# Patient Record
Sex: MALE | Race: WHITE | Employment: OTHER | URBAN - METROPOLITAN AREA
[De-identification: names, ages, dates, MRNs, and addresses within clinical notes are randomized per-mention and may not be internally consistent; named-entity substitution may affect disease eponyms.]

---

## 2017-04-17 PROBLEM — K21.9 GASTROESOPHAGEAL REFLUX DISEASE WITHOUT ESOPHAGITIS: Status: ACTIVE | Noted: 2017-04-17

## 2017-04-17 PROBLEM — R10.13 EPIGASTRIC PAIN: Status: ACTIVE | Noted: 2017-04-17

## 2017-07-05 PROBLEM — G47.33 OSA (OBSTRUCTIVE SLEEP APNEA): Status: ACTIVE | Noted: 2017-07-05

## 2017-12-29 PROBLEM — J01.00 ACUTE NON-RECURRENT MAXILLARY SINUSITIS: Status: ACTIVE | Noted: 2017-12-29

## 2017-12-29 PROBLEM — S33.5XXA LUMBAR BACK SPRAIN: Status: ACTIVE | Noted: 2017-12-29

## 2018-01-09 PROBLEM — E79.0 HYPERURICEMIA: Status: ACTIVE | Noted: 2018-01-09

## 2018-01-24 PROBLEM — J45.20 MILD INTERMITTENT ASTHMA WITHOUT COMPLICATION: Status: ACTIVE | Noted: 2018-01-24

## 2018-01-24 PROBLEM — G47.34 NOCTURNAL HYPOXEMIA: Status: ACTIVE | Noted: 2018-01-24

## 2018-05-22 ENCOUNTER — OFFICE VISIT (OUTPATIENT)
Dept: FAMILY MEDICINE CLINIC | Age: 73
End: 2018-05-22
Payer: MEDICARE

## 2018-05-22 ENCOUNTER — HOSPITAL ENCOUNTER (OUTPATIENT)
Age: 73
Discharge: HOME | End: 2018-05-24
Payer: MEDICARE

## 2018-05-22 ENCOUNTER — HOSPITAL ENCOUNTER (OUTPATIENT)
Dept: GENERAL RADIOLOGY | Age: 73
Discharge: HOME | End: 2018-05-24
Payer: MEDICARE

## 2018-05-22 VITALS
TEMPERATURE: 98 F | OXYGEN SATURATION: 96 % | HEIGHT: 69 IN | SYSTOLIC BLOOD PRESSURE: 130 MMHG | BODY MASS INDEX: 31.1 KG/M2 | RESPIRATION RATE: 20 BRPM | HEART RATE: 68 BPM | WEIGHT: 210 LBS | DIASTOLIC BLOOD PRESSURE: 72 MMHG

## 2018-05-22 DIAGNOSIS — M54.50 ACUTE BILATERAL LOW BACK PAIN WITHOUT SCIATICA: ICD-10-CM

## 2018-05-22 DIAGNOSIS — M54.50 ACUTE BILATERAL LOW BACK PAIN WITHOUT SCIATICA: Primary | ICD-10-CM

## 2018-05-22 PROCEDURE — 96372 THER/PROPH/DIAG INJ SC/IM: CPT | Performed by: FAMILY MEDICINE

## 2018-05-22 PROCEDURE — 72110 X-RAY EXAM L-2 SPINE 4/>VWS: CPT

## 2018-05-22 PROCEDURE — 99213 OFFICE O/P EST LOW 20 MIN: CPT | Performed by: FAMILY MEDICINE

## 2018-05-22 RX ORDER — PREDNISONE 20 MG/1
TABLET ORAL
Qty: 11 TABLET | Refills: 0 | Status: SHIPPED | OUTPATIENT
Start: 2018-05-22 | End: 2018-05-30 | Stop reason: ALTCHOICE

## 2018-05-22 RX ORDER — METHYLPREDNISOLONE ACETATE 80 MG/ML
80 INJECTION, SUSPENSION INTRA-ARTICULAR; INTRALESIONAL; INTRAMUSCULAR; SOFT TISSUE ONCE
Status: COMPLETED | OUTPATIENT
Start: 2018-05-22 | End: 2018-05-22

## 2018-05-22 RX ORDER — TRAMADOL HYDROCHLORIDE 50 MG/1
50 TABLET ORAL 2 TIMES DAILY PRN
Qty: 14 TABLET | Refills: 0 | Status: SHIPPED | OUTPATIENT
Start: 2018-05-22 | End: 2018-05-29

## 2018-05-22 RX ADMIN — METHYLPREDNISOLONE ACETATE 80 MG: 80 INJECTION, SUSPENSION INTRA-ARTICULAR; INTRALESIONAL; INTRAMUSCULAR; SOFT TISSUE at 14:39

## 2018-05-22 ASSESSMENT — ENCOUNTER SYMPTOMS
PHOTOPHOBIA: 0
SINUS PRESSURE: 0
SHORTNESS OF BREATH: 0
BACK PAIN: 1
COLOR CHANGE: 0
APNEA: 0
CHEST TIGHTNESS: 0
NAUSEA: 0
VOMITING: 0
WHEEZING: 0
ABDOMINAL PAIN: 0
SORE THROAT: 0
BLOOD IN STOOL: 0
FACIAL SWELLING: 0
ALLERGIC/IMMUNOLOGIC NEGATIVE: 1
DIARRHEA: 0
COUGH: 0

## 2018-05-30 ENCOUNTER — HOSPITAL ENCOUNTER (OUTPATIENT)
Dept: GENERAL RADIOLOGY | Age: 73
Discharge: HOME | End: 2018-06-01
Payer: MEDICARE

## 2018-05-30 ENCOUNTER — HOSPITAL ENCOUNTER (OUTPATIENT)
Age: 73
Discharge: HOME | End: 2018-06-01
Payer: MEDICARE

## 2018-05-30 ENCOUNTER — OFFICE VISIT (OUTPATIENT)
Dept: FAMILY MEDICINE CLINIC | Age: 73
End: 2018-05-30
Payer: MEDICARE

## 2018-05-30 VITALS
DIASTOLIC BLOOD PRESSURE: 60 MMHG | SYSTOLIC BLOOD PRESSURE: 126 MMHG | WEIGHT: 208 LBS | RESPIRATION RATE: 22 BRPM | HEART RATE: 73 BPM | BODY MASS INDEX: 30.81 KG/M2 | OXYGEN SATURATION: 95 % | HEIGHT: 69 IN

## 2018-05-30 DIAGNOSIS — M48.02 CERVICAL SPINAL STENOSIS: Primary | ICD-10-CM

## 2018-05-30 DIAGNOSIS — M50.30 DDD (DEGENERATIVE DISC DISEASE), CERVICAL: ICD-10-CM

## 2018-05-30 DIAGNOSIS — E78.5 HYPERLIPIDEMIA, UNSPECIFIED HYPERLIPIDEMIA TYPE: ICD-10-CM

## 2018-05-30 DIAGNOSIS — M51.36 DDD (DEGENERATIVE DISC DISEASE), LUMBAR: ICD-10-CM

## 2018-05-30 DIAGNOSIS — I35.0 AORTIC VALVE STENOSIS, ETIOLOGY OF CARDIAC VALVE DISEASE UNSPECIFIED: ICD-10-CM

## 2018-05-30 DIAGNOSIS — M50.30 DDD (DEGENERATIVE DISC DISEASE), CERVICAL: Primary | ICD-10-CM

## 2018-05-30 PROBLEM — M51.369 DDD (DEGENERATIVE DISC DISEASE), LUMBAR: Status: ACTIVE | Noted: 2018-05-30

## 2018-05-30 LAB
ALBUMIN SERPL-MCNC: 4.4 G/DL (ref 3.5–5.2)
ALP BLD-CCNC: 46 U/L (ref 40–129)
ALT SERPL-CCNC: 27 U/L (ref 0–40)
ANION GAP SERPL CALCULATED.3IONS-SCNC: 17 MMOL/L (ref 7–16)
AST SERPL-CCNC: 27 U/L (ref 0–39)
BASOPHILS ABSOLUTE: 0.06 E9/L (ref 0–0.2)
BASOPHILS RELATIVE PERCENT: 0.7 % (ref 0–2)
BILIRUB SERPL-MCNC: 0.4 MG/DL (ref 0–1.2)
BUN BLDV-MCNC: 23 MG/DL (ref 8–23)
CALCIUM SERPL-MCNC: 9.7 MG/DL (ref 8.6–10.2)
CHLORIDE BLD-SCNC: 100 MMOL/L (ref 98–107)
CHOLESTEROL, TOTAL: 199 MG/DL (ref 0–199)
CO2: 24 MMOL/L (ref 22–29)
CREAT SERPL-MCNC: 0.7 MG/DL (ref 0.7–1.2)
EOSINOPHILS ABSOLUTE: 0.02 E9/L (ref 0.05–0.5)
EOSINOPHILS RELATIVE PERCENT: 0.2 % (ref 0–6)
GFR AFRICAN AMERICAN: >60
GFR NON-AFRICAN AMERICAN: >60 ML/MIN/1.73
GLUCOSE BLD-MCNC: 135 MG/DL (ref 74–109)
HCT VFR BLD CALC: 39.7 % (ref 37–54)
HDLC SERPL-MCNC: 107 MG/DL
HEMOGLOBIN: 12.7 G/DL (ref 12.5–16.5)
IMMATURE GRANULOCYTES #: 0.03 E9/L
IMMATURE GRANULOCYTES %: 0.4 % (ref 0–5)
LDL CHOLESTEROL CALCULATED: 74 MG/DL (ref 0–99)
LYMPHOCYTES ABSOLUTE: 1.39 E9/L (ref 1.5–4)
LYMPHOCYTES RELATIVE PERCENT: 17.3 % (ref 20–42)
MCH RBC QN AUTO: 31.2 PG (ref 26–35)
MCHC RBC AUTO-ENTMCNC: 32 % (ref 32–34.5)
MCV RBC AUTO: 97.5 FL (ref 80–99.9)
MONOCYTES ABSOLUTE: 0.46 E9/L (ref 0.1–0.95)
MONOCYTES RELATIVE PERCENT: 5.7 % (ref 2–12)
NEUTROPHILS ABSOLUTE: 6.09 E9/L (ref 1.8–7.3)
NEUTROPHILS RELATIVE PERCENT: 75.7 % (ref 43–80)
PDW BLD-RTO: 12.9 FL (ref 11.5–15)
PLATELET # BLD: 222 E9/L (ref 130–450)
PMV BLD AUTO: 10.5 FL (ref 7–12)
POTASSIUM SERPL-SCNC: 4.4 MMOL/L (ref 3.5–5)
RBC # BLD: 4.07 E12/L (ref 3.8–5.8)
SODIUM BLD-SCNC: 141 MMOL/L (ref 132–146)
TOTAL PROTEIN: 6.7 G/DL (ref 6.4–8.3)
TRIGL SERPL-MCNC: 90 MG/DL (ref 0–149)
VLDLC SERPL CALC-MCNC: 18 MG/DL
WBC # BLD: 8.1 E9/L (ref 4.5–11.5)

## 2018-05-30 PROCEDURE — 80061 LIPID PANEL: CPT

## 2018-05-30 PROCEDURE — 80053 COMPREHEN METABOLIC PANEL: CPT

## 2018-05-30 PROCEDURE — 72050 X-RAY EXAM NECK SPINE 4/5VWS: CPT

## 2018-05-30 PROCEDURE — 85025 COMPLETE CBC W/AUTO DIFF WBC: CPT

## 2018-05-30 PROCEDURE — 99214 OFFICE O/P EST MOD 30 MIN: CPT | Performed by: FAMILY MEDICINE

## 2018-05-30 RX ORDER — GABAPENTIN 100 MG/1
100 CAPSULE ORAL 3 TIMES DAILY
Qty: 90 CAPSULE | Refills: 3 | Status: SHIPPED | OUTPATIENT
Start: 2018-05-30 | End: 2018-07-02 | Stop reason: DRUGHIGH

## 2018-05-30 ASSESSMENT — ENCOUNTER SYMPTOMS
DIARRHEA: 0
VOMITING: 0
COUGH: 0
APNEA: 0
PHOTOPHOBIA: 0
SORE THROAT: 0
NAUSEA: 0
COLOR CHANGE: 0
ALLERGIC/IMMUNOLOGIC NEGATIVE: 1
BACK PAIN: 1
ABDOMINAL PAIN: 0
BLOOD IN STOOL: 0
SINUS PRESSURE: 0
WHEEZING: 0
SHORTNESS OF BREATH: 0
FACIAL SWELLING: 0
CHEST TIGHTNESS: 0

## 2018-06-07 ENCOUNTER — HOSPITAL ENCOUNTER (OUTPATIENT)
Dept: MRI IMAGING | Age: 73
Discharge: HOME | End: 2018-06-09
Payer: MEDICARE

## 2018-06-07 DIAGNOSIS — M51.36 DDD (DEGENERATIVE DISC DISEASE), LUMBAR: ICD-10-CM

## 2018-06-07 DIAGNOSIS — M48.02 CERVICAL SPINAL STENOSIS: ICD-10-CM

## 2018-06-07 PROCEDURE — 72141 MRI NECK SPINE W/O DYE: CPT

## 2018-06-07 PROCEDURE — 72148 MRI LUMBAR SPINE W/O DYE: CPT

## 2018-07-02 ENCOUNTER — OFFICE VISIT (OUTPATIENT)
Dept: FAMILY MEDICINE CLINIC | Age: 73
End: 2018-07-02
Payer: MEDICARE

## 2018-07-02 ENCOUNTER — HOSPITAL ENCOUNTER (OUTPATIENT)
Dept: GENERAL RADIOLOGY | Age: 73
Discharge: HOME | End: 2018-07-04
Payer: MEDICARE

## 2018-07-02 ENCOUNTER — HOSPITAL ENCOUNTER (OUTPATIENT)
Age: 73
Discharge: HOME | End: 2018-07-04
Payer: MEDICARE

## 2018-07-02 VITALS
HEART RATE: 88 BPM | TEMPERATURE: 98.1 F | OXYGEN SATURATION: 96 % | RESPIRATION RATE: 18 BRPM | BODY MASS INDEX: 29.2 KG/M2 | WEIGHT: 204 LBS | SYSTOLIC BLOOD PRESSURE: 140 MMHG | HEIGHT: 70 IN | DIASTOLIC BLOOD PRESSURE: 80 MMHG

## 2018-07-02 DIAGNOSIS — J20.9 ACUTE BRONCHITIS, UNSPECIFIED ORGANISM: ICD-10-CM

## 2018-07-02 DIAGNOSIS — G03.9 ARACHNOIDITIS: ICD-10-CM

## 2018-07-02 DIAGNOSIS — M51.36 DDD (DEGENERATIVE DISC DISEASE), LUMBAR: Primary | ICD-10-CM

## 2018-07-02 DIAGNOSIS — M50.30 DDD (DEGENERATIVE DISC DISEASE), CERVICAL: ICD-10-CM

## 2018-07-02 PROCEDURE — 71046 X-RAY EXAM CHEST 2 VIEWS: CPT

## 2018-07-02 PROCEDURE — 99214 OFFICE O/P EST MOD 30 MIN: CPT | Performed by: FAMILY MEDICINE

## 2018-07-02 RX ORDER — PREDNISONE 20 MG/1
20 TABLET ORAL 2 TIMES DAILY
Qty: 10 TABLET | Refills: 0 | Status: SHIPPED | OUTPATIENT
Start: 2018-07-02 | End: 2018-07-07

## 2018-07-02 RX ORDER — DOXYCYCLINE HYCLATE 100 MG
100 TABLET ORAL 2 TIMES DAILY
Qty: 20 TABLET | Refills: 0 | Status: SHIPPED | OUTPATIENT
Start: 2018-07-02 | End: 2018-07-12

## 2018-07-02 RX ORDER — ALBUTEROL SULFATE 90 UG/1
2 AEROSOL, METERED RESPIRATORY (INHALATION) EVERY 6 HOURS PRN
Qty: 1 INHALER | Refills: 3 | Status: SHIPPED | OUTPATIENT
Start: 2018-07-02 | End: 2019-01-23

## 2018-07-02 RX ORDER — GABAPENTIN 300 MG/1
CAPSULE ORAL
Refills: 1 | COMMUNITY
Start: 2018-06-15 | End: 2018-10-12

## 2018-07-02 ASSESSMENT — ENCOUNTER SYMPTOMS
SINUS PRESSURE: 0
WHEEZING: 1
FACIAL SWELLING: 0
COUGH: 1
ALLERGIC/IMMUNOLOGIC NEGATIVE: 1
APNEA: 0
DIARRHEA: 0
PHOTOPHOBIA: 0
COLOR CHANGE: 0
BACK PAIN: 0
BLOOD IN STOOL: 0
CHEST TIGHTNESS: 0
SHORTNESS OF BREATH: 1

## 2018-07-02 NOTE — PROGRESS NOTES
right and left shoulders       Current Outpatient Prescriptions   Medication Sig Dispense Refill    gabapentin (NEURONTIN) 300 MG capsule TAKE 1 CAPSULE BY MOUTH AT BEDTIME FOR 3 DAYS  THEN TAKE TWICE DAILY FOR 3 DAYS. THEN TAKE THREE TIMES A DAY  1    predniSONE (DELTASONE) 20 MG tablet Take 1 tablet by mouth 2 times daily for 5 days 10 tablet 0    doxycycline hyclate (VIBRA-TABS) 100 MG tablet Take 1 tablet by mouth 2 times daily for 10 days 20 tablet 0    albuterol sulfate HFA (VENTOLIN HFA) 108 (90 Base) MCG/ACT inhaler Inhale 2 puffs into the lungs every 6 hours as needed for Wheezing 1 Inhaler 3    allopurinol (ZYLOPRIM) 300 MG tablet Take 1 tablet by mouth daily 90 tablet 3    pitavastatin (LIVALO) 2 MG TABS tablet Take 1 tablet by mouth nightly 90 tablet 3    valsartan-hydrochlorothiazide (DIOVAN-HCT) 80-12.5 MG per tablet Take 1 tablet by mouth daily 90 tablet 2    omeprazole (PRILOSEC) 20 MG delayed release capsule Take 1 capsule by mouth daily 90 capsule 3    Coenzyme Q10 (CO Q 10) 100 MG CAPS Take 2 tablets by mouth daily      Cholecalciferol (VITAMIN D3) 2000 UNITS TABS Take 1 tablet by mouth daily      Multiple Vitamins-Minerals (THERAPEUTIC MULTIVITAMIN-MINERALS) tablet Take 1 tablet by mouth daily.  aspirin 81 MG EC tablet Take 81 mg by mouth daily        No current facility-administered medications for this visit.         Allergies   Allergen Reactions    Iodine Swelling and Other (See Comments)     Reddened face and swelling of tongue       Social History     Social History    Marital status:      Spouse name: N/A    Number of children: N/A    Years of education: N/A     Occupational History    retired- assembly line/ [employer]      Social History Main Topics    Smoking status: Former Smoker     Packs/day: 1.50     Years: 55.00     Types: Cigarettes     Quit date: 4/29/2013    Smokeless tobacco: Never Used    Alcohol use 3.6 oz/week     6 Cans of beer per week Musculoskeletal: Normal range of motion. Lymphadenopathy:     He has no cervical adenopathy. Neurological: He is alert and oriented to person, place, and time. He has normal reflexes. No cranial nerve deficit. He exhibits normal muscle tone. Coordination normal.   Skin: Skin is warm and dry. No rash noted. No erythema. Psychiatric: He has a normal mood and affect. His behavior is normal. Judgment normal.   Nursing note and vitals reviewed. ASSESSMENT/PLAN:    Blair Anderson was seen today for cough, nasal congestion and generalized body aches. Diagnoses and all orders for this visit:    DDD (degenerative disc disease), lumbar  -     [facility]- Tarik Santos MD    DDD (degenerative disc disease), cervical  -     [facility]- Tarik Santos MD    Arachnoiditis  -     [facility]- Tarik Santos MD    Acute bronchitis, unspecified organism  -     predniSONE (DELTASONE) 20 MG tablet; Take 1 tablet by mouth 2 times daily for 5 days  -     doxycycline hyclate (VIBRA-TABS) 100 MG tablet; Take 1 tablet by mouth 2 times daily for 10 days  -     albuterol sulfate HFA (VENTOLIN HFA) 108 (90 Base) MCG/ACT inhaler; Inhale 2 puffs into the lungs every 6 hours as needed for Wheezing  -     XR CHEST STANDARD (2 VW);  Future            Lobo Hung DO    7/2/2018  11:07 AM

## 2018-07-06 ENCOUNTER — TELEPHONE (OUTPATIENT)
Dept: FAMILY MEDICINE CLINIC | Age: 73
End: 2018-07-06

## 2018-07-06 DIAGNOSIS — R05.9 COUGH: Primary | ICD-10-CM

## 2018-07-09 RX ORDER — GUAIFENESIN AND CODEINE PHOSPHATE 100; 10 MG/5ML; MG/5ML
5 SOLUTION ORAL 2 TIMES DAILY PRN
Qty: 70 ML | Refills: 0 | Status: SHIPPED | OUTPATIENT
Start: 2018-07-09 | End: 2018-07-16

## 2018-07-10 PROBLEM — R09.02 NOCTURNAL HYPOXEMIA DUE TO ASTHMA: Status: ACTIVE | Noted: 2018-07-10

## 2018-07-10 PROBLEM — J45.909 NOCTURNAL HYPOXEMIA DUE TO ASTHMA: Status: ACTIVE | Noted: 2018-07-10

## 2018-07-10 PROBLEM — J45.20 MILD INTERMITTENT ASTHMA WITHOUT COMPLICATION: Status: ACTIVE | Noted: 2018-07-10

## 2018-08-13 RX ORDER — OLMESARTAN MEDOXOMIL AND HYDROCHLOROTHIAZIDE 20/12.5 20; 12.5 MG/1; MG/1
1 TABLET ORAL DAILY
Qty: 30 TABLET | Refills: 3 | Status: SHIPPED | OUTPATIENT
Start: 2018-08-13 | End: 2018-12-03 | Stop reason: SINTOL

## 2018-08-13 RX ORDER — VALSARTAN AND HYDROCHLOROTHIAZIDE 80; 12.5 MG/1; MG/1
1 TABLET, FILM COATED ORAL DAILY
Qty: 90 TABLET | Refills: 1 | OUTPATIENT
Start: 2018-08-13

## 2018-08-24 ENCOUNTER — OFFICE VISIT (OUTPATIENT)
Dept: NEUROSURGERY | Age: 73
End: 2018-08-24
Payer: MEDICARE

## 2018-08-24 VITALS
WEIGHT: 211 LBS | HEIGHT: 69 IN | HEART RATE: 76 BPM | BODY MASS INDEX: 31.25 KG/M2 | SYSTOLIC BLOOD PRESSURE: 139 MMHG | OXYGEN SATURATION: 95 % | DIASTOLIC BLOOD PRESSURE: 69 MMHG

## 2018-08-24 DIAGNOSIS — M54.41 ACUTE MIDLINE LOW BACK PAIN WITH RIGHT-SIDED SCIATICA: Primary | ICD-10-CM

## 2018-08-24 PROCEDURE — 99204 OFFICE O/P NEW MOD 45 MIN: CPT | Performed by: NEUROLOGICAL SURGERY

## 2018-08-24 RX ORDER — IBUPROFEN 200 MG
200 TABLET ORAL EVERY 6 HOURS PRN
COMMUNITY
End: 2019-06-04

## 2018-08-24 ASSESSMENT — ENCOUNTER SYMPTOMS
BACK PAIN: 1
SHORTNESS OF BREATH: 1
EYES NEGATIVE: 1
ALLERGIC/IMMUNOLOGIC NEGATIVE: 1
GASTROINTESTINAL NEGATIVE: 1
BOWEL INCONTINENCE: 0
ABDOMINAL PAIN: 0

## 2018-08-24 NOTE — PATIENT INSTRUCTIONS
Patient Education        Back Pain: Care Instructions  Your Care Instructions    Back pain has many possible causes. It is often related to problems with muscles and ligaments of the back. It may also be related to problems with the nerves, discs, or bones of the back. Moving, lifting, standing, sitting, or sleeping in an awkward way can strain the back. Sometimes you don't notice the injury until later. Arthritis is another common cause of back pain. Although it may hurt a lot, back pain usually improves on its own within several weeks. Most people recover in 12 weeks or less. Using good home treatment and being careful not to stress your back can help you feel better sooner. Follow-up care is a key part of your treatment and safety. Be sure to make and go to all appointments, and call your doctor if you are having problems. It's also a good idea to know your test results and keep a list of the medicines you take. How can you care for yourself at home? · Sit or lie in positions that are most comfortable and reduce your pain. Try one of these positions when you lie down:  ¨ Lie on your back with your knees bent and supported by large pillows. ¨ Lie on the floor with your legs on the seat of a sofa or chair. [place] on your side with your knees and hips bent and a pillow between your legs. ¨ Lie on your stomach if it does not make pain worse. · Do not sit up in bed, and avoid soft couches and twisted positions. Bed rest can help relieve pain at first, but it delays healing. Avoid bed rest after the first day of back pain. · Change positions every 30 minutes. If you must sit for long periods of time, take breaks from sitting. Get up and walk around, or lie in a comfortable position. · Try using a heating pad on a low or medium setting for 15 to 20 minutes every 2 or 3 hours. Try a warm shower in place of one session with the heating pad.   · You can also try an ice pack for 10 to 15 minutes every 2 to 3

## 2018-08-24 NOTE — PROGRESS NOTES
epidurals. Plan:      I have discussed surgery with hi at this time and he is not interested in surgery. Continue with conservative therapy.         nAa Duran MD

## 2018-09-07 ENCOUNTER — HOSPITAL ENCOUNTER (OUTPATIENT)
Age: 73
Discharge: HOME | End: 2018-09-09
Payer: MEDICARE

## 2018-09-07 ENCOUNTER — NURSE ONLY (OUTPATIENT)
Dept: FAMILY MEDICINE CLINIC | Age: 73
End: 2018-09-07
Payer: MEDICARE

## 2018-09-07 DIAGNOSIS — E78.5 HYPERLIPIDEMIA, UNSPECIFIED HYPERLIPIDEMIA TYPE: ICD-10-CM

## 2018-09-07 DIAGNOSIS — I10 ESSENTIAL HYPERTENSION: ICD-10-CM

## 2018-09-07 DIAGNOSIS — E78.5 HYPERLIPIDEMIA, UNSPECIFIED HYPERLIPIDEMIA TYPE: Primary | ICD-10-CM

## 2018-09-07 LAB
ALBUMIN SERPL-MCNC: 4.2 G/DL (ref 3.5–5.2)
ALP BLD-CCNC: 45 U/L (ref 40–129)
ALT SERPL-CCNC: 24 U/L (ref 0–40)
ANION GAP SERPL CALCULATED.3IONS-SCNC: 18 MMOL/L (ref 7–16)
AST SERPL-CCNC: 28 U/L (ref 0–39)
BILIRUB SERPL-MCNC: 0.4 MG/DL (ref 0–1.2)
BUN BLDV-MCNC: 21 MG/DL (ref 8–23)
CALCIUM SERPL-MCNC: 9.4 MG/DL (ref 8.6–10.2)
CHLORIDE BLD-SCNC: 101 MMOL/L (ref 98–107)
CHOLESTEROL, TOTAL: 216 MG/DL (ref 0–199)
CO2: 24 MMOL/L (ref 22–29)
CREAT SERPL-MCNC: 0.8 MG/DL (ref 0.7–1.2)
GFR AFRICAN AMERICAN: >60
GFR NON-AFRICAN AMERICAN: >60 ML/MIN/1.73
GLUCOSE BLD-MCNC: 101 MG/DL (ref 74–109)
HCT VFR BLD CALC: 38.7 % (ref 37–54)
HDLC SERPL-MCNC: 76 MG/DL
HEMOGLOBIN: 12.6 G/DL (ref 12.5–16.5)
LDL CHOLESTEROL CALCULATED: 112 MG/DL (ref 0–99)
MCH RBC QN AUTO: 31.7 PG (ref 26–35)
MCHC RBC AUTO-ENTMCNC: 32.6 % (ref 32–34.5)
MCV RBC AUTO: 97.5 FL (ref 80–99.9)
PDW BLD-RTO: 12.9 FL (ref 11.5–15)
PLATELET # BLD: 214 E9/L (ref 130–450)
PMV BLD AUTO: 10.8 FL (ref 7–12)
POTASSIUM SERPL-SCNC: 4.5 MMOL/L (ref 3.5–5)
RBC # BLD: 3.97 E12/L (ref 3.8–5.8)
SODIUM BLD-SCNC: 143 MMOL/L (ref 132–146)
TOTAL PROTEIN: 6.5 G/DL (ref 6.4–8.3)
TRIGL SERPL-MCNC: 139 MG/DL (ref 0–149)
TSH SERPL DL<=0.05 MIU/L-ACNC: 1.8 UIU/ML (ref 0.27–4.2)
VLDLC SERPL CALC-MCNC: 28 MG/DL
WBC # BLD: 5.1 E9/L (ref 4.5–11.5)

## 2018-09-07 PROCEDURE — 80053 COMPREHEN METABOLIC PANEL: CPT

## 2018-09-07 PROCEDURE — 36415 COLL VENOUS BLD VENIPUNCTURE: CPT | Performed by: FAMILY MEDICINE

## 2018-09-07 PROCEDURE — 84443 ASSAY THYROID STIM HORMONE: CPT

## 2018-09-07 PROCEDURE — 80061 LIPID PANEL: CPT

## 2018-09-07 PROCEDURE — 85027 COMPLETE CBC AUTOMATED: CPT

## 2018-09-10 ENCOUNTER — OFFICE VISIT (OUTPATIENT)
Dept: FAMILY MEDICINE CLINIC | Age: 73
End: 2018-09-10
Payer: MEDICARE

## 2018-09-10 VITALS
HEART RATE: 64 BPM | RESPIRATION RATE: 18 BRPM | SYSTOLIC BLOOD PRESSURE: 138 MMHG | DIASTOLIC BLOOD PRESSURE: 70 MMHG | OXYGEN SATURATION: 96 % | WEIGHT: 217 LBS | HEIGHT: 70 IN | TEMPERATURE: 98.2 F | BODY MASS INDEX: 31.07 KG/M2

## 2018-09-10 DIAGNOSIS — I35.0 AORTIC VALVE STENOSIS, ETIOLOGY OF CARDIAC VALVE DISEASE UNSPECIFIED: ICD-10-CM

## 2018-09-10 DIAGNOSIS — M51.36 DDD (DEGENERATIVE DISC DISEASE), LUMBAR: ICD-10-CM

## 2018-09-10 DIAGNOSIS — R59.0 CERVICAL LYMPHADENOPATHY: ICD-10-CM

## 2018-09-10 DIAGNOSIS — J02.9 ACUTE PHARYNGITIS, UNSPECIFIED ETIOLOGY: ICD-10-CM

## 2018-09-10 DIAGNOSIS — I25.10 ATHEROSCLEROSIS OF NATIVE CORONARY ARTERY OF NATIVE HEART WITHOUT ANGINA PECTORIS: Primary | ICD-10-CM

## 2018-09-10 DIAGNOSIS — Z23 NEED FOR PROPHYLACTIC VACCINATION AGAINST STREPTOCOCCUS PNEUMONIAE (PNEUMOCOCCUS) AND INFLUENZA: ICD-10-CM

## 2018-09-10 DIAGNOSIS — R13.10 DYSPHAGIA, UNSPECIFIED TYPE: ICD-10-CM

## 2018-09-10 PROCEDURE — G8510 SCR DEP NEG, NO PLAN REQD: HCPCS | Performed by: FAMILY MEDICINE

## 2018-09-10 PROCEDURE — 90662 IIV NO PRSV INCREASED AG IM: CPT | Performed by: FAMILY MEDICINE

## 2018-09-10 PROCEDURE — 99214 OFFICE O/P EST MOD 30 MIN: CPT | Performed by: FAMILY MEDICINE

## 2018-09-10 PROCEDURE — G0009 ADMIN PNEUMOCOCCAL VACCINE: HCPCS | Performed by: FAMILY MEDICINE

## 2018-09-10 PROCEDURE — 90670 PCV13 VACCINE IM: CPT | Performed by: FAMILY MEDICINE

## 2018-09-10 PROCEDURE — G0008 ADMIN INFLUENZA VIRUS VAC: HCPCS | Performed by: FAMILY MEDICINE

## 2018-09-10 PROCEDURE — 3288F FALL RISK ASSESSMENT DOCD: CPT | Performed by: FAMILY MEDICINE

## 2018-09-10 ASSESSMENT — PATIENT HEALTH QUESTIONNAIRE - PHQ9
1. LITTLE INTEREST OR PLEASURE IN DOING THINGS: 0
2. FEELING DOWN, DEPRESSED OR HOPELESS: 0
SUM OF ALL RESPONSES TO PHQ QUESTIONS 1-9: 0
SUM OF ALL RESPONSES TO PHQ QUESTIONS 1-9: 0
SUM OF ALL RESPONSES TO PHQ9 QUESTIONS 1 & 2: 0

## 2018-09-10 ASSESSMENT — ENCOUNTER SYMPTOMS
SINUS PRESSURE: 0
BACK PAIN: 0
ABDOMINAL PAIN: 0
TROUBLE SWALLOWING: 1
CHEST TIGHTNESS: 0
SWOLLEN GLANDS: 1
SORE THROAT: 1
ALLERGIC/IMMUNOLOGIC NEGATIVE: 1
NAUSEA: 0
FACIAL SWELLING: 0
APNEA: 0
COLOR CHANGE: 0
WHEEZING: 0
DIARRHEA: 0
PHOTOPHOBIA: 0
BLOOD IN STOOL: 0
VOMITING: 0
SHORTNESS OF BREATH: 0
COUGH: 0

## 2018-09-10 NOTE — PROGRESS NOTES
Chief Complaint:   Chief Complaint   Patient presents with    Hypertension     check up and go over bw    Hyperlipidemia    Discuss Medications     wants to know if he should still be on allopurinol    Neck Swelling     feels like neck is swelling       Hypertension   This is a chronic problem. The current episode started more than 1 year ago. The problem has been resolved since onset. The problem is controlled. Pertinent negatives include no chest pain, headaches, palpitations or shortness of breath. Past treatments include angiotensin blockers and diuretics. The current treatment provides significant improvement. Hyperlipidemia   This is a chronic problem. The current episode started more than 1 year ago. The problem is controlled. Recent lipid tests were reviewed and are normal. Pertinent negatives include no chest pain, myalgias or shortness of breath. Current antihyperlipidemic treatment includes statins. The current treatment provides significant improvement of lipids. Pharyngitis   The current episode started 1 to 4 weeks ago. The problem occurs intermittently. The problem has been gradually worsening. Associated symptoms include a sore throat and swollen glands. Pertinent negatives include no abdominal pain, arthralgias, chest pain, congestion, coughing, headaches, joint swelling, myalgias, nausea, rash, vomiting or weakness.        Patient Active Problem List   Diagnosis    Coronary atherosclerosis of native coronary artery    Hyperlipidemia    Aortic stenosis    Hypertension    DEBORAH (obstructive sleep apnea)    DDD (degenerative disc disease), lumbar    DDD (degenerative disc disease), cervical    Mild intermittent asthma without complication    Nocturnal hypoxemia due to asthma       Past Medical History:   Diagnosis Date    Atrial fibrillation ([address]     day of surgery on 06/12/13- resolved at this time 6/23/2013    CAD (coronary artery disease)     heart cath with stent 2001    COPD Comments)     Reddened face and swelling of tongue       Social History     Social History    Marital status:      Spouse name: N/A    Number of children: N/A    Years of education: N/A     Occupational History    retired- assembly line/ [employer]      Social History Main Topics    Smoking status: Former Smoker     Packs/day: 1.50     Years: 55.00     Types: Cigarettes     Quit date: 4/29/2013    Smokeless tobacco: Never Used    Alcohol use Yes      Comment: drinks 6 cans of beer /day    Drug use: No    Sexual activity: Not Asked     Other Topics Concern    None     Social History Narrative    None       Family History   Problem Relation Age of Onset    Stroke Father     Cancer Sister     Heart Disease Brother          Review of Systems   Constitutional: Negative. HENT: Positive for sore throat and trouble swallowing. Negative for congestion, facial swelling, hearing loss, nosebleeds and sinus pressure. Eyes: Negative for photophobia and visual disturbance. Respiratory: Negative for apnea, cough, chest tightness, shortness of breath and wheezing. Cardiovascular: Negative for chest pain, palpitations and leg swelling. Gastrointestinal: Negative for abdominal pain, blood in stool, diarrhea, nausea and vomiting. Genitourinary: Negative for difficulty urinating, frequency and urgency. Musculoskeletal: Negative for arthralgias, back pain, joint swelling and myalgias. Skin: Negative for color change and rash. Allergic/Immunologic: Negative. Neurological: Negative for syncope, weakness, light-headedness and headaches. Hematological: Negative. Psychiatric/Behavioral: Negative for agitation, behavioral problems, confusion and self-injury. The patient is not nervous/anxious. All other systems reviewed and are negative. Physical Exam   Constitutional: He is oriented to person, place, and time. He appears well-developed and well-nourished. No distress.    HENT:   Head: Normocephalic and atraumatic. Nose: Nose normal.   Mouth/Throat: Oropharynx is clear and moist.   Eyes: Pupils are equal, round, and reactive to light. Conjunctivae and EOM are normal.   Neck: Normal range of motion. Neck supple. No JVD present. No thyromegaly present. Cardiovascular: Normal rate and regular rhythm. Exam reveals no gallop and no friction rub. Murmur heard. Systolic murmur is present with a grade of 1/6   Pulmonary/Chest: Effort normal and breath sounds normal. No respiratory distress. He has no wheezes. Abdominal: Soft. Bowel sounds are normal. He exhibits no distension. There is no tenderness. There is no rebound and no guarding. Musculoskeletal: Normal range of motion. Lymphadenopathy:     He has cervical adenopathy. Neurological: He is alert and oriented to person, place, and time. He has normal reflexes. No cranial nerve deficit. He exhibits normal muscle tone. Coordination normal.   Skin: Skin is warm and dry. No rash noted. No erythema. Psychiatric: He has a normal mood and affect. His behavior is normal. Judgment normal.   Nursing note and vitals reviewed. ASSESSMENT/PLAN:    Oren Landau was seen today for hypertension, hyperlipidemia, discuss medications and neck swelling.     Diagnoses and all orders for this visit:    Atherosclerosis of native coronary artery of native heart without angina pectoris    Aortic valve stenosis, etiology of cardiac valve disease unspecified    DDD (degenerative disc disease), lumbar    Dysphagia, unspecified type  -     CT Soft Tissue Neck WO Contrast; Future    Acute pharyngitis, unspecified etiology  -     CT Soft Tissue Neck WO Contrast; Future    Cervical lymphadenopathy  -     CT Soft Tissue Neck WO Contrast; Future    Need for prophylactic vaccination against Streptococcus pneumoniae (pneumococcus) and influenza    Other orders  -     INFLUENZA, HIGH DOSE, 65 YRS +, IM, PF, PREFILL SYR, 0.5ML (FLUZONE HD)  - Pneumococcal conjugate vaccine 13-valent      The current medical regimen is effective;  continue present plan and medications.         Rubina Copeland DO    9/10/2018  8:49 AM

## 2018-09-10 NOTE — PATIENT INSTRUCTIONS
ever had a life-threatening allergic reaction to a dose of this vaccine, to an earlier pneumococcal vaccine called PCV7, or to any vaccine containing diphtheria toxoid (for example, DTaP), should not get PCV13. Anyone with a severe allergy to any component of PCV13 should not get the vaccine. Tell your doctor if the person being vaccinated has any severe allergies. If the person scheduled for vaccination is not feeling well, your healthcare provider might decide to reschedule the shot on another day. Risks of a vaccine reaction  With any medicine, including vaccines, there is a chance of reactions. These are usually mild and go away on their own, but serious reactions are also possible. Problems reported following PCV13 varied by age and dose in the series. The most common problems reported among children were:  · About half became drowsy after the shot, had a temporary loss of appetite, or had redness or tenderness where the shot was given. · About 1 out of 3 had swelling where the shot was given. · About 1 out of 3 had a mild fever, and about 1 in 20 had a fever over 102.2°F.  · Up to about 8 out of 10 became fussy or irritable. Adults have reported pain, redness, and swelling where the shot was given; also mild fever, fatigue, headache, chills, or muscle pain. [facility] children who get PCV13 along with inactivated flu vaccine at the same time may be at increased risk for seizures caused by fever. Ask your doctor for more information. Problems that could happen after any vaccine:  · People sometimes faint after a medical procedure, including vaccination. Sitting or lying down for about 15 minutes can help prevent fainting and the injuries caused by a fall. Tell your doctor if you feel dizzy or have vision changes or ringing in the ears. · Some older children and adults get severe pain in the shoulder and have difficulty moving the arm where a shot was given. This happens very rarely.   · Any medication can Centers for Disease Control and Prevention (CDC):  ¨ Call 2-198.407.3695 (1-800-CDC-INFO) or  ¨ Visit CDC's website at www.cdc.gov/vaccines  Vaccine Information Statement  PCV13 Vaccine  11/5/2015  42 DIANE Lemus [id number]-[id number]  Department of Health and Human Services  Centers for Disease Control and Prevention  Many Vaccine Information Statements are available in [place] and other languages. See www.immunize.org/vis. Muchas hojas de información sobre vacunas están disponibles en español y en otros idiomas. Visite www.immunize.org/vis. Care instructions adapted under license by [facility] ([facility]). If you have questions about a medical condition or this instruction, always ask your healthcare professional. [address] any warranty or liability for your use of this information.

## 2018-09-14 ENCOUNTER — HOSPITAL ENCOUNTER (OUTPATIENT)
Dept: CT IMAGING | Age: 73
Discharge: HOME | End: 2018-09-16
Payer: MEDICARE

## 2018-09-14 DIAGNOSIS — J02.9 ACUTE PHARYNGITIS, UNSPECIFIED ETIOLOGY: ICD-10-CM

## 2018-09-14 DIAGNOSIS — R59.0 CERVICAL LYMPHADENOPATHY: ICD-10-CM

## 2018-09-14 DIAGNOSIS — R13.10 DYSPHAGIA, UNSPECIFIED TYPE: ICD-10-CM

## 2018-09-14 PROCEDURE — 70490 CT SOFT TISSUE NECK W/O DYE: CPT

## 2018-09-17 ENCOUNTER — TELEPHONE (OUTPATIENT)
Dept: FAMILY MEDICINE CLINIC | Age: 73
End: 2018-09-17

## 2018-09-17 DIAGNOSIS — M51.36 DDD (DEGENERATIVE DISC DISEASE), LUMBAR: Primary | ICD-10-CM

## 2018-09-17 DIAGNOSIS — M48.061 NEUROFORAMINAL STENOSIS OF LUMBAR SPINE: ICD-10-CM

## 2018-09-17 RX ORDER — OMEPRAZOLE 20 MG/1
20 CAPSULE, DELAYED RELEASE ORAL DAILY
Qty: 90 CAPSULE | Refills: 1 | Status: SHIPPED | OUTPATIENT
Start: 2018-09-17 | End: 2019-04-04 | Stop reason: SDUPTHER

## 2018-09-17 NOTE — TELEPHONE ENCOUNTER
Needs script for physical therapy  Back pain.  They didn't realize they needed a script  They are going to [address] [place] [address] orthop[facility] center for the therapy        Fax--[phone]

## 2018-09-17 NOTE — TELEPHONE ENCOUNTER
Patients wife called stating that he has some bilat swelling in lower legs & feet. Not sure if this is from the new BP meds or what. Please advise.

## 2018-09-21 ENCOUNTER — OFFICE VISIT (OUTPATIENT)
Dept: FAMILY MEDICINE CLINIC | Age: 73
End: 2018-09-21
Payer: MEDICARE

## 2018-09-21 VITALS
SYSTOLIC BLOOD PRESSURE: 152 MMHG | HEIGHT: 70 IN | TEMPERATURE: 98 F | BODY MASS INDEX: 30.64 KG/M2 | HEART RATE: 66 BPM | DIASTOLIC BLOOD PRESSURE: 68 MMHG | OXYGEN SATURATION: 95 % | RESPIRATION RATE: 20 BRPM | WEIGHT: 214 LBS

## 2018-09-21 DIAGNOSIS — I25.10 ATHEROSCLEROSIS OF NATIVE CORONARY ARTERY OF NATIVE HEART WITHOUT ANGINA PECTORIS: ICD-10-CM

## 2018-09-21 DIAGNOSIS — M79.89 LEG SWELLING: Primary | ICD-10-CM

## 2018-09-21 PROCEDURE — 99213 OFFICE O/P EST LOW 20 MIN: CPT | Performed by: FAMILY MEDICINE

## 2018-09-21 RX ORDER — FUROSEMIDE 20 MG/1
20 TABLET ORAL DAILY
Qty: 30 TABLET | Refills: 3 | Status: SHIPPED | OUTPATIENT
Start: 2018-09-21 | End: 2019-01-02 | Stop reason: SDUPTHER

## 2018-09-21 ASSESSMENT — ENCOUNTER SYMPTOMS
DIARRHEA: 0
SINUS PRESSURE: 0
ALLERGIC/IMMUNOLOGIC NEGATIVE: 1
NAUSEA: 0
ABDOMINAL PAIN: 0
APNEA: 0
WHEEZING: 0
BACK PAIN: 1
COLOR CHANGE: 0
COUGH: 0
SHORTNESS OF BREATH: 0
BLOOD IN STOOL: 0
FACIAL SWELLING: 0
CHEST TIGHTNESS: 0
PHOTOPHOBIA: 0
SORE THROAT: 0
VOMITING: 0

## 2018-09-21 NOTE — PROGRESS NOTES
Chief Complaint:   Chief Complaint   Patient presents with    Leg Swelling     x 1 week-decreased salt in diet, no change.  feels tight and swollen       HPIleg edema x 2 months   No sob  C/o back pain    Patient Active Problem List   Diagnosis    Coronary atherosclerosis of native coronary artery    Hyperlipidemia    Aortic stenosis    Hypertension    DEBORAH (obstructive sleep apnea)    DDD (degenerative disc disease), lumbar    DDD (degenerative disc disease), cervical    Mild intermittent asthma without complication    Nocturnal hypoxemia due to asthma    Neuroforaminal stenosis of lumbar spine       Past Medical History:   Diagnosis Date    Atrial fibrillation ([address]     day of surgery on 06/12/13- resolved at this time 6/23/2013    CAD (coronary artery disease)     heart cath with stent 2001    COPD (chronic obstructive pulmonary disease) ([address]     Hyperlipidemia     Hypertension     Kidney stones     Sleep apnea     Vitamin D deficiency        Past Surgical History:   Procedure Laterality Date    AORTIC VALVE REPLACEMENT  6/12/2013    BACK SURGERY  2006    cervicle fusion   [address] [place] [address] BACK SURGERY  1974, 1984    lumbar laminectomy    BACK SURGERY  [id number]    360 FUSION L4-L5    CARDIAC SURGERY      CARPAL TUNNEL RELEASE  [id number]    both hands    CORONARY ARTERY BYPASS GRAFT      DIAGNOSTIC CARDIAC CATH LAB PROCEDURE      KIDNEY STONE SURGERY      open    NECK SURGERY      SHOULDER ARTHROSCOPY      right and left shoulders       Current Outpatient Prescriptions   Medication Sig Dispense Refill    furosemide (LASIX) 20 MG tablet Take 1 tablet by mouth daily 30 tablet 3    omeprazole (PRILOSEC) 20 MG delayed release capsule Take 1 capsule by mouth daily 90 capsule 1    ibuprofen (ADVIL;MOTRIN) 200 MG tablet Take 200 mg by mouth every 6 hours as needed for Pain      olmesartan-hydrochlorothiazide (BENICAR HCT) 20-12.5 MG per tablet Take 1 tablet by mouth daily 30 tablet 3    gabapentin (NEURONTIN) 300 MG capsule TAKE 1 CAPSULE BY MOUTH AT BEDTIME FOR 3 DAYS  THEN TAKE TWICE DAILY FOR 3 DAYS. THEN TAKE THREE TIMES A DAY  1    albuterol sulfate HFA (VENTOLIN HFA) 108 (90 Base) MCG/ACT inhaler Inhale 2 puffs into the lungs every 6 hours as needed for Wheezing 1 Inhaler 3    allopurinol (ZYLOPRIM) 300 MG tablet Take 1 tablet by mouth daily 90 tablet 3    pitavastatin (LIVALO) 2 MG TABS tablet Take 1 tablet by mouth nightly 90 tablet 3    Coenzyme Q10 (CO Q 10) 100 MG CAPS Take 2 tablets by mouth daily      Cholecalciferol (VITAMIN D3) 2000 UNITS TABS Take 1 tablet by mouth daily      Multiple Vitamins-Minerals (THERAPEUTIC MULTIVITAMIN-MINERALS) tablet Take 1 tablet by mouth daily.  aspirin 81 MG EC tablet Take 81 mg by mouth daily        No current facility-administered medications for this visit. Allergies   Allergen Reactions    Iodine Swelling and Other (See Comments)     Reddened face and swelling of tongue       Social History     Social History    Marital status:      Spouse name: N/A    Number of children: N/A    Years of education: N/A     Occupational History    retired- assembly line/ [employer]      Social History Main Topics    Smoking status: Former Smoker     Packs/day: 1.50     Years: 55.00     Types: Cigarettes     Quit date: 4/29/2013    Smokeless tobacco: Never Used    Alcohol use Yes      Comment: drinks 6 cans of beer /day    Drug use: No    Sexual activity: Not Asked     Other Topics Concern    None     Social History Narrative    None       Family History   Problem Relation Age of Onset    Stroke Father     Cancer Sister     Heart Disease Brother          Review of Systems   Constitutional: Negative. HENT: Negative for congestion, facial swelling, hearing loss, nosebleeds, sinus pressure and sore throat. Eyes: Negative for photophobia and visual disturbance.    Respiratory: Negative for apnea, cough, chest tightness, shortness of breath and wheezing. Cardiovascular: Positive for leg swelling. Negative for chest pain and palpitations. Gastrointestinal: Negative for abdominal pain, blood in stool, diarrhea, nausea and vomiting. Genitourinary: Negative for difficulty urinating, frequency and urgency. Musculoskeletal: Positive for back pain. Negative for arthralgias, joint swelling and myalgias. Skin: Negative for color change and rash. Allergic/Immunologic: Negative. Neurological: Negative for syncope, weakness, light-headedness and headaches. Hematological: Negative. Psychiatric/Behavioral: Negative for agitation, behavioral problems, confusion and self-injury. The patient is not nervous/anxious. All other systems reviewed and are negative. Physical Exam   Constitutional: He is oriented to person, place, and time. He appears well-developed and well-nourished. No distress. HENT:   Head: Normocephalic and atraumatic. Nose: Nose normal.   Mouth/Throat: Oropharynx is clear and moist.   Eyes: Pupils are equal, round, and reactive to light. Conjunctivae and EOM are normal.   Neck: Normal range of motion. Neck supple. No JVD present. No thyromegaly present. Cardiovascular: Normal rate, regular rhythm and normal heart sounds. Exam reveals no gallop and no friction rub. No murmur heard. Pulmonary/Chest: Effort normal and breath sounds normal. No respiratory distress. He has no wheezes. Abdominal: Soft. Bowel sounds are normal. He exhibits no distension. There is no tenderness. There is no rebound and no guarding. Musculoskeletal:        Lumbar back: He exhibits decreased range of motion, tenderness and bony tenderness. Right lower leg: He exhibits swelling. Left lower leg: He exhibits swelling. Lymphadenopathy:     He has no cervical adenopathy. Neurological: He is alert and oriented to person, place, and time. He has normal reflexes. No cranial nerve deficit. He exhibits normal muscle tone.

## 2018-10-12 ENCOUNTER — HOSPITAL ENCOUNTER (OUTPATIENT)
Age: 73
Discharge: HOME | End: 2018-10-14
Payer: MEDICARE

## 2018-10-12 ENCOUNTER — OFFICE VISIT (OUTPATIENT)
Dept: FAMILY MEDICINE CLINIC | Age: 73
End: 2018-10-12
Payer: MEDICARE

## 2018-10-12 VITALS
DIASTOLIC BLOOD PRESSURE: 70 MMHG | OXYGEN SATURATION: 96 % | RESPIRATION RATE: 18 BRPM | WEIGHT: 213 LBS | HEART RATE: 71 BPM | HEIGHT: 70 IN | TEMPERATURE: 98.2 F | SYSTOLIC BLOOD PRESSURE: 124 MMHG | BODY MASS INDEX: 30.49 KG/M2

## 2018-10-12 DIAGNOSIS — G62.9 NEUROPATHY: ICD-10-CM

## 2018-10-12 DIAGNOSIS — E11.40 TYPE 2 DIABETES MELLITUS WITH DIABETIC NEUROPATHY, WITHOUT LONG-TERM CURRENT USE OF INSULIN (HCC): Primary | ICD-10-CM

## 2018-10-12 DIAGNOSIS — I73.9 PVD (PERIPHERAL VASCULAR DISEASE) (HCC): ICD-10-CM

## 2018-10-12 DIAGNOSIS — E11.40 TYPE 2 DIABETES MELLITUS WITH DIABETIC NEUROPATHY, WITHOUT LONG-TERM CURRENT USE OF INSULIN (HCC): ICD-10-CM

## 2018-10-12 LAB
CREATININE URINE: 28 MG/DL (ref 40–278)
HBA1C MFR BLD: 5.9 %
MICROALBUMIN UR-MCNC: <12 MG/L
MICROALBUMIN/CREAT UR-RTO: ABNORMAL (ref 0–30)

## 2018-10-12 PROCEDURE — 83036 HEMOGLOBIN GLYCOSYLATED A1C: CPT | Performed by: FAMILY MEDICINE

## 2018-10-12 PROCEDURE — 99214 OFFICE O/P EST MOD 30 MIN: CPT | Performed by: FAMILY MEDICINE

## 2018-10-12 PROCEDURE — 82044 UR ALBUMIN SEMIQUANTITATIVE: CPT

## 2018-10-12 PROCEDURE — 82570 ASSAY OF URINE CREATININE: CPT

## 2018-10-12 RX ORDER — GABAPENTIN 600 MG/1
600 TABLET ORAL 3 TIMES DAILY
Qty: 90 TABLET | Refills: 3 | Status: SHIPPED | OUTPATIENT
Start: 2018-10-12 | End: 2019-06-04

## 2018-10-12 ASSESSMENT — ENCOUNTER SYMPTOMS
COLOR CHANGE: 0
APNEA: 0
BACK PAIN: 0
ABDOMINAL PAIN: 0
COUGH: 0
SINUS PRESSURE: 0
ALLERGIC/IMMUNOLOGIC NEGATIVE: 1
BLOOD IN STOOL: 0
CHEST TIGHTNESS: 0
WHEEZING: 0
PHOTOPHOBIA: 0
DIARRHEA: 0
SHORTNESS OF BREATH: 0
NAUSEA: 0
FACIAL SWELLING: 0
VOMITING: 0
SORE THROAT: 0

## 2018-10-12 NOTE — PROGRESS NOTES
KIDNEY STONE SURGERY      open    NECK SURGERY      SHOULDER ARTHROSCOPY      right and left shoulders       Current Outpatient Prescriptions   Medication Sig Dispense Refill    gabapentin (NEURONTIN) 600 MG tablet Take 1 tablet by mouth 3 times daily for 30 days. . 90 tablet 3    furosemide (LASIX) 20 MG tablet Take 1 tablet by mouth daily 30 tablet 3    omeprazole (PRILOSEC) 20 MG delayed release capsule Take 1 capsule by mouth daily 90 capsule 1    ibuprofen (ADVIL;MOTRIN) 200 MG tablet Take 200 mg by mouth every 6 hours as needed for Pain      olmesartan-hydrochlorothiazide (BENICAR HCT) 20-12.5 MG per tablet Take 1 tablet by mouth daily 30 tablet 3    albuterol sulfate HFA (VENTOLIN HFA) 108 (90 Base) MCG/ACT inhaler Inhale 2 puffs into the lungs every 6 hours as needed for Wheezing 1 Inhaler 3    allopurinol (ZYLOPRIM) 300 MG tablet Take 1 tablet by mouth daily 90 tablet 3    pitavastatin (LIVALO) 2 MG TABS tablet Take 1 tablet by mouth nightly 90 tablet 3    Coenzyme Q10 (CO Q 10) 100 MG CAPS Take 2 tablets by mouth daily      Cholecalciferol (VITAMIN D3) 2000 UNITS TABS Take 1 tablet by mouth daily      Multiple Vitamins-Minerals (THERAPEUTIC MULTIVITAMIN-MINERALS) tablet Take 1 tablet by mouth daily.  aspirin 81 MG EC tablet Take 81 mg by mouth daily        No current facility-administered medications for this visit.         Allergies   Allergen Reactions    Iodine Swelling and Other (See Comments)     Reddened face and swelling of tongue       Social History     Social History    Marital status:      Spouse name: N/A    Number of children: N/A    Years of education: N/A     Occupational History    retired- assembly line/ forklift      Social History Main Topics    Smoking status: Former Smoker     Packs/day: 1.50     Years: 55.00     Types: Cigarettes     Quit date: 4/29/2013    Smokeless tobacco: Never Used    Alcohol use Yes      Comment: drinks 6 cans of beer /day    Drug normal and breath sounds normal. No respiratory distress. He has no wheezes. Abdominal: Soft. Bowel sounds are normal. He exhibits no distension. There is no tenderness. There is no rebound and no guarding. Musculoskeletal: Normal range of motion. Lymphadenopathy:     He has no cervical adenopathy. Neurological: He is alert and oriented to person, place, and time. He has normal reflexes. No cranial nerve deficit. He exhibits normal muscle tone. Coordination normal.   Skin: Skin is warm and dry. No rash noted. No erythema. Psychiatric: He has a normal mood and affect. His behavior is normal. Judgment normal.   Nursing note and vitals reviewed. decrease sensation ailyn lower ext with pin prick                         ASSESSMENT/PLAN:    David Couch was seen today for foot swelling and arthritis. Diagnoses and all orders for this visit:    Type 2 diabetes mellitus with diabetic neuropathy, without long-term current use of insulin ([facility])  -      DIABETES FOOT EXAM  -     Microalbumin / creatinine urine ratio; Future  -     POCT glycosylated hemoglobin (Hb A1C)    PVD (peripheral vascular disease) ([facility])  -     VL JELENA BILATERAL LIMITED 1-2 LEVELS; Future    Neuropathy  -     gabapentin (NEURONTIN) 600 MG tablet; Take 1 tablet by mouth 3 times daily for 30 days. .      Lose weight      Ami Seals DO    10/12/2018  11:09 AM

## 2018-10-17 ENCOUNTER — HOSPITAL ENCOUNTER (OUTPATIENT)
Dept: INTERVENTIONAL RADIOLOGY/VASCULAR | Age: 73
Discharge: HOME | End: 2018-10-19
Payer: MEDICARE

## 2018-10-17 DIAGNOSIS — I73.9 PVD (PERIPHERAL VASCULAR DISEASE) (HCC): ICD-10-CM

## 2018-10-17 PROCEDURE — 93922 UPR/L XTREMITY ART 2 LEVELS: CPT

## 2018-10-19 ENCOUNTER — TELEPHONE (OUTPATIENT)
Dept: FAMILY MEDICINE CLINIC | Age: 73
End: 2018-10-19

## 2018-10-19 DIAGNOSIS — R20.2 PARESTHESIA: ICD-10-CM

## 2018-10-19 DIAGNOSIS — G62.9 NEUROPATHY: Primary | ICD-10-CM

## 2018-10-19 NOTE — TELEPHONE ENCOUNTER
Purnima Jones calling states that patient is still having a lot of foot pain and swelling. Right side is worse. Would like to know if there is further testing for neuropathy?  Please advise, Purnima Jones can be reached at [phone]

## 2018-11-28 ENCOUNTER — HOSPITAL ENCOUNTER (OUTPATIENT)
Dept: NEUROLOGY | Age: 73
Discharge: HOME | End: 2018-11-28
Payer: MEDICARE

## 2018-11-28 VITALS — HEIGHT: 70 IN | WEIGHT: 210 LBS | BODY MASS INDEX: 30.06 KG/M2

## 2018-11-28 DIAGNOSIS — R20.2 PARESTHESIA: ICD-10-CM

## 2018-11-28 DIAGNOSIS — G62.9 NEUROPATHY: ICD-10-CM

## 2018-11-28 PROCEDURE — 95886 MUSC TEST DONE W/N TEST COMP: CPT | Performed by: PSYCHIATRY & NEUROLOGY

## 2018-11-28 PROCEDURE — 95911 NRV CNDJ TEST 9-10 STUDIES: CPT

## 2018-11-28 PROCEDURE — 95911 NRV CNDJ TEST 9-10 STUDIES: CPT | Performed by: PSYCHIATRY & NEUROLOGY

## 2018-11-28 PROCEDURE — 95886 MUSC TEST DONE W/N TEST COMP: CPT

## 2018-11-28 NOTE — PROCEDURES
interossei (pedis) N None None None None N N N Distant MUPs   R. Extensor digitorum brevis N None None None None N N 2+ Decr   R. Vastus lateralis N None None None None N N 1+ N   R. Biceps femoris (short head) N None None None +1Nascent N N 1+ N   R. Iliopsoas N None None None None N N N N   R. Sacral paraspinals N None None None None N N N N   R. Lumbar paraspinals (low) Incr 1+ None None None N N N N   R. Lumbar paraspinals (mid) Incr 1+ None None CRDs N N N N   R. Gluteus medius N None None None None N N 1+ N       Air conduction studies in both legs disclosed the following abnormalities---decreased compound motor action potentials in both peroneal nerves recording of the extensor digitorum brevis muscles. Both superficial peroneal and right sural sensory nerve potentials were not obtained. The left peroneal F wave response was not recorded; there was marked delay in the F-wave latencies of both tibial nerves. Both H reflex latencies were also prolonged. These findings were compared to the referential values in this laboratory, available upon request.    Monopolar needle examination of the right leg disclosed chronic axonal loss with reinnervation in all muscles examined except for the iliopsoas. Acute denervation changes were seen in the medial gastrocnemius muscle. Acute and chronic denervation changes were found in the lumbar paraspinals. Electrodiagnostic examination of both legs disclosed evidence of diffuse right-sided sacral motor radiculopathies or intracanalicular lesions---involving at least the L4 through S1 motor roots. These were also acute and chronic denervation changes in the paraspinals. However, the patient was status post 2 lumbosacral laminectomies. There were no other radiculopathies. There were no definitive peripheral neuropathies. Absent sensory nerve potentials in the feet are commonly seen in individuals in her 76s.  Sensory radiculopathies cannot be evaluated by

## 2018-12-03 ENCOUNTER — OFFICE VISIT (OUTPATIENT)
Dept: FAMILY MEDICINE CLINIC | Age: 73
End: 2018-12-03
Payer: MEDICARE

## 2018-12-03 ENCOUNTER — HOSPITAL ENCOUNTER (OUTPATIENT)
Age: 73
Discharge: HOME | End: 2018-12-05
Payer: MEDICARE

## 2018-12-03 VITALS
DIASTOLIC BLOOD PRESSURE: 50 MMHG | RESPIRATION RATE: 18 BRPM | HEIGHT: 70 IN | WEIGHT: 210 LBS | HEART RATE: 80 BPM | SYSTOLIC BLOOD PRESSURE: 110 MMHG | BODY MASS INDEX: 30.06 KG/M2 | TEMPERATURE: 98 F

## 2018-12-03 DIAGNOSIS — Z12.5 PROSTATE CANCER SCREENING: ICD-10-CM

## 2018-12-03 DIAGNOSIS — M50.30 DDD (DEGENERATIVE DISC DISEASE), CERVICAL: ICD-10-CM

## 2018-12-03 DIAGNOSIS — E78.5 HYPERLIPIDEMIA, UNSPECIFIED HYPERLIPIDEMIA TYPE: ICD-10-CM

## 2018-12-03 DIAGNOSIS — I25.10 ATHEROSCLEROSIS OF NATIVE CORONARY ARTERY OF NATIVE HEART WITHOUT ANGINA PECTORIS: ICD-10-CM

## 2018-12-03 DIAGNOSIS — M51.36 DDD (DEGENERATIVE DISC DISEASE), LUMBAR: ICD-10-CM

## 2018-12-03 DIAGNOSIS — I10 ESSENTIAL HYPERTENSION: Primary | ICD-10-CM

## 2018-12-03 LAB — PROSTATE SPECIFIC ANTIGEN: 0.91 NG/ML (ref 0–4)

## 2018-12-03 PROCEDURE — G0103 PSA SCREENING: HCPCS

## 2018-12-03 PROCEDURE — 84132 ASSAY OF SERUM POTASSIUM: CPT

## 2018-12-03 PROCEDURE — G8510 SCR DEP NEG, NO PLAN REQD: HCPCS | Performed by: FAMILY MEDICINE

## 2018-12-03 PROCEDURE — 99214 OFFICE O/P EST MOD 30 MIN: CPT | Performed by: FAMILY MEDICINE

## 2018-12-03 PROCEDURE — 36415 COLL VENOUS BLD VENIPUNCTURE: CPT | Performed by: FAMILY MEDICINE

## 2018-12-03 PROCEDURE — 3288F FALL RISK ASSESSMENT DOCD: CPT | Performed by: FAMILY MEDICINE

## 2018-12-03 RX ORDER — PREDNISONE 20 MG/1
TABLET ORAL
Qty: 11 TABLET | Refills: 0 | Status: SHIPPED | OUTPATIENT
Start: 2018-12-03 | End: 2019-01-23

## 2018-12-03 RX ORDER — OLMESARTAN MEDOXOMIL 5 MG/1
5 TABLET ORAL DAILY
Qty: 90 TABLET | Refills: 1 | Status: SHIPPED | OUTPATIENT
Start: 2018-12-03 | End: 2019-01-23 | Stop reason: DRUGHIGH

## 2018-12-03 ASSESSMENT — ENCOUNTER SYMPTOMS
WHEEZING: 0
CHEST TIGHTNESS: 0
SHORTNESS OF BREATH: 0
BACK PAIN: 1
FACIAL SWELLING: 0
DIARRHEA: 0
SINUS PRESSURE: 0
VOMITING: 0
NAUSEA: 0
COUGH: 0
SORE THROAT: 0
PHOTOPHOBIA: 0
ALLERGIC/IMMUNOLOGIC NEGATIVE: 1
ABDOMINAL PAIN: 0
COLOR CHANGE: 0
APNEA: 0
BLOOD IN STOOL: 0

## 2018-12-03 ASSESSMENT — PATIENT HEALTH QUESTIONNAIRE - PHQ9
SUM OF ALL RESPONSES TO PHQ QUESTIONS 1-9: 0
SUM OF ALL RESPONSES TO PHQ9 QUESTIONS 1 & 2: 0
SUM OF ALL RESPONSES TO PHQ QUESTIONS 1-9: 0
2. FEELING DOWN, DEPRESSED OR HOPELESS: 0
1. LITTLE INTEREST OR PLEASURE IN DOING THINGS: 0

## 2018-12-03 NOTE — PROGRESS NOTES
(coronary artery disease)     heart cath with stent 2001    COPD (chronic obstructive pulmonary disease) (HCC)     Hyperlipidemia     Hypertension     Kidney stones     Sleep apnea     Vitamin D deficiency        Past Surgical History:   Procedure Laterality Date    AORTIC VALVE REPLACEMENT  6/12/2013    BACK SURGERY  2006    cervicle fusion   [facility]GERY  1974, 1984    lumbar laminectomy    BACK SURGERY  [id number]    360 FUSION L4-L5    CARDIAC SURGERY      CARPAL TUNNEL RELEASE  1-20013    both hands    CORONARY ARTERY BYPASS GRAFT      DIAGNOSTIC CARDIAC CATH LAB PROCEDURE      KIDNEY STONE SURGERY      open    NECK SURGERY      SHOULDER ARTHROSCOPY      right and left shoulders       Current Outpatient Prescriptions   Medication Sig Dispense Refill    olmesartan (BENICAR) 5 MG tablet Take 1 tablet by mouth daily 90 tablet 1    predniSONE (DELTASONE) 20 MG tablet 2 tabs qd x 3 days  1 tab qd x 3 days 0.5 tab qd x 3 days 11 tablet 0    gabapentin (NEURONTIN) 600 MG tablet Take 1 tablet by mouth 3 times daily for 30 days. . 90 tablet 3    furosemide (LASIX) 20 MG tablet Take 1 tablet by mouth daily 30 tablet 3    omeprazole (PRILOSEC) 20 MG delayed release capsule Take 1 capsule by mouth daily 90 capsule 1    ibuprofen (ADVIL;MOTRIN) 200 MG tablet Take 200 mg by mouth every 6 hours as needed for Pain      albuterol sulfate HFA (VENTOLIN HFA) 108 (90 Base) MCG/ACT inhaler Inhale 2 puffs into the lungs every 6 hours as needed for Wheezing 1 Inhaler 3    allopurinol (ZYLOPRIM) 300 MG tablet Take 1 tablet by mouth daily 90 tablet 3    pitavastatin (LIVALO) 2 MG TABS tablet Take 1 tablet by mouth nightly 90 tablet 3    Coenzyme Q10 (CO Q 10) 100 MG CAPS Take 2 tablets by mouth daily      Cholecalciferol (VITAMIN D3) 2000 UNITS TABS Take 1 tablet by mouth daily      Multiple Vitamins-Minerals (THERAPEUTIC MULTIVITAMIN-MINERALS) tablet Take 1 tablet by mouth daily.       aspirin 81 MG EC Neurosurgery    Hyperlipidemia, unspecified hyperlipidemia type    Atherosclerosis of native coronary artery of native heart without angina pectoris    Prostate cancer screening  -     PSA SCREENING;  Future      Monitor bp      Britton Corrigan DO    12/3/2018  1:10 PM

## 2018-12-06 ENCOUNTER — TELEPHONE (OUTPATIENT)
Dept: FAMILY MEDICINE CLINIC | Age: 73
End: 2018-12-06

## 2018-12-07 LAB — POTASSIUM SERPL-SCNC: 4.9 MMOL/L (ref 3.5–5)

## 2018-12-14 ENCOUNTER — TELEPHONE (OUTPATIENT)
Dept: FAMILY MEDICINE CLINIC | Age: 73
End: 2018-12-14

## 2018-12-14 RX ORDER — METHYLPREDNISOLONE 4 MG/1
TABLET ORAL
Qty: 1 KIT | Refills: 0 | Status: SHIPPED | OUTPATIENT
Start: 2018-12-14 | End: 2019-01-23

## 2018-12-14 RX ORDER — DOXYCYCLINE HYCLATE 100 MG
100 TABLET ORAL 2 TIMES DAILY
Qty: 20 TABLET | Refills: 0 | Status: SHIPPED | OUTPATIENT
Start: 2018-12-14 | End: 2018-12-24

## 2018-12-17 ENCOUNTER — TELEPHONE (OUTPATIENT)
Dept: ADMINISTRATIVE | Age: 73
End: 2018-12-17

## 2018-12-17 NOTE — TELEPHONE ENCOUNTER
Pt has a yrly scheduled in February 2019, but wife states he is having some breathing problems and just dosen't feel right. Would like to see Dr. Connolly Bachmagnusor sooner.

## 2019-01-02 ENCOUNTER — OFFICE VISIT (OUTPATIENT)
Dept: CARDIOLOGY CLINIC | Age: 74
End: 2019-01-02
Payer: MEDICARE

## 2019-01-02 VITALS
HEIGHT: 70 IN | DIASTOLIC BLOOD PRESSURE: 82 MMHG | HEART RATE: 60 BPM | SYSTOLIC BLOOD PRESSURE: 150 MMHG | RESPIRATION RATE: 16 BRPM | BODY MASS INDEX: 31.5 KG/M2 | WEIGHT: 220 LBS

## 2019-01-02 DIAGNOSIS — I25.10 ATHEROSCLEROSIS OF NATIVE CORONARY ARTERY OF NATIVE HEART WITHOUT ANGINA PECTORIS: Primary | ICD-10-CM

## 2019-01-02 DIAGNOSIS — M79.89 LEG SWELLING: ICD-10-CM

## 2019-01-02 PROCEDURE — 99214 OFFICE O/P EST MOD 30 MIN: CPT | Performed by: INTERNAL MEDICINE

## 2019-01-02 PROCEDURE — 93000 ELECTROCARDIOGRAM COMPLETE: CPT | Performed by: INTERNAL MEDICINE

## 2019-01-02 RX ORDER — HYDROCODONE BITARTRATE AND ACETAMINOPHEN 5; 325 MG/1; MG/1
1 TABLET ORAL EVERY 6 HOURS PRN
COMMUNITY
End: 2019-01-23

## 2019-01-02 RX ORDER — VITAMIN B COMPLEX
1 CAPSULE ORAL DAILY
Status: ON HOLD | COMMUNITY
End: 2019-06-05 | Stop reason: HOSPADM

## 2019-01-02 RX ORDER — FUROSEMIDE 20 MG/1
20 TABLET ORAL DAILY
Qty: 30 TABLET | Refills: 3 | Status: CANCELLED | OUTPATIENT
Start: 2019-01-02

## 2019-01-02 RX ORDER — FUROSEMIDE 20 MG/1
20 TABLET ORAL DAILY PRN
Qty: 30 TABLET | Refills: 3 | Status: SHIPPED | OUTPATIENT
Start: 2019-01-02 | End: 2019-06-02 | Stop reason: SDUPTHER

## 2019-01-23 ENCOUNTER — HOSPITAL ENCOUNTER (OUTPATIENT)
Age: 74
Discharge: HOME | End: 2019-01-25
Payer: MEDICARE

## 2019-01-23 ENCOUNTER — OFFICE VISIT (OUTPATIENT)
Dept: FAMILY MEDICINE CLINIC | Age: 74
End: 2019-01-23
Payer: MEDICARE

## 2019-01-23 VITALS
RESPIRATION RATE: 16 BRPM | SYSTOLIC BLOOD PRESSURE: 148 MMHG | TEMPERATURE: 98 F | HEART RATE: 58 BPM | OXYGEN SATURATION: 99 % | HEIGHT: 70 IN | BODY MASS INDEX: 31.64 KG/M2 | DIASTOLIC BLOOD PRESSURE: 76 MMHG | WEIGHT: 221 LBS

## 2019-01-23 DIAGNOSIS — E79.0 HYPERURICEMIA: ICD-10-CM

## 2019-01-23 DIAGNOSIS — E78.5 HYPERLIPIDEMIA, UNSPECIFIED HYPERLIPIDEMIA TYPE: ICD-10-CM

## 2019-01-23 DIAGNOSIS — I38 VALVULAR HEART DISEASE: ICD-10-CM

## 2019-01-23 DIAGNOSIS — I10 ESSENTIAL HYPERTENSION: ICD-10-CM

## 2019-01-23 DIAGNOSIS — E11.9 TYPE 2 DIABETES MELLITUS WITHOUT COMPLICATION, WITHOUT LONG-TERM CURRENT USE OF INSULIN (HCC): ICD-10-CM

## 2019-01-23 DIAGNOSIS — I35.0 AORTIC VALVE STENOSIS, ETIOLOGY OF CARDIAC VALVE DISEASE UNSPECIFIED: ICD-10-CM

## 2019-01-23 DIAGNOSIS — I25.10 ATHEROSCLEROSIS OF NATIVE CORONARY ARTERY OF NATIVE HEART WITHOUT ANGINA PECTORIS: ICD-10-CM

## 2019-01-23 DIAGNOSIS — I25.10 ATHEROSCLEROSIS OF NATIVE CORONARY ARTERY OF NATIVE HEART WITHOUT ANGINA PECTORIS: Primary | ICD-10-CM

## 2019-01-23 DIAGNOSIS — R14.0 ABDOMINAL BLOATING: ICD-10-CM

## 2019-01-23 LAB
ALBUMIN SERPL-MCNC: 4.4 G/DL (ref 3.5–5.2)
ALP BLD-CCNC: 50 U/L (ref 40–129)
ALT SERPL-CCNC: 23 U/L (ref 0–40)
ANION GAP SERPL CALCULATED.3IONS-SCNC: 13 MMOL/L (ref 7–16)
AST SERPL-CCNC: 25 U/L (ref 0–39)
BASOPHILS ABSOLUTE: 0.07 E9/L (ref 0–0.2)
BASOPHILS RELATIVE PERCENT: 1.4 % (ref 0–2)
BILIRUB SERPL-MCNC: 0.4 MG/DL (ref 0–1.2)
BUN BLDV-MCNC: 24 MG/DL (ref 8–23)
CALCIUM SERPL-MCNC: 9.5 MG/DL (ref 8.6–10.2)
CHLORIDE BLD-SCNC: 102 MMOL/L (ref 98–107)
CHOLESTEROL, TOTAL: 173 MG/DL (ref 0–199)
CO2: 27 MMOL/L (ref 22–29)
CREAT SERPL-MCNC: 0.9 MG/DL (ref 0.7–1.2)
EOSINOPHILS ABSOLUTE: 0.18 E9/L (ref 0.05–0.5)
EOSINOPHILS RELATIVE PERCENT: 3.6 % (ref 0–6)
GFR AFRICAN AMERICAN: >60
GFR NON-AFRICAN AMERICAN: >60 ML/MIN/1.73
GLUCOSE BLD-MCNC: 125 MG/DL (ref 74–99)
HBA1C MFR BLD: 5.8 % (ref 4–5.6)
HCT VFR BLD CALC: 37.5 % (ref 37–54)
HDLC SERPL-MCNC: 57 MG/DL
HEMOGLOBIN: 12.3 G/DL (ref 12.5–16.5)
IMMATURE GRANULOCYTES #: 0.01 E9/L
IMMATURE GRANULOCYTES %: 0.2 % (ref 0–5)
LDL CHOLESTEROL CALCULATED: 88 MG/DL (ref 0–99)
LYMPHOCYTES ABSOLUTE: 1.66 E9/L (ref 1.5–4)
LYMPHOCYTES RELATIVE PERCENT: 33.3 % (ref 20–42)
MCH RBC QN AUTO: 31.5 PG (ref 26–35)
MCHC RBC AUTO-ENTMCNC: 32.8 % (ref 32–34.5)
MCV RBC AUTO: 95.9 FL (ref 80–99.9)
MONOCYTES ABSOLUTE: 0.58 E9/L (ref 0.1–0.95)
MONOCYTES RELATIVE PERCENT: 11.6 % (ref 2–12)
NEUTROPHILS ABSOLUTE: 2.49 E9/L (ref 1.8–7.3)
NEUTROPHILS RELATIVE PERCENT: 49.9 % (ref 43–80)
PDW BLD-RTO: 13.1 FL (ref 11.5–15)
PLATELET # BLD: 204 E9/L (ref 130–450)
PMV BLD AUTO: 11.3 FL (ref 7–12)
POTASSIUM SERPL-SCNC: 4.7 MMOL/L (ref 3.5–5)
RBC # BLD: 3.91 E12/L (ref 3.8–5.8)
SODIUM BLD-SCNC: 142 MMOL/L (ref 132–146)
TOTAL PROTEIN: 6.7 G/DL (ref 6.4–8.3)
TRIGL SERPL-MCNC: 140 MG/DL (ref 0–149)
TSH SERPL DL<=0.05 MIU/L-ACNC: 0.75 UIU/ML (ref 0.27–4.2)
URIC ACID, SERUM: 5.1 MG/DL (ref 3.4–7)
VLDLC SERPL CALC-MCNC: 28 MG/DL
WBC # BLD: 5 E9/L (ref 4.5–11.5)

## 2019-01-23 PROCEDURE — 80061 LIPID PANEL: CPT

## 2019-01-23 PROCEDURE — 85025 COMPLETE CBC W/AUTO DIFF WBC: CPT

## 2019-01-23 PROCEDURE — 84443 ASSAY THYROID STIM HORMONE: CPT

## 2019-01-23 PROCEDURE — 36415 COLL VENOUS BLD VENIPUNCTURE: CPT | Performed by: FAMILY MEDICINE

## 2019-01-23 PROCEDURE — 83036 HEMOGLOBIN GLYCOSYLATED A1C: CPT

## 2019-01-23 PROCEDURE — 99214 OFFICE O/P EST MOD 30 MIN: CPT | Performed by: FAMILY MEDICINE

## 2019-01-23 PROCEDURE — 80053 COMPREHEN METABOLIC PANEL: CPT

## 2019-01-23 PROCEDURE — G8510 SCR DEP NEG, NO PLAN REQD: HCPCS | Performed by: FAMILY MEDICINE

## 2019-01-23 PROCEDURE — 84550 ASSAY OF BLOOD/URIC ACID: CPT

## 2019-01-23 RX ORDER — OLMESARTAN MEDOXOMIL 5 MG/1
10 TABLET ORAL DAILY
Qty: 90 TABLET | Refills: 1 | Status: SHIPPED
Start: 2019-01-23 | End: 2019-02-04 | Stop reason: SDUPTHER

## 2019-01-23 ASSESSMENT — PATIENT HEALTH QUESTIONNAIRE - PHQ9
2. FEELING DOWN, DEPRESSED OR HOPELESS: 0
SUM OF ALL RESPONSES TO PHQ QUESTIONS 1-9: 0
SUM OF ALL RESPONSES TO PHQ9 QUESTIONS 1 & 2: 0
SUM OF ALL RESPONSES TO PHQ QUESTIONS 1-9: 0
1. LITTLE INTEREST OR PLEASURE IN DOING THINGS: 0

## 2019-01-23 ASSESSMENT — ENCOUNTER SYMPTOMS
APNEA: 0
WHEEZING: 0
ALLERGIC/IMMUNOLOGIC NEGATIVE: 1
PHOTOPHOBIA: 0
FACIAL SWELLING: 0
BLOOD IN STOOL: 0
BACK PAIN: 0
COLOR CHANGE: 0
SORE THROAT: 0
BLOATING: 1
SHORTNESS OF BREATH: 0
CHEST TIGHTNESS: 0
COUGH: 0
ABDOMINAL PAIN: 0
SINUS PRESSURE: 0

## 2019-01-24 ENCOUNTER — HOSPITAL ENCOUNTER (OUTPATIENT)
Dept: ULTRASOUND IMAGING | Age: 74
Discharge: HOME | End: 2019-01-26
Payer: MEDICARE

## 2019-01-24 DIAGNOSIS — R14.0 ABDOMINAL BLOATING: ICD-10-CM

## 2019-01-24 PROCEDURE — 76700 US EXAM ABDOM COMPLETE: CPT

## 2019-01-29 ENCOUNTER — TELEPHONE (OUTPATIENT)
Dept: CARDIOLOGY | Age: 74
End: 2019-01-29

## 2019-02-04 DIAGNOSIS — I10 ESSENTIAL HYPERTENSION: ICD-10-CM

## 2019-02-04 RX ORDER — OLMESARTAN MEDOXOMIL 5 MG/1
10 TABLET ORAL DAILY
Qty: 180 TABLET | Refills: 1 | Status: SHIPPED | OUTPATIENT
Start: 2019-02-04 | End: 2019-05-21

## 2019-02-05 ENCOUNTER — TELEPHONE (OUTPATIENT)
Dept: FAMILY MEDICINE CLINIC | Age: 74
End: 2019-02-05

## 2019-02-05 DIAGNOSIS — M48.061 NEUROFORAMINAL STENOSIS OF LUMBAR SPINE: Primary | ICD-10-CM

## 2019-02-05 RX ORDER — HYDROCODONE BITARTRATE AND ACETAMINOPHEN 5; 325 MG/1; MG/1
1 TABLET ORAL EVERY 6 HOURS PRN
Qty: 28 TABLET | Refills: 0 | Status: SHIPPED | OUTPATIENT
Start: 2019-02-05 | End: 2019-02-18 | Stop reason: SDUPTHER

## 2019-02-05 RX ORDER — PREDNISONE 20 MG/1
TABLET ORAL
Qty: 11 TABLET | Refills: 0 | Status: SHIPPED | OUTPATIENT
Start: 2019-02-05 | End: 2019-04-02

## 2019-02-18 DIAGNOSIS — M48.061 NEUROFORAMINAL STENOSIS OF LUMBAR SPINE: ICD-10-CM

## 2019-02-18 RX ORDER — HYDROCODONE BITARTRATE AND ACETAMINOPHEN 5; 325 MG/1; MG/1
1 TABLET ORAL EVERY 6 HOURS PRN
Qty: 28 TABLET | Refills: 0 | Status: SHIPPED | OUTPATIENT
Start: 2019-02-18 | End: 2019-02-25

## 2019-02-25 ENCOUNTER — TELEPHONE (OUTPATIENT)
Dept: FAMILY MEDICINE CLINIC | Age: 74
End: 2019-02-25

## 2019-04-02 ENCOUNTER — HOSPITAL ENCOUNTER (OUTPATIENT)
Age: 74
Discharge: HOME | End: 2019-04-04
Payer: MEDICARE

## 2019-04-02 ENCOUNTER — OFFICE VISIT (OUTPATIENT)
Dept: FAMILY MEDICINE CLINIC | Age: 74
End: 2019-04-02
Payer: MEDICARE

## 2019-04-02 VITALS
HEIGHT: 70 IN | DIASTOLIC BLOOD PRESSURE: 70 MMHG | HEART RATE: 73 BPM | RESPIRATION RATE: 16 BRPM | SYSTOLIC BLOOD PRESSURE: 138 MMHG | OXYGEN SATURATION: 93 % | TEMPERATURE: 98 F | WEIGHT: 214 LBS | BODY MASS INDEX: 30.64 KG/M2

## 2019-04-02 DIAGNOSIS — E79.0 HYPERURICEMIA: ICD-10-CM

## 2019-04-02 DIAGNOSIS — E55.9 VITAMIN D DEFICIENCY: ICD-10-CM

## 2019-04-02 DIAGNOSIS — Z00.00 ROUTINE GENERAL MEDICAL EXAMINATION AT A HEALTH CARE FACILITY: ICD-10-CM

## 2019-04-02 DIAGNOSIS — E11.9 TYPE 2 DIABETES MELLITUS WITHOUT COMPLICATION, WITHOUT LONG-TERM CURRENT USE OF INSULIN (HCC): ICD-10-CM

## 2019-04-02 DIAGNOSIS — M51.36 DDD (DEGENERATIVE DISC DISEASE), LUMBAR: ICD-10-CM

## 2019-04-02 DIAGNOSIS — I10 ESSENTIAL HYPERTENSION: Primary | ICD-10-CM

## 2019-04-02 DIAGNOSIS — E78.5 HYPERLIPIDEMIA, UNSPECIFIED HYPERLIPIDEMIA TYPE: ICD-10-CM

## 2019-04-02 LAB
ALBUMIN SERPL-MCNC: 4.5 G/DL (ref 3.5–5.2)
ALP BLD-CCNC: 74 U/L (ref 40–129)
ALT SERPL-CCNC: 20 U/L (ref 0–40)
ANION GAP SERPL CALCULATED.3IONS-SCNC: 15 MMOL/L (ref 7–16)
AST SERPL-CCNC: 20 U/L (ref 0–39)
BASOPHILS ABSOLUTE: 0.04 E9/L (ref 0–0.2)
BASOPHILS RELATIVE PERCENT: 0.6 % (ref 0–2)
BILIRUB SERPL-MCNC: 0.4 MG/DL (ref 0–1.2)
BUN BLDV-MCNC: 20 MG/DL (ref 8–23)
CALCIUM SERPL-MCNC: 9.5 MG/DL (ref 8.6–10.2)
CHLORIDE BLD-SCNC: 101 MMOL/L (ref 98–107)
CHOLESTEROL, TOTAL: 202 MG/DL (ref 0–199)
CO2: 27 MMOL/L (ref 22–29)
CREAT SERPL-MCNC: 0.8 MG/DL (ref 0.7–1.2)
EOSINOPHILS ABSOLUTE: 0.11 E9/L (ref 0.05–0.5)
EOSINOPHILS RELATIVE PERCENT: 1.7 % (ref 0–6)
GFR AFRICAN AMERICAN: >60
GFR NON-AFRICAN AMERICAN: >60 ML/MIN/1.73
GLUCOSE BLD-MCNC: 114 MG/DL (ref 74–99)
HBA1C MFR BLD: 5.5 % (ref 4–5.6)
HCT VFR BLD CALC: 36.2 % (ref 37–54)
HDLC SERPL-MCNC: 69 MG/DL
HEMOGLOBIN: 11.5 G/DL (ref 12.5–16.5)
IMMATURE GRANULOCYTES #: 0.02 E9/L
IMMATURE GRANULOCYTES %: 0.3 % (ref 0–5)
LDL CHOLESTEROL CALCULATED: 98 MG/DL (ref 0–99)
LYMPHOCYTES ABSOLUTE: 1.55 E9/L (ref 1.5–4)
LYMPHOCYTES RELATIVE PERCENT: 24.3 % (ref 20–42)
MCH RBC QN AUTO: 29.8 PG (ref 26–35)
MCHC RBC AUTO-ENTMCNC: 31.8 % (ref 32–34.5)
MCV RBC AUTO: 93.8 FL (ref 80–99.9)
MONOCYTES ABSOLUTE: 0.62 E9/L (ref 0.1–0.95)
MONOCYTES RELATIVE PERCENT: 9.7 % (ref 2–12)
NEUTROPHILS ABSOLUTE: 4.05 E9/L (ref 1.8–7.3)
NEUTROPHILS RELATIVE PERCENT: 63.4 % (ref 43–80)
PDW BLD-RTO: 13.3 FL (ref 11.5–15)
PLATELET # BLD: 283 E9/L (ref 130–450)
PMV BLD AUTO: 10.3 FL (ref 7–12)
POTASSIUM SERPL-SCNC: 4 MMOL/L (ref 3.5–5)
RBC # BLD: 3.86 E12/L (ref 3.8–5.8)
SODIUM BLD-SCNC: 143 MMOL/L (ref 132–146)
TOTAL PROTEIN: 7.1 G/DL (ref 6.4–8.3)
TRIGL SERPL-MCNC: 175 MG/DL (ref 0–149)
TSH SERPL DL<=0.05 MIU/L-ACNC: 1.28 UIU/ML (ref 0.27–4.2)
URIC ACID, SERUM: 6.4 MG/DL (ref 3.4–7)
VITAMIN D 25-HYDROXY: 26 NG/ML (ref 30–100)
VLDLC SERPL CALC-MCNC: 35 MG/DL
WBC # BLD: 6.4 E9/L (ref 4.5–11.5)

## 2019-04-02 PROCEDURE — 82306 VITAMIN D 25 HYDROXY: CPT

## 2019-04-02 PROCEDURE — G0439 PPPS, SUBSEQ VISIT: HCPCS | Performed by: FAMILY MEDICINE

## 2019-04-02 PROCEDURE — 83036 HEMOGLOBIN GLYCOSYLATED A1C: CPT

## 2019-04-02 PROCEDURE — 80061 LIPID PANEL: CPT

## 2019-04-02 PROCEDURE — 84443 ASSAY THYROID STIM HORMONE: CPT

## 2019-04-02 PROCEDURE — 84550 ASSAY OF BLOOD/URIC ACID: CPT

## 2019-04-02 PROCEDURE — 80053 COMPREHEN METABOLIC PANEL: CPT

## 2019-04-02 PROCEDURE — 85025 COMPLETE CBC W/AUTO DIFF WBC: CPT

## 2019-04-02 PROCEDURE — 36415 COLL VENOUS BLD VENIPUNCTURE: CPT | Performed by: FAMILY MEDICINE

## 2019-04-02 ASSESSMENT — LIFESTYLE VARIABLES
HOW OFTEN DURING THE LAST YEAR HAVE YOU NEEDED AN ALCOHOLIC DRINK FIRST THING IN THE MORNING TO GET YOURSELF GOING AFTER A NIGHT OF HEAVY DRINKING: 0
HAS A RELATIVE, FRIEND, DOCTOR, OR ANOTHER HEALTH PROFESSIONAL EXPRESSED CONCERN ABOUT YOUR DRINKING OR SUGGESTED YOU CUT DOWN: 0
AUDIT TOTAL SCORE: 3
HOW OFTEN DURING THE LAST YEAR HAVE YOU FAILED TO DO WHAT WAS NORMALLY EXPECTED FROM YOU BECAUSE OF DRINKING: 0
AUDIT-C TOTAL SCORE: 3
HOW MANY STANDARD DRINKS CONTAINING ALCOHOL DO YOU HAVE ON A TYPICAL DAY: 0
HOW OFTEN DURING THE LAST YEAR HAVE YOU FOUND THAT YOU WERE NOT ABLE TO STOP DRINKING ONCE YOU HAD STARTED: 0
HOW OFTEN DURING THE LAST YEAR HAVE YOU HAD A FEELING OF GUILT OR REMORSE AFTER DRINKING: 0
HOW OFTEN DO YOU HAVE A DRINK CONTAINING ALCOHOL: 3
HOW OFTEN DO YOU HAVE SIX OR MORE DRINKS ON ONE OCCASION: 0
HAVE YOU OR SOMEONE ELSE BEEN INJURED AS A RESULT OF YOUR DRINKING: 0
HOW OFTEN DURING THE LAST YEAR HAVE YOU BEEN UNABLE TO REMEMBER WHAT HAPPENED THE NIGHT BEFORE BECAUSE YOU HAD BEEN DRINKING: 0

## 2019-04-02 ASSESSMENT — PATIENT HEALTH QUESTIONNAIRE - PHQ9
SUM OF ALL RESPONSES TO PHQ QUESTIONS 1-9: 0
SUM OF ALL RESPONSES TO PHQ QUESTIONS 1-9: 0

## 2019-04-02 ASSESSMENT — ANXIETY QUESTIONNAIRES: GAD7 TOTAL SCORE: 0

## 2019-04-02 NOTE — PATIENT INSTRUCTIONS
Personalized Preventive Plan for Masha People - 4/2/2019  Medicare offers a range of preventive health benefits. Some of the tests and screenings are paid in full while other may be subject to a deductible, co-insurance, and/or copay. Some of these benefits include a comprehensive review of your medical history including lifestyle, illnesses that may run in your family, and various assessments and screenings as appropriate. After reviewing your medical record and screening and assessments performed today your provider may have ordered immunizations, labs, imaging, and/or referrals for you. A list of these orders (if applicable) as well as your Preventive Care list are included within your After Visit Summary for your review. Other Preventive Recommendations:    · A preventive eye exam performed by an eye specialist is recommended every 1-2 years to screen for glaucoma; cataracts, macular degeneration, and other eye disorders. · A preventive dental visit is recommended every 6 months. · Try to get at least 150 minutes of exercise per week or 10,000 steps per day on a pedometer . · Order or download the FREE \"Exercise & Physical Activity: Your Everyday Guide\" from [employer]. Call 7-968.167.4774 or search [employer] online. · You need 2557-4307 mg of calcium and 2006-8367 IU of vitamin D per day. It is possible to meet your calcium requirement with diet alone, but a vitamin D supplement is usually necessary to meet this goal.  · When exposed to the sun, use a sunscreen that protects against both UVA and UVB radiation with an SPF of 30 or greater. Reapply every 2 to 3 hours or after sweating, drying off with a towel, or swimming. · Always wear a seat belt when traveling in a car. Always wear a helmet when riding a bicycle or motorcycle.

## 2019-04-02 NOTE — PROGRESS NOTES
tablet Take 81 mg by mouth daily  Yes Historical Provider, MD   gabapentin (NEURONTIN) 600 MG tablet Take 1 tablet by mouth 3 times daily for 30 days. .  Patient taking differently: Take 400 mg by mouth 2 times daily. Judy Husbands, DO       Past Medical History:   Diagnosis Date    Atrial fibrillation [facility])     day of surgery on 06/12/13- resolved at this time 6/23/2013    CAD (coronary artery disease)     heart cath with stent 2001    COPD (chronic obstructive pulmonary disease) ([address]     Hyperlipidemia     Hypertension     Kidney stones     Sleep apnea     Vitamin D deficiency      Past Surgical History:   Procedure Laterality Date    AORTIC VALVE REPLACEMENT  6/12/2013    BACK SURGERY  2006    cervicle fusion   [facility] SURGERY  1974, 1984    lumbar laminectomy    BACK SURGERY  [id number]    360 FUSION L4-L5    CARDIAC SURGERY      CARPAL TUNNEL RELEASE  1-20013    both hands    CORONARY ARTERY BYPASS GRAFT      DIAGNOSTIC CARDIAC CATH LAB PROCEDURE      KIDNEY STONE SURGERY      open    NECK SURGERY      SHOULDER ARTHROSCOPY      right and left shoulders       Family History   Problem Relation Age of Onset    Stroke Father     Cancer Sister     Heart Disease Brother        CareTeam (Including outside providers/suppliers regularly involved in providing care):   Patient Care Team:  Janice Stubbs DO as PCP - General (Family Medicine)  Janice Stubbs DO as PCP - [facility] Attributed Provider  Shahnaz Preston MD as Consulting Physician (Cardiology)    Wt Readings from Last 3 Encounters:   04/02/19 214 lb (97.1 kg)   01/23/19 221 lb (100.2 kg)   01/02/19 220 lb (99.8 kg)     Vitals:    04/02/19 1057   BP: 138/70   Pulse: 73   Resp: 16   Temp: 98 °F (36.7 °C)   SpO2: 93%   Weight: 214 lb (97.1 kg)   Height: 5' 9.5\" (1.765 m)     Body mass index is 31.15 kg/m². Based upon direct observation of the patient, evaluation of cognition reveals recent and remote memory intact.     General Appearance: alert and oriented to person, place and time, well developed and well- nourished, in no acute distress  Skin: warm and dry, no rash or erythema  Head: normocephalic and atraumatic  Eyes: pupils equal, round, and reactive to light, extraocular eye movements intact, conjunctivae normal  ENT: tympanic membrane, external ear and ear canal normal bilaterally, nose without deformity, nasal mucosa and turbinates normal without polyps  Neck: supple and non-tender without mass, no thyromegaly or thyroid nodules, no cervical lymphadenopathy  Pulmonary/Chest: clear to auscultation bilaterally- no wheezes, rales or rhonchi, normal air movement, no respiratory distress  Cardiovascular: normal rate, regular rhythm, normal S1 and S2, no murmurs, rubs, clicks, or gallops, distal pulses intact, no carotid bruits  Abdomen: soft, non-tender, non-distended, normal bowel sounds, no masses or organomegaly  Extremities: no cyanosis, clubbing or edema  Musculoskeletal: normal range of motion, no joint swelling, deformity pos low back tenderness  Neurologic: reflexes normal and symmetric, no cranial nerve deficit, gait, coordination and speech normal    Patient's complete Health Risk Assessment and screening values have been reviewed and are found in Flowsheets. The following problems were reviewed today and where indicated follow up appointments were made and/or referrals ordered. Positive Risk Factor Screenings with Interventions:     Health Habits/Nutrition:  Health Habits/Nutrition  Do you exercise for at least 20 minutes 2-3 times per week?: (!) No  Have you lost any weight without trying in the past 3 months?: No  Do you eat fewer than 2 meals per day?: No  Have you seen a dentist within the past year?: Yes  Body mass index is 31.15 kg/m².   Health Habits/Nutrition Interventions:  · Inadequate physical activity:  patient agrees to exercise for at least 150 minutes/week    Hearing/Vision:  Hearing/Vision  Do you or your family notice any

## 2019-04-04 RX ORDER — ALLOPURINOL 300 MG/1
300 TABLET ORAL DAILY
Qty: 90 TABLET | Refills: 3 | Status: SHIPPED
Start: 2019-04-04 | End: 2020-04-07

## 2019-04-04 RX ORDER — OMEPRAZOLE 20 MG/1
20 CAPSULE, DELAYED RELEASE ORAL DAILY
Qty: 90 CAPSULE | Refills: 1 | Status: SHIPPED | OUTPATIENT
Start: 2019-04-04 | End: 2019-09-23 | Stop reason: SDUPTHER

## 2019-04-18 PROBLEM — J45.20 MILD INTERMITTENT ASTHMA WITHOUT COMPLICATION: Status: ACTIVE | Noted: 2019-04-18

## 2019-05-21 ENCOUNTER — OFFICE VISIT (OUTPATIENT)
Dept: FAMILY MEDICINE CLINIC | Age: 74
End: 2019-05-21
Payer: MEDICARE

## 2019-05-21 VITALS
RESPIRATION RATE: 18 BRPM | TEMPERATURE: 98 F | OXYGEN SATURATION: 96 % | DIASTOLIC BLOOD PRESSURE: 72 MMHG | SYSTOLIC BLOOD PRESSURE: 150 MMHG | HEIGHT: 70 IN | BODY MASS INDEX: 30.92 KG/M2 | WEIGHT: 216 LBS | HEART RATE: 61 BPM

## 2019-05-21 DIAGNOSIS — I10 ESSENTIAL HYPERTENSION: Primary | ICD-10-CM

## 2019-05-21 PROCEDURE — 99213 OFFICE O/P EST LOW 20 MIN: CPT | Performed by: FAMILY MEDICINE

## 2019-05-21 RX ORDER — OLMESARTAN MEDOXOMIL 20 MG/1
20 TABLET ORAL DAILY
Qty: 30 TABLET | Refills: 3 | Status: SHIPPED | OUTPATIENT
Start: 2019-05-21 | End: 2020-02-04

## 2019-05-21 ASSESSMENT — ENCOUNTER SYMPTOMS
ALLERGIC/IMMUNOLOGIC NEGATIVE: 1
SORE THROAT: 0
SINUS PRESSURE: 0
ABDOMINAL PAIN: 0
PHOTOPHOBIA: 0
WHEEZING: 0
CHEST TIGHTNESS: 0
DIARRHEA: 0
COLOR CHANGE: 0
NAUSEA: 0
COUGH: 0
APNEA: 0
BLOOD IN STOOL: 0
BACK PAIN: 0
VOMITING: 0
SHORTNESS OF BREATH: 0
FACIAL SWELLING: 0

## 2019-05-21 NOTE — PROGRESS NOTES
olmesartan (BENICAR) 20 MG tablet Take 1 tablet by mouth daily 30 tablet 3    omeprazole (PRILOSEC) 20 MG delayed release capsule Take 1 capsule by mouth daily 90 capsule 1    allopurinol (ZYLOPRIM) 300 MG tablet Take 1 tablet by mouth daily 90 tablet 3    b complex vitamins capsule Take 1 capsule by mouth daily      Alpha-Lipoic Acid 100 MG CAPS Take by mouth      BIOTIN PO Take by mouth      Ascorbic Acid (VITAMIN C ER PO) Take by mouth      pitavastatin (LIVALO) 2 MG TABS tablet Take 1 tablet by mouth nightly 90 tablet 3    furosemide (LASIX) 20 MG tablet Take 1 tablet by mouth daily as needed (swelling) 30 tablet 3    gabapentin (NEURONTIN) 600 MG tablet Take 1 tablet by mouth 3 times daily for 30 days. . (Patient taking differently: Take 400 mg by mouth 2 times daily. Lucian Skye ) 90 tablet 3    ibuprofen (ADVIL;MOTRIN) 200 MG tablet Take 200 mg by mouth every 6 hours as needed for Pain      Coenzyme Q10 (CO Q 10) 100 MG CAPS Take 2 tablets by mouth daily      Cholecalciferol (VITAMIN D3) 2000 UNITS TABS Take 1 tablet by mouth daily      Multiple Vitamins-Minerals (THERAPEUTIC MULTIVITAMIN-MINERALS) tablet Take 1 tablet by mouth daily.  aspirin 81 MG EC tablet Take 81 mg by mouth daily        No current facility-administered medications for this visit.         Allergies   Allergen Reactions    Iodine Swelling and Other (See Comments)     Reddened face and swelling of tongue       Social History     Socioeconomic History    Marital status:      Spouse name: None    Number of children: None    Years of education: None    Highest education level: None   Occupational History    Occupation: retired- assembly line/ forklift   Social Needs    Financial resource strain: None    Food insecurity:     Worry: None     Inability: None    Transportation needs:     Medical: None     Non-medical: None   Tobacco Use    Smoking status: Former Smoker     Packs/day: 1.50     Years: 55.00     Pack years: 82. 50     Types: Cigarettes     Last attempt to quit: 2013     Years since quittin.0    Smokeless tobacco: Never Used   Substance and Sexual Activity    Alcohol use: Yes     Comment: drinks 6 cans of beer /day    Drug use: No    Sexual activity: None   Lifestyle    Physical activity:     Days per week: None     Minutes per session: None    Stress: None   Relationships    Social connections:     Talks on phone: None     Gets together: None     Attends [employer] service: None     Active member of club or organization: None     Attends meetings of clubs or organizations: None     Relationship status: None    Intimate partner violence:     Fear of current or ex partner: None     Emotionally abused: None     Physically abused: None     Forced sexual activity: None   Other Topics Concern    None   Social History Narrative    None       Family History   Problem Relation Age of Onset    Stroke Father     Cancer Sister     Heart Disease Brother          Review of Systems   Constitutional: Negative. HENT: Negative for congestion, facial swelling, hearing loss, nosebleeds, sinus pressure and sore throat. Eyes: Negative for photophobia and visual disturbance. Respiratory: Negative for apnea, cough, chest tightness, shortness of breath and wheezing. Cardiovascular: Negative for chest pain, palpitations and leg swelling. Gastrointestinal: Negative for abdominal pain, blood in stool, diarrhea, nausea and vomiting. Genitourinary: Negative for difficulty urinating, frequency and urgency. Musculoskeletal: Negative for arthralgias, back pain, joint swelling and myalgias. Skin: Negative for color change and rash. Allergic/Immunologic: Negative. Neurological: Negative for syncope, weakness, light-headedness and headaches. Hematological: Negative. Psychiatric/Behavioral: Negative for agitation, behavioral problems, confusion and self-injury. The patient is not nervous/anxious.     All other systems reviewed and are negative. Physical Exam   Constitutional: He is oriented to person, place, and time. He appears well-developed and well-nourished. No distress. HENT:   Head: Normocephalic and atraumatic. Nose: Nose normal.   Mouth/Throat: Oropharynx is clear and moist.   Eyes: Pupils are equal, round, and reactive to light. Conjunctivae and EOM are normal.   Neck: Normal range of motion. Neck supple. No JVD present. No thyromegaly present. Cardiovascular: Normal rate, regular rhythm and normal heart sounds. Exam reveals no gallop and no friction rub. No murmur heard. Pulmonary/Chest: Effort normal and breath sounds normal. No respiratory distress. He has no wheezes. Abdominal: Soft. Bowel sounds are normal. He exhibits no distension. There is no tenderness. There is no rebound and no guarding. Musculoskeletal: Normal range of motion. Lymphadenopathy:     He has no cervical adenopathy. Neurological: He is alert and oriented to person, place, and time. He has normal reflexes. No cranial nerve deficit. He exhibits normal muscle tone. Coordination normal.   Skin: Skin is warm and dry. No rash noted. No erythema. Psychiatric: He has a normal mood and affect. His behavior is normal. Judgment normal.   Nursing note and vitals reviewed. ASSESSMENT/PLAN:    Kyra Palumbo was seen today for hypertension. Diagnoses and all orders for this visit:    Essential hypertension  -     olmesartan (BENICAR) 20 MG tablet;  Take 1 tablet by mouth daily            Deb Gresham DO    5/21/2019  2:35 PM

## 2019-06-02 DIAGNOSIS — M79.89 LEG SWELLING: ICD-10-CM

## 2019-06-03 RX ORDER — FUROSEMIDE 20 MG/1
TABLET ORAL
Qty: 30 TABLET | Refills: 5 | Status: ON HOLD | OUTPATIENT
Start: 2019-06-03 | End: 2020-02-03 | Stop reason: HOSPADM

## 2019-06-04 ENCOUNTER — OFFICE VISIT (OUTPATIENT)
Dept: FAMILY MEDICINE CLINIC | Age: 74
End: 2019-06-04
Payer: MEDICARE

## 2019-06-04 ENCOUNTER — APPOINTMENT (OUTPATIENT)
Dept: GENERAL RADIOLOGY | Age: 74
End: 2019-06-04
Payer: MEDICARE

## 2019-06-04 ENCOUNTER — HOSPITAL ENCOUNTER (OUTPATIENT)
Age: 74
Setting detail: OBSERVATION
Discharge: HOME | End: 2019-06-05
Attending: EMERGENCY MEDICINE | Admitting: INTERNAL MEDICINE
Payer: MEDICARE

## 2019-06-04 VITALS
DIASTOLIC BLOOD PRESSURE: 70 MMHG | SYSTOLIC BLOOD PRESSURE: 130 MMHG | OXYGEN SATURATION: 96 % | TEMPERATURE: 98 F | RESPIRATION RATE: 22 BRPM | BODY MASS INDEX: 30.92 KG/M2 | HEIGHT: 70 IN | HEART RATE: 129 BPM | WEIGHT: 216 LBS

## 2019-06-04 DIAGNOSIS — I48.91 NEW ONSET ATRIAL FIBRILLATION (HCC): ICD-10-CM

## 2019-06-04 DIAGNOSIS — R06.02 SOB (SHORTNESS OF BREATH): Primary | ICD-10-CM

## 2019-06-04 DIAGNOSIS — I48.91 NEW ONSET A-FIB (HCC): Primary | ICD-10-CM

## 2019-06-04 PROBLEM — Z95.2 S/P AVR (AORTIC VALVE REPLACEMENT): Chronic | Status: ACTIVE | Noted: 2019-06-04

## 2019-06-04 PROBLEM — Z95.2 S/P AVR (AORTIC VALVE REPLACEMENT): Status: ACTIVE | Noted: 2019-06-04

## 2019-06-04 PROBLEM — G47.33 OSA (OBSTRUCTIVE SLEEP APNEA): Chronic | Status: ACTIVE | Noted: 2017-07-05

## 2019-06-04 PROBLEM — Z95.1 S/P CABG (CORONARY ARTERY BYPASS GRAFT): Status: ACTIVE | Noted: 2019-06-04

## 2019-06-04 PROBLEM — Z95.1 S/P CABG (CORONARY ARTERY BYPASS GRAFT): Chronic | Status: ACTIVE | Noted: 2019-06-04

## 2019-06-04 LAB
ALBUMIN SERPL-MCNC: 4.6 G/DL (ref 3.5–5.2)
ALP BLD-CCNC: 70 U/L (ref 40–129)
ALT SERPL-CCNC: 32 U/L (ref 0–40)
ANION GAP SERPL CALCULATED.3IONS-SCNC: 11 MMOL/L (ref 7–16)
APTT: 27.3 SEC (ref 24.5–35.1)
AST SERPL-CCNC: 42 U/L (ref 0–39)
BASOPHILS ABSOLUTE: 0.05 E9/L (ref 0–0.2)
BASOPHILS RELATIVE PERCENT: 0.6 % (ref 0–2)
BILIRUB SERPL-MCNC: 0.5 MG/DL (ref 0–1.2)
BUN BLDV-MCNC: 18 MG/DL (ref 8–23)
CALCIUM SERPL-MCNC: 9.6 MG/DL (ref 8.6–10.2)
CHLORIDE BLD-SCNC: 102 MMOL/L (ref 98–107)
CO2: 26 MMOL/L (ref 22–29)
CREAT SERPL-MCNC: 0.8 MG/DL (ref 0.7–1.2)
EKG ATRIAL RATE: 375 BPM
EKG P AXIS: 122 DEGREES
EKG Q-T INTERVAL: 352 MS
EKG QRS DURATION: 110 MS
EKG QTC CALCULATION (BAZETT): 408 MS
EKG R AXIS: 100 DEGREES
EKG T AXIS: 34 DEGREES
EKG VENTRICULAR RATE: 81 BPM
EOSINOPHILS ABSOLUTE: 0.09 E9/L (ref 0.05–0.5)
EOSINOPHILS RELATIVE PERCENT: 1 % (ref 0–6)
GFR AFRICAN AMERICAN: >60
GFR NON-AFRICAN AMERICAN: >60 ML/MIN/1.73
GLUCOSE BLD-MCNC: 126 MG/DL (ref 74–99)
HCT VFR BLD CALC: 39.9 % (ref 37–54)
HEMOGLOBIN: 13.3 G/DL (ref 12.5–16.5)
IMMATURE GRANULOCYTES #: 0.03 E9/L
IMMATURE GRANULOCYTES %: 0.3 % (ref 0–5)
INR BLD: 1
LYMPHOCYTES ABSOLUTE: 1.39 E9/L (ref 1.5–4)
LYMPHOCYTES RELATIVE PERCENT: 15.6 % (ref 20–42)
MAGNESIUM: 2 MG/DL (ref 1.6–2.6)
MCH RBC QN AUTO: 30.4 PG (ref 26–35)
MCHC RBC AUTO-ENTMCNC: 33.3 % (ref 32–34.5)
MCV RBC AUTO: 91.3 FL (ref 80–99.9)
MONOCYTES ABSOLUTE: 0.95 E9/L (ref 0.1–0.95)
MONOCYTES RELATIVE PERCENT: 10.7 % (ref 2–12)
NEUTROPHILS ABSOLUTE: 6.38 E9/L (ref 1.8–7.3)
NEUTROPHILS RELATIVE PERCENT: 71.8 % (ref 43–80)
PDW BLD-RTO: 15 FL (ref 11.5–15)
PLATELET # BLD: 219 E9/L (ref 130–450)
PMV BLD AUTO: 10.5 FL (ref 7–12)
POTASSIUM SERPL-SCNC: 4 MMOL/L (ref 3.5–5)
POTASSIUM SERPL-SCNC: 4.5 MMOL/L (ref 3.5–5)
PROTHROMBIN TIME: 11 SEC (ref 9.3–12.4)
RBC # BLD: 4.37 E12/L (ref 3.8–5.8)
REASON FOR REJECTION: NORMAL
REJECTED TEST: NORMAL
SODIUM BLD-SCNC: 139 MMOL/L (ref 132–146)
TOTAL PROTEIN: 7.7 G/DL (ref 6.4–8.3)
TROPONIN: <0.01 NG/ML (ref 0–0.03)
TROPONIN: <0.01 NG/ML (ref 0–0.03)
TSH SERPL DL<=0.05 MIU/L-ACNC: 1.02 UIU/ML (ref 0.27–4.2)
WBC # BLD: 8.9 E9/L (ref 4.5–11.5)

## 2019-06-04 PROCEDURE — G0378 HOSPITAL OBSERVATION PER HR: HCPCS

## 2019-06-04 PROCEDURE — 2580000003 HC RX 258: Performed by: STUDENT IN AN ORGANIZED HEALTH CARE EDUCATION/TRAINING PROGRAM

## 2019-06-04 PROCEDURE — APPSS45 APP SPLIT SHARED TIME 31-45 MINUTES: Performed by: PHYSICIAN ASSISTANT

## 2019-06-04 PROCEDURE — 6370000000 HC RX 637 (ALT 250 FOR IP): Performed by: INTERNAL MEDICINE

## 2019-06-04 PROCEDURE — 84132 ASSAY OF SERUM POTASSIUM: CPT

## 2019-06-04 PROCEDURE — 71046 X-RAY EXAM CHEST 2 VIEWS: CPT

## 2019-06-04 PROCEDURE — 99214 OFFICE O/P EST MOD 30 MIN: CPT | Performed by: FAMILY MEDICINE

## 2019-06-04 PROCEDURE — 99223 1ST HOSP IP/OBS HIGH 75: CPT | Performed by: INTERNAL MEDICINE

## 2019-06-04 PROCEDURE — 2500000003 HC RX 250 WO HCPCS: Performed by: EMERGENCY MEDICINE

## 2019-06-04 PROCEDURE — 93010 ELECTROCARDIOGRAM REPORT: CPT | Performed by: INTERNAL MEDICINE

## 2019-06-04 PROCEDURE — 85610 PROTHROMBIN TIME: CPT

## 2019-06-04 PROCEDURE — 84484 ASSAY OF TROPONIN QUANT: CPT

## 2019-06-04 PROCEDURE — 93005 ELECTROCARDIOGRAM TRACING: CPT | Performed by: EMERGENCY MEDICINE

## 2019-06-04 PROCEDURE — 36415 COLL VENOUS BLD VENIPUNCTURE: CPT

## 2019-06-04 PROCEDURE — 2500000003 HC RX 250 WO HCPCS: Performed by: STUDENT IN AN ORGANIZED HEALTH CARE EDUCATION/TRAINING PROGRAM

## 2019-06-04 PROCEDURE — 85730 THROMBOPLASTIN TIME PARTIAL: CPT

## 2019-06-04 PROCEDURE — 93005 ELECTROCARDIOGRAM TRACING: CPT | Performed by: PHYSICIAN ASSISTANT

## 2019-06-04 PROCEDURE — 99285 EMERGENCY DEPT VISIT HI MDM: CPT

## 2019-06-04 PROCEDURE — 85025 COMPLETE CBC W/AUTO DIFF WBC: CPT

## 2019-06-04 PROCEDURE — 84443 ASSAY THYROID STIM HORMONE: CPT

## 2019-06-04 PROCEDURE — 96365 THER/PROPH/DIAG IV INF INIT: CPT

## 2019-06-04 PROCEDURE — 80053 COMPREHEN METABOLIC PANEL: CPT

## 2019-06-04 PROCEDURE — 83735 ASSAY OF MAGNESIUM: CPT

## 2019-06-04 PROCEDURE — 2580000003 HC RX 258: Performed by: INTERNAL MEDICINE

## 2019-06-04 PROCEDURE — 96366 THER/PROPH/DIAG IV INF ADDON: CPT

## 2019-06-04 PROCEDURE — 93000 ELECTROCARDIOGRAM COMPLETE: CPT | Performed by: FAMILY MEDICINE

## 2019-06-04 RX ORDER — DILTIAZEM HCL/D5W 125 MG/125
5 PLASTIC BAG, INJECTION (ML) INTRAVENOUS CONTINUOUS
Status: DISCONTINUED | OUTPATIENT
Start: 2019-06-04 | End: 2019-06-05

## 2019-06-04 RX ORDER — SODIUM CHLORIDE 0.9 % (FLUSH) 0.9 %
10 SYRINGE (ML) INJECTION PRN
Status: DISCONTINUED | OUTPATIENT
Start: 2019-06-04 | End: 2019-06-05 | Stop reason: HOSPADM

## 2019-06-04 RX ORDER — ATORVASTATIN CALCIUM 10 MG/1
10 TABLET, FILM COATED ORAL NIGHTLY
Status: DISCONTINUED | OUTPATIENT
Start: 2019-06-04 | End: 2019-06-05 | Stop reason: HOSPADM

## 2019-06-04 RX ORDER — METOPROLOL SUCCINATE 25 MG/1
25 TABLET, EXTENDED RELEASE ORAL 2 TIMES DAILY
Status: DISCONTINUED | OUTPATIENT
Start: 2019-06-04 | End: 2019-06-05 | Stop reason: HOSPADM

## 2019-06-04 RX ORDER — ALLOPURINOL 300 MG/1
300 TABLET ORAL DAILY
Status: DISCONTINUED | OUTPATIENT
Start: 2019-06-04 | End: 2019-06-05 | Stop reason: HOSPADM

## 2019-06-04 RX ORDER — OMEPRAZOLE 20 MG/1
20 CAPSULE, DELAYED RELEASE ORAL DAILY
Status: DISCONTINUED | OUTPATIENT
Start: 2019-06-04 | End: 2019-06-04 | Stop reason: CLARIF

## 2019-06-04 RX ORDER — ONDANSETRON 2 MG/ML
4 INJECTION INTRAMUSCULAR; INTRAVENOUS EVERY 6 HOURS PRN
Status: DISCONTINUED | OUTPATIENT
Start: 2019-06-04 | End: 2019-06-05 | Stop reason: HOSPADM

## 2019-06-04 RX ORDER — ASPIRIN 81 MG/1
81 TABLET ORAL DAILY
Status: DISCONTINUED | OUTPATIENT
Start: 2019-06-04 | End: 2019-06-05 | Stop reason: HOSPADM

## 2019-06-04 RX ORDER — PANTOPRAZOLE SODIUM 40 MG/1
40 TABLET, DELAYED RELEASE ORAL
Status: DISCONTINUED | OUTPATIENT
Start: 2019-06-04 | End: 2019-06-05 | Stop reason: HOSPADM

## 2019-06-04 RX ORDER — LOSARTAN POTASSIUM 50 MG/1
50 TABLET ORAL DAILY
Status: DISCONTINUED | OUTPATIENT
Start: 2019-06-04 | End: 2019-06-05 | Stop reason: HOSPADM

## 2019-06-04 RX ORDER — SODIUM CHLORIDE 0.9 % (FLUSH) 0.9 %
10 SYRINGE (ML) INJECTION EVERY 12 HOURS SCHEDULED
Status: DISCONTINUED | OUTPATIENT
Start: 2019-06-04 | End: 2019-06-05 | Stop reason: HOSPADM

## 2019-06-04 RX ORDER — OLMESARTAN MEDOXOMIL 20 MG/1
20 TABLET ORAL DAILY
Status: DISCONTINUED | OUTPATIENT
Start: 2019-06-04 | End: 2019-06-04 | Stop reason: CLARIF

## 2019-06-04 RX ADMIN — Medication 10 ML: at 20:53

## 2019-06-04 RX ADMIN — LOSARTAN POTASSIUM 50 MG: 50 TABLET, FILM COATED ORAL at 17:57

## 2019-06-04 RX ADMIN — DILTIAZEM HYDROCHLORIDE 25 MG: 5 INJECTION INTRAVENOUS at 12:52

## 2019-06-04 RX ADMIN — ATORVASTATIN CALCIUM 10 MG: 10 TABLET, FILM COATED ORAL at 20:53

## 2019-06-04 RX ADMIN — Medication 5 MG/HR: at 14:40

## 2019-06-04 RX ADMIN — DILTIAZEM HYDROCHLORIDE 25 MG: 5 INJECTION INTRAVENOUS at 13:04

## 2019-06-04 RX ADMIN — APIXABAN 5 MG: 5 TABLET, FILM COATED ORAL at 20:53

## 2019-06-04 RX ADMIN — METOPROLOL SUCCINATE 25 MG: 25 TABLET, EXTENDED RELEASE ORAL at 20:53

## 2019-06-04 ASSESSMENT — ENCOUNTER SYMPTOMS
ABDOMINAL PAIN: 0
PHOTOPHOBIA: 0
APNEA: 0
VOMITING: 0
BACK PAIN: 0
CHEST TIGHTNESS: 0
ALLERGIC/IMMUNOLOGIC NEGATIVE: 1
DIARRHEA: 0
FACIAL SWELLING: 0
SHORTNESS OF BREATH: 1
SINUS PRESSURE: 0
WHEEZING: 0
NAUSEA: 0
BLOOD IN STOOL: 0
COUGH: 0
SORE THROAT: 0
COLOR CHANGE: 0

## 2019-06-04 ASSESSMENT — PAIN SCALES - GENERAL
PAINLEVEL_OUTOF10: 0
PAINLEVEL_OUTOF10: 0

## 2019-06-04 ASSESSMENT — PATIENT HEALTH QUESTIONNAIRE - PHQ9
SUM OF ALL RESPONSES TO PHQ QUESTIONS 1-9: 0
1. LITTLE INTEREST OR PLEASURE IN DOING THINGS: 0
2. FEELING DOWN, DEPRESSED OR HOPELESS: 0
SUM OF ALL RESPONSES TO PHQ QUESTIONS 1-9: 0
SUM OF ALL RESPONSES TO PHQ9 QUESTIONS 1 & 2: 0

## 2019-06-04 NOTE — ED NOTES
[email or web address]  Spoke with:tacho Mann  06/04/19 1577

## 2019-06-04 NOTE — CONSULTS
Inpatient Cardiology Consultation      Reason for Consult:  New onset Afib    Consulting Physician: Dr. Digna Dunn    Requesting Physician:  Dr. Tatiana Dye    Date of Consultation: 6/4/2019    HISTORY OF PRESENT ILLNESS:    This is a very pleasant 76year old gentleman who is known to the practice through Dr. Taye Ferreira with a known medical history of CAD (s/p stent to LAD in 3/01 and s/p LIMA-LAD in 6/13; negative nuclear stress test 5/17), VHD (s/p bioprosthetic AVR in 6/13, normal functioning on Echo in 2/17), HTN, hyperlipidemia, COPD, DEBORAH (uses nighttime 02), post op Afib in 2013, and recent lumbar fusion 3 months ago. He is routinely able to do ADLs and ambulate without any exertional chest pain or significant CASTELLANO. Over the past 2 days, he notes CASTELLANO, fatigue, chills, and moist cough with green sputum. Yesterday evening at 1130 pm, he noted dull mid-sternal chest tightness and upper shoulder blade pain rated 2/10 on 1-10 discomfort scale with skipped beats. This morning, he noted persistent symptoms and made appointment with pcp. He was seen by pcp in OV and was noted to be in Afib RVR rate 109. He was advised for ED evaluation. Upon arrival, /103, EKG noted AFlutter RVR, CXR negative for acute disease, troponin <0.01, K 4.5, creatinine 0.8, Mg 2.0, H/H 13.3/39.9. Patient was initiated on Cardizem gtt. He presently is chest pain free. He does admit to fatigue and moist cough. Please note: past medical records were reviewed per electronic medical record (EMR) - see detailed reports under Past Medical/ Surgical History.    Past Medical History:    Past Medical History:   Diagnosis Date    Atrial fibrillation ([address]     day of surgery on 06/12/13- resolved at this time 6/23/2013    CAD (coronary artery disease)     heart cath with stent 2001    COPD (chronic obstructive pulmonary disease) ([address]     Hyperlipidemia     Hypertension     Kidney stones     Sleep apnea     Vitamin D deficiency      · CAD TABLET BY MOUTH EVERY DAY AS NEEDED (SWELLING). 6/3/19   Terrence Fletcher,    olmesartan (BENICAR) 20 MG tablet Take 1 tablet by mouth daily 5/21/19   Terrence Fletcher DO   omeprazole (PRILOSEC) 20 MG delayed release capsule Take 1 capsule by mouth daily 4/4/19   Zohra Graham DO   allopurinol (ZYLOPRIM) 300 MG tablet Take 1 tablet by mouth daily 4/4/19   Zohra Graham DO   b complex vitamins capsule Take 1 capsule by mouth daily    Historical Provider, MD   Alpha-Lipoic Acid 100 MG CAPS Take by mouth    Historical Provider, MD   BIOTIN PO Take by mouth    Historical Provider, MD   Ascorbic Acid (VITAMIN C ER PO) Take by mouth    Historical Provider, MD   pitavastatin (LIVALO) 2 MG TABS tablet Take 1 tablet by mouth nightly 1/2/19   Terrence Fletcher DO   Coenzyme Q10 (CO Q 10) 100 MG CAPS Take 2 tablets by mouth daily    Historical Provider, MD   Cholecalciferol (VITAMIN D3) 2000 UNITS TABS Take 1 tablet by mouth daily    Historical Provider, MD   Multiple Vitamins-Minerals (THERAPEUTIC MULTIVITAMIN-MINERALS) tablet Take 1 tablet by mouth daily.     Historical Provider, MD   aspirin 81 MG EC tablet Take 81 mg by mouth daily     Historical Provider, MD       Current Medications:    Current Facility-Administered Medications: diltiazem (CARDIZEM) 125 mg in dextrose 5% 125 mL infusion (premix), 5 mg/hr, Intravenous, Continuous  perflutren lipid microspheres (DEFINITY) injection 1.65 mg, 1.5 mL, Intravenous, ONCE PRN    Allergies:  Iodine    Social History:    Social History     Socioeconomic History    Marital status:      Spouse name: Not on file    Number of children: Not on file    Years of education: Not on file    Highest education level: Not on file   Occupational History    Occupation: retired- [employer]/ [employer]   Social Needs    Financial resource strain: Not on file    Food insecurity:     Worry: Not on file     Inability: Not on file    Transportation needs:     Medical: Not on file Non-medical: Not on file   Tobacco Use    Smoking status: Former Smoker     Packs/day: 1.50     Years: 55.00     Pack years: 82.50     Types: Cigarettes     Last attempt to quit: 2013     Years since quittin.1    Smokeless tobacco: Never Used   Substance and Sexual Activity    Alcohol use: Yes     Comment: drinks 6 cans of beer /day    Drug use: No    Sexual activity: Not on file   Lifestyle    Physical activity:     Days per week: Not on file     Minutes per session: Not on file    Stress: Not on file   Relationships    Social connections:     Talks on phone: Not on file     Gets together: Not on file     Attends Restorationism service: Not on file     Active member of club or organization: Not on file     Attends meetings of clubs or organizations: Not on file     Relationship status: Not on file    Intimate partner violence:     Fear of current or ex partner: Not on file     Emotionally abused: Not on file     Physically abused: Not on file     Forced sexual activity: Not on file   Other Topics Concern    Not on file   Social History Narrative    Not on file       Family History:   Family History   Problem Relation Age of Onset    Stroke Father     Cancer Sister     Heart Disease Brother        REVIEW OF SYSTEMS:     · Constitutional: + fatigue, denies fevers, chills or night sweats  · Eyes: Denies visual changes or drainage  · ENT: Denies headaches or hearing loss. No mouth sores or sore throat. No epistaxis   · Cardiovascular: Denies chest pain, pressure + palpitations. No lower extremity swelling. · Respiratory: Denies CASTELLANO, cough, orthopnea or PND. No hemoptysis   · Gastrointestinal: Denies hematemesis or anorexia. No hematochezia or melena    · Genitourinary: Denies urgency, dysuria or hematuria. · Musculoskeletal: Denies gait disturbance, + weakness and oint complaints  · Integumentary: Denies rash, hives or pruritis   · Neurological: Denies dizziness, headaches or seizures.  No numbness 1454    Labs:   CBC:   Recent Labs     06/04/19  1240   WBC 8.9   HGB 13.3   HCT 39.9        BMP:   Recent Labs     06/04/19  1240 06/04/19  1418     --    K 4.5 4.0   CO2 26  --    BUN 18  --    CREATININE 0.8  --    LABGLOM >60  --    CALCIUM 9.6  --      Mag:   Recent Labs     06/04/19  1240   MG 2.0     Phos: No results for input(s): PHOS in the last 72 hours. TSH: No results for input(s): TSH in the last 72 hours. HgA1c:   Lab Results   Component Value Date    LABA1C 5.5 04/02/2019     No results found for: EAG  proBNP: No results for input(s): PROBNP in the last 72 hours. PT/INR:   Recent Labs     06/04/19  1240   PROTIME 11.0   INR 1.0     APTT:  Recent Labs     06/04/19  1240   APTT 27.3     CARDIAC ENZYMES:  Recent Labs     06/04/19  1240 06/04/19  1418   TROPONINI <0.01 <0.01     FASTING LIPID PANEL:  Lab Results   Component Value Date    CHOL 202 04/02/2019    HDL 69 04/02/2019    LDLCALC 98 04/02/2019    TRIG 175 04/02/2019     LIVER PROFILE:  Recent Labs     06/04/19  1240   AST 42*   ALT 32   LABALBU 4.6     Assessment and Recommendations are to follow as per Dr. Enmanuel Trimble. Rama Mackenzie, [facility]    Electronically signed by MARIO Peterson on 6/4/2019 at 2:54 PM     Reason for consult:  AF    Patient seen with Maribell Velazquez. Agree with the findings and A/P. Management plan was discussed. I have personally interviewed the patient, independently performed a focused cardiac exam, reviewed the pertinent laboratory and diagnostic testing results and directly participated in the medical decision-making. HPI: 73yr old male with h/o CAD (s/p stent to LAD in 3/01 and s/p LIMA-LAD in 6/13; negative nuclear stress test 5/17), VHD (s/p bioprosthetic AVR in 6/13, normal functioning on Echo in 2/17), HTN, hyperlipidemia, COPD, DEBORAH (uses nighttime 02), post op Afib in 2013 presented to his PCPs office for treatment of URI symptoms.  He has had a running nose, postnasal drip, productive cough for the past 3-4 days. Last night he noted intermittent fluttering spells. No episodes of chest pain. No dizziness/presyncope/syncope    In his PCPs office he was noted to be in A. fib. He was therefore sent to the emergency room. Cardiology was consulted for new onset A. fib. He is has had transient episode of A. fib post cardiac surgery in 2013. He's never been on oral anti-coagulation. Presently he is laying in bed comfortable with no symptoms. Reviewed the PMH, social history, FH and ROS from APRN note. Agree with the findings. See the full consult note for details. PE:   /76   Pulse 153   Temp 98.2 °F (36.8 °C) (Temporal)   Resp 16 Comment: 18  Ht 5' 9\" (1.753 m)   Wt 213 lb (96.6 kg)   SpO2 95%   BMI 31.45 kg/m²   CONST: In general, this is a well developed, elderly obese male who appears of stated age. Awake, alert, cooperative, no apparent distress. Throat: Oral mucosa pink and moist.  HEENT: Head- normocephalic, atraumatic; Eyes:conjunctivae pink, no noted xanthomas. Neck: no stridor, no noted enlargement of the thyroid,symmetric, no tenderness, no jugular venous distention. No carotid bruit noted. CHEST: Symmetrical and non-tender to palpation. No noted accessory muscle use or intercostal retractions. LUNGS: diminished AE b/l; no creps; no ronchi. CARDIOVASCULAR:   Heart Inspection shows no noted pulsations  Heart Palpation: no heaves or thrills, PMI in 5th ISC near left midclavicular line   Heart Ausculation -Normal S1 and S2, IRRR, 2/6 SM RUSB  PV: no extremity edema. No varicosities. Pedal pulses palpable, no clubbing or cyanosis   ABDOMEN: Soft, non-tender to light palpation. Bowel sounds present. No palpable masses no hepatosplenomegaly or splenomegaly; no abdominal bruit / pulsation  MS: Good muscle strength and tone. Not atrophy or abnormal movements. : deferred. SKIN: Warm and dry no statis dermatitis or ulcers.    NEURO / PSYCH: Oriented to person, place and time. Speech clear and appropriate. Follows all commands. Pleasant affect. EKG as per my interpretation: AF, RVR that is new  CXR on my review: no CHF      Lab Review         Recent Labs     06/04/19  1240   WBC 8.9   HGB 13.3   HCT 39.9          Recent Labs     06/04/19  1240 06/04/19  1418     --    K 4.5 4.0     --    CO2 26  --    BUN 18  --    CREATININE 0.8  --        Recent Labs     06/04/19  1240   AST 42*   ALT 32   ALKPHOS 70           Last 3 Troponin:    Lab Results   Component Value Date    TROPONINI <0.01 06/04/2019    TROPONINI <0.01 06/04/2019              Recent Labs     06/04/19  1240   INR 1.0         Assessment:  1. Presented with newly diagnosed A. Fib. He had transient episode after cardiac surgery. HR not adequately controlled. ? Induced by his upper respiratory symptoms. Is on Cardizem gtt. long-term management of AF was described with them in detail. Rationale for oral anticoagulation, stroke prevention and the various agents used with risks/benefits were reviewed with the patient and his family in detail. We will use Eliquis. Since he has no prior history off AF, we will attempt rhythm control with RYDER guided cardioversion tomorrow. Rationale for the procedure was described to the patient in detail. Start beta blocker. 2. CAD s/p stent to LAD in 3/01 and s/p LIMA-LAD in 6/13: On ASA. Beta blocker was stopped few years ago secondary to bradycardia. 3. VHD s/p bioprosthetic AVR in 6/13: Valve functioning normally on auscultation as well as last echo. 4. HTN: not well controlled. 5. DEBORAH: uses O2 at night. Plan:  1. NPO after MN  2. RYDER guided DCCV in AM tomorrow if AF is present. Rationale for the procedure and its risks/benefits were reviewed with the patient in detail. He is willing to proceed  3. Toprol BID  4. Eliquis BID for [address]  5. Optimize antihypertensives. Differential diagnosis and management plans discussed with the patient in detail. Thank you for the consult. Will follow. Dr. Marilou Gardner MD.  [facility] Cardiology.

## 2019-06-04 NOTE — ED PROVIDER NOTES
Tad Francois is a 76year old male who presents with new onset afib with RVR. Patient is having chest pain and tightness that started yesterday along with URI symptoms. Patient's chest pain feels tight and is substernal. Patient thought his chest pain was related to URI symptoms. Patient presented to PCP office and was found to be in afib with RVR. Patient sent in to ED for evaluation. Patient follows with Dr. Alexa Shultz for cardiology. Patient has a history of aortic valve replacement. Patient denies being on anticoagulation aside from aspirin. The history is provided by the patient and the spouse. Chest Pain   Pain location:  Substernal area  Pain quality: tightness    Pain radiates to:  Does not radiate  Pain severity:  Mild  Onset quality:  Gradual  Duration:  1 day  Timing:  Constant  Progression:  Unchanged  Chronicity:  New  Context: at rest    Relieved by:  Nothing  Worsened by:  Nothing  Ineffective treatments:  None tried  Associated symptoms: no abdominal pain, no cough, no dizziness, no fatigue, no fever, no headache, no nausea, no palpitations, no shortness of breath and no weakness    Risk factors: coronary artery disease, hypertension and male sex    Risk factors: no prior DVT/PE and no smoking        Review of Systems   Constitutional: Negative for chills, fatigue and fever. HENT: Positive for congestion. Negative for sore throat. Eyes: Negative for pain, redness and visual disturbance. Respiratory: Negative for cough, chest tightness and shortness of breath. Cardiovascular: Negative for chest pain, palpitations and leg swelling. Gastrointestinal: Negative for abdominal distention, abdominal pain, constipation, diarrhea and nausea. Genitourinary: Negative for difficulty urinating, dysuria and flank pain. Musculoskeletal: Negative for arthralgias and myalgias. Skin: Negative for color change and rash. Neurological: Negative for dizziness, syncope, weakness and headaches. Psychiatric/Behavioral: Negative for confusion. Physical Exam   Constitutional: He is oriented to person, place, and time. He appears well-developed and well-nourished. No distress. HENT:   Head: Normocephalic and atraumatic. Eyes: Pupils are equal, round, and reactive to light. Conjunctivae and EOM are normal.   Neck: Neck supple. Cardiovascular: An irregularly irregular rhythm present. Pulmonary/Chest: Effort normal and breath sounds normal. No stridor. No respiratory distress. Abdominal: Soft. Bowel sounds are normal. He exhibits no distension. There is no tenderness. There is no rebound and no guarding. Musculoskeletal: He exhibits no edema. Neurological: He is alert and oriented to person, place, and time. No cranial nerve deficit. Skin: Skin is warm and dry. Capillary refill takes less than 2 seconds. He is not diaphoretic. No erythema. Psychiatric: He has a normal mood and affect. His behavior is normal.   Nursing note and vitals reviewed. Procedures    MDM  Number of Diagnoses or Management Options  New onset a-fib [facility]):   Diagnosis management comments: Britt Park is a 76year old male who presented in afib with RVR from PCP's office. Patient was given loading dose of Cardizem without improvement of HR. Patient was placed on Cardizem drip with rate control. Patient remained then in atrial flutter following cardizem drip. Initial troponin was negative. Patient's chest tightness improved while in ED Patient will be admitted for further evaluation and management of new onset afib. Discussed results and plan with patient and wife.  They are agreeable to admision.          --------------------------------------------- PAST HISTORY ---------------------------------------------  Past Medical History:  has a past medical history of Arthritis, Atrial fibrillation ([address] CAD (coronary artery disease), Cancer ([address] COPD (chronic obstructive pulmonary disease) ([address] History of blood Not Detected      Film Array Influenza A Virus 09H1       Result: Not Detected  *  *  Normal Range: Not Detected      Film Array Influenza A Virus H3       Result: Not Detected  *  *  Normal Range: Not Detected      Film Array Influenza B       Result: Not Detected  *  *  Normal Range: Not Detected      Film Array Metapneumovirus       Result: Not Detected  *  *  Normal Range: Not Detected      Film Array Parainfluenza Virus 1       Result: Not Detected  *  *  Normal Range: Not Detected      Film Array Parainfluenza Virus 2       Result: Not Detected  *  *  Normal Range: Not Detected      Film Array Parainfluenza Virus 3       Result: Not Detected  *  *  Normal Range: Not Detected      Film Array Parainfluenza Virus 4       Result: Not Detected  *  *  Normal Range: Not Detected      Film Array Respiratory Syncitial Virus       Result: Not Detected  *  *  Normal Range: Not Detected      Film Array Bordetella Pertusis       Result: Not Detected  *  *  Normal Range: Not Detected      Film Array Chlamydophilia Pneumoniae       Result: Not Detected  *  *  Normal Range: Not Detected      Film Array Mycoplasma Pneumoniae       Result: Not Detected  *  *  Normal Range: Not Detected      Organism FILM ARR Rhinovirus/Enterovirus Detected (A)    CBC Auto Differential   Result Value Ref Range    WBC 8.9 4.5 - 11.5 E9/L    RBC 4.37 3.80 - 5.80 E12/L    Hemoglobin 13.3 12.5 - 16.5 g/dL    Hematocrit 39.9 37.0 - 54.0 %    MCV 91.3 80.0 - 99.9 fL    MCH 30.4 26.0 - 35.0 pg    MCHC 33.3 32.0 - 34.5 %    RDW 15.0 11.5 - 15.0 fL    Platelets 118 895 - 464 E9/L    MPV 10.5 7.0 - 12.0 fL    Neutrophils % 71.8 43.0 - 80.0 %    Immature Granulocytes % 0.3 0.0 - 5.0 %    Lymphocytes % 15.6 (L) 20.0 - 42.0 %    Monocytes % 10.7 2.0 - 12.0 %    Eosinophils % 1.0 0.0 - 6.0 %    Basophils % 0.6 0.0 - 2.0 %    Neutrophils # 6.38 1.80 - 7.30 E9/L    Immature Granulocytes # 0.03 E9/L    Lymphocytes # 1.39 (L) 1.50 - 4.00 E9/L    Monocytes # 0.95 Luz) 465 ms    R Axis 91 degrees    T Axis 19 degrees       RADIOLOGY:  XR CHEST STANDARD (2 VW)   Final Result   Borderline cardiac size without acute infiltrate or edema. ------------------------- NURSING NOTES AND VITALS REVIEWED ---------------------------  Date / Time Roomed:  6/4/2019 12:34 PM  ED Bed Assignment:  [id number]    The nursing notes within the ED encounter and vital signs as below have been reviewed. Patient Vitals for the past 24 hrs:   BP Temp Temp src Pulse Resp   06/05/19 1621 (!) 145/77 97.4 °F (36.3 °C) Temporal 80 18       Oxygen Saturation Interpretation: Normal    ------------------------------------------ PROGRESS NOTES ------------------------------------------  Re-evaluation(s):  Time:1pm   Patients symptoms are improving  Repeat physical examination is improved    Counseling:  I have spoken with the patient and discussed todays results, in addition to providing specific details for the plan of care and counseling regarding the diagnosis and prognosis. Their questions are answered at this time and they are agreeable with the plan of admission.    --------------------------------- ADDITIONAL PROVIDER NOTES ---------------------------------  Consultations:  Spoke with Dr. Abbie Smith. Discussed case. They will admit the patient. This patient's ED course included: a personal history and physicial examination, re-evaluation prior to disposition, multiple bedside re-evaluations, IV medications, cardiac monitoring and continuous pulse oximetry    This patient has remained hemodynamically stable during their ED course. Diagnosis:  1. New onset a-fib [facility])        Disposition:  Patient's disposition: Admit to telemetry  Patient's condition is stable.                    Kaylee Joe MD  06/06/19 2021

## 2019-06-04 NOTE — ED NOTES
FIRST PROVIDER CONTACT ASSESSMENT NOTE      Department of Emergency Medicine   6/4/19  12:21 PM    Chief Complaint: No chief complaint on file. History of Present Illness:    Yunier Correa is a 76 y.o. male who presents to the ED by private car for new onset a fib. Sent in by PCP for abnormal EKG. States had some chest tightness with shortness of breath. No history of a fib. Focused Screening Exam:  Constitutional:  Alert, appears stated age and is in no distress.       *ALLERGIES*     Iodine     ED Triage Vitals [06/04/19 1212]   BP Temp Temp src Pulse Resp SpO2 Height Weight   -- -- -- 118 -- 95 % -- --        Initial Plan of Care:  Initiate Treatment-Testing, Proceed toTreatment Area When Bed Available for ED Attending/MLP to Continue Care    -----------------END OF FIRST PROVIDER CONTACT ASSESSMENT NOTE--------------  Electronically signed by MARIO Chávez   DD: 6/4/19       MARIO Chávez  06/04/19 1226

## 2019-06-04 NOTE — PLAN OF CARE
Patient presented with A-fib RVR and SOB. Upon admission he was already on a Cardizem drip @ 5 per hour  No SOB  Explained what was going on and how to conserve energy.

## 2019-06-04 NOTE — PROGRESS NOTES
Chief Complaint:   Chief Complaint   Patient presents with    Cough     cough    Shortness of Breath       Shortness of Breath   This is a new problem. The current episode started yesterday. The problem occurs daily. The problem has been unchanged. Associated symptoms include chest pain. Pertinent negatives include no abdominal pain, headaches, leg swelling, rash, sore throat, vomiting or wheezing. His past medical history is significant for CAD. Patient Active Problem List   Diagnosis    Coronary atherosclerosis of native coronary artery    Hyperlipidemia    Aortic stenosis    Hypertension    DEBORAH (obstructive sleep apnea)    DDD (degenerative disc disease), cervical    Nocturnal hypoxemia due to asthma    Neuroforaminal stenosis of lumbar spine    Valvular heart disease    Mild intermittent asthma without complication       Past Medical History:   Diagnosis Date    Atrial fibrillation ([address]     day of surgery on 06/12/13- resolved at this time 6/23/2013    CAD (coronary artery disease)     heart cath with stent 2001    COPD (chronic obstructive pulmonary disease) ([address]     Hyperlipidemia     Hypertension     Kidney stones     Sleep apnea     Vitamin D deficiency        Past Surgical History:   Procedure Laterality Date    AORTIC VALVE REPLACEMENT  6/12/2013    BACK SURGERY  2006    cervicle fusion   [facility] SURGERY  1974, 1984    lumbar laminectomy    BACK SURGERY  [id number]    360 FUSION L4-L5    BACK SURGERY      CARDIAC SURGERY      CARPAL TUNNEL RELEASE  1-20013    both hands    CORONARY ARTERY BYPASS GRAFT      DIAGNOSTIC CARDIAC CATH LAB PROCEDURE      KIDNEY STONE SURGERY      open    NECK SURGERY      SHOULDER ARTHROSCOPY      right and left shoulders       Current Outpatient Medications   Medication Sig Dispense Refill    furosemide (LASIX) 20 MG tablet TAKE ONE TABLET BY MOUTH EVERY DAY AS NEEDED (SWELLING).  30 tablet 5    olmesartan (BENICAR) 20 MG tablet Take 1 tablet by mouth daily 30 tablet 3    omeprazole (PRILOSEC) 20 MG delayed release capsule Take 1 capsule by mouth daily 90 capsule 1    allopurinol (ZYLOPRIM) 300 MG tablet Take 1 tablet by mouth daily 90 tablet 3    b complex vitamins capsule Take 1 capsule by mouth daily      Alpha-Lipoic Acid 100 MG CAPS Take by mouth      BIOTIN PO Take by mouth      Ascorbic Acid (VITAMIN C ER PO) Take by mouth      pitavastatin (LIVALO) 2 MG TABS tablet Take 1 tablet by mouth nightly 90 tablet 3    Coenzyme Q10 (CO Q 10) 100 MG CAPS Take 2 tablets by mouth daily      Cholecalciferol (VITAMIN D3) 2000 UNITS TABS Take 1 tablet by mouth daily      Multiple Vitamins-Minerals (THERAPEUTIC MULTIVITAMIN-MINERALS) tablet Take 1 tablet by mouth daily.  aspirin 81 MG EC tablet Take 81 mg by mouth daily        No current facility-administered medications for this visit.         Allergies   Allergen Reactions    Iodine Swelling and Other (See Comments)     Reddened face and swelling of tongue       Social History     Socioeconomic History    Marital status:      Spouse name: None    Number of children: None    Years of education: None    Highest education level: None   Occupational History    Occupation: retired- assembly line/ [employer]   Social Needs    Financial resource strain: None    Food insecurity:     Worry: None     Inability: None    Transportation needs:     Medical: None     Non-medical: None   Tobacco Use    Smoking status: Former Smoker     Packs/day: 1.50     Years: 55.00     Pack years: 82.50     Types: Cigarettes     Last attempt to quit: 2013     Years since quittin.1    Smokeless tobacco: Never Used   Substance and Sexual Activity    Alcohol use: Yes     Comment: drinks 6 cans of beer /day    Drug use: No    Sexual activity: None   Lifestyle    Physical activity:     Days per week: None     Minutes per session: None    Stress: None   Relationships    Social connections: Talks on phone: None     Gets together: None     Attends Holiness service: None     Active member of club or organization: None     Attends meetings of clubs or organizations: None     Relationship status: None    Intimate partner violence:     Fear of current or ex partner: None     Emotionally abused: None     Physically abused: None     Forced sexual activity: None   Other Topics Concern    None   Social History Narrative    None       Family History   Problem Relation Age of Onset    Stroke Father     Cancer Sister     Heart Disease Brother          Review of Systems   Constitutional: Negative. HENT: Negative for congestion, facial swelling, hearing loss, nosebleeds, sinus pressure and sore throat. Eyes: Negative for photophobia and visual disturbance. Respiratory: Positive for shortness of breath. Negative for apnea, cough, chest tightness and wheezing. Cardiovascular: Positive for chest pain. Negative for palpitations and leg swelling. Gastrointestinal: Negative for abdominal pain, blood in stool, diarrhea, nausea and vomiting. Genitourinary: Negative for difficulty urinating, frequency and urgency. Musculoskeletal: Negative for arthralgias, back pain, joint swelling and myalgias. Skin: Negative for color change and rash. Allergic/Immunologic: Negative. Neurological: Negative for syncope, weakness, light-headedness and headaches. Hematological: Negative. Psychiatric/Behavioral: Negative for agitation, behavioral problems, confusion and self-injury. The patient is not nervous/anxious. All other systems reviewed and are negative. Physical Exam   Constitutional: He is oriented to person, place, and time. He appears well-developed and well-nourished. No distress. HENT:   Head: Normocephalic and atraumatic. Nose: Nose normal.   Mouth/Throat: Oropharynx is clear and moist.   Eyes: Pupils are equal, round, and reactive to light.  Conjunctivae and EOM are normal.   Neck: Normal range of motion. Neck supple. No JVD present. No thyromegaly present. Cardiovascular: An irregularly irregular rhythm present. Tachycardia present. Exam reveals no gallop and no friction rub. Murmur heard. Systolic murmur is present with a grade of 2/6. Pulmonary/Chest: Effort normal and breath sounds normal. No respiratory distress. He has no wheezes. Abdominal: Soft. Bowel sounds are normal. He exhibits no distension. There is no tenderness. There is no rebound and no guarding. Musculoskeletal: Normal range of motion. Lymphadenopathy:     He has no cervical adenopathy. Neurological: He is alert and oriented to person, place, and time. He has normal reflexes. No cranial nerve deficit. He exhibits normal muscle tone. Coordination normal.   Skin: Skin is warm and dry. No rash noted. No erythema. Psychiatric: He has a normal mood and affect. His behavior is normal. Judgment normal.   Nursing note and vitals reviewed. ASSESSMENT/PLAN:    Shavon Grimes was seen today for cough and shortness of breath.     Diagnoses and all orders for this visit:    SOB (shortness of breath)  -     EKG 12 Lead; Future  -     EKG 12 Lead    New onset atrial fibrillation ([facility]    d/w pt and wife- needs to go to ER - d/w ER resident        Wade Roldan DO    6/4/2019  11:01 AM

## 2019-06-05 VITALS
OXYGEN SATURATION: 97 % | HEIGHT: 69 IN | DIASTOLIC BLOOD PRESSURE: 77 MMHG | WEIGHT: 213 LBS | TEMPERATURE: 97.4 F | SYSTOLIC BLOOD PRESSURE: 145 MMHG | HEART RATE: 80 BPM | RESPIRATION RATE: 18 BRPM | BODY MASS INDEX: 31.55 KG/M2

## 2019-06-05 LAB
EKG ATRIAL RATE: 340 BPM
EKG ATRIAL RATE: 76 BPM
EKG P AXIS: 76 DEGREES
EKG P-R INTERVAL: 196 MS
EKG Q-T INTERVAL: 324 MS
EKG Q-T INTERVAL: 392 MS
EKG QRS DURATION: 104 MS
EKG QRS DURATION: 96 MS
EKG QTC CALCULATION (BAZETT): 441 MS
EKG QTC CALCULATION (BAZETT): 465 MS
EKG R AXIS: 91 DEGREES
EKG R AXIS: 91 DEGREES
EKG T AXIS: 19 DEGREES
EKG T AXIS: 71 DEGREES
EKG VENTRICULAR RATE: 124 BPM
EKG VENTRICULAR RATE: 76 BPM
FILM ARRAY ADENOVIRUS: ABNORMAL
FILM ARRAY BORDETELLA PERTUSSIS: ABNORMAL
FILM ARRAY CHLAMYDOPHILIA PNEUMONIAE: ABNORMAL
FILM ARRAY CORONAVIRUS 229E: ABNORMAL
FILM ARRAY CORONAVIRUS HKU1: ABNORMAL
FILM ARRAY CORONAVIRUS NL63: ABNORMAL
FILM ARRAY CORONAVIRUS OC43: ABNORMAL
FILM ARRAY INFLUENZA A VIRUS 09H1: ABNORMAL
FILM ARRAY INFLUENZA A VIRUS H1: ABNORMAL
FILM ARRAY INFLUENZA A VIRUS H3: ABNORMAL
FILM ARRAY INFLUENZA A VIRUS: ABNORMAL
FILM ARRAY INFLUENZA B: ABNORMAL
FILM ARRAY METAPNEUMOVIRUS: ABNORMAL
FILM ARRAY MYCOPLASMA PNEUMONIAE: ABNORMAL
FILM ARRAY PARAINFLUENZA VIRUS 1: ABNORMAL
FILM ARRAY PARAINFLUENZA VIRUS 2: ABNORMAL
FILM ARRAY PARAINFLUENZA VIRUS 3: ABNORMAL
FILM ARRAY PARAINFLUENZA VIRUS 4: ABNORMAL
FILM ARRAY RESPIRATORY SYNCITIAL VIRUS: ABNORMAL
LV EF: 65 %
LVEF MODALITY: NORMAL
ORGANISM: ABNORMAL

## 2019-06-05 PROCEDURE — 93306 TTE W/DOPPLER COMPLETE: CPT

## 2019-06-05 PROCEDURE — 93005 ELECTROCARDIOGRAM TRACING: CPT | Performed by: INTERNAL MEDICINE

## 2019-06-05 PROCEDURE — 87798 DETECT AGENT NOS DNA AMP: CPT

## 2019-06-05 PROCEDURE — 93010 ELECTROCARDIOGRAM REPORT: CPT | Performed by: INTERNAL MEDICINE

## 2019-06-05 PROCEDURE — 96366 THER/PROPH/DIAG IV INF ADDON: CPT

## 2019-06-05 PROCEDURE — 2580000003 HC RX 258: Performed by: INTERNAL MEDICINE

## 2019-06-05 PROCEDURE — G0378 HOSPITAL OBSERVATION PER HR: HCPCS

## 2019-06-05 PROCEDURE — 6370000000 HC RX 637 (ALT 250 FOR IP): Performed by: INTERNAL MEDICINE

## 2019-06-05 PROCEDURE — 99232 SBSQ HOSP IP/OBS MODERATE 35: CPT | Performed by: INTERNAL MEDICINE

## 2019-06-05 PROCEDURE — 87486 CHLMYD PNEUM DNA AMP PROBE: CPT

## 2019-06-05 PROCEDURE — 87581 M.PNEUMON DNA AMP PROBE: CPT

## 2019-06-05 PROCEDURE — 87633 RESP VIRUS 12-25 TARGETS: CPT

## 2019-06-05 RX ORDER — METOPROLOL SUCCINATE 25 MG/1
25 TABLET, EXTENDED RELEASE ORAL 2 TIMES DAILY
Qty: 60 TABLET | Refills: 0 | Status: ON HOLD | OUTPATIENT
Start: 2019-06-05 | End: 2020-02-03 | Stop reason: HOSPADM

## 2019-06-05 RX ORDER — IPRATROPIUM BROMIDE AND ALBUTEROL SULFATE 2.5; .5 MG/3ML; MG/3ML
1 SOLUTION RESPIRATORY (INHALATION) EVERY 4 HOURS PRN
Status: DISCONTINUED | OUTPATIENT
Start: 2019-06-05 | End: 2019-06-05 | Stop reason: HOSPADM

## 2019-06-05 RX ADMIN — ALLOPURINOL 300 MG: 300 TABLET ORAL at 08:26

## 2019-06-05 RX ADMIN — PANTOPRAZOLE SODIUM 40 MG: 40 TABLET, DELAYED RELEASE ORAL at 08:26

## 2019-06-05 RX ADMIN — ASPIRIN 81 MG: 81 TABLET ORAL at 08:26

## 2019-06-05 RX ADMIN — METOPROLOL SUCCINATE 25 MG: 25 TABLET, EXTENDED RELEASE ORAL at 08:26

## 2019-06-05 RX ADMIN — Medication 10 ML: at 08:26

## 2019-06-05 RX ADMIN — APIXABAN 5 MG: 5 TABLET, FILM COATED ORAL at 08:31

## 2019-06-05 RX ADMIN — LOSARTAN POTASSIUM 50 MG: 50 TABLET, FILM COATED ORAL at 08:26

## 2019-06-05 ASSESSMENT — PAIN SCALES - GENERAL
PAINLEVEL_OUTOF10: 0
PAINLEVEL_OUTOF10: 0

## 2019-06-05 NOTE — PROGRESS NOTES
Reason for follow up:  PAF; CAD; AVR    Subjective:  Feels better today. No CP. Continues to have URI like symptoms. Objective:    No distress. No events overnight. Scheduled Meds:   allopurinol  300 mg Oral Daily    aspirin  81 mg Oral Daily    sodium chloride flush  10 mL Intravenous 2 times per day    apixaban  5 mg Oral BID    metoprolol succinate  25 mg Oral BID    losartan  50 mg Oral Daily    pantoprazole  40 mg Oral QAM AC    atorvastatin  10 mg Oral Nightly       Continuous Infusions:        Intake/Output Summary (Last 24 hours) at 6/5/2019 0855  Last data filed at 6/5/2019 0826  Gross per 24 hour   Intake 300 ml   Output 750 ml   Net -450 ml       Patient Vitals for the past 96 hrs (Last 3 readings):   Weight   06/04/19 1522 213 lb (96.6 kg)   06/04/19 1222 213 lb (96.6 kg)          PE:   /70   Pulse 78   Temp 97.6 °F (36.4 °C) (Temporal)   Resp 16   Ht 5' 9\" (1.753 m)   Wt 213 lb (96.6 kg)   SpO2 96%   BMI 31.45 kg/m²   CONST: In general, this is a well developed, elderly male who appears of stated age. Awake, alert, cooperative, no apparent distress. Throat: Oral mucosa pink and moist.  HEENT: Head- normocephalic, atraumatic; Eyes:conjunctivae pink, no noted xanthomas. Neck: no stridor, no noted enlargement of the thyroid,symmetric, no tenderness, no jugular venous distention. No carotid bruit noted. CHEST: Symmetrical and non-tender to palpation. No noted accessory muscle use or intercostal retractions. LUNGS: diminished AE b/l; no creps; no ronchi. CARDIOVASCULAR:   Heart Inspection shows no noted pulsations  Heart Palpation: no heaves or thrills, PMI in 5th ISC near left midclavicular line   Heart Ausculation -Normal S1 and S2, RRR, no murmur, s3, s4 or rub noted. PV: no extremity edema. No varicosities. Pedal pulses palpable, no clubbing or cyanosis   ABDOMEN: Soft, non-tender to light palpation. Bowel sounds present.  No palpable masses no hepatosplenomegaly or splenomegaly; no abdominal bruit / pulsation  MS: Good muscle strength and tone. Not atrophy or abnormal movements. : deferred. SKIN: Warm and dry no statis dermatitis or ulcers. NEURO / PSYCH: Oriented to person, place and time. Speech clear and appropriate. Follows all commands. Pleasant affect. Monitor: AF converted to SR. Lab Review       Recent Labs     06/04/19  1240   WBC 8.9   HGB 13.3   HCT 39.9          Recent Labs     06/04/19  1240 06/04/19  1418     --    K 4.5 4.0     --    CO2 26  --    BUN 18  --    CREATININE 0.8  --        Recent Labs     06/04/19  1240   AST 42*   ALT 32   ALKPHOS 70           Last 3 Troponin:    Lab Results   Component Value Date    TROPONINI <0.01 06/04/2019    TROPONINI <0.01 06/04/2019               Recent Labs     06/04/19  1240   INR 1.0     Echo 2/17:   Left ventricular size is grossly normal   No gross regional wall motion abnormality   probably normal diastolic function for age   Overall ejection fraction is normal .   Focal calcification mitral valve leaflets   Mild mitral regurgitation is present.   # 23 Trifecta aortic valve bioprosthesis   Mild insufficiency   Mean gradient 12 mm. Assessment:  1. Presented with newly diagnosed A. Fib. Converted to SR. On Toprol and Eliquis. Continue. 2. CAD s/p stent to LAD in 3/01 and s/p LIMA-LAD in 6/13: On ASA. Beta blocker was stopped few years ago secondary to bradycardia. Back on Toprol. Tolerating well presently. 3. VHD s/p bioprosthetic AVR in 6/13: Valve functioning normally on auscultation as well as last echo. 4. HTN: not well controlled. 5. DEBORAH: uses O2 at night. Plan:  1. Continue BB and OAC  2. Stop cardizem gtt  3. ECHO today  4. Can be discharged later today. 5. Follow up with Dr Maday Wylie in 2-3 weeks       Dr. Tavo Luna MD.  [facility] Cardiology.

## 2019-06-05 NOTE — H&P
[address]                  [address] [facility],  [facility]                              HISTORY AND PHYSICAL    PATIENT NAME: Ilana Orozco                     :        1945  MED REC NO:   [id number]                            ROOM:       9921  ACCOUNT NO:   [de-identified]                           ADMIT DATE: 2019  PROVIDER:     Marta Arreguin DO      CHIEF COMPLAINT:  Heart racing. HISTORY OF PRESENT ILLNESS:  The patient is a 60-year-old  male  who went to his primary care physician's office. He was noted to be in  atrial fib with RVR. He was sent to the emergency room. The patient  did not notice any symptoms. He has a significant past history of  coronary disease, status post stent, status post CABG, status post  aortic valve replacement, mitral regurg. He was seen in the emergency  room and admitted for further evaluation and treatment. PAST MEDICAL HISTORY:  Coronary artery disease, valvular heart disease,  status post aortic valve replacement, mitral regurgitation,  hypertension, hyperlipidemia, COPD, DEBORAH, on nocturnal O2, postop atrial  fibrillation . PAST SURGICAL HISTORY:  Multiple low back surgeries, aortic valve  replacement, bilateral carpal tunnel surgery, coronary artery bypass  graft, kidney stone surgery, neck surgery, bilateral shoulder surgery. MEDICATIONS PRIOR TO ADMISSION:  Lasix p.r.n., Benicar, Prilosec,  Zyloprim, vitamin B, alpha-lipoic acid, biotin, vitamin C, Livalo,  CoQ10, vitamin D, multivitamin, aspirin. SOCIAL HISTORY:  The patient quit tobacco.  Admits to alcohol six beers  a day. PRIMARY CARE PHYSICIAN:  Dr. Kassy Cooper. REVIEW OF SYSTEMS:  Remarkable for above-stated chief complaint. ALLERGIES:  Allergy to IODINE.     PHYSICAL EXAMINATION:  GENERAL APPEARANCE:  Physical exam reveals a 60-year-old  male  who is alert and oriented x3, cooperative and a fair

## 2019-06-05 NOTE — PROGRESS NOTES
CLINICAL PHARMACY: DISCHARGE MED RECONCILIATION/REVIEW    [facility]) Select Patient?: Yes  Total # of Interventions Recommended: 0   -   Total # Interventions Accepted: 0  Intervention Severity:   - Level 1 Intervention Present?: No   - Level 2 #: 0   - Level 3 #: 0   Time Spent (min): 5    Additional Documentation:

## 2019-06-06 ENCOUNTER — TELEPHONE (OUTPATIENT)
Dept: ADMINISTRATIVE | Age: 74
End: 2019-06-06

## 2019-06-06 ASSESSMENT — ENCOUNTER SYMPTOMS
ABDOMINAL PAIN: 0
EYE PAIN: 0
SORE THROAT: 0
COUGH: 0
CHEST TIGHTNESS: 0
NAUSEA: 0
CONSTIPATION: 0
COLOR CHANGE: 0
DIARRHEA: 0
EYE REDNESS: 0
SHORTNESS OF BREATH: 0
ABDOMINAL DISTENTION: 0

## 2019-06-07 ENCOUNTER — OFFICE VISIT (OUTPATIENT)
Dept: FAMILY MEDICINE CLINIC | Age: 74
End: 2019-06-07
Payer: MEDICARE

## 2019-06-07 VITALS
WEIGHT: 212 LBS | DIASTOLIC BLOOD PRESSURE: 66 MMHG | TEMPERATURE: 98 F | BODY MASS INDEX: 30.35 KG/M2 | HEIGHT: 70 IN | RESPIRATION RATE: 20 BRPM | OXYGEN SATURATION: 93 % | SYSTOLIC BLOOD PRESSURE: 126 MMHG | HEART RATE: 73 BPM

## 2019-06-07 DIAGNOSIS — I48.0 PAROXYSMAL ATRIAL FIBRILLATION (HCC): Primary | ICD-10-CM

## 2019-06-07 DIAGNOSIS — J20.6 ACUTE BRONCHITIS DUE TO RHINOVIRUS: ICD-10-CM

## 2019-06-07 PROCEDURE — 99214 OFFICE O/P EST MOD 30 MIN: CPT | Performed by: FAMILY MEDICINE

## 2019-06-07 RX ORDER — PREDNISONE 20 MG/1
20 TABLET ORAL 2 TIMES DAILY
Qty: 10 TABLET | Refills: 0 | Status: SHIPPED | OUTPATIENT
Start: 2019-06-07 | End: 2019-06-12

## 2019-06-07 ASSESSMENT — ENCOUNTER SYMPTOMS
NAUSEA: 0
SORE THROAT: 0
BLOOD IN STOOL: 0
PHOTOPHOBIA: 0
ALLERGIC/IMMUNOLOGIC NEGATIVE: 1
DIARRHEA: 0
COLOR CHANGE: 0
SHORTNESS OF BREATH: 0
ABDOMINAL PAIN: 0
CHEST TIGHTNESS: 0
WHEEZING: 1
BACK PAIN: 0
COUGH: 1
FACIAL SWELLING: 0
VOMITING: 0
APNEA: 0
SINUS PRESSURE: 0

## 2019-06-07 NOTE — PROGRESS NOTES
Chief Complaint:   Chief Complaint   Patient presents with    Follow-Up from [address] [place] [address] Results     respiratory panel       Heart Problem   This is a new problem. The current episode started in the past 7 days. The problem occurs daily. The problem has been gradually improving. Associated symptoms include congestion and coughing. Pertinent negatives include no abdominal pain, arthralgias, chest pain, headaches, joint swelling, myalgias, nausea, rash, sore throat, vomiting or weakness.        Patient Active Problem List   Diagnosis    Coronary atherosclerosis of native coronary artery    Hyperlipidemia    Aortic stenosis    Hypertension    DEBORAH (obstructive sleep apnea)    DDD (degenerative disc disease), cervical    Nocturnal hypoxemia due to asthma    Neuroforaminal stenosis of lumbar spine    Valvular heart disease    Mild intermittent asthma without complication    New onset a-fib ([address]    S/P AVR (aortic valve replacement)    S/P CABG (coronary artery bypass graft)       Past Medical History:   Diagnosis Date    Arthritis     Atrial fibrillation ([address]     day of surgery on 06/12/13- resolved at this time 6/23/2013    CAD (coronary artery disease)     heart cath with stent 2001    Cancer ([address]     Basal Cell on his back    COPD (chronic obstructive pulmonary disease) ([address]     History of blood transfusion     Hyperlipidemia     Hypertension     Kidney stones     Sleep apnea     Vitamin D deficiency        Past Surgical History:   Procedure Laterality Date    ABDOMEN SURGERY      Bilateral groin hernias    AORTIC VALVE REPLACEMENT  6/12/2013    BACK SURGERY  2006    cervicle fusion   [facility] SURGERY  1974, 1984    lumbar laminectomy    BACK SURGERY  [id number]    360 FUSION L4-L5    BACK SURGERY      CARDIAC SURGERY      CARPAL TUNNEL RELEASE  1-20013    both hands    COLONOSCOPY      CORONARY ARTERY BYPASS GRAFT      DIAGNOSTIC CARDIAC CATH LAB PROCEDURE      ENDOSCOPY, COLON, DIAGNOSTIC      FRACTURE SURGERY      Right arm Fx    FRACTURE SURGERY      Left finger Fx    HERNIA REPAIR      Bilateral    KIDNEY STONE SURGERY      open    NECK SURGERY      SHOULDER ARTHROSCOPY      right and left shoulders    SKIN BIOPSY         Current Outpatient Medications   Medication Sig Dispense Refill    predniSONE (DELTASONE) 20 MG tablet Take 1 tablet by mouth 2 times daily for 5 days 10 tablet 0    apixaban (ELIQUIS) 5 MG TABS tablet Take 1 tablet by mouth 2 times daily 60 tablet 0    metoprolol succinate (TOPROL XL) 25 MG extended release tablet Take 1 tablet by mouth 2 times daily 60 tablet 0    furosemide (LASIX) 20 MG tablet TAKE ONE TABLET BY MOUTH EVERY DAY AS NEEDED (SWELLING). 30 tablet 5    olmesartan (BENICAR) 20 MG tablet Take 1 tablet by mouth daily 30 tablet 3    omeprazole (PRILOSEC) 20 MG delayed release capsule Take 1 capsule by mouth daily 90 capsule 1    allopurinol (ZYLOPRIM) 300 MG tablet Take 1 tablet by mouth daily 90 tablet 3    Alpha-Lipoic Acid 100 MG CAPS Take by mouth      BIOTIN PO Take by mouth      Ascorbic Acid (VITAMIN C ER PO) Take by mouth      pitavastatin (LIVALO) 2 MG TABS tablet Take 1 tablet by mouth nightly 90 tablet 3    Coenzyme Q10 (CO Q 10) 100 MG CAPS Take 2 tablets by mouth daily      Cholecalciferol (VITAMIN D3) 2000 UNITS TABS Take 1 tablet by mouth daily      Multiple Vitamins-Minerals (THERAPEUTIC MULTIVITAMIN-MINERALS) tablet Take 1 tablet by mouth daily.  aspirin 81 MG EC tablet Take 81 mg by mouth daily        No current facility-administered medications for this visit.         Allergies   Allergen Reactions    Iodine Swelling and Other (See Comments)     Reddened face and swelling of tongue       Social History     Socioeconomic History    Marital status:      Spouse name: Sulma Huggins    Number of children: 3    Years of education: 5    Highest education level: None   Occupational History    Occupation: urinating, frequency and urgency. Musculoskeletal: Negative for arthralgias, back pain, joint swelling and myalgias. Skin: Negative for color change and rash. Allergic/Immunologic: Negative. Neurological: Negative for syncope, weakness, light-headedness and headaches. Hematological: Negative. Psychiatric/Behavioral: Negative for agitation, behavioral problems, confusion and self-injury. The patient is not nervous/anxious. All other systems reviewed and are negative. Physical Exam   Constitutional: He is oriented to person, place, and time. He appears well-developed and well-nourished. No distress. HENT:   Head: Normocephalic and atraumatic. Nose: Nose normal.   Mouth/Throat: Oropharynx is clear and moist.   Eyes: Pupils are equal, round, and reactive to light. Conjunctivae and EOM are normal.   Neck: Normal range of motion. Neck supple. No JVD present. No thyromegaly present. Cardiovascular: Normal rate, regular rhythm and normal heart sounds. Exam reveals no gallop and no friction rub. No murmur heard. Pulmonary/Chest: Effort normal. No respiratory distress. He has wheezes. He has rhonchi. Abdominal: Soft. Bowel sounds are normal. He exhibits no distension. There is no tenderness. There is no rebound and no guarding. Musculoskeletal: Normal range of motion. Lymphadenopathy:     He has no cervical adenopathy. Neurological: He is alert and oriented to person, place, and time. He has normal reflexes. No cranial nerve deficit. He exhibits normal muscle tone. Coordination normal.   Skin: Skin is warm and dry. No rash noted. No erythema. Psychiatric: He has a normal mood and affect. His behavior is normal. Judgment normal.   Nursing note and vitals reviewed. ASSESSMENT/PLAN:    Galen Quintana was seen today for follow-up from hospital and results.     Diagnoses and all orders for this visit:    Paroxysmal atrial fibrillation ([address]    Acute bronchitis due to Rhinovirus  -     predniSONE (DELTASONE) 20 MG tablet; Take 1 tablet by mouth 2 times daily for 5 days    The current medical regimen is effective;  continue present plan and medications.           Ashko Sykes DO    6/7/2019  12:34 PM

## 2019-06-07 NOTE — DISCHARGE SUMMARY
[address]                  [address] [facility], [address] [facility]                               DISCHARGE SUMMARY    PATIENT NAME: Sheri King                     :        1945  MED REC NO:   [id number]                            ROOM:       2614  ACCOUNT NO:   [de-identified]                           ADMIT DATE: 2019  PROVIDER:     Phuc Esteves DO                  DISCHARGE DATE:  2019    DISCHARGE DIAGNOSES:  1. Atrial fibrillation with RVR with spontaneous conversion to sinus  rhythm. 2.  Rhinovirus infection. 3.  Coronary artery disease. 4.  Hyperlipidemia. 5.  Hypertension. 6.  Status post aortic valve replacement. 7.  Status post CABG. 8.  Obstructive sleep apnea. HOSPITAL COURSE:  The patient is a 51-year-old  male who went  to his primary care physician's office. He was noted to be in atrial  fib with RVR. He was sent to the emergency room. He had URI symptoms. He was assigned to observation status. He was seen by Cardiology. He  had spontaneous conversion of his rhythm to sinus rhythm. Respiratory  FILMARRAY was positive for rhinovirus. 2-D echo revealed left ventricle  ejection fraction 65%, stage II diastolic dysfunction, normally  functioning status post aortic valve replacement. The patient was  discharged to home in stable condition. DISCHARGE MEDICATIONS:  As per discharge med rec, which include:  1. Eliquis 5 mg b.i.d.  2.  Toprol XL 25 mg b.i.d.  3.  Furosemide 20 mg daily p.r.n. as directed. 4.  Benicar 20 mg daily. 5.  Prilosec 20 mg daily. 6.  Zyloprim 300 mg daily  7. Alpha lipoic acid as directed. 8.  Biotin as directed. 9.  Vitamin C as directed. 10.  Livalo 2 mg daily. 11.  Vitamin B complex. 12.  CoQ10.  13.  Vitamin D. 14.  Multivitamin. 15.  Aspirin 81 mg daily. DISCHARGE INSTRUCTIONS:  The patient instructed to follow up his primary  care physician, Dr. Donovan Nolan.

## 2019-06-12 ENCOUNTER — TELEPHONE (OUTPATIENT)
Dept: FAMILY MEDICINE CLINIC | Age: 74
End: 2019-06-12

## 2019-06-13 ENCOUNTER — TELEPHONE (OUTPATIENT)
Dept: FAMILY MEDICINE CLINIC | Age: 74
End: 2019-06-13

## 2019-06-28 NOTE — TELEPHONE ENCOUNTER
Was given this medication while in the hospital. His wife called saying that he needs it refilled but the doctor that gave it to him was from the hospital and she was wondering if you would prescribe it for him. Med pended, pharmacy verified.

## 2019-07-08 ENCOUNTER — OFFICE VISIT (OUTPATIENT)
Dept: FAMILY MEDICINE CLINIC | Age: 74
End: 2019-07-08
Payer: MEDICARE

## 2019-07-08 VITALS
WEIGHT: 214 LBS | OXYGEN SATURATION: 96 % | BODY MASS INDEX: 30.64 KG/M2 | DIASTOLIC BLOOD PRESSURE: 68 MMHG | HEART RATE: 62 BPM | TEMPERATURE: 98 F | HEIGHT: 70 IN | SYSTOLIC BLOOD PRESSURE: 134 MMHG | RESPIRATION RATE: 16 BRPM

## 2019-07-08 DIAGNOSIS — I10 ESSENTIAL HYPERTENSION: ICD-10-CM

## 2019-07-08 DIAGNOSIS — I48.0 PAROXYSMAL ATRIAL FIBRILLATION (HCC): Primary | ICD-10-CM

## 2019-07-08 PROCEDURE — G8510 SCR DEP NEG, NO PLAN REQD: HCPCS | Performed by: FAMILY MEDICINE

## 2019-07-08 PROCEDURE — 99213 OFFICE O/P EST LOW 20 MIN: CPT | Performed by: FAMILY MEDICINE

## 2019-07-08 RX ORDER — AMLODIPINE BESYLATE 5 MG/1
5 TABLET ORAL DAILY
COMMUNITY
End: 2019-12-30 | Stop reason: SDUPTHER

## 2019-07-08 ASSESSMENT — PATIENT HEALTH QUESTIONNAIRE - PHQ9
SUM OF ALL RESPONSES TO PHQ QUESTIONS 1-9: 0
SUM OF ALL RESPONSES TO PHQ9 QUESTIONS 1 & 2: 0
2. FEELING DOWN, DEPRESSED OR HOPELESS: 0
SUM OF ALL RESPONSES TO PHQ QUESTIONS 1-9: 0
1. LITTLE INTEREST OR PLEASURE IN DOING THINGS: 0

## 2019-07-08 ASSESSMENT — ENCOUNTER SYMPTOMS
NAUSEA: 0
COLOR CHANGE: 0
VOMITING: 0
APNEA: 0
SORE THROAT: 0
ALLERGIC/IMMUNOLOGIC NEGATIVE: 1
SHORTNESS OF BREATH: 0
BACK PAIN: 0
ABDOMINAL PAIN: 0
PHOTOPHOBIA: 0
DIARRHEA: 0
COUGH: 0
BLOOD IN STOOL: 0
WHEEZING: 0
CHEST TIGHTNESS: 0
FACIAL SWELLING: 0
SINUS PRESSURE: 0

## 2019-07-24 ENCOUNTER — OFFICE VISIT (OUTPATIENT)
Dept: CARDIOLOGY CLINIC | Age: 74
End: 2019-07-24
Payer: MEDICARE

## 2019-07-24 VITALS
BODY MASS INDEX: 31.49 KG/M2 | HEIGHT: 69 IN | RESPIRATION RATE: 16 BRPM | DIASTOLIC BLOOD PRESSURE: 60 MMHG | HEART RATE: 57 BPM | SYSTOLIC BLOOD PRESSURE: 132 MMHG | WEIGHT: 212.6 LBS

## 2019-07-24 DIAGNOSIS — I10 HYPERTENSION, UNSPECIFIED TYPE: Primary | Chronic | ICD-10-CM

## 2019-07-24 PROCEDURE — 93000 ELECTROCARDIOGRAM COMPLETE: CPT | Performed by: INTERNAL MEDICINE

## 2019-07-24 PROCEDURE — 99214 OFFICE O/P EST MOD 30 MIN: CPT | Performed by: INTERNAL MEDICINE

## 2019-07-24 NOTE — PROGRESS NOTES
40 mg ) 30 tablet 3    omeprazole (PRILOSEC) 20 MG delayed release capsule Take 1 capsule by mouth daily 90 capsule 1    allopurinol (ZYLOPRIM) 300 MG tablet Take 1 tablet by mouth daily 90 tablet 3    Alpha-Lipoic Acid 100 MG CAPS Take by mouth      BIOTIN PO Take by mouth      Ascorbic Acid (VITAMIN C ER PO) Take by mouth      pitavastatin (LIVALO) 2 MG TABS tablet Take 1 tablet by mouth nightly 90 tablet 3    Coenzyme Q10 (CO Q 10) 100 MG CAPS Take 2 tablets by mouth daily      Cholecalciferol (VITAMIN D3) 2000 UNITS TABS Take 1 tablet by mouth daily      Multiple Vitamins-Minerals (THERAPEUTIC MULTIVITAMIN-MINERALS) tablet Take 1 tablet by mouth daily.  aspirin 81 MG EC tablet Take 81 mg by mouth daily        No current facility-administered medications for this visit.         Social History     Socioeconomic History    Marital status:      Spouse name: Brice Nation    Number of children: 3    Years of education: 5    Highest education level: Not on file   Occupational History    Occupation: retired- [employer]/ [employer]     [employer]er: [employer] [address]: Retired   Social Needs    Financial resource strain: Not on file    Food insecurity:     Worry: Not on file     Inability: Not on file   [employer] needs:     Medical: Not on file     Non-medical: Not on file   Tobacco Use    Smoking status: Former Smoker     Packs/day: 1.50     Years: 55.00     Pack years: 82.50     Types: Cigarettes     Last attempt to quit: 2013     Years since quittin.2    Smokeless tobacco: Never Used    Tobacco comment: [employer]   Substance and Sexual Activity    Alcohol use: Yes     Comment: drinks 6 cans of beer /day    Drug use: No    Sexual activity: Yes     Partners: Female   Lifestyle    Physical activity:     Days per week: Not on file     Minutes per session: Not on file    Stress: Not on file   Relationships    Social connections:     Talks on phone: Not on file

## 2019-08-07 ENCOUNTER — HOSPITAL ENCOUNTER (OUTPATIENT)
Age: 74
Discharge: HOME | End: 2019-08-09
Payer: MEDICARE

## 2019-08-07 ENCOUNTER — OFFICE VISIT (OUTPATIENT)
Dept: FAMILY MEDICINE CLINIC | Age: 74
End: 2019-08-07
Payer: MEDICARE

## 2019-08-07 VITALS
HEIGHT: 70 IN | HEART RATE: 59 BPM | TEMPERATURE: 98.2 F | SYSTOLIC BLOOD PRESSURE: 136 MMHG | WEIGHT: 213 LBS | RESPIRATION RATE: 18 BRPM | BODY MASS INDEX: 30.49 KG/M2 | OXYGEN SATURATION: 96 % | DIASTOLIC BLOOD PRESSURE: 84 MMHG

## 2019-08-07 DIAGNOSIS — E55.9 VITAMIN D DEFICIENCY: ICD-10-CM

## 2019-08-07 DIAGNOSIS — E79.0 HYPERURICEMIA: ICD-10-CM

## 2019-08-07 DIAGNOSIS — E78.5 HYPERLIPIDEMIA, UNSPECIFIED HYPERLIPIDEMIA TYPE: ICD-10-CM

## 2019-08-07 DIAGNOSIS — M77.8 RIGHT WRIST TENDINITIS: ICD-10-CM

## 2019-08-07 DIAGNOSIS — I10 ESSENTIAL HYPERTENSION: ICD-10-CM

## 2019-08-07 DIAGNOSIS — E11.9 TYPE 2 DIABETES MELLITUS WITHOUT COMPLICATION, WITHOUT LONG-TERM CURRENT USE OF INSULIN (HCC): ICD-10-CM

## 2019-08-07 DIAGNOSIS — I48.0 PAROXYSMAL ATRIAL FIBRILLATION (HCC): ICD-10-CM

## 2019-08-07 DIAGNOSIS — I48.0 PAROXYSMAL ATRIAL FIBRILLATION (HCC): Primary | ICD-10-CM

## 2019-08-07 LAB
ALBUMIN SERPL-MCNC: 4.5 G/DL (ref 3.5–5.2)
ALP BLD-CCNC: 54 U/L (ref 40–129)
ALT SERPL-CCNC: 24 U/L (ref 0–40)
ANION GAP SERPL CALCULATED.3IONS-SCNC: 14 MMOL/L (ref 7–16)
AST SERPL-CCNC: 24 U/L (ref 0–39)
BASOPHILS ABSOLUTE: 0.05 E9/L (ref 0–0.2)
BASOPHILS RELATIVE PERCENT: 0.9 % (ref 0–2)
BILIRUB SERPL-MCNC: 0.2 MG/DL (ref 0–1.2)
BUN BLDV-MCNC: 25 MG/DL (ref 8–23)
CALCIUM SERPL-MCNC: 10 MG/DL (ref 8.6–10.2)
CHLORIDE BLD-SCNC: 104 MMOL/L (ref 98–107)
CHOLESTEROL, TOTAL: 202 MG/DL (ref 0–199)
CO2: 24 MMOL/L (ref 22–29)
CREAT SERPL-MCNC: 0.8 MG/DL (ref 0.7–1.2)
EOSINOPHILS ABSOLUTE: 0.14 E9/L (ref 0.05–0.5)
EOSINOPHILS RELATIVE PERCENT: 2.5 % (ref 0–6)
GFR AFRICAN AMERICAN: >60
GFR NON-AFRICAN AMERICAN: >60 ML/MIN/1.73
GLUCOSE BLD-MCNC: 118 MG/DL (ref 74–99)
HBA1C MFR BLD: 5.8 % (ref 4–5.6)
HCT VFR BLD CALC: 37.6 % (ref 37–54)
HDLC SERPL-MCNC: 73 MG/DL
HEMOGLOBIN: 12.1 G/DL (ref 12.5–16.5)
IMMATURE GRANULOCYTES #: 0.01 E9/L
IMMATURE GRANULOCYTES %: 0.2 % (ref 0–5)
LDL CHOLESTEROL CALCULATED: 106 MG/DL (ref 0–99)
LYMPHOCYTES ABSOLUTE: 1.53 E9/L (ref 1.5–4)
LYMPHOCYTES RELATIVE PERCENT: 27.2 % (ref 20–42)
MCH RBC QN AUTO: 31.7 PG (ref 26–35)
MCHC RBC AUTO-ENTMCNC: 32.2 % (ref 32–34.5)
MCV RBC AUTO: 98.4 FL (ref 80–99.9)
MONOCYTES ABSOLUTE: 0.58 E9/L (ref 0.1–0.95)
MONOCYTES RELATIVE PERCENT: 10.3 % (ref 2–12)
NEUTROPHILS ABSOLUTE: 3.32 E9/L (ref 1.8–7.3)
NEUTROPHILS RELATIVE PERCENT: 58.9 % (ref 43–80)
PDW BLD-RTO: 14.5 FL (ref 11.5–15)
PLATELET # BLD: 193 E9/L (ref 130–450)
PMV BLD AUTO: 10.7 FL (ref 7–12)
POTASSIUM SERPL-SCNC: 4.6 MMOL/L (ref 3.5–5)
RBC # BLD: 3.82 E12/L (ref 3.8–5.8)
SODIUM BLD-SCNC: 142 MMOL/L (ref 132–146)
TOTAL PROTEIN: 7.2 G/DL (ref 6.4–8.3)
TRIGL SERPL-MCNC: 113 MG/DL (ref 0–149)
TSH SERPL DL<=0.05 MIU/L-ACNC: 1.02 UIU/ML (ref 0.27–4.2)
URIC ACID, SERUM: 4.7 MG/DL (ref 3.4–7)
VITAMIN D 25-HYDROXY: 34 NG/ML (ref 30–100)
VLDLC SERPL CALC-MCNC: 23 MG/DL
WBC # BLD: 5.6 E9/L (ref 4.5–11.5)

## 2019-08-07 PROCEDURE — 80061 LIPID PANEL: CPT

## 2019-08-07 PROCEDURE — 84443 ASSAY THYROID STIM HORMONE: CPT

## 2019-08-07 PROCEDURE — 82306 VITAMIN D 25 HYDROXY: CPT

## 2019-08-07 PROCEDURE — 85025 COMPLETE CBC W/AUTO DIFF WBC: CPT

## 2019-08-07 PROCEDURE — 83036 HEMOGLOBIN GLYCOSYLATED A1C: CPT

## 2019-08-07 PROCEDURE — 80053 COMPREHEN METABOLIC PANEL: CPT

## 2019-08-07 PROCEDURE — 84550 ASSAY OF BLOOD/URIC ACID: CPT

## 2019-08-07 PROCEDURE — 36415 COLL VENOUS BLD VENIPUNCTURE: CPT | Performed by: FAMILY MEDICINE

## 2019-08-07 PROCEDURE — 99214 OFFICE O/P EST MOD 30 MIN: CPT | Performed by: FAMILY MEDICINE

## 2019-08-07 RX ORDER — METHYLPREDNISOLONE 4 MG/1
TABLET ORAL
Qty: 1 KIT | Refills: 0 | Status: SHIPPED | OUTPATIENT
Start: 2019-08-07 | End: 2019-10-16

## 2019-08-07 ASSESSMENT — ENCOUNTER SYMPTOMS
VOMITING: 0
BACK PAIN: 0
COLOR CHANGE: 0
ABDOMINAL PAIN: 0
APNEA: 0
SHORTNESS OF BREATH: 0
BLOOD IN STOOL: 0
NAUSEA: 0
SORE THROAT: 0
WHEEZING: 0
SINUS PRESSURE: 0
DIARRHEA: 0
CHEST TIGHTNESS: 0
ALLERGIC/IMMUNOLOGIC NEGATIVE: 1
COUGH: 0
PHOTOPHOBIA: 0
FACIAL SWELLING: 0

## 2019-08-07 NOTE — PROGRESS NOTES
Allergic/Immunologic: Negative. Neurological: Negative for syncope, weakness, light-headedness and headaches. Hematological: Negative. Psychiatric/Behavioral: Negative for agitation, behavioral problems, confusion and self-injury. The patient is not nervous/anxious. All other systems reviewed and are negative. Physical Exam   Constitutional: He is oriented to person, place, and time. He appears well-developed and well-nourished. No distress. HENT:   Head: Normocephalic and atraumatic. Nose: Nose normal.   Mouth/Throat: Oropharynx is clear and moist.   Eyes: Pupils are equal, round, and reactive to light. Conjunctivae and EOM are normal.   Neck: Normal range of motion. Neck supple. No JVD present. No thyromegaly present. Cardiovascular: Normal rate and regular rhythm. Exam reveals no gallop and no friction rub. Murmur heard. Systolic murmur is present with a grade of 1/6. Pulmonary/Chest: Effort normal and breath sounds normal. No respiratory distress. He has no wheezes. Abdominal: Soft. Bowel sounds are normal. He exhibits no distension. There is no tenderness. There is no rebound and no guarding. Musculoskeletal: Normal range of motion. Right wrist: He exhibits tenderness and bony tenderness. Lymphadenopathy:     He has no cervical adenopathy. Neurological: He is alert and oriented to person, place, and time. He has normal reflexes. No cranial nerve deficit. He exhibits normal muscle tone. Coordination normal.   Skin: Skin is warm and dry. No rash noted. No erythema. Psychiatric: He has a normal mood and affect. His behavior is normal. Judgment normal.   Nursing note and vitals reviewed. ASSESSMENT/PLAN:    Pollo Palencia was seen today for atrial fibrillation and hyperlipidemia.     Diagnoses and all orders for this visit:    Paroxysmal atrial fibrillation (HCC)  -     CBC Auto Differential; Future    Essential hypertension  -     Comprehensive

## 2019-09-23 RX ORDER — OMEPRAZOLE 20 MG/1
20 CAPSULE, DELAYED RELEASE ORAL DAILY
Qty: 90 CAPSULE | Refills: 1 | Status: SHIPPED
Start: 2019-09-23 | End: 2020-03-23 | Stop reason: SDUPTHER

## 2019-10-16 ENCOUNTER — OFFICE VISIT (OUTPATIENT)
Dept: PRIMARY CARE CLINIC | Age: 74
End: 2019-10-16
Payer: MEDICARE

## 2019-10-16 VITALS
BODY MASS INDEX: 32.14 KG/M2 | HEIGHT: 69 IN | DIASTOLIC BLOOD PRESSURE: 80 MMHG | WEIGHT: 217 LBS | OXYGEN SATURATION: 95 % | HEART RATE: 66 BPM | SYSTOLIC BLOOD PRESSURE: 134 MMHG | RESPIRATION RATE: 18 BRPM | TEMPERATURE: 98.2 F

## 2019-10-16 DIAGNOSIS — G47.33 OSA (OBSTRUCTIVE SLEEP APNEA): ICD-10-CM

## 2019-10-16 DIAGNOSIS — E78.2 MIXED HYPERLIPIDEMIA: ICD-10-CM

## 2019-10-16 DIAGNOSIS — M79.644 THUMB PAIN, RIGHT: ICD-10-CM

## 2019-10-16 DIAGNOSIS — Z23 NEED FOR PROPHYLACTIC VACCINATION AND INOCULATION AGAINST INFLUENZA: Primary | ICD-10-CM

## 2019-10-16 DIAGNOSIS — I35.0 AORTIC VALVE STENOSIS, ETIOLOGY OF CARDIAC VALVE DISEASE UNSPECIFIED: ICD-10-CM

## 2019-10-16 DIAGNOSIS — I25.10 ATHEROSCLEROSIS OF NATIVE CORONARY ARTERY OF NATIVE HEART WITHOUT ANGINA PECTORIS: ICD-10-CM

## 2019-10-16 DIAGNOSIS — I10 HYPERTENSION, UNSPECIFIED TYPE: ICD-10-CM

## 2019-10-16 PROCEDURE — 99214 OFFICE O/P EST MOD 30 MIN: CPT | Performed by: FAMILY MEDICINE

## 2019-10-16 PROCEDURE — G0008 ADMIN INFLUENZA VIRUS VAC: HCPCS | Performed by: FAMILY MEDICINE

## 2019-10-16 PROCEDURE — 90653 IIV ADJUVANT VACCINE IM: CPT | Performed by: FAMILY MEDICINE

## 2019-10-16 ASSESSMENT — ENCOUNTER SYMPTOMS
ABDOMINAL PAIN: 0
DIARRHEA: 0
CHEST TIGHTNESS: 0
BACK PAIN: 0
SORE THROAT: 0
ALLERGIC/IMMUNOLOGIC NEGATIVE: 1
COLOR CHANGE: 0
SINUS PRESSURE: 0
NAUSEA: 0
COUGH: 0
VOMITING: 0
WHEEZING: 0
BLOOD IN STOOL: 0
APNEA: 0
FACIAL SWELLING: 0
SHORTNESS OF BREATH: 0
PHOTOPHOBIA: 0

## 2019-10-17 ENCOUNTER — HOSPITAL ENCOUNTER (OUTPATIENT)
Age: 74
Discharge: HOME | End: 2019-10-17
Payer: MEDICARE

## 2019-10-17 LAB
ALBUMIN SERPL-MCNC: 4.4 G/DL (ref 3.5–5.2)
ALP BLD-CCNC: 63 U/L (ref 40–129)
ALT SERPL-CCNC: 22 U/L (ref 0–40)
ANION GAP SERPL CALCULATED.3IONS-SCNC: 12 MMOL/L (ref 7–16)
AST SERPL-CCNC: 25 U/L (ref 0–39)
BASOPHILS ABSOLUTE: 0.05 E9/L (ref 0–0.2)
BASOPHILS RELATIVE PERCENT: 0.8 % (ref 0–2)
BILIRUB SERPL-MCNC: 0.6 MG/DL (ref 0–1.2)
BUN BLDV-MCNC: 23 MG/DL (ref 8–23)
CALCIUM SERPL-MCNC: 9.8 MG/DL (ref 8.6–10.2)
CHLORIDE BLD-SCNC: 104 MMOL/L (ref 98–107)
CHOLESTEROL, TOTAL: 184 MG/DL (ref 0–199)
CO2: 25 MMOL/L (ref 22–29)
CREAT SERPL-MCNC: 0.8 MG/DL (ref 0.7–1.2)
EOSINOPHILS ABSOLUTE: 0.09 E9/L (ref 0.05–0.5)
EOSINOPHILS RELATIVE PERCENT: 1.5 % (ref 0–6)
GFR AFRICAN AMERICAN: >60
GFR NON-AFRICAN AMERICAN: >60 ML/MIN/1.73
GLUCOSE BLD-MCNC: 122 MG/DL (ref 74–99)
HCT VFR BLD CALC: 37.9 % (ref 37–54)
HDLC SERPL-MCNC: 62 MG/DL
HEMOGLOBIN: 12.5 G/DL (ref 12.5–16.5)
IMMATURE GRANULOCYTES #: 0.01 E9/L
IMMATURE GRANULOCYTES %: 0.2 % (ref 0–5)
LDL CHOLESTEROL CALCULATED: 91 MG/DL (ref 0–99)
LYMPHOCYTES ABSOLUTE: 0.86 E9/L (ref 1.5–4)
LYMPHOCYTES RELATIVE PERCENT: 14.6 % (ref 20–42)
MCH RBC QN AUTO: 32.3 PG (ref 26–35)
MCHC RBC AUTO-ENTMCNC: 33 % (ref 32–34.5)
MCV RBC AUTO: 97.9 FL (ref 80–99.9)
MONOCYTES ABSOLUTE: 0.59 E9/L (ref 0.1–0.95)
MONOCYTES RELATIVE PERCENT: 10 % (ref 2–12)
NEUTROPHILS ABSOLUTE: 4.29 E9/L (ref 1.8–7.3)
NEUTROPHILS RELATIVE PERCENT: 72.9 % (ref 43–80)
PDW BLD-RTO: 12.6 FL (ref 11.5–15)
PLATELET # BLD: 193 E9/L (ref 130–450)
PMV BLD AUTO: 10.1 FL (ref 7–12)
POTASSIUM SERPL-SCNC: 4.5 MMOL/L (ref 3.5–5)
RBC # BLD: 3.87 E12/L (ref 3.8–5.8)
SODIUM BLD-SCNC: 141 MMOL/L (ref 132–146)
TOTAL PROTEIN: 7.2 G/DL (ref 6.4–8.3)
TRIGL SERPL-MCNC: 155 MG/DL (ref 0–149)
VLDLC SERPL CALC-MCNC: 31 MG/DL
WBC # BLD: 5.9 E9/L (ref 4.5–11.5)

## 2019-10-17 PROCEDURE — 36415 COLL VENOUS BLD VENIPUNCTURE: CPT

## 2019-10-17 PROCEDURE — 85025 COMPLETE CBC W/AUTO DIFF WBC: CPT

## 2019-10-17 PROCEDURE — 80053 COMPREHEN METABOLIC PANEL: CPT

## 2019-10-17 PROCEDURE — 80061 LIPID PANEL: CPT

## 2019-10-18 ENCOUNTER — TELEPHONE (OUTPATIENT)
Dept: PRIMARY CARE CLINIC | Age: 74
End: 2019-10-18

## 2019-10-24 LAB — DIABETIC RETINOPATHY: NORMAL

## 2019-10-29 ENCOUNTER — OFFICE VISIT (OUTPATIENT)
Dept: CARDIOLOGY CLINIC | Age: 74
End: 2019-10-29
Payer: MEDICARE

## 2019-10-29 VITALS
WEIGHT: 216.6 LBS | HEART RATE: 63 BPM | RESPIRATION RATE: 16 BRPM | SYSTOLIC BLOOD PRESSURE: 132 MMHG | BODY MASS INDEX: 31.01 KG/M2 | HEIGHT: 70 IN | DIASTOLIC BLOOD PRESSURE: 76 MMHG

## 2019-10-29 DIAGNOSIS — I10 HYPERTENSION, UNSPECIFIED TYPE: Primary | ICD-10-CM

## 2019-10-29 PROCEDURE — 93000 ELECTROCARDIOGRAM COMPLETE: CPT | Performed by: INTERNAL MEDICINE

## 2019-10-29 PROCEDURE — 99214 OFFICE O/P EST MOD 30 MIN: CPT | Performed by: INTERNAL MEDICINE

## 2019-12-30 ENCOUNTER — OFFICE VISIT (OUTPATIENT)
Dept: PRIMARY CARE CLINIC | Age: 74
End: 2019-12-30
Payer: MEDICARE

## 2019-12-30 ENCOUNTER — HOSPITAL ENCOUNTER (OUTPATIENT)
Age: 74
Discharge: HOME | End: 2020-01-01
Payer: MEDICARE

## 2019-12-30 VITALS
OXYGEN SATURATION: 98 % | RESPIRATION RATE: 16 BRPM | WEIGHT: 220.6 LBS | BODY MASS INDEX: 31.58 KG/M2 | HEART RATE: 75 BPM | HEIGHT: 70 IN | DIASTOLIC BLOOD PRESSURE: 70 MMHG | SYSTOLIC BLOOD PRESSURE: 138 MMHG

## 2019-12-30 DIAGNOSIS — I10 HYPERTENSION, UNSPECIFIED TYPE: ICD-10-CM

## 2019-12-30 DIAGNOSIS — I48.0 PAROXYSMAL ATRIAL FIBRILLATION (HCC): ICD-10-CM

## 2019-12-30 DIAGNOSIS — R73.03 PREDIABETES: ICD-10-CM

## 2019-12-30 DIAGNOSIS — I35.0 AORTIC VALVE STENOSIS, ETIOLOGY OF CARDIAC VALVE DISEASE UNSPECIFIED: ICD-10-CM

## 2019-12-30 DIAGNOSIS — E78.2 MIXED HYPERLIPIDEMIA: Primary | ICD-10-CM

## 2019-12-30 LAB
ALBUMIN SERPL-MCNC: 4.5 G/DL (ref 3.5–5.2)
ALP BLD-CCNC: 61 U/L (ref 40–129)
ALT SERPL-CCNC: 30 U/L (ref 0–40)
ANION GAP SERPL CALCULATED.3IONS-SCNC: 18 MMOL/L (ref 7–16)
AST SERPL-CCNC: 19 U/L (ref 0–39)
BASOPHILS ABSOLUTE: 0.05 E9/L (ref 0–0.2)
BASOPHILS RELATIVE PERCENT: 0.8 % (ref 0–2)
BILIRUB SERPL-MCNC: 0.5 MG/DL (ref 0–1.2)
BUN BLDV-MCNC: 20 MG/DL (ref 8–23)
CALCIUM SERPL-MCNC: 9.7 MG/DL (ref 8.6–10.2)
CHLORIDE BLD-SCNC: 101 MMOL/L (ref 98–107)
CHOLESTEROL, TOTAL: 200 MG/DL (ref 0–199)
CO2: 22 MMOL/L (ref 22–29)
CREAT SERPL-MCNC: 0.8 MG/DL (ref 0.7–1.2)
CREATININE URINE: 80 MG/DL (ref 40–278)
EOSINOPHILS ABSOLUTE: 0.13 E9/L (ref 0.05–0.5)
EOSINOPHILS RELATIVE PERCENT: 2.1 % (ref 0–6)
GFR AFRICAN AMERICAN: >60
GFR NON-AFRICAN AMERICAN: >60 ML/MIN/1.73
GLUCOSE BLD-MCNC: 116 MG/DL (ref 74–99)
HBA1C MFR BLD: 6.1 % (ref 4–5.6)
HCT VFR BLD CALC: 38 % (ref 37–54)
HDLC SERPL-MCNC: 65 MG/DL
HEMOGLOBIN: 12.2 G/DL (ref 12.5–16.5)
IMMATURE GRANULOCYTES #: 0.02 E9/L
IMMATURE GRANULOCYTES %: 0.3 % (ref 0–5)
LDL CHOLESTEROL CALCULATED: 97 MG/DL (ref 0–99)
LYMPHOCYTES ABSOLUTE: 1.74 E9/L (ref 1.5–4)
LYMPHOCYTES RELATIVE PERCENT: 28.3 % (ref 20–42)
MCH RBC QN AUTO: 31 PG (ref 26–35)
MCHC RBC AUTO-ENTMCNC: 32.1 % (ref 32–34.5)
MCV RBC AUTO: 96.7 FL (ref 80–99.9)
MICROALBUMIN UR-MCNC: <12 MG/L
MICROALBUMIN/CREAT UR-RTO: ABNORMAL (ref 0–30)
MONOCYTES ABSOLUTE: 0.53 E9/L (ref 0.1–0.95)
MONOCYTES RELATIVE PERCENT: 8.6 % (ref 2–12)
NEUTROPHILS ABSOLUTE: 3.68 E9/L (ref 1.8–7.3)
NEUTROPHILS RELATIVE PERCENT: 59.9 % (ref 43–80)
PDW BLD-RTO: 12.7 FL (ref 11.5–15)
PLATELET # BLD: 188 E9/L (ref 130–450)
PMV BLD AUTO: 10.8 FL (ref 7–12)
POTASSIUM SERPL-SCNC: 4.4 MMOL/L (ref 3.5–5)
RBC # BLD: 3.93 E12/L (ref 3.8–5.8)
SODIUM BLD-SCNC: 141 MMOL/L (ref 132–146)
TOTAL PROTEIN: 6.9 G/DL (ref 6.4–8.3)
TRIGL SERPL-MCNC: 192 MG/DL (ref 0–149)
TSH SERPL DL<=0.05 MIU/L-ACNC: 0.7 UIU/ML (ref 0.27–4.2)
VLDLC SERPL CALC-MCNC: 38 MG/DL
WBC # BLD: 6.2 E9/L (ref 4.5–11.5)

## 2019-12-30 PROCEDURE — 85025 COMPLETE CBC W/AUTO DIFF WBC: CPT

## 2019-12-30 PROCEDURE — 82570 ASSAY OF URINE CREATININE: CPT

## 2019-12-30 PROCEDURE — 36415 COLL VENOUS BLD VENIPUNCTURE: CPT

## 2019-12-30 PROCEDURE — 83036 HEMOGLOBIN GLYCOSYLATED A1C: CPT

## 2019-12-30 PROCEDURE — 99214 OFFICE O/P EST MOD 30 MIN: CPT | Performed by: FAMILY MEDICINE

## 2019-12-30 PROCEDURE — 82044 UR ALBUMIN SEMIQUANTITATIVE: CPT

## 2019-12-30 PROCEDURE — 80061 LIPID PANEL: CPT

## 2019-12-30 PROCEDURE — 84443 ASSAY THYROID STIM HORMONE: CPT

## 2019-12-30 PROCEDURE — 80053 COMPREHEN METABOLIC PANEL: CPT

## 2019-12-30 RX ORDER — AMLODIPINE BESYLATE 5 MG/1
5 TABLET ORAL DAILY
Qty: 90 TABLET | Refills: 3 | Status: ON HOLD | OUTPATIENT
Start: 2019-12-30 | End: 2020-02-03 | Stop reason: HOSPADM

## 2019-12-30 ASSESSMENT — ENCOUNTER SYMPTOMS
DIARRHEA: 0
SORE THROAT: 0
WHEEZING: 0
SINUS PRESSURE: 0
ALLERGIC/IMMUNOLOGIC NEGATIVE: 1
PHOTOPHOBIA: 0
BLOOD IN STOOL: 0
ABDOMINAL PAIN: 0
APNEA: 0
BACK PAIN: 0
NAUSEA: 0
SHORTNESS OF BREATH: 0
COLOR CHANGE: 0
CHEST TIGHTNESS: 0
COUGH: 0
VOMITING: 0
FACIAL SWELLING: 0

## 2020-02-01 ENCOUNTER — HOSPITAL ENCOUNTER (INPATIENT)
Age: 75
LOS: 2 days | Discharge: HOME | DRG: 310 | End: 2020-02-03
Attending: EMERGENCY MEDICINE | Admitting: INTERNAL MEDICINE
Payer: MEDICARE

## 2020-02-01 ENCOUNTER — APPOINTMENT (OUTPATIENT)
Dept: GENERAL RADIOLOGY | Age: 75
DRG: 310 | End: 2020-02-01
Payer: MEDICARE

## 2020-02-01 PROBLEM — I48.91 ATRIAL FIBRILLATION (HCC): Status: ACTIVE | Noted: 2020-02-01

## 2020-02-01 PROBLEM — I48.91 ATRIAL FIBRILLATION WITH RAPID VENTRICULAR RESPONSE (HCC): Status: ACTIVE | Noted: 2020-02-01

## 2020-02-01 LAB
ALBUMIN SERPL-MCNC: 4.4 G/DL (ref 3.5–5.2)
ALP BLD-CCNC: 65 U/L (ref 40–129)
ALT SERPL-CCNC: 21 U/L (ref 0–40)
ANION GAP SERPL CALCULATED.3IONS-SCNC: 12 MMOL/L (ref 7–16)
APTT: 32.5 SEC (ref 24.5–35.1)
AST SERPL-CCNC: 20 U/L (ref 0–39)
BASOPHILS ABSOLUTE: 0.07 E9/L (ref 0–0.2)
BASOPHILS RELATIVE PERCENT: 0.9 % (ref 0–2)
BILIRUB SERPL-MCNC: 0.6 MG/DL (ref 0–1.2)
BUN BLDV-MCNC: 21 MG/DL (ref 8–23)
CALCIUM SERPL-MCNC: 9.8 MG/DL (ref 8.6–10.2)
CHLORIDE BLD-SCNC: 102 MMOL/L (ref 98–107)
CK MB: 2.6 NG/ML (ref 0–7.7)
CK MB: 2.7 NG/ML (ref 0–7.7)
CO2: 27 MMOL/L (ref 22–29)
CREAT SERPL-MCNC: 0.9 MG/DL (ref 0.7–1.2)
EKG ATRIAL RATE: 144 BPM
EKG Q-T INTERVAL: 326 MS
EKG QRS DURATION: 100 MS
EKG QTC CALCULATION (BAZETT): 462 MS
EKG R AXIS: 109 DEGREES
EKG T AXIS: -9 DEGREES
EKG VENTRICULAR RATE: 121 BPM
EOSINOPHILS ABSOLUTE: 0.1 E9/L (ref 0.05–0.5)
EOSINOPHILS RELATIVE PERCENT: 1.3 % (ref 0–6)
GFR AFRICAN AMERICAN: >60
GFR NON-AFRICAN AMERICAN: >60 ML/MIN/1.73
GLUCOSE BLD-MCNC: 156 MG/DL (ref 74–99)
HCT VFR BLD CALC: 40.8 % (ref 37–54)
HEMOGLOBIN: 13.2 G/DL (ref 12.5–16.5)
IMMATURE GRANULOCYTES #: 0.01 E9/L
IMMATURE GRANULOCYTES %: 0.1 % (ref 0–5)
INR BLD: 1.1
LYMPHOCYTES ABSOLUTE: 1.66 E9/L (ref 1.5–4)
LYMPHOCYTES RELATIVE PERCENT: 20.9 % (ref 20–42)
MAGNESIUM: 2.1 MG/DL (ref 1.6–2.6)
MCH RBC QN AUTO: 31.1 PG (ref 26–35)
MCHC RBC AUTO-ENTMCNC: 32.4 % (ref 32–34.5)
MCV RBC AUTO: 96.2 FL (ref 80–99.9)
MONOCYTES ABSOLUTE: 0.83 E9/L (ref 0.1–0.95)
MONOCYTES RELATIVE PERCENT: 10.5 % (ref 2–12)
NEUTROPHILS ABSOLUTE: 5.26 E9/L (ref 1.8–7.3)
NEUTROPHILS RELATIVE PERCENT: 66.3 % (ref 43–80)
PDW BLD-RTO: 13.2 FL (ref 11.5–15)
PLATELET # BLD: 207 E9/L (ref 130–450)
PMV BLD AUTO: 10.6 FL (ref 7–12)
POTASSIUM SERPL-SCNC: 3.9 MMOL/L (ref 3.5–5)
PRO-BNP: 331 PG/ML (ref 0–450)
PROTHROMBIN TIME: 12.1 SEC (ref 9.3–12.4)
RBC # BLD: 4.24 E12/L (ref 3.8–5.8)
SODIUM BLD-SCNC: 141 MMOL/L (ref 132–146)
TOTAL CK: 122 U/L (ref 20–200)
TOTAL CK: 124 U/L (ref 20–200)
TOTAL PROTEIN: 7.4 G/DL (ref 6.4–8.3)
TROPONIN: <0.01 NG/ML (ref 0–0.03)
WBC # BLD: 7.9 E9/L (ref 4.5–11.5)

## 2020-02-01 PROCEDURE — 83880 ASSAY OF NATRIURETIC PEPTIDE: CPT

## 2020-02-01 PROCEDURE — 93005 ELECTROCARDIOGRAM TRACING: CPT | Performed by: EMERGENCY MEDICINE

## 2020-02-01 PROCEDURE — 94760 N-INVAS EAR/PLS OXIMETRY 1: CPT

## 2020-02-01 PROCEDURE — 80053 COMPREHEN METABOLIC PANEL: CPT

## 2020-02-01 PROCEDURE — 82550 ASSAY OF CK (CPK): CPT

## 2020-02-01 PROCEDURE — 36415 COLL VENOUS BLD VENIPUNCTURE: CPT

## 2020-02-01 PROCEDURE — 82553 CREATINE MB FRACTION: CPT

## 2020-02-01 PROCEDURE — 96365 THER/PROPH/DIAG IV INF INIT: CPT

## 2020-02-01 PROCEDURE — 2500000003 HC RX 250 WO HCPCS: Performed by: EMERGENCY MEDICINE

## 2020-02-01 PROCEDURE — 85025 COMPLETE CBC W/AUTO DIFF WBC: CPT

## 2020-02-01 PROCEDURE — 6370000000 HC RX 637 (ALT 250 FOR IP): Performed by: EMERGENCY MEDICINE

## 2020-02-01 PROCEDURE — 99291 CRITICAL CARE FIRST HOUR: CPT

## 2020-02-01 PROCEDURE — 71045 X-RAY EXAM CHEST 1 VIEW: CPT

## 2020-02-01 PROCEDURE — 2580000003 HC RX 258: Performed by: EMERGENCY MEDICINE

## 2020-02-01 PROCEDURE — 84484 ASSAY OF TROPONIN QUANT: CPT

## 2020-02-01 PROCEDURE — 93010 ELECTROCARDIOGRAM REPORT: CPT | Performed by: INTERNAL MEDICINE

## 2020-02-01 PROCEDURE — 85610 PROTHROMBIN TIME: CPT

## 2020-02-01 PROCEDURE — 96376 TX/PRO/DX INJ SAME DRUG ADON: CPT

## 2020-02-01 PROCEDURE — 2140000000 HC CCU INTERMEDIATE R&B

## 2020-02-01 PROCEDURE — 85730 THROMBOPLASTIN TIME PARTIAL: CPT

## 2020-02-01 PROCEDURE — 83735 ASSAY OF MAGNESIUM: CPT

## 2020-02-01 PROCEDURE — 6370000000 HC RX 637 (ALT 250 FOR IP): Performed by: INTERNAL MEDICINE

## 2020-02-01 RX ORDER — 0.9 % SODIUM CHLORIDE 0.9 %
1000 INTRAVENOUS SOLUTION INTRAVENOUS ONCE
Status: COMPLETED | OUTPATIENT
Start: 2020-02-01 | End: 2020-02-01

## 2020-02-01 RX ORDER — ALLOPURINOL 300 MG/1
300 TABLET ORAL DAILY
Status: DISCONTINUED | OUTPATIENT
Start: 2020-02-02 | End: 2020-02-03 | Stop reason: HOSPADM

## 2020-02-01 RX ORDER — PANTOPRAZOLE SODIUM 40 MG/1
40 TABLET, DELAYED RELEASE ORAL
Status: DISCONTINUED | OUTPATIENT
Start: 2020-02-02 | End: 2020-02-03 | Stop reason: HOSPADM

## 2020-02-01 RX ORDER — FUROSEMIDE 20 MG/1
20 TABLET ORAL DAILY
Status: DISCONTINUED | OUTPATIENT
Start: 2020-02-02 | End: 2020-02-03 | Stop reason: HOSPADM

## 2020-02-01 RX ORDER — ASPIRIN 325 MG
325 TABLET ORAL ONCE
Status: COMPLETED | OUTPATIENT
Start: 2020-02-01 | End: 2020-02-01

## 2020-02-01 RX ORDER — DILTIAZEM HYDROCHLORIDE 5 MG/ML
10 INJECTION INTRAVENOUS ONCE
Status: COMPLETED | OUTPATIENT
Start: 2020-02-01 | End: 2020-02-01

## 2020-02-01 RX ORDER — LOSARTAN POTASSIUM 25 MG/1
12.5 TABLET ORAL DAILY
Status: DISCONTINUED | OUTPATIENT
Start: 2020-02-02 | End: 2020-02-03 | Stop reason: HOSPADM

## 2020-02-01 RX ORDER — SIMVASTATIN 20 MG
20 TABLET ORAL NIGHTLY
Status: DISCONTINUED | OUTPATIENT
Start: 2020-02-01 | End: 2020-02-03 | Stop reason: HOSPADM

## 2020-02-01 RX ORDER — ASPIRIN 81 MG/1
81 TABLET ORAL DAILY
Status: DISCONTINUED | OUTPATIENT
Start: 2020-02-02 | End: 2020-02-03 | Stop reason: HOSPADM

## 2020-02-01 RX ORDER — METOPROLOL SUCCINATE 25 MG/1
25 TABLET, EXTENDED RELEASE ORAL 2 TIMES DAILY
Status: DISCONTINUED | OUTPATIENT
Start: 2020-02-01 | End: 2020-02-02

## 2020-02-01 RX ORDER — CHOLECALCIFEROL (VITAMIN D3) 50 MCG
2000 TABLET ORAL DAILY
Status: DISCONTINUED | OUTPATIENT
Start: 2020-02-02 | End: 2020-02-03 | Stop reason: HOSPADM

## 2020-02-01 RX ADMIN — SODIUM CHLORIDE 1000 ML: 9 INJECTION, SOLUTION INTRAVENOUS at 10:55

## 2020-02-01 RX ADMIN — APIXABAN 5 MG: 5 TABLET, FILM COATED ORAL at 21:45

## 2020-02-01 RX ADMIN — DEXTROSE MONOHYDRATE 5 MG/HR: 50 INJECTION, SOLUTION INTRAVENOUS at 11:13

## 2020-02-01 RX ADMIN — DEXTROSE MONOHYDRATE 5 MG/HR: 50 INJECTION, SOLUTION INTRAVENOUS at 10:34

## 2020-02-01 RX ADMIN — SIMVASTATIN 20 MG: 20 TABLET, FILM COATED ORAL at 21:45

## 2020-02-01 RX ADMIN — DILTIAZEM HYDROCHLORIDE 10 MG: 5 INJECTION INTRAVENOUS at 10:34

## 2020-02-01 RX ADMIN — ASPIRIN 325 MG: 325 TABLET, FILM COATED ORAL at 10:33

## 2020-02-01 RX ADMIN — METOPROLOL SUCCINATE 25 MG: 25 TABLET, EXTENDED RELEASE ORAL at 21:45

## 2020-02-01 ASSESSMENT — PAIN DESCRIPTION - PAIN TYPE: TYPE: ACUTE PAIN

## 2020-02-01 ASSESSMENT — PAIN SCALES - GENERAL
PAINLEVEL_OUTOF10: 0
PAINLEVEL_OUTOF10: 0
PAINLEVEL_OUTOF10: 5

## 2020-02-01 ASSESSMENT — PAIN DESCRIPTION - DESCRIPTORS: DESCRIPTORS: DISCOMFORT

## 2020-02-01 ASSESSMENT — PAIN DESCRIPTION - LOCATION: LOCATION: CHEST

## 2020-02-01 NOTE — ED PROVIDER NOTES
Department of Emergency Medicine   ED  Provider Note  Admit Date/RoomTime: 2/1/2020  9:34 AM  ED Room: 7943/6197-V          History of Present Illness:  2/1/20, Time: 9:49 AM  Chief Complaint   Patient presents with    Chest Pain     Started on Thursday while at water aerobics. Currently a 5/10. Increases with inspiration. Rocio Islas is a 76 y.o. male presenting to the ED for irregular HR and concerns for afib, beginning a few days prior to arrival.  The complaint has been constant, moderate in severity, and worsened by nothing. Hx of afib, on eliquis, reports he is normally in sinus rhythm. He reports that for the last few days he has had an irregular heartbeat with an elevated heart rate. He says he can feel his heart is beating unusual and it is off. He reports that he also has some discomfort in his chest.  He denies fevers or chills. He denies abdominal pain. He denies back pain. He denies leg swelling. He denies syncope. He denies other complaints. Review of Systems:   Pertinent positives and negatives are stated within HPI, all other systems reviewed and are negative.        --------------------------------------------- PAST HISTORY ---------------------------------------------  Past Medical History:  has a past medical history of Arthritis, Atrial fibrillation ([facility] CAD (coronary artery disease), Cancer ([facility] COPD (chronic obstructive pulmonary disease) ([facility] History of blood transfusion, Hyperlipidemia, Hypertension, Kidney stones, Sleep apnea, and Vitamin D deficiency. Past Surgical History:  has a past surgical history that includes Kidney stone surgery; Shoulder arthroscopy; Carpal tunnel release (1-20013); back surgery (2006); back surgery (1974, 1984); back surgery ([id number]); Aortic valve replacement (6/12/2013); Neck surgery; Diagnostic Cardiac Cath Lab Procedure; Coronary artery bypass graft; Cardiac surgery; back surgery; Abdomen surgery;  Colonoscopy;

## 2020-02-02 LAB
CK MB: 2.5 NG/ML (ref 0–7.7)
TOTAL CK: 115 U/L (ref 20–200)
TROPONIN: <0.01 NG/ML (ref 0–0.03)

## 2020-02-02 PROCEDURE — 6370000000 HC RX 637 (ALT 250 FOR IP): Performed by: INTERNAL MEDICINE

## 2020-02-02 PROCEDURE — 2140000000 HC CCU INTERMEDIATE R&B

## 2020-02-02 PROCEDURE — 36415 COLL VENOUS BLD VENIPUNCTURE: CPT

## 2020-02-02 PROCEDURE — 99222 1ST HOSP IP/OBS MODERATE 55: CPT | Performed by: INTERNAL MEDICINE

## 2020-02-02 PROCEDURE — 82550 ASSAY OF CK (CPK): CPT

## 2020-02-02 PROCEDURE — 82553 CREATINE MB FRACTION: CPT

## 2020-02-02 PROCEDURE — 84484 ASSAY OF TROPONIN QUANT: CPT

## 2020-02-02 RX ORDER — METOPROLOL SUCCINATE 50 MG/1
50 TABLET, EXTENDED RELEASE ORAL 2 TIMES DAILY
Status: DISCONTINUED | OUTPATIENT
Start: 2020-02-02 | End: 2020-02-03 | Stop reason: HOSPADM

## 2020-02-02 RX ADMIN — APIXABAN 5 MG: 5 TABLET, FILM COATED ORAL at 20:23

## 2020-02-02 RX ADMIN — LOSARTAN POTASSIUM 12.5 MG: 25 TABLET, FILM COATED ORAL at 10:43

## 2020-02-02 RX ADMIN — APIXABAN 5 MG: 5 TABLET, FILM COATED ORAL at 10:42

## 2020-02-02 RX ADMIN — FUROSEMIDE 20 MG: 20 TABLET ORAL at 10:41

## 2020-02-02 RX ADMIN — SIMVASTATIN 20 MG: 20 TABLET, FILM COATED ORAL at 20:24

## 2020-02-02 RX ADMIN — METOPROLOL SUCCINATE 25 MG: 25 TABLET, EXTENDED RELEASE ORAL at 10:43

## 2020-02-02 RX ADMIN — METOPROLOL SUCCINATE 50 MG: 50 TABLET, EXTENDED RELEASE ORAL at 20:24

## 2020-02-02 RX ADMIN — ALLOPURINOL 300 MG: 300 TABLET ORAL at 10:42

## 2020-02-02 RX ADMIN — PANTOPRAZOLE SODIUM 40 MG: 40 TABLET, DELAYED RELEASE ORAL at 10:42

## 2020-02-02 RX ADMIN — ASPIRIN 81 MG: 81 TABLET, COATED ORAL at 10:41

## 2020-02-02 RX ADMIN — Medication 2000 UNITS: at 10:41

## 2020-02-02 NOTE — PROGRESS NOTES
the last 72 hours. Xr Chest Portable    Result Date: 2020  Patient MRN: [id number] : 1945 Age:  76 years Gender: Male Order Date: 2020 10:00 AM Exam: XR CHEST PORTABLE Number of Images: 1 view Indication:   chest pain chest pain Comparison: Chest 2 view radiograph 2019 Findings: There are postsurgical changes from a prior sternotomy. Stable cardiomegaly. The lung fields are unremarkable. The aorta is unremarkable.      No significant change from previous exam.       Medications     diltiazem (CARDIZEM) 100 mg in dextrose 5% 100 mL ([facility]) 5 mg/hr (20 1113)      allopurinol  300 mg Oral Daily    apixaban  5 mg Oral BID    aspirin  81 mg Oral Daily    vitamin D  2,000 Units Oral Daily    furosemide  20 mg Oral Daily    metoprolol succinate  25 mg Oral BID    losartan  12.5 mg Oral Daily    pantoprazole  40 mg Oral QAM AC    simvastatin  20 mg Oral Nightly      DIET CARDIAC;    ASSESSMENT:  Chest pain  Atrial fibrillation with rapid ventricular response (HCC)  Valvular heart disease  Hypertension  Hyperlipidemia  DEBORAH (obstructive sleep apnea)  S/P AVR (aortic valve replacement)  S/P CABG (coronary artery bypass graft)  Cardizem drip cardio consult  Meds reviewed and renewed      Electronically signed by Cole Verma MD on 2020 at 10:59 AM

## 2020-02-02 NOTE — CONSULTS
coronary artery 11/09/2011       A. Status post PTCA/Stent of LAD 3/5/01 Tetra 3.0-28 mm. B. LIMA - LAD at time of AVR 6/2013  C. Normal exercise nuclear 5/19/2017      Hyperlipidemia 11/09/2011     2. Hyperlipidemia. A. Myalgias and abnormal CPK with Lipitor and Zocor      Aortic stenosis 11/09/2011       A. AVR 6/13/2013 ([facility]):  #23 Trifecta Bioprosthetic valve  B. Echo 7/27/2013 [facility]):  Peak/Mean gradients: 16 / 9 mm. Moderate LVH. EF normal   C. Echo 2/2017:  Mean gradient 12 mm.    Normal EF             Past Medical History:   Diagnosis Date    Arthritis     Atrial fibrillation ([address]     day of surgery on 06/12/13- resolved at this time 6/23/2013    CAD (coronary artery disease)     heart cath with stent 2001    Cancer ([address]     Basal Cell on his back    COPD (chronic obstructive pulmonary disease) ([address]     History of blood transfusion     Hyperlipidemia     Hypertension     Kidney stones     Sleep apnea     Vitamin D deficiency            Past Surgical History:   Procedure Laterality Date    ABDOMEN SURGERY      Bilateral groin hernias    AORTIC VALVE REPLACEMENT  6/12/2013    BACK SURGERY  2006    cervicle fusion   [facility] SURGERY  1974, 1984    lumbar laminectomy    BACK SURGERY  [id number]    360 FUSION L4-L5    BACK SURGERY      CARDIAC SURGERY      CARPAL TUNNEL RELEASE  1-20013    both hands    COLONOSCOPY      CORONARY ARTERY BYPASS GRAFT      DIAGNOSTIC CARDIAC CATH LAB PROCEDURE      ENDOSCOPY, COLON, DIAGNOSTIC      FRACTURE SURGERY      Right arm Fx    FRACTURE SURGERY      Left finger Fx    HERNIA REPAIR      Bilateral    KIDNEY STONE SURGERY      open    NECK SURGERY      SHOULDER ARTHROSCOPY      right and left shoulders    SKIN BIOPSY             Current Facility-Administered Medications   Medication Dose Route Frequency Provider Last Rate Last Dose    diltiazem 100 mg in dextrose 5 % 100 mL infusion ([facility])  5 mg/hr Intravenous Continuous Elvira W

## 2020-02-03 ENCOUNTER — ANESTHESIA (OUTPATIENT)
Dept: CARDIAC CATH/INVASIVE PROCEDURES | Age: 75
DRG: 310 | End: 2020-02-03
Payer: MEDICARE

## 2020-02-03 ENCOUNTER — ANESTHESIA EVENT (OUTPATIENT)
Dept: CARDIAC CATH/INVASIVE PROCEDURES | Age: 75
DRG: 310 | End: 2020-02-03
Payer: MEDICARE

## 2020-02-03 VITALS — SYSTOLIC BLOOD PRESSURE: 136 MMHG | OXYGEN SATURATION: 95 % | DIASTOLIC BLOOD PRESSURE: 86 MMHG

## 2020-02-03 VITALS
SYSTOLIC BLOOD PRESSURE: 137 MMHG | HEIGHT: 69 IN | TEMPERATURE: 97 F | DIASTOLIC BLOOD PRESSURE: 77 MMHG | RESPIRATION RATE: 15 BRPM | HEART RATE: 74 BPM | BODY MASS INDEX: 32.58 KG/M2 | OXYGEN SATURATION: 96 % | WEIGHT: 220 LBS

## 2020-02-03 PROCEDURE — 99024 POSTOP FOLLOW-UP VISIT: CPT | Performed by: INTERNAL MEDICINE

## 2020-02-03 PROCEDURE — 92960 CARDIOVERSION ELECTRIC EXT: CPT | Performed by: INTERNAL MEDICINE

## 2020-02-03 PROCEDURE — 6360000002 HC RX W HCPCS: Performed by: ANESTHESIOLOGIST ASSISTANT

## 2020-02-03 PROCEDURE — 2709999900 HC NON-CHARGEABLE SUPPLY

## 2020-02-03 PROCEDURE — 5A2204Z RESTORATION OF CARDIAC RHYTHM, SINGLE: ICD-10-PCS | Performed by: INTERNAL MEDICINE

## 2020-02-03 PROCEDURE — 6370000000 HC RX 637 (ALT 250 FOR IP): Performed by: INTERNAL MEDICINE

## 2020-02-03 PROCEDURE — 2580000003 HC RX 258: Performed by: ANESTHESIOLOGIST ASSISTANT

## 2020-02-03 RX ORDER — SODIUM CHLORIDE 9 MG/ML
INJECTION, SOLUTION INTRAVENOUS CONTINUOUS PRN
Status: DISCONTINUED | OUTPATIENT
Start: 2020-02-03 | End: 2020-02-03 | Stop reason: SDUPTHER

## 2020-02-03 RX ORDER — PROPOFOL 10 MG/ML
INJECTION, EMULSION INTRAVENOUS PRN
Status: DISCONTINUED | OUTPATIENT
Start: 2020-02-03 | End: 2020-02-03 | Stop reason: SDUPTHER

## 2020-02-03 RX ORDER — METOPROLOL SUCCINATE 50 MG/1
50 TABLET, EXTENDED RELEASE ORAL 2 TIMES DAILY
Qty: 60 TABLET | Refills: 0 | Status: SHIPPED | OUTPATIENT
Start: 2020-02-03 | End: 2020-03-23 | Stop reason: SDUPTHER

## 2020-02-03 RX ORDER — FUROSEMIDE 20 MG/1
20 TABLET ORAL DAILY
Qty: 30 TABLET | Refills: 0 | Status: SHIPPED | OUTPATIENT
Start: 2020-02-03 | End: 2020-06-02 | Stop reason: SDUPTHER

## 2020-02-03 RX ADMIN — PROPOFOL 100 MG: 10 INJECTION, EMULSION INTRAVENOUS at 10:37

## 2020-02-03 RX ADMIN — SODIUM CHLORIDE: 9 INJECTION, SOLUTION INTRAVENOUS at 10:16

## 2020-02-03 RX ADMIN — ALLOPURINOL 300 MG: 300 TABLET ORAL at 09:16

## 2020-02-03 RX ADMIN — APIXABAN 5 MG: 5 TABLET, FILM COATED ORAL at 09:16

## 2020-02-03 RX ADMIN — LOSARTAN POTASSIUM 12.5 MG: 25 TABLET, FILM COATED ORAL at 09:16

## 2020-02-03 RX ADMIN — ASPIRIN 81 MG: 81 TABLET, COATED ORAL at 09:17

## 2020-02-03 RX ADMIN — METOPROLOL SUCCINATE 50 MG: 50 TABLET, EXTENDED RELEASE ORAL at 09:16

## 2020-02-03 ASSESSMENT — PAIN SCALES - GENERAL: PAINLEVEL_OUTOF10: 0

## 2020-02-03 ASSESSMENT — ENCOUNTER SYMPTOMS: SHORTNESS OF BREATH: 1

## 2020-02-03 NOTE — PROGRESS NOTES
96.2   MCH 31.1   MCHC 32.4   RDW 13.2      MPV 10.6     Lab Results   Component Value Date    CKTOTAL 115 02/02/2020    CKMB 2.5 02/02/2020    TROPONINI <0.01 02/02/2020    TROPONINI <0.01 02/01/2020    TROPONINI <0.01 02/01/2020     Lab Results   Component Value Date    INR 1.1 02/01/2020    INR 1.0 06/04/2019    INR 1.2 07/09/2013    PROTIME 12.1 02/01/2020    PROTIME 11.0 06/04/2019    PROTIME 12.5 07/09/2013     Lab Results   Component Value Date    TSH 0.704 12/30/2019     Lab Results   Component Value Date    LABA1C 6.1 (H) 12/30/2019     No results found for: EAG  Lab Results   Component Value Date    CHOL 200 (H) 12/30/2019    CHOL 184 10/17/2019    CHOL 202 (H) 08/07/2019     Lab Results   Component Value Date    TRIG 192 (H) 12/30/2019    TRIG 155 (H) 10/17/2019    TRIG 113 08/07/2019     Lab Results   Component Value Date    HDL 65 12/30/2019    HDL 62 10/17/2019    HDL 73 08/07/2019     Lab Results   Component Value Date    LDLCALC 97 12/30/2019    LDLCALC 91 10/17/2019    LDLCALC 106 (H) 08/07/2019     Lab Results   Component Value Date    LABVLDL 38 12/30/2019    LABVLDL 31 10/17/2019    LABVLDL 23 08/07/2019     Lab Results   Component Value Date    CHOLHDLRATIO 2.5 01/18/2016     Recent Labs     02/01/20  1004   PROBNP 331       Cardiac Tests:  Telemetry findings reviewed: atrial fibrillation, rate 60's    ASSESSMENT / PLAN:  1. Paroxysmal atrial fibrillation -- RVR --> now rate controlled  2. Chronic anticoagulation with eliquis  3. DEBORAH -- untreated  4. BMI 32.5  5. History of AVR  6. CAD s/p CABG (LIMA-LAD)  7. HLD  8.  HTN    - DC CVN scheduled for today  - Treatment of DEBORAH as indicated (cardiac complications associated with untreated DEBORAH discussed today)  - Continue current medications (including BB and NOAC)     Sandro Flynn MD  [facility]) Cardiology

## 2020-02-03 NOTE — PROGRESS NOTES
Hospital Medicine    Subjective:  Pt seen s/p dccv wife at bedside pt feels well      Current Facility-Administered Medications:     metoprolol succinate (TOPROL XL) extended release tablet 50 mg, 50 mg, Oral, BID, Brandy Pratt MD, 50 mg at 02/03/20 0916    allopurinol (ZYLOPRIM) tablet 300 mg, 300 mg, Oral, Daily, Arpit Mendieta MD, 300 mg at 02/03/20 0916    apixaban (ELIQUIS) tablet 5 mg, 5 mg, Oral, BID, Arpit Mendieta MD, 5 mg at 02/03/20 0916    aspirin EC tablet 81 mg, 81 mg, Oral, Daily, Arpit Mendieta MD, 81 mg at 02/03/20 0917    vitamin D tablet 2,000 Units, 2,000 Units, Oral, Daily, Arpit Mendieta MD, 2,000 Units at 02/02/20 1041    furosemide (LASIX) tablet 20 mg, 20 mg, Oral, Daily, Arpit Mendieta MD, 20 mg at 02/02/20 1041    losartan (COZAAR) tablet 12.5 mg, 12.5 mg, Oral, Daily, Arpit Mendieta MD, 12.5 mg at 02/03/20 0916    pantoprazole (PROTONIX) tablet 40 mg, 40 mg, Oral, QAM AC, Arpit eMndieta MD, 40 mg at 02/02/20 1042    simvastatin (ZOCOR) tablet 20 mg, 20 mg, Oral, Nightly, Arpit Mendieta MD, 20 mg at 02/02/20 2024    Objective:    /77   Pulse 74   Temp 97 °F (36.1 °C) (Temporal)   Resp 15   Ht 5' 9\" (1.753 m)   Wt 220 lb (99.8 kg)   SpO2 96%   BMI 32.49 kg/m²     Heart:  Reg  Lungs:  CTA bilaterally, no wheeze, rales or rhonchi  Abd: bowel sounds present, nontender, nondistended, no masses  Extrem:  Min edema legs    CBC with Differential:    Lab Results   Component Value Date    WBC 7.9 02/01/2020    RBC 4.24 02/01/2020    HGB 13.2 02/01/2020    HCT 40.8 02/01/2020     02/01/2020    MCV 96.2 02/01/2020    MCH 31.1 02/01/2020    MCHC 32.4 02/01/2020    RDW 13.2 02/01/2020    SEGSPCT 81 07/09/2013    LYMPHOPCT 20.9 02/01/2020    MONOPCT 10.5 02/01/2020    BASOPCT 0.9 02/01/2020    MONOSABS 0.83 02/01/2020    LYMPHSABS 1.66 02/01/2020    EOSABS 0.10 02/01/2020    BASOSABS 0.07 02/01/2020     CMP:    Lab Results   Component Value Date  02/01/2020    K 3.9 02/01/2020     02/01/2020    CO2 27 02/01/2020    BUN 21 02/01/2020    CREATININE 0.9 02/01/2020    GFRAA >60 02/01/2020    LABGLOM >60 02/01/2020    GLUCOSE 156 02/01/2020    GLUCOSE 89 02/11/2012    PROT 7.4 02/01/2020    LABALBU 4.4 02/01/2020    CALCIUM 9.8 02/01/2020    BILITOT 0.6 02/01/2020    ALKPHOS 65 02/01/2020    AST 20 02/01/2020    ALT 21 02/01/2020     Warfarin PT/INR:    Lab Results   Component Value Date    INR 1.1 02/01/2020    INR 1.0 06/04/2019    INR 1.2 07/09/2013    PROTIME 12.1 02/01/2020    PROTIME 11.0 06/04/2019    PROTIME 12.5 07/09/2013       Assessment:    Principal Problem:    Atrial fibrillation with rapid ventricular response (HCC)  Active Problems:    Hyperlipidemia    Hypertension    DEBORAH (obstructive sleep apnea)    Valvular heart disease    S/P AVR (aortic valve replacement)    S/P CABG (coronary artery bypass graft)    Atrial fibrillation (HCC)  Resolved Problems:    * No resolved hospital problems.  *      Plan:  Dc home outpt f/u needs reeval for  deborah treatment as outpt        Stefany Real  1:26 PM  2/3/2020

## 2020-02-03 NOTE — ANESTHESIA PRE PROCEDURE
Historical Provider, MD       Current medications:    Current Facility-Administered Medications   Medication Dose Route Frequency Provider Last Rate Last Dose    metoprolol succinate (TOPROL XL) extended release tablet 50 mg  50 mg Oral BID Jarad Mcmahon MD   50 mg at 02/03/20 0916    allopurinol (ZYLOPRIM) tablet 300 mg  300 mg Oral Daily Carina Saleem MD   300 mg at 02/03/20 0916    apixaban (ELIQUIS) tablet 5 mg  5 mg Oral BID Carina Saleem MD   5 mg at 02/03/20 0916    aspirin EC tablet 81 mg  81 mg Oral Daily Carina Saleem MD   81 mg at 02/03/20 0917    vitamin D tablet 2,000 Units  2,000 Units Oral Daily Carina Saleem MD   2,000 Units at 02/02/20 1041    furosemide (LASIX) tablet 20 mg  20 mg Oral Daily Carina Saleem MD   20 mg at 02/02/20 1041    losartan (COZAAR) tablet 12.5 mg  12.5 mg Oral Daily Carina Saleem MD   12.5 mg at 02/03/20 0916    pantoprazole (PROTONIX) tablet 40 mg  40 mg Oral QAM AC Sarah Osborn MD   40 mg at 02/02/20 1042    simvastatin (ZOCOR) tablet 20 mg  20 mg Oral Nightly Carina Saleem MD   20 mg at 02/02/20 2024       Allergies:     Allergies   Allergen Reactions    Iodine Swelling and Other (See Comments)     Reddened face and swelling of tongue       Problem List:    Patient Active Problem List   Diagnosis Code    Coronary atherosclerosis of native coronary artery I25.10    Hyperlipidemia E78.5    Aortic stenosis I35.0    Hypertension I10    DEBORAH (obstructive sleep apnea) G47.33    DDD (degenerative disc disease), cervical M50.30    Nocturnal hypoxemia due to asthma R09.02, J45.909    Neuroforaminal stenosis of lumbar spine M99.83    Valvular heart disease I38    Mild intermittent asthma without complication L61.83    New onset a-fib ([address] I48.91    S/P AVR (aortic valve replacement) Z95.2    S/P CABG (coronary artery bypass graft) Z95.1    Atrial fibrillation with rapid ventricular response (HCC) I48.91    Atrial fibrillation ([facility] I48.91       Past Medical History:        Diagnosis Date    Arthritis     Atrial fibrillation ([facility]     day of surgery on 13- resolved at this time 2013    CAD (coronary artery disease)     heart cath with stent     Cancer ([facility]     Basal Cell on his back    COPD (chronic obstructive pulmonary disease) ([facility]     History of blood transfusion     Hyperlipidemia     Hypertension     Kidney stones     Sleep apnea     Vitamin D deficiency        Past Surgical History:        Procedure Laterality Date    ABDOMEN SURGERY      Bilateral groin hernias    AORTIC VALVE REPLACEMENT  2013    BACK SURGERY  2006    cervicle fusion   [facility] SURGERY  ,     lumbar laminectomy    BACK SURGERY  [id number]    360 FUSION L4-L5    BACK SURGERY      CARDIAC SURGERY      CARPAL TUNNEL RELEASE      both hands    COLONOSCOPY      CORONARY ARTERY BYPASS GRAFT      DIAGNOSTIC CARDIAC CATH LAB PROCEDURE      ENDOSCOPY, COLON, DIAGNOSTIC      FRACTURE SURGERY      Right arm Fx    FRACTURE SURGERY      Left finger Fx    HERNIA REPAIR      Bilateral    KIDNEY STONE SURGERY      open    NECK SURGERY      SHOULDER ARTHROSCOPY      right and left shoulders    SKIN BIOPSY         Social History:    Social History     Tobacco Use    Smoking status: Former Smoker     Packs/day: 1.50     Years: 55.00     Pack years: 82.50     Types: Cigarettes     Start date:      Last attempt to quit: 2013     Years since quittin.7    Smokeless tobacco: Never Used    Tobacco comment: [employer]   Substance Use Topics    Alcohol use: Yes     Comment: drinks 6 cans of beer /day                                Counseling given: Not Answered  Comment: [employer]      Vital Signs (Current):   Vitals:    20 2236 20 0415 20 0830 20 0940   BP: 136/84 (!) 148/68 131/70 (!) 147/94   Pulse: 92 80 82 90   Resp: 16 16 16    Temp: 36.8 °C (98.3 °F)  36.6 °C (97.8 °F) 36.6 °C (97.9 °F)   TempSrc: Temporal      SpO2: 95%  97% 96%   Weight:       Height:                                                  BP Readings from Last 3 Encounters:   20 (!) 147/94   20 139/70   19 138/70       NPO Status:  > 8 hours except for sip of water with morning meds                                                                               BMI:   Wt Readings from Last 3 Encounters:   20 220 lb (99.8 kg)   20 218 lb 9.6 oz (99.2 kg)   19 220 lb 9.6 oz (100.1 kg)     Body mass index is 32.49 kg/m². CBC:   Lab Results   Component Value Date    WBC 7.9 2020    RBC 4.24 2020    HGB 13.2 2020    HCT 40.8 2020    MCV 96.2 2020    RDW 13.2 2020     2020       CMP:   Lab Results   Component Value Date     2020    K 3.9 2020     2020    CO2 27 2020    BUN 21 2020    CREATININE 0.9 2020    GFRAA >60 2020    LABGLOM >60 2020    GLUCOSE 156 2020    GLUCOSE 89 2012    PROT 7.4 2020    CALCIUM 9.8 2020    BILITOT 0.6 2020    ALKPHOS 65 2020    AST 20 2020    ALT 21 2020       POC Tests: No results for input(s): POCGLU, POCNA, POCK, POCCL, POCBUN, POCHEMO, POCHCT in the last 72 hours.     Coags:   Lab Results   Component Value Date    PROTIME 12.1 2020    INR 1.1 2020    APTT 32.5 2020       HCG (If Applicable): No results found for: PREGTESTUR, PREGSERUM, HCG, HCGQUANT     ABGs: No results found for: PHART, PO2ART, TVZ1JOM, IMS7KDT, BEART, D9GWXIJX     Type & Screen (If Applicable):  Lab Results   Component Value Date    LABABO A 2012    LABRH NEG 2012       EK2020  Ventricular Rate 121  BPM Final 2020  9:40 AM HMHPEAPM   Atrial Rate 144  BPM Final 2020  9:40 AM HMHPEAPM   QRS Duration 100  ms Final 2020  9:40 AM HMHPEAPM   Q-T Interval 326  ms Final 2020  9:40 AM HMHPEAPM

## 2020-02-03 NOTE — ANESTHESIA POSTPROCEDURE EVALUATION
Department of Anesthesiology  Postprocedure Note    Patient: Jaimee Mcnulty  MRN: [id number]  YOB: 1945  Date of evaluation: 2/3/2020  Time:  11:59 AM     Procedure Summary     Date:  02/03/20 Room / Location:  [facility] CATH LAB    Anesthesia Start:  8030 Anesthesia Stop:  0885    Procedure:  CARDIOVERSION WITH ANESTHESIA Diagnosis:      Scheduled Providers:   Responsible Provider:  Kenny Giordano MD    Anesthesia Type:  MAC ASA Status:  3          Anesthesia Type: MAC    Anastacia Phase I:      Anastacia Phase II:      Last vitals: Reviewed and per EMR flowsheets.        Anesthesia Post Evaluation    Patient location during evaluation: bedside  Patient participation: complete - patient participated  Level of consciousness: awake and alert  Airway patency: patent  Nausea & Vomiting: no nausea and no vomiting  Complications: no  Cardiovascular status: hemodynamically stable and blood pressure returned to baseline  Respiratory status: acceptable  Hydration status: euvolemic

## 2020-02-04 ENCOUNTER — TELEPHONE (OUTPATIENT)
Dept: PRIMARY CARE CLINIC | Age: 75
End: 2020-02-04

## 2020-02-04 ENCOUNTER — CARE COORDINATION (OUTPATIENT)
Dept: CASE MANAGEMENT | Age: 75
End: 2020-02-04

## 2020-02-04 ENCOUNTER — TELEPHONE (OUTPATIENT)
Dept: CARDIOLOGY CLINIC | Age: 75
End: 2020-02-04

## 2020-02-04 PROCEDURE — 1111F DSCHRG MED/CURRENT MED MERGE: CPT | Performed by: FAMILY MEDICINE

## 2020-02-04 RX ORDER — OLMESARTAN MEDOXOMIL 40 MG/1
40 TABLET ORAL DAILY
Qty: 90 TABLET | Refills: 3
Start: 2020-02-04 | End: 2020-12-03 | Stop reason: SDUPTHER

## 2020-02-04 NOTE — CARE COORDINATION
[facility] Transitions Initial Follow Up Call    Call within 2 business days of discharge: Yes    Patient: Hadley Portillo Patient : 1945   MRN: [id number]  Reason for Admission: AF with RVR, s/p cardioversion 2/3/20  Discharge Date: 2/3/20 RARS: Readmission Risk Score: 9      Last Discharge [facility]       Complaint Diagnosis Description Type Department Provider    20 Chest Pain Atrial fibrillation with rapid ventricular response ([address] . .. ED to Hosp-Admission (Discharged) (ADMITTED) [id number] Shirin Console, DO; Kenyatta Donl. .. Spoke with: Yumi Xiao St: SILVIANO    Non-face-to-face services provided:  Scheduled appointment with PCP-patient declined CTN making appointment, states his wife will call later on today. Scheduled appointment with [address] cardiology appointment for 20 with patient  Obtained and reviewed discharge summary and/or continuity of care documents    Care Transitions 24 Hour Call    Do you have any ongoing symptoms?:  No  Do you have a copy of your discharge instructions?:  Yes  Do you have all of your prescriptions and are they filled?:  Yes  Have you been contacted by a [facility] Pharmacist?:  No  Have you scheduled your follow up appointment?:  No  Were you discharged with any Home Care or Post Acute Services:  No  Do you feel like you have everything you need to keep you well at home?:  Yes  Care Transitions Interventions  No Identified Needs                               -Spoke with patient Deanna Tyson  for initial care transition call post hospital discharge.   -Patient [facility] on 20  with symptoms of chest pressure and palpitations. Patient found to be in AF with RVR and underwent cardioversion on 2/3/20.  -Patient states he is feeling well and denies any chest pain/pressure or palpitations. B/P last evening was 142/63 with pulse 68. This am  B/P 147/65 with pulse 61.    -Med review  completed, 1111F  entered. Patient awaiting call back from cardiology office regarding Benicar dose(20 mg or 40 mg.)  CTN reviewed bleeding precautions with patient as related to Eliquis therapy.  -Patient states his wife always attends his provider appointments with him and either he will drive or she will drive.  -Patient denies any needs, questions, or concerns at this time and is agreeable to continued follow up from CTN.     Follow Up  Future Appointments   Date Time Provider Dexter Ambrose   2/20/2020  9:15 AM DO Fernie Baez [facility]   4/29/2020  9:30 AM DO FRANCES Hooker [facility]       Sarah King, KIM

## 2020-02-04 NOTE — TELEPHONE ENCOUNTER
Patient's wife contacted our office. Patient recently discharged from [facility]. She indicates that patient was told to take his Benicar 20 mg daily on discharge and that his Norvasc had been discontinued at discharge. She also indicates that the Benicar was changed to 40 mg daily by Dr. Marizol Andrews in [facility] [employer]. Today, patient's BP was 147/65. She would like to know if they should go back to the Benicar 40 mg daily. Please advise.

## 2020-02-04 NOTE — TELEPHONE ENCOUNTER
Contacted patient's wife with medication recommendations per Dr. Marylene Esters. She acknowledged understanding. Medication correction made in EPIC.

## 2020-02-05 ENCOUNTER — NURSE ONLY (OUTPATIENT)
Dept: CARDIOLOGY CLINIC | Age: 75
End: 2020-02-05

## 2020-02-06 ENCOUNTER — TELEPHONE (OUTPATIENT)
Dept: CARDIOLOGY CLINIC | Age: 75
End: 2020-02-06

## 2020-02-10 ENCOUNTER — CARE COORDINATION (OUTPATIENT)
Dept: CASE MANAGEMENT | Age: 75
End: 2020-02-10

## 2020-02-10 ENCOUNTER — OFFICE VISIT (OUTPATIENT)
Dept: PRIMARY CARE CLINIC | Age: 75
End: 2020-02-10
Payer: MEDICARE

## 2020-02-10 VITALS
BODY MASS INDEX: 32.58 KG/M2 | WEIGHT: 220 LBS | DIASTOLIC BLOOD PRESSURE: 70 MMHG | HEIGHT: 69 IN | RESPIRATION RATE: 18 BRPM | OXYGEN SATURATION: 96 % | HEART RATE: 56 BPM | SYSTOLIC BLOOD PRESSURE: 130 MMHG

## 2020-02-10 PROCEDURE — 99214 OFFICE O/P EST MOD 30 MIN: CPT | Performed by: FAMILY MEDICINE

## 2020-02-10 ASSESSMENT — ENCOUNTER SYMPTOMS
ABDOMINAL PAIN: 0
WHEEZING: 0
BACK PAIN: 0
VOMITING: 0
DIARRHEA: 0
COLOR CHANGE: 0
FACIAL SWELLING: 0
BLOOD IN STOOL: 0
PHOTOPHOBIA: 0
SINUS PRESSURE: 0
SHORTNESS OF BREATH: 0
APNEA: 0
ALLERGIC/IMMUNOLOGIC NEGATIVE: 1
COUGH: 0
SORE THROAT: 0
CHEST TIGHTNESS: 0
NAUSEA: 0

## 2020-02-10 ASSESSMENT — PATIENT HEALTH QUESTIONNAIRE - PHQ9
2. FEELING DOWN, DEPRESSED OR HOPELESS: 0
SUM OF ALL RESPONSES TO PHQ QUESTIONS 1-9: 0
SUM OF ALL RESPONSES TO PHQ QUESTIONS 1-9: 0
1. LITTLE INTEREST OR PLEASURE IN DOING THINGS: 0
SUM OF ALL RESPONSES TO PHQ9 QUESTIONS 1 & 2: 0

## 2020-02-10 NOTE — CARE COORDINATION
[facility] Transitions Follow Up Call    2/10/2020    Patient: Vasyl Lynch  Patient : 1945   MRN: [id number]  Reason for Admission: AF with RVR, s/p cardioversion 2/3/20  Discharge Date: 2/3/20 RARS: Readmission Risk Score: 9         Spoke with: Vasyl Lynch, patient    Care Transitions Subsequent and Final Call    Subsequent and Final Calls  Do you have any ongoing symptoms?:  No  Have your medications changed?:  Yes  Patient Reports:  patient confirms Benicar dosage is 40 mg daily  Do you have any questions related to your medications?:  No  Do you currently have any active services?:  No  Do you have any needs or concerns that I can assist you with?:  No  Identified Barriers:  None  Care Transitions Interventions  No Identified Needs                          Other Interventions:          -Spoke with patient Zane Bence and also patient's wife for follow up care transition call.   -Patient states he is doing well and had his PCP visit today. Patient continues to monitor his B/P and HR at least daily. Patient checked his B/P around 8:40 am at home prior to his PCP appointment and reading was 150/68. Reading at PCP visit was 130/70. Patient checked another reading while on phone with /59 with HR 56. CTN confirmed Benicar dose of 40 mg daily with patient. No medication changes at provider appointment today. CTN informed patient he would be receiving a call regarding scheduling of his sleep study.  -Patient and his wife deny any needs, questions, or concerns at this time and are agreeable to continued follow up from CTN.       Follow Up  Future Appointments   Date Time Provider Dexter Ambrose   2020  9:15 AM DO Demario Loera [facility]   2020  9:30 AM DO FRANCES Fuller [facility]       Nando Valentine RN

## 2020-02-10 NOTE — PROGRESS NOTES
Chief Complaint:   Chief Complaint   Patient presents with    Atrial Fibrillation     f/u hospital 2/3    Hypertension     meds were adjusted in hospital, BP has been high       Hypertension   This is a chronic problem. The current episode started more than 1 year ago. The problem has been resolved since onset. The problem is controlled. Pertinent negatives include no chest pain, headaches, palpitations or shortness of breath. Past treatments include angiotensin blockers, beta blockers and diuretics. The current treatment provides significant improvement. Heart Problem   This is a chronic problem. The current episode started more than 1 year ago. The problem occurs daily. Pertinent negatives include no abdominal pain, arthralgias, chest pain, congestion, coughing, headaches, joint swelling, myalgias, nausea, rash, sore throat, vomiting or weakness.        Patient Active Problem List   Diagnosis    Coronary atherosclerosis of native coronary artery    Hyperlipidemia    Aortic stenosis    Hypertension    DEBORAH (obstructive sleep apnea)    DDD (degenerative disc disease), cervical    Nocturnal hypoxemia due to asthma    Neuroforaminal stenosis of lumbar spine    Valvular heart disease    Mild intermittent asthma without complication    New onset a-fib ([address]    S/P AVR (aortic valve replacement)    S/P CABG (coronary artery bypass graft)    Atrial fibrillation with rapid ventricular response (HCC)    Atrial fibrillation ([address]       Past Medical History:   Diagnosis Date    Arthritis     Atrial fibrillation ([address]     day of surgery on 06/12/13- resolved at this time 6/23/2013    CAD (coronary artery disease)     heart cath with stent 2001    Cancer ([address]     Basal Cell on his back    COPD (chronic obstructive pulmonary disease) ([address]     History of blood transfusion     Hyperlipidemia     Hypertension     Kidney stones     Sleep apnea     Vitamin D deficiency        Past Surgical History:  Iodine Swelling and Other (See Comments)     Reddened face and swelling of tongue       Social History     Socioeconomic History    Marital status:      Spouse name: Jean-Pierre Orr    Number of children: 3    Years of education: 9    Highest education level: None   Occupational History    Occupation: retired- [employer] line/ [employer]     Employer: [address]: Retired   Social Needs    Financial resource strain: None    Food insecurity:     Worry: None     Inability: None    Transportation needs:     Medical: None     Non-medical: None   Tobacco Use    Smoking status: Former Smoker     Packs/day: 1.50     Years: 55.00     Pack years: 82.50     Types: Cigarettes     Start date:      Last attempt to quit: 2013     Years since quittin.7    Smokeless tobacco: Never Used    Tobacco comment: Camel Cig   Substance and Sexual Activity    Alcohol use: Yes     Comment: drinks 6 cans of beer /day    Drug use: No    Sexual activity: Yes     Partners: Female   Lifestyle    Physical activity:     Days per week: None     Minutes per session: None    Stress: None   Relationships    Social connections:     Talks on phone: None     Gets together: None     Attends Confucianism service: None     Active member of club or organization: None     Attends meetings of clubs or organizations: None     Relationship status: None    Intimate partner violence:     Fear of current or ex partner: None     Emotionally abused: None     Physically abused: None     Forced sexual activity: None   Other Topics Concern    None   Social History Narrative    None       Family History   Problem Relation Age of Onset    Stroke Father     Cancer Sister     Heart Disease Brother          Review of Systems   Constitutional: Negative. HENT: Negative for congestion, facial swelling, hearing loss, nosebleeds, sinus pressure and sore throat. Eyes: Negative for photophobia and visual disturbance. Respiratory: Negative for apnea, cough, chest tightness, shortness of breath and wheezing. Cardiovascular: Negative for chest pain, palpitations and leg swelling. Gastrointestinal: Negative for abdominal pain, blood in stool, diarrhea, nausea and vomiting. Genitourinary: Negative for difficulty urinating, frequency and urgency. Musculoskeletal: Negative for arthralgias, back pain, joint swelling and myalgias. Skin: Negative for color change and rash. Allergic/Immunologic: Negative. Neurological: Negative for syncope, weakness, light-headedness and headaches. Hematological: Negative. Psychiatric/Behavioral: Negative for agitation, behavioral problems, confusion and self-injury. The patient is not nervous/anxious. All other systems reviewed and are negative. Physical Exam  Vitals signs and nursing note reviewed. Constitutional:       General: He is not in acute distress. Appearance: He is well-developed. HENT:      Head: Normocephalic and atraumatic. Nose: Nose normal.   Eyes:      Conjunctiva/sclera: Conjunctivae normal.      Pupils: Pupils are equal, round, and reactive to light. Neck:      Musculoskeletal: Normal range of motion and neck supple. Thyroid: No thyromegaly. Vascular: No JVD. Cardiovascular:      Rate and Rhythm: Normal rate and regular rhythm. Heart sounds: Normal heart sounds. No murmur. No friction rub. No gallop. Pulmonary:      Effort: Pulmonary effort is normal. No respiratory distress. Breath sounds: Normal breath sounds. No wheezing. Abdominal:      General: Bowel sounds are normal. There is no distension. Palpations: Abdomen is soft. Tenderness: There is no abdominal tenderness. There is no guarding or rebound. Musculoskeletal: Normal range of motion. Lymphadenopathy:      Cervical: No cervical adenopathy. Skin:     General: Skin is warm and dry. Findings: No erythema or rash.    Neurological: Mental Status: He is alert and oriented to person, place, and time. Cranial Nerves: No cranial nerve deficit. Motor: No abnormal muscle tone. Coordination: Coordination normal.      Deep Tendon Reflexes: Reflexes are normal and symmetric. Psychiatric:         Behavior: Behavior normal.         Judgment: Judgment normal.                               ASSESSMENT/PLAN:    Sangeetha Martinez was seen today for atrial fibrillation and hypertension. Diagnoses and all orders for this visit:    Mixed hyperlipidemia    Hypertension, unspecified type    Aortic valve stenosis, etiology of cardiac valve disease unspecified    Paroxysmal atrial fibrillation (HCC)    DEBORAH (obstructive sleep apnea)  -     Sleep Study with PAP Titration; Future      The current medical regimen is effective;  continue present plan and medications.         Kayley Mcguire, DO    2/10/2020  10:28 AM

## 2020-02-17 ENCOUNTER — CARE COORDINATION (OUTPATIENT)
Dept: CASE MANAGEMENT | Age: 75
End: 2020-02-17

## 2020-02-17 NOTE — CARE COORDINATION
[facility] Transitions Follow Up Call    2020    Patient: Hadley Portillo  Patient : 1945   MRN: [id number]  Reason for Admission: AF with RVR, s/p cardioversion 2/3/20  Discharge Date: 2/3/20 RARS: Readmission Risk Score: 9           -First attempt to reach the patient for sub Care Transition call post hospital discharge. Message left with CTN's contact information requesting return phone call.     Follow Up  Future Appointments   Date Time Provider Dexter Ambrose   2020  3:15 PM DO Demario Simons [facility]   2020  9:30 AM DO FRANCES Whitney [facility]       Crow Ramos RN

## 2020-02-20 ENCOUNTER — CARE COORDINATION (OUTPATIENT)
Dept: CASE MANAGEMENT | Age: 75
End: 2020-02-20

## 2020-02-20 NOTE — CARE COORDINATION
[facility] Transitions Follow Up Call    2020    Patient: Estela Mulligan  Patient : 1945   MRN: [id number]  Reason for Admission: AF with RVR, s/p cardioversion 2/3/20  Discharge Date: 2/3/20 RARS: Readmission Risk Score: 9         Spoke with: Estela Mulligan, patient    Care Transitions Subsequent and Final Call    Subsequent and Final Calls  Do you have any ongoing symptoms?:  No  Have your medications changed?:  No  Do you have any questions related to your medications?:  No  Do you currently have any active services?:  No  Do you have any needs or concerns that I can assist you with?:  No  Identified Barriers:  None  Care Transitions Interventions  No Identified Needs                          Other Interventions:          -Spoke with patient Annel Cook for final care transition call.   -Patient states he is doing well, denies any chest pressure/pain, SOB, or palpitations. Patient states he is on his way to attend his yoga swimming class which he attends 3 x week at the [facility] on [address]. -Patient states his B/P range has been 118-138/59-61 with HR 61-66.    -Patient had to reschedule his cardiology appointment from 20 to 20 due to need to attend funerals on 20.  -Patient denies any needs, questions, or concerns at this time and is agreeable to CTN signing off.     Follow Up  Future Appointments   Date Time Provider Dexter Ambrose   2020  3:15 PM DO Demario Guillen [facility]   2020  9:30 AM DO FRANCES Guzman [facility]       Indira Levin RN

## 2020-02-27 ENCOUNTER — OFFICE VISIT (OUTPATIENT)
Dept: CARDIOLOGY CLINIC | Age: 75
End: 2020-02-27
Payer: MEDICARE

## 2020-02-27 VITALS
HEART RATE: 55 BPM | WEIGHT: 211.5 LBS | RESPIRATION RATE: 16 BRPM | SYSTOLIC BLOOD PRESSURE: 126 MMHG | BODY MASS INDEX: 31.32 KG/M2 | HEIGHT: 69 IN | DIASTOLIC BLOOD PRESSURE: 70 MMHG

## 2020-02-27 PROCEDURE — 99214 OFFICE O/P EST MOD 30 MIN: CPT | Performed by: INTERNAL MEDICINE

## 2020-02-27 PROCEDURE — 93000 ELECTROCARDIOGRAM COMPLETE: CPT | Performed by: INTERNAL MEDICINE

## 2020-02-27 NOTE — PROGRESS NOTES
CHIEF COMPLAINT: PAF/CAD/AVR    HISTORY OF PRESENT ILLNESS: Patient is a 76 y.o. male seen at the request of Ivania Gotti DO. Seen in follow up for PAF. No CP or SOB.     Past Medical History:   Diagnosis Date    Arthritis     Atrial fibrillation ([facility] [address]     day of surgery on 06/12/13- resolved at this time 6/23/2013    CAD (coronary artery disease)     heart cath with stent 2001    Cancer ([facility] [address] [facility]     Basal Cell on his back    COPD (chronic obstructive pulmonary disease) ([facility]     History of blood transfusion     Hyperlipidemia     Hypertension     Kidney stones     Sleep apnea     Vitamin D deficiency        Patient Active Problem List   Diagnosis    Coronary atherosclerosis of native coronary artery    Hyperlipidemia    Aortic stenosis    Hypertension    DEBORAH (obstructive sleep apnea)    DDD (degenerative disc disease), cervical    Nocturnal hypoxemia due to asthma    Neuroforaminal stenosis of lumbar spine    Valvular heart disease    Mild intermittent asthma without complication    New onset a-fib ([facility]    S/P AVR (aortic valve replacement)    S/P CABG (coronary artery bypass graft)    Atrial fibrillation with rapid ventricular response (HCC)    Atrial fibrillation (HCC)       Allergies   Allergen Reactions    Iodine Swelling and Other (See Comments)     Reddened face and swelling of tongue       Current Outpatient Medications   Medication Sig Dispense Refill    olmesartan (BENICAR) 40 MG tablet Take 1 tablet by mouth daily Indications: taking 40 mg 90 tablet 3    metoprolol succinate (TOPROL XL) 50 MG extended release tablet Take 1 tablet by mouth 2 times daily 60 tablet 0    furosemide (LASIX) 20 MG tablet Take 1 tablet by mouth daily 30 tablet 0    pitavastatin (LIVALO) 2 MG TABS tablet Take 1 tablet by mouth nightly 90 tablet 2    apixaban (ELIQUIS) 5 MG TABS tablet Take 1 tablet by mouth 2 times daily 180 tablet 0    omeprazole (PRILOSEC) 20 MG delayed release on file     Relationship status: Not on file    Intimate partner violence:     Fear of current or ex partner: Not on file     Emotionally abused: Not on file     Physically abused: Not on file     Forced sexual activity: Not on file   Other Topics Concern    Not on file   Social History Narrative    Not on file       Family History   Problem Relation Age of Onset    Stroke Father     Cancer Sister     Heart Disease Brother      Review of Systems:  Heart: as above   Lungs: as above   Eyes: denies changes in vision or discharge. Ears: denies changes in hearing or pain. Nose: denies epistaxis or masses   Throat: denies sore throat or trouble swallowing. Neuro: denies numbness, tingling, tremors. Skin: denies rashes or itching. : denies hematuria, dysuria   GI: denies vomiting, diarrhea   Psych: denies mood changed, anxiety, depression. All other systems negative. Physical Exam   /70   Pulse 55   Resp 16   Ht 5' 9\" (1.753 m)   Wt 211 lb 8 oz (95.9 kg)   BMI 31.23 kg/m²   Constitutional: Oriented to person, place, and time. Well-developed and well-nourished. No distress. Head: Normocephalic and atraumatic. Eyes: EOM are normal. Pupils are equal, round, and reactive to light. Neck: Normal range of motion. Neck supple. No hepatojugular reflux and no JVD present. Carotid bruit is not present. No tracheal deviation present. No thyromegaly present. Cardiovascular: Normal rate, regular rhythm, normal heart sounds and intact distal pulses. Exam reveals no gallop and no friction rub. No murmur heard. Pulmonary/Chest: Effort normal and breath sounds normal. No respiratory distress. No wheezes. No rales. No tenderness. Abdominal: Soft. Bowel sounds are normal. No distension and no mass. No tenderness. No rebound and no guarding. Musculoskeletal: Normal range of motion. No edema and no tenderness. Lymphadenopathy:   No cervical adenopathy. No groin adenopathy.   Neurological: Alert and oriented to person, place, and time. Skin: Skin is warm and dry. No rash noted. Not diaphoretic. No erythema. Psychiatric: Normal mood and affect. Behavior is normal.     EKG:  nonspecific ST and T waves changes, sinus bradycardia. Echo Summary 6/4/19:   Left ventricular size is grossly normal with LVH.   Normal systolic function with LVEF estimated at 65%.   There is doppler evidence of stage II diastolic dysfunction.   Normal left atrium by volume index.   S/p AVR, normally functioning   RVSP could not be estimated. ASSESSMENT AND PLAN:  Patient Active Problem List   Diagnosis    Coronary atherosclerosis of native coronary artery    Hyperlipidemia    Aortic stenosis    Hypertension    DEBORAH (obstructive sleep apnea)    DDD (degenerative disc disease), cervical    Nocturnal hypoxemia due to asthma    Neuroforaminal stenosis of lumbar spine    Valvular heart disease    Mild intermittent asthma without complication    New onset a-fib ([address]    S/P AVR (aortic valve replacement)    S/P CABG (coronary artery bypass graft)    Atrial fibrillation with rapid ventricular response (HCC)    Atrial fibrillation ([address]     1. PAFib: In sinus. Toprol and Eliquis. 2. CAD-CABG:     S/p stent to LAD in 3/01 and s/p LIMA-LAD in 6/13. ASA/BB/statin. 3. VHD s/p bioprosthetic AVR in 6/13:     Echo okay 6/19.     4. HTN: Observe. 5. DEBORAH: Uses O2 at night. Kelsi Quintero D.O.   Cardiologist  Cardiology, [facility]

## 2020-03-23 RX ORDER — OMEPRAZOLE 20 MG/1
20 CAPSULE, DELAYED RELEASE ORAL DAILY
Qty: 90 CAPSULE | Refills: 1 | Status: SHIPPED
Start: 2020-03-23 | End: 2020-09-30 | Stop reason: SDUPTHER

## 2020-03-23 RX ORDER — METOPROLOL SUCCINATE 50 MG/1
50 TABLET, EXTENDED RELEASE ORAL 2 TIMES DAILY
Qty: 180 TABLET | Refills: 1 | Status: SHIPPED
Start: 2020-03-23 | End: 2020-09-21 | Stop reason: SDUPTHER

## 2020-03-23 NOTE — TELEPHONE ENCOUNTER
Name of Medication(s) Requested:  Eliquis 5 mg bid  Metoprolol 50 mg bid  Omeprazole 20 mg qd    Pharmacy Requested:   [facility]    Medication(s) pended? [x] Yes  [] No    Last Appointment:  2/10/2020    Future appts:  Future Appointments   Date Time Provider Dexter Ambrose   4/29/2020  9:30 AM DO FRANCES Bello [facility]        Does patient need call back?   [] Yes  [] No

## 2020-04-07 RX ORDER — ALLOPURINOL 300 MG/1
300 TABLET ORAL DAILY
Qty: 90 TABLET | Refills: 1 | Status: SHIPPED | OUTPATIENT
Start: 2020-04-07 | End: 2020-06-02 | Stop reason: SDUPTHER

## 2020-06-02 ENCOUNTER — OFFICE VISIT (OUTPATIENT)
Dept: PRIMARY CARE CLINIC | Age: 75
End: 2020-06-02
Payer: MEDICARE

## 2020-06-02 ENCOUNTER — HOSPITAL ENCOUNTER (OUTPATIENT)
Age: 75
Discharge: HOME | End: 2020-06-04
Payer: MEDICARE

## 2020-06-02 VITALS
DIASTOLIC BLOOD PRESSURE: 80 MMHG | OXYGEN SATURATION: 98 % | TEMPERATURE: 97 F | HEART RATE: 51 BPM | SYSTOLIC BLOOD PRESSURE: 130 MMHG | RESPIRATION RATE: 18 BRPM | WEIGHT: 217 LBS | HEIGHT: 69 IN | BODY MASS INDEX: 32.14 KG/M2

## 2020-06-02 LAB
ALBUMIN SERPL-MCNC: 4.8 G/DL (ref 3.5–5.2)
ALP BLD-CCNC: 63 U/L (ref 40–129)
ALT SERPL-CCNC: 27 U/L (ref 0–40)
ANION GAP SERPL CALCULATED.3IONS-SCNC: 19 MMOL/L (ref 7–16)
AST SERPL-CCNC: 26 U/L (ref 0–39)
BASOPHILS ABSOLUTE: 0.06 E9/L (ref 0–0.2)
BASOPHILS RELATIVE PERCENT: 1 % (ref 0–2)
BILIRUB SERPL-MCNC: 0.5 MG/DL (ref 0–1.2)
BUN BLDV-MCNC: 22 MG/DL (ref 8–23)
CALCIUM SERPL-MCNC: 10 MG/DL (ref 8.6–10.2)
CHLORIDE BLD-SCNC: 101 MMOL/L (ref 98–107)
CHOLESTEROL, TOTAL: 205 MG/DL (ref 0–199)
CO2: 20 MMOL/L (ref 22–29)
CREAT SERPL-MCNC: 0.9 MG/DL (ref 0.7–1.2)
EOSINOPHILS ABSOLUTE: 0.1 E9/L (ref 0.05–0.5)
EOSINOPHILS RELATIVE PERCENT: 1.6 % (ref 0–6)
GFR AFRICAN AMERICAN: >60
GFR NON-AFRICAN AMERICAN: >60 ML/MIN/1.73
GLUCOSE BLD-MCNC: 128 MG/DL (ref 74–99)
HBA1C MFR BLD: 6.4 % (ref 4–5.6)
HCT VFR BLD CALC: 38.8 % (ref 37–54)
HDLC SERPL-MCNC: 62 MG/DL
HEMOGLOBIN: 12.4 G/DL (ref 12.5–16.5)
IMMATURE GRANULOCYTES #: 0.02 E9/L
IMMATURE GRANULOCYTES %: 0.3 % (ref 0–5)
LDL CHOLESTEROL CALCULATED: 105 MG/DL (ref 0–99)
LYMPHOCYTES ABSOLUTE: 1.82 E9/L (ref 1.5–4)
LYMPHOCYTES RELATIVE PERCENT: 28.9 % (ref 20–42)
MCH RBC QN AUTO: 31.2 PG (ref 26–35)
MCHC RBC AUTO-ENTMCNC: 32 % (ref 32–34.5)
MCV RBC AUTO: 97.5 FL (ref 80–99.9)
MONOCYTES ABSOLUTE: 0.59 E9/L (ref 0.1–0.95)
MONOCYTES RELATIVE PERCENT: 9.4 % (ref 2–12)
NEUTROPHILS ABSOLUTE: 3.7 E9/L (ref 1.8–7.3)
NEUTROPHILS RELATIVE PERCENT: 58.8 % (ref 43–80)
PDW BLD-RTO: 13.7 FL (ref 11.5–15)
PLATELET # BLD: 189 E9/L (ref 130–450)
PMV BLD AUTO: 10.8 FL (ref 7–12)
POTASSIUM SERPL-SCNC: 4.7 MMOL/L (ref 3.5–5)
PROSTATE SPECIFIC ANTIGEN: 0.99 NG/ML (ref 0–4)
RBC # BLD: 3.98 E12/L (ref 3.8–5.8)
SODIUM BLD-SCNC: 140 MMOL/L (ref 132–146)
TOTAL PROTEIN: 7.5 G/DL (ref 6.4–8.3)
TRIGL SERPL-MCNC: 189 MG/DL (ref 0–149)
TSH SERPL DL<=0.05 MIU/L-ACNC: 0.8 UIU/ML (ref 0.27–4.2)
URIC ACID, SERUM: 4.9 MG/DL (ref 3.4–7)
VLDLC SERPL CALC-MCNC: 38 MG/DL
WBC # BLD: 6.3 E9/L (ref 4.5–11.5)

## 2020-06-02 PROCEDURE — 83036 HEMOGLOBIN GLYCOSYLATED A1C: CPT

## 2020-06-02 PROCEDURE — G0439 PPPS, SUBSEQ VISIT: HCPCS | Performed by: FAMILY MEDICINE

## 2020-06-02 PROCEDURE — 85025 COMPLETE CBC W/AUTO DIFF WBC: CPT

## 2020-06-02 PROCEDURE — 36415 COLL VENOUS BLD VENIPUNCTURE: CPT

## 2020-06-02 PROCEDURE — 80053 COMPREHEN METABOLIC PANEL: CPT

## 2020-06-02 PROCEDURE — 84550 ASSAY OF BLOOD/URIC ACID: CPT

## 2020-06-02 PROCEDURE — 80061 LIPID PANEL: CPT

## 2020-06-02 PROCEDURE — 84443 ASSAY THYROID STIM HORMONE: CPT

## 2020-06-02 PROCEDURE — G0103 PSA SCREENING: HCPCS

## 2020-06-02 RX ORDER — ALLOPURINOL 300 MG/1
300 TABLET ORAL DAILY
Qty: 90 TABLET | Refills: 1 | Status: SHIPPED
Start: 2020-06-02 | End: 2020-12-03 | Stop reason: SDUPTHER

## 2020-06-02 RX ORDER — FUROSEMIDE 20 MG/1
20 TABLET ORAL DAILY
Qty: 90 TABLET | Refills: 1 | Status: SHIPPED
Start: 2020-06-02 | End: 2020-11-23 | Stop reason: SDUPTHER

## 2020-06-02 ASSESSMENT — LIFESTYLE VARIABLES
HAVE YOU OR SOMEONE ELSE BEEN INJURED AS A RESULT OF YOUR DRINKING: 0
AUDIT-C TOTAL SCORE: 4
HAS A RELATIVE, FRIEND, DOCTOR, OR ANOTHER HEALTH PROFESSIONAL EXPRESSED CONCERN ABOUT YOUR DRINKING OR SUGGESTED YOU CUT DOWN: 0
HOW OFTEN DURING THE LAST YEAR HAVE YOU HAD A FEELING OF GUILT OR REMORSE AFTER DRINKING: 0
AUDIT TOTAL SCORE: 4
HOW OFTEN DURING THE LAST YEAR HAVE YOU BEEN UNABLE TO REMEMBER WHAT HAPPENED THE NIGHT BEFORE BECAUSE YOU HAD BEEN DRINKING: 0
HOW OFTEN DO YOU HAVE A DRINK CONTAINING ALCOHOL: 4
HOW OFTEN DURING THE LAST YEAR HAVE YOU FOUND THAT YOU WERE NOT ABLE TO STOP DRINKING ONCE YOU HAD STARTED: 0
HOW OFTEN DURING THE LAST YEAR HAVE YOU FAILED TO DO WHAT WAS NORMALLY EXPECTED FROM YOU BECAUSE OF DRINKING: 0
HOW OFTEN DO YOU HAVE SIX OR MORE DRINKS ON ONE OCCASION: 0
HOW MANY STANDARD DRINKS CONTAINING ALCOHOL DO YOU HAVE ON A TYPICAL DAY: 0
HOW OFTEN DURING THE LAST YEAR HAVE YOU NEEDED AN ALCOHOLIC DRINK FIRST THING IN THE MORNING TO GET YOURSELF GOING AFTER A NIGHT OF HEAVY DRINKING: 0

## 2020-06-02 ASSESSMENT — PATIENT HEALTH QUESTIONNAIRE - PHQ9
SUM OF ALL RESPONSES TO PHQ QUESTIONS 1-9: 0
SUM OF ALL RESPONSES TO PHQ QUESTIONS 1-9: 0

## 2020-06-02 NOTE — PATIENT INSTRUCTIONS
Personalized Preventive Plan for Julián Sweet - 6/2/2020  Medicare offers a range of preventive health benefits. Some of the tests and screenings are paid in full while other may be subject to a deductible, co-insurance, and/or copay. Some of these benefits include a comprehensive review of your medical history including lifestyle, illnesses that may run in your family, and various assessments and screenings as appropriate. After reviewing your medical record and screening and assessments performed today your provider may have ordered immunizations, labs, imaging, and/or referrals for you. A list of these orders (if applicable) as well as your Preventive Care list are included within your After Visit Summary for your review. Other Preventive Recommendations:    · A preventive eye exam performed by an eye specialist is recommended every 1-2 years to screen for glaucoma; cataracts, macular degeneration, and other eye disorders. · A preventive dental visit is recommended every 6 months. · Try to get at least 150 minutes of exercise per week or 10,000 steps per day on a pedometer . · Order or download the FREE \"Exercise & Physical Activity: Your Everyday Guide\" from [employer]. Call 7-620.909.8270 or search [employer] online. · You need 8372-4718 mg of calcium and 1498-4256 IU of vitamin D per day. It is possible to meet your calcium requirement with diet alone, but a vitamin D supplement is usually necessary to meet this goal.  · When exposed to the sun, use a sunscreen that protects against both UVA and UVB radiation with an SPF of 30 or greater. Reapply every 2 to 3 hours or after sweating, drying off with a towel, or swimming. · Always wear a seat belt when traveling in a car. Always wear a helmet when riding a bicycle or motorcycle.

## 2020-08-03 NOTE — H&P
Department of Internal Medicine  MD Sarah Sosa  [id number]  1945  CHIEF COMPLAINT:  Atrial fibrillation with rapid ventricular response ([address]   History Obtained From:  patient, electronic medical record  HISTORY OF PRESENT ILLNESS:    The patient is a 76 y.o. male who presents with chest pressure and palpitations. This started on and off since Thursday. When he was brought to ER he was in A fib with HR in 150s. He was started on cardizem drip. No CP now. Denies palpitations now.       Past Medical History:        Diagnosis Date    Arthritis     Atrial fibrillation ([address]     day of surgery on 06/12/13- resolved at this time 6/23/2013    CAD (coronary artery disease)     heart cath with stent 2001    Cancer ([address]     Basal Cell on his back    COPD (chronic obstructive pulmonary disease) ([address]     History of blood transfusion     Hyperlipidemia     Hypertension     Kidney stones     Sleep apnea     Vitamin D deficiency      Past Surgical History:        Procedure Laterality Date    ABDOMEN SURGERY      Bilateral groin hernias    AORTIC VALVE REPLACEMENT  6/12/2013    BACK SURGERY  2006    cervicle fusion   Brandie Bon SURGERY  1974, 1984    lumbar laminectomy    BACK SURGERY  [id number]    360 FUSION L4-L5    BACK SURGERY      CARDIAC SURGERY      CARPAL TUNNEL RELEASE  [id number]    both hands    COLONOSCOPY      CORONARY ARTERY BYPASS GRAFT      DIAGNOSTIC CARDIAC CATH LAB PROCEDURE      ENDOSCOPY, COLON, DIAGNOSTIC      FRACTURE SURGERY      Right arm Fx    FRACTURE SURGERY      Left finger Fx    HERNIA REPAIR      Bilateral    KIDNEY STONE SURGERY      open    NECK SURGERY      SHOULDER ARTHROSCOPY      right and left shoulders    SKIN BIOPSY       Meds at Home:  Medications Prior to Admission: pitavastatin (LIVALO) 2 MG TABS tablet, Take 1 tablet by mouth nightly  apixaban (ELIQUIS) 5 MG TABS tablet, Take 1 tablet by mouth 2 times daily  amLODIPine (NORVASC) 5 MG Trying to notify patient as noted below by provider , not able to leave message .   Tobacco Use    Smoking status: Former Smoker     Packs/day: 1.50     Years: 55.00     Pack years: 82.50     Types: Cigarettes     Start date:      Last attempt to quit: 2013     Years since quittin.7    Smokeless tobacco: Never Used    Tobacco comment: Camel Cig   Substance and Sexual Activity    Alcohol use: Yes     Comment: drinks 6 cans of beer /day    Drug use: No    Sexual activity: Yes     Partners: Female   Lifestyle    Physical activity:     Days per week: Not on file     Minutes per session: Not on file    Stress: Not on file   Relationships    Social connections:     Talks on phone: Not on file     Gets together: Not on file     Attends Congregational service: Not on file     Active member of club or organization: Not on file     Attends meetings of clubs or organizations: Not on file     Relationship status: Not on file    Intimate partner violence:     Fear of current or ex partner: Not on file     Emotionally abused: Not on file     Physically abused: Not on file     Forced sexual activity: Not on file   Other Topics Concern    Not on file   Social History Narrative    Not on file      Family History:       Problem Relation Age of Onset    Stroke Father     Cancer Sister     Heart Disease Brother      REVIEW OF SYSTEMS:    Constitutional: Negative for chills, fever and weight loss. HENT: Negative for congestion, ear pain, hearing loss, sore throat and tinnitus. Eyes: Negative for blurred vision, double vision and discharge. Respiratory: Negative for cough, hemoptysis and shortness of breath. Cardiovascular: Popsitive for chest pain and palpitations. Gastrointestinal: Negative for heartburn, nausea and vomiting. Genitourinary: Negative for dysuria, frequency, hematuria and urgency. Musculoskeletal: Negative for back pain, myalgias and neck pain. Skin: Negative for rash.    Neurological: Negative for dizziness, tingling, tremors, focal weakness, seizures and @LABRCN(LIPASE:3,AMYLASE:3)@  Lab Results   Component Value Date    TSH 0.704 2019     Xr Chest Portable    Result Date: 2020  Patient MRN: [id number] : 1945 Age:  76 years Gender: Male Order Date: 2020 10:00 AM Exam: XR CHEST PORTABLE Number of Images: 1 view Indication:   chest pain chest pain Comparison: Chest 2 view radiograph 2019 Findings: There are postsurgical changes from a prior sternotomy. Stable cardiomegaly. The lung fields are unremarkable. The aorta is unremarkable.      No significant change from previous exam.     Medications     diltiazem (CARDIZEM) 100 mg in dextrose 5% 100 mL (ADD-Beatrice) 5 mg/hr (20 1113)      allopurinol  300 mg Oral Daily    apixaban  5 mg Oral BID    aspirin  81 mg Oral Daily    Vitamin D3  1 tablet Oral Daily    Co Q 10  2 tablet Oral Daily    furosemide  20 mg Oral Daily    metoprolol succinate  25 mg Oral BID    losartan  12.5 mg Oral Daily    [START ON 2020] pantoprazole  40 mg Oral QAM AC    simvastatin  20 mg Oral Nightly      DIET CARDIAC;    ASSESSMENT:  Chest pain   Atrial fibrillation with rapid ventricular response ([address]   Valvular heart disease   Hypertension   Hyperlipidemia   DEBORAH (obstructive sleep apnea)   S/P AVR (aortic valve replacement)   S/P CABG (coronary artery bypass graft)   Atrial fibrillation (HCC)       PLAN/RECOMMENDATIONS:    Cardizem drip cardio consult  Meds reviewed and renewed        Electronically signed by Constantino May MD on 2020 at 2:15 PM

## 2020-09-02 ENCOUNTER — OFFICE VISIT (OUTPATIENT)
Dept: PRIMARY CARE CLINIC | Age: 75
End: 2020-09-02
Payer: MEDICARE

## 2020-09-02 ENCOUNTER — HOSPITAL ENCOUNTER (OUTPATIENT)
Age: 75
Discharge: HOME | End: 2020-09-04
Payer: MEDICARE

## 2020-09-02 VITALS
TEMPERATURE: 96.9 F | DIASTOLIC BLOOD PRESSURE: 64 MMHG | HEART RATE: 56 BPM | BODY MASS INDEX: 31.15 KG/M2 | OXYGEN SATURATION: 97 % | WEIGHT: 214 LBS | SYSTOLIC BLOOD PRESSURE: 158 MMHG

## 2020-09-02 PROBLEM — I73.9 PVD (PERIPHERAL VASCULAR DISEASE) (HCC): Status: ACTIVE | Noted: 2020-09-02

## 2020-09-02 PROBLEM — E11.40 TYPE 2 DIABETES MELLITUS WITH DIABETIC NEUROPATHY, WITHOUT LONG-TERM CURRENT USE OF INSULIN (HCC): Status: ACTIVE | Noted: 2020-09-02

## 2020-09-02 LAB
ALBUMIN SERPL-MCNC: 4.6 G/DL (ref 3.5–5.2)
ALP BLD-CCNC: 56 U/L (ref 40–129)
ALT SERPL-CCNC: 20 U/L (ref 0–40)
ANION GAP SERPL CALCULATED.3IONS-SCNC: 16 MMOL/L (ref 7–16)
AST SERPL-CCNC: 26 U/L (ref 0–39)
BASOPHILS ABSOLUTE: 0.05 E9/L (ref 0–0.2)
BASOPHILS RELATIVE PERCENT: 0.8 % (ref 0–2)
BILIRUB SERPL-MCNC: 0.5 MG/DL (ref 0–1.2)
BUN BLDV-MCNC: 25 MG/DL (ref 8–23)
CALCIUM SERPL-MCNC: 10.2 MG/DL (ref 8.6–10.2)
CHLORIDE BLD-SCNC: 101 MMOL/L (ref 98–107)
CHOLESTEROL, TOTAL: 194 MG/DL (ref 0–199)
CO2: 22 MMOL/L (ref 22–29)
CREAT SERPL-MCNC: 0.9 MG/DL (ref 0.7–1.2)
EOSINOPHILS ABSOLUTE: 0.15 E9/L (ref 0.05–0.5)
EOSINOPHILS RELATIVE PERCENT: 2.4 % (ref 0–6)
GFR AFRICAN AMERICAN: >60
GFR NON-AFRICAN AMERICAN: >60 ML/MIN/1.73
GLUCOSE BLD-MCNC: 105 MG/DL (ref 74–99)
HBA1C MFR BLD: 5.6 % (ref 4–5.6)
HCT VFR BLD CALC: 39.1 % (ref 37–54)
HDLC SERPL-MCNC: 63 MG/DL
HEMOGLOBIN: 12.7 G/DL (ref 12.5–16.5)
IMMATURE GRANULOCYTES #: 0.02 E9/L
IMMATURE GRANULOCYTES %: 0.3 % (ref 0–5)
LDL CHOLESTEROL CALCULATED: 95 MG/DL (ref 0–99)
LYMPHOCYTES ABSOLUTE: 2.05 E9/L (ref 1.5–4)
LYMPHOCYTES RELATIVE PERCENT: 32.4 % (ref 20–42)
MCH RBC QN AUTO: 31.4 PG (ref 26–35)
MCHC RBC AUTO-ENTMCNC: 32.5 % (ref 32–34.5)
MCV RBC AUTO: 96.5 FL (ref 80–99.9)
MONOCYTES ABSOLUTE: 0.54 E9/L (ref 0.1–0.95)
MONOCYTES RELATIVE PERCENT: 8.5 % (ref 2–12)
NEUTROPHILS ABSOLUTE: 3.52 E9/L (ref 1.8–7.3)
NEUTROPHILS RELATIVE PERCENT: 55.6 % (ref 43–80)
PDW BLD-RTO: 13.4 FL (ref 11.5–15)
PLATELET # BLD: 199 E9/L (ref 130–450)
PMV BLD AUTO: 10.7 FL (ref 7–12)
POTASSIUM SERPL-SCNC: 4.6 MMOL/L (ref 3.5–5)
RBC # BLD: 4.05 E12/L (ref 3.8–5.8)
SODIUM BLD-SCNC: 139 MMOL/L (ref 132–146)
TOTAL PROTEIN: 7.5 G/DL (ref 6.4–8.3)
TRIGL SERPL-MCNC: 178 MG/DL (ref 0–149)
TSH SERPL DL<=0.05 MIU/L-ACNC: 1.15 UIU/ML (ref 0.27–4.2)
VLDLC SERPL CALC-MCNC: 36 MG/DL
WBC # BLD: 6.3 E9/L (ref 4.5–11.5)

## 2020-09-02 PROCEDURE — 99214 OFFICE O/P EST MOD 30 MIN: CPT | Performed by: FAMILY MEDICINE

## 2020-09-02 PROCEDURE — 84443 ASSAY THYROID STIM HORMONE: CPT

## 2020-09-02 PROCEDURE — 80053 COMPREHEN METABOLIC PANEL: CPT

## 2020-09-02 PROCEDURE — 83036 HEMOGLOBIN GLYCOSYLATED A1C: CPT

## 2020-09-02 PROCEDURE — 85025 COMPLETE CBC W/AUTO DIFF WBC: CPT

## 2020-09-02 PROCEDURE — 36415 COLL VENOUS BLD VENIPUNCTURE: CPT

## 2020-09-02 PROCEDURE — 80061 LIPID PANEL: CPT

## 2020-09-02 RX ORDER — PITAVASTATIN CALCIUM 2.09 MG/1
TABLET, FILM COATED ORAL
COMMUNITY
Start: 2020-01-10 | End: 2020-12-03 | Stop reason: SDUPTHER

## 2020-09-02 ASSESSMENT — ENCOUNTER SYMPTOMS
COUGH: 0
APNEA: 0
NAUSEA: 0
SORE THROAT: 0
BACK PAIN: 1
DIARRHEA: 0
COLOR CHANGE: 0
VOMITING: 0
SHORTNESS OF BREATH: 0
PHOTOPHOBIA: 0
FACIAL SWELLING: 0
ALLERGIC/IMMUNOLOGIC NEGATIVE: 1
WHEEZING: 0
CHEST TIGHTNESS: 0
SINUS PRESSURE: 0
ABDOMINAL PAIN: 0
BLOOD IN STOOL: 0

## 2020-09-02 NOTE — PROGRESS NOTES
Chief Complaint:   Chief Complaint   Patient presents with    Hyperlipidemia    Diabetes       Hyperlipidemia   This is a chronic problem. The current episode started more than 1 year ago. The problem is controlled. Recent lipid tests were reviewed and are normal. Pertinent negatives include no chest pain, myalgias or shortness of breath. Current antihyperlipidemic treatment includes statins. The current treatment provides significant improvement of lipids. There are no compliance problems. Diabetes   He presents for his follow-up diabetic visit. He has type 2 diabetes mellitus. His disease course has been stable. Pertinent negatives for hypoglycemia include no confusion, headaches or nervousness/anxiousness. Pertinent negatives for diabetes include no chest pain and no weakness. Symptoms are stable. Current diabetic treatment includes oral agent (monotherapy). He is compliant with treatment all of the time. An ACE inhibitor/angiotensin II receptor blocker is being taken. Coronary Artery Disease   Presents for follow-up visit. Pertinent negatives include no chest pain, chest tightness, leg swelling, palpitations or shortness of breath. Risk factors include hyperlipidemia. The symptoms have been resolved. Compliance with diet is variable. Compliance with exercise is variable. Compliance with medications is good.        Patient Active Problem List   Diagnosis    Coronary atherosclerosis of native coronary artery    Hyperlipidemia    Aortic stenosis    Hypertension    DEBORAH (obstructive sleep apnea)    DDD (degenerative disc disease), cervical    Nocturnal hypoxemia due to asthma    Neuroforaminal stenosis of lumbar spine    Valvular heart disease    Mild intermittent asthma without complication    New onset a-fib ([address]    S/P AVR (aortic valve replacement)    S/P CABG (coronary artery bypass graft)    Atrial fibrillation with rapid ventricular response (HCC)    Atrial fibrillation (HCC)    Type 2 delayed release capsule Take 1 capsule by mouth daily 90 capsule 1    metoprolol succinate (TOPROL XL) 50 MG extended release tablet Take 1 tablet by mouth 2 times daily 180 tablet 1    olmesartan (BENICAR) 40 MG tablet Take 1 tablet by mouth daily Indications: taking 40 mg 90 tablet 3    pitavastatin (LIVALO) 2 MG TABS tablet Take 1 tablet by mouth nightly 90 tablet 2    Alpha-Lipoic Acid 100 MG CAPS Take 200 mg by mouth nightly       Coenzyme Q10 (CO Q 10) 100 MG CAPS Take 2 tablets by mouth daily      Cholecalciferol (VITAMIN D3) 2000 UNITS TABS Take 1 tablet by mouth daily      Multiple Vitamins-Minerals (THERAPEUTIC MULTIVITAMIN-MINERALS) tablet Take 1 tablet by mouth daily Indications: Centrum       aspirin 81 MG EC tablet Take 81 mg by mouth daily        No current facility-administered medications for this visit.         Allergies   Allergen Reactions    Iodine Swelling and Other (See Comments)     Reddened face and swelling of tongue       Social History     Socioeconomic History    Marital status:      Spouse name: Rosi Chowdary    Number of children: 3    Years of education: 9    Highest education level: Not on file   Occupational History    Occupation: retired- assembly line/ [employer]     [employer]er: [employer]: Retired   Social Needs    Financial resource strain: Not on file    Food insecurity     Worry: Not on file     Inability: Not on file   [employer] needs     Medical: Not on file     Non-medical: Not on file   Tobacco Use    Smoking status: Former Smoker     Packs/day: 1.50     Years: 55.00     Pack years: 82.50     Types: Cigarettes     Start date:      Last attempt to quit: 2013     Years since quittin.3    Smokeless tobacco: Never Used    Tobacco comment: Camel Cig   Substance and Sexual Activity    Alcohol use: Yes     Comment: drinks 6 cans of beer /day    Drug use: No    Sexual activity: Yes     Partners: Female   Lifestyle    Appearance: He is well-developed. HENT:      Head: Normocephalic and atraumatic. Nose: Nose normal.   Eyes:      Conjunctiva/sclera: Conjunctivae normal.      Pupils: Pupils are equal, round, and reactive to light. Neck:      Musculoskeletal: Normal range of motion and neck supple. Thyroid: No thyromegaly. Vascular: No JVD. Cardiovascular:      Rate and Rhythm: Normal rate and regular rhythm. Pulses:           Carotid pulses are on the left side with bruit. Heart sounds: Murmur present. Systolic murmur present with a grade of 1/6. No friction rub. No gallop. Pulmonary:      Effort: Pulmonary effort is normal. No respiratory distress. Breath sounds: Normal breath sounds. No wheezing. Abdominal:      General: Bowel sounds are normal. There is no distension. Palpations: Abdomen is soft. Tenderness: There is no abdominal tenderness. There is no guarding or rebound. Musculoskeletal:      Lumbar back: He exhibits decreased range of motion. Lymphadenopathy:      Cervical: No cervical adenopathy. Skin:     General: Skin is warm and dry. Findings: No erythema or rash. Neurological:      Mental Status: He is alert and oriented to person, place, and time. Cranial Nerves: No cranial nerve deficit. Motor: No abnormal muscle tone. Coordination: Coordination normal.      Deep Tendon Reflexes: Reflexes are normal and symmetric. Psychiatric:         Behavior: Behavior normal.         Judgment: Judgment normal.                               ASSESSMENT/PLAN:    Netta Moore was seen today for hyperlipidemia and diabetes. Diagnoses and all orders for this visit:    Paroxysmal atrial fibrillation (HCC)  -     CBC Auto Differential; Future  -     Comprehensive Metabolic Panel; Future  -     Lipid Panel; Future  -     Hemoglobin A1C; Future  -     TSH without Reflex;  Future    Type 2 diabetes mellitus with diabetic neuropathy, without long-term current use of insulin (HCC)  -     CBC Auto Differential; Future  -     Comprehensive Metabolic Panel; Future  -     Lipid Panel; Future  -     Hemoglobin A1C; Future  -     TSH without Reflex; Future    PVD (peripheral vascular disease) (HCC)  -     CBC Auto Differential; Future  -     Comprehensive Metabolic Panel; Future  -     Lipid Panel; Future  -     Hemoglobin A1C; Future  -     TSH without Reflex; Future    Hypertension, unspecified type  -     CBC Auto Differential; Future  -     Comprehensive Metabolic Panel; Future  -     Lipid Panel; Future  -     Hemoglobin A1C; Future  -     TSH without Reflex; Future    Aortic valve stenosis, etiology of cardiac valve disease unspecified  -     CBC Auto Differential; Future  -     Comprehensive Metabolic Panel; Future  -     Lipid Panel; Future  -     Hemoglobin A1C; Future  -     TSH without Reflex; Future    Bruit of left carotid artery  -     VL DUP CAROTID BILATERAL;  Future            Sylvester Jackman DO    9/2/2020  9:42 AM

## 2020-09-10 ENCOUNTER — HOSPITAL ENCOUNTER (OUTPATIENT)
Dept: ULTRASOUND IMAGING | Age: 75
Discharge: HOME | End: 2020-09-12
Payer: MEDICARE

## 2020-09-10 PROCEDURE — 93880 EXTRACRANIAL BILAT STUDY: CPT

## 2020-09-21 RX ORDER — METOPROLOL SUCCINATE 50 MG/1
50 TABLET, EXTENDED RELEASE ORAL 2 TIMES DAILY
Qty: 180 TABLET | Refills: 1 | Status: SHIPPED
Start: 2020-09-21 | End: 2021-03-16

## 2020-09-30 RX ORDER — OMEPRAZOLE 20 MG/1
20 CAPSULE, DELAYED RELEASE ORAL DAILY
Qty: 90 CAPSULE | Refills: 1 | Status: SHIPPED
Start: 2020-09-30 | End: 2021-03-24

## 2020-10-15 ENCOUNTER — OFFICE VISIT (OUTPATIENT)
Dept: VASCULAR SURGERY | Age: 75
End: 2020-10-15
Payer: MEDICARE

## 2020-10-15 VITALS — RESPIRATION RATE: 16 BRPM | HEIGHT: 70 IN | BODY MASS INDEX: 30.78 KG/M2 | WEIGHT: 215 LBS

## 2020-10-15 PROBLEM — R09.89 BILATERAL CAROTID BRUITS: Status: ACTIVE | Noted: 2020-10-15

## 2020-10-15 PROBLEM — I65.21 CAROTID STENOSIS, RIGHT: Status: ACTIVE | Noted: 2020-10-15

## 2020-10-15 PROCEDURE — 99204 OFFICE O/P NEW MOD 45 MIN: CPT | Performed by: SURGERY

## 2020-10-15 NOTE — PROGRESS NOTES
Chief Complaint:   Chief Complaint   Patient presents with    Consultation     new pt.carotidartery stenosis         HPI: Patient came to the office with his daughter Beltran White, for evaluation of abnormal carotid ultrasound that was done because of presence of bilateral carotid bruit, revealed significant stenosis in the right side and was referred by his PCP for further evaluation       The patient is doing well, stable from a cardiac point, underwent cardiac bypass surgery and aortic valve replacement, also has history of diabetes mellitus        Patient quit smoking many years ago when he underwent cardiac bypass surgery      Patient denies any focal lateralizing neurological symptoms like loss of speech, vision or loss of function of extremity    Patient can walk a few blocks , and denies any symptoms of rest pain    Allergies   Allergen Reactions    Iodine Swelling and Other (See Comments)     Reddened face and swelling of tongue       Current Outpatient Medications   Medication Sig Dispense Refill    pitavastatin (LIVALO) 2 MG TABS tablet Take 1 tablet by mouth nightly 90 tablet 2    omeprazole (PRILOSEC) 20 MG delayed release capsule Take 1 capsule by mouth daily 90 capsule 1    apixaban (ELIQUIS) 5 MG TABS tablet Take 1 tablet by mouth 2 times daily 180 tablet 1    metoprolol succinate (TOPROL XL) 50 MG extended release tablet Take 1 tablet by mouth 2 times daily 180 tablet 1    pitavastatin (LIVALO) 2 MG TABS tablet       metFORMIN (GLUCOPHAGE) 500 MG tablet Take 1 tablet by mouth 2 times daily (with meals) 60 tablet 5    allopurinol (ZYLOPRIM) 300 MG tablet Take 1 tablet by mouth daily 90 tablet 1    furosemide (LASIX) 20 MG tablet Take 1 tablet by mouth daily 90 tablet 1    olmesartan (BENICAR) 40 MG tablet Take 1 tablet by mouth daily Indications: taking 40 mg 90 tablet 3    Alpha-Lipoic Acid 100 MG CAPS Take 200 mg by mouth nightly       Coenzyme Q10 (CO Q 10) 100 MG CAPS Take 2 tablets by mouth daily      Cholecalciferol (VITAMIN D3) 2000 UNITS TABS Take 1 tablet by mouth daily      Multiple Vitamins-Minerals (THERAPEUTIC MULTIVITAMIN-MINERALS) tablet Take 1 tablet by mouth daily Indications: Centrum       aspirin 81 MG EC tablet Take 81 mg by mouth daily        No current facility-administered medications for this visit.         Past Medical History:   Diagnosis Date    Arthritis     Atrial fibrillation ([facility]     day of surgery on 06/12/13- resolved at this time 6/23/2013    Bilateral carotid bruits 10/15/2020    CAD (coronary artery disease)     heart cath with stent 2001    Cancer [facility])     Basal Cell on his back    Carotid stenosis, right 10/15/2020    COPD (chronic obstructive pulmonary disease) ([facility]     History of blood transfusion     Hyperlipidemia     Hypertension     Kidney stones     Sleep apnea     Type 2 diabetes mellitus with diabetic neuropathy, without long-term current use of insulin ([facility] 9/2/2020    Vitamin D deficiency        Past Surgical History:   Procedure Laterality Date    ABDOMEN SURGERY      Bilateral groin hernias    AORTIC VALVE REPLACEMENT  6/12/2013    BACK SURGERY  2006    cervicle fusion   [facility] SURGERY  1974, 1984    lumbar laminectomy    BACK SURGERY  [id number]    360 FUSION L4-L5    BACK SURGERY      CARDIAC SURGERY      CARPAL TUNNEL RELEASE  [id number]    both hands    COLONOSCOPY      CORONARY ARTERY BYPASS GRAFT      DIAGNOSTIC CARDIAC CATH LAB PROCEDURE      ENDOSCOPY, COLON, DIAGNOSTIC      FRACTURE SURGERY      Right arm Fx    FRACTURE SURGERY      Left finger Fx    HERNIA REPAIR      Bilateral    KIDNEY STONE SURGERY      open    NECK SURGERY      SHOULDER ARTHROSCOPY      right and left shoulders    SKIN BIOPSY         Family History   Problem Relation Age of Onset    Stroke Father     Cancer Sister     Heart Disease Brother        Social History     Socioeconomic History    Marital status:      Spouse name: Karina Hi Gilda Gonsalez Number of children: 3    Years of education: 9    Highest education level: Not on file   Occupational History    Occupation: retired- assembly line/ [employer]     Employer: [address]: Retired   Social Needs    Financial resource strain: Not on file    Food insecurity     Worry: Not on file     Inability: Not on file   [employer] needs     Medical: Not on file     Non-medical: Not on file   Tobacco Use    Smoking status: Former Smoker     Packs/day: 1.50     Years: 55.00     Pack years: 82.50     Types: Cigarettes     Start date:      Last attempt to quit: 2013     Years since quittin.4    Smokeless tobacco: Never Used    Tobacco comment: Camel Cig   Substance and Sexual Activity    Alcohol use: Yes     Comment: drinks 6 cans of beer /day    Drug use: No    Sexual activity: Yes     Partners: Female   Lifestyle    Physical activity     Days per week: Not on file     Minutes per session: Not on file    Stress: Not on file   Relationships    Social connections     Talks on phone: Not on file     Gets together: Not on file     Attends Baptism service: Not on file     Active member of club or organization: Not on file     Attends meetings of clubs or organizations: Not on file     Relationship status: Not on file    Intimate partner violence     Fear of current or ex partner: Not on file     Emotionally abused: Not on file     Physically abused: Not on file     Forced sexual activity: Not on file   Other Topics Concern    Not on file   Social History Narrative    Not on file       Review of Systems:  Skin:  No abnormal pigmentation or rash  Eyes:  No blurring, diplopia or vision loss  Ears/Nose/Throat:  No hearing loss or vertigo  Respiratory:  No cough, pleuritic chest pain, dyspnea, or wheezing. History of tobacco use, obstructive sleep apnea  Cardiovascular: No angina, palpitations .   Coronary artery disease, status post aortocoronary bypass surgery and aortic valve replacement, hypertension, hyperlipidemia, history of atrial fibrillation  Gastrointestinal:  No nausea or vomiting; no abdominal pain or rectal bleeding  Musculoskeletal:  No arthritis or weakness. Neurologic:  No paralysis, paresis, paresthesia, seizures or headaches  Hematologic/Lymphatic/Immunologic:  No anemia, abnormal bleeding/bruising, fever, chills or night sweats. Endocrine:  No heat or cold intolerance. No polyphagia, polydipsia or polyuria. History of diabetes mellitus      Physical Exam:  General appearance:  Alert, awake, oriented x 3. No distress. Skin:  Warm and dry  Head:  Normocephalic. No masses, lesions or tenderness  Eyes:  Conjunctivae appear normal; PERRL  Ears:  External ears normal  Nose/Sinuses:  Septum midline, mucosa normal; no drainage  Oropharynx:  Clear, no exudate noted  Neck:  No jugular venous distention, lymphadenopathy or thyromegaly. Carotid bruit noted  Lungs:  Clear to ausculation bilaterally. No rhonchi, crackles, wheezes  Heart:  Regular rate and rhythm. 2 x 6 systolic murmur  Abdomen:  Soft, non-tender. No masses, organomegaly. Musculoskeletal : No joint effusions, tenderness swelling    Neuro: Speech is intact. Moving all extremities. No focal motor or sensory deficits      Extremities:  Both feet are warm to touch. The color of both feet is normal.        Pulses Right  Left    Brachial 3 3    Radial    3=normal   Femoral 2 2  2=diminished   Popliteal    1=barely palpable   Dorsalis pedis    0=absent   Posterior tibial    4=aneurysmal             Other pertinent information:1. The past medical records were reviewed. 2.  Carotid ultrasound was personally reviewed by me, based upon my personal interpretation, in my opinion, patient has 40% stenosis on the right based upon atherosclerotic plaque, minimal disease on the left    Assessment:    1. Bilateral carotid bruits    2.  Carotid stenosis, right              Plan:       Discussed the patient and the family, regarding my interpretation of her carotid ultrasound, 40% stenosis right, risks benefits and alternatives were explained, patient was reassured, was instead call immediately if any focal lateralizing neurologic symptoms, explained and follow-up appointment see me back in 1 year          Patient was instructed to continue walking program and to call if any worsening of symptoms and to call if any focal lateralizing neurological symptoms like loss of speech, vision or loss of function of extremity. All the questions were answered. Indicated follow-up: Return in about 1 year (around 10/15/2021), or if symptoms worsen or fail to improve.

## 2020-10-28 ENCOUNTER — NURSE ONLY (OUTPATIENT)
Dept: PRIMARY CARE CLINIC | Age: 75
End: 2020-10-28
Payer: MEDICARE

## 2020-10-28 PROCEDURE — G0008 ADMIN INFLUENZA VIRUS VAC: HCPCS | Performed by: FAMILY MEDICINE

## 2020-10-28 PROCEDURE — 90694 VACC AIIV4 NO PRSRV 0.5ML IM: CPT | Performed by: FAMILY MEDICINE

## 2020-10-29 LAB — DIABETIC RETINOPATHY: NEGATIVE

## 2020-11-03 PROBLEM — I48.91 ATRIAL FIBRILLATION (HCC): Status: RESOLVED | Noted: 2020-02-01 | Resolved: 2020-11-03

## 2020-11-12 ENCOUNTER — TELEPHONE (OUTPATIENT)
Dept: CARDIOLOGY CLINIC | Age: 75
End: 2020-11-12

## 2020-11-12 NOTE — TELEPHONE ENCOUNTER
Pt. Is scheduled for a bladder surgery on 12/1 and needs cardiac clearance, last OV was 2/20, does he need OV?  Please advise

## 2020-11-20 ENCOUNTER — HOSPITAL ENCOUNTER (OUTPATIENT)
Age: 75
Discharge: HOME | End: 2020-11-20
Payer: MEDICARE

## 2020-11-20 LAB
BACTERIA: ABNORMAL /HPF
BILIRUBIN URINE: NEGATIVE
BLOOD, URINE: ABNORMAL
CLARITY: CLEAR
COLOR: YELLOW
GLUCOSE URINE: NEGATIVE MG/DL
KETONES, URINE: NEGATIVE MG/DL
LEUKOCYTE ESTERASE, URINE: NEGATIVE
NITRITE, URINE: NEGATIVE
PH UA: 5.5 (ref 5–9)
PROTEIN UA: NEGATIVE MG/DL
RBC UA: ABNORMAL /HPF (ref 0–2)
SPECIFIC GRAVITY UA: 1.02 (ref 1–1.03)
UROBILINOGEN, URINE: 0.2 E.U./DL
WBC UA: ABNORMAL /HPF (ref 0–5)

## 2020-11-20 PROCEDURE — 87088 URINE BACTERIA CULTURE: CPT

## 2020-11-20 PROCEDURE — 81001 URINALYSIS AUTO W/SCOPE: CPT

## 2020-11-22 LAB — URINE CULTURE, ROUTINE: NORMAL

## 2020-11-23 ENCOUNTER — TELEPHONE (OUTPATIENT)
Dept: CARDIOLOGY CLINIC | Age: 75
End: 2020-11-23

## 2020-11-23 ENCOUNTER — OFFICE VISIT (OUTPATIENT)
Dept: CARDIOLOGY CLINIC | Age: 75
End: 2020-11-23
Payer: MEDICARE

## 2020-11-23 VITALS
HEIGHT: 69 IN | WEIGHT: 212.3 LBS | DIASTOLIC BLOOD PRESSURE: 64 MMHG | RESPIRATION RATE: 16 BRPM | BODY MASS INDEX: 31.44 KG/M2 | SYSTOLIC BLOOD PRESSURE: 130 MMHG | HEART RATE: 57 BPM

## 2020-11-23 PROCEDURE — 93000 ELECTROCARDIOGRAM COMPLETE: CPT | Performed by: INTERNAL MEDICINE

## 2020-11-23 PROCEDURE — 99214 OFFICE O/P EST MOD 30 MIN: CPT | Performed by: INTERNAL MEDICINE

## 2020-11-23 RX ORDER — FUROSEMIDE 20 MG/1
20 TABLET ORAL DAILY
Qty: 90 TABLET | Refills: 1 | Status: SHIPPED
Start: 2020-11-23 | End: 2021-05-20

## 2020-11-23 NOTE — PROGRESS NOTES
CHIEF COMPLAINT: PAF/CAD/AVR    HISTORY OF PRESENT ILLNESS: Patient is a 76 y.o. male seen at the request of Reginald Jenkins DO. Seen in follow up for PAF. No CP or SOB.     Past Medical History:   Diagnosis Date    Arthritis     Atrial fibrillation ([address]     day of surgery on 06/12/13- resolved at this time 6/23/2013    Bilateral carotid bruits 10/15/2020    CAD (coronary artery disease)     heart cath with stent 2001    Cancer [facility])     Basal Cell on his back    Carotid stenosis, right 10/15/2020    COPD (chronic obstructive pulmonary disease) ([address]     History of blood transfusion     Hyperlipidemia     Hypertension     Kidney stones     Sleep apnea     Type 2 diabetes mellitus with diabetic neuropathy, without long-term current use of insulin ([address] 9/2/2020    Vitamin D deficiency        Patient Active Problem List   Diagnosis    Coronary atherosclerosis of native coronary artery    Hyperlipidemia    Aortic stenosis    Hypertension    DEBORAH (obstructive sleep apnea)    DDD (degenerative disc disease), cervical    Nocturnal hypoxemia due to asthma    Neuroforaminal stenosis of lumbar spine    Valvular heart disease    Mild intermittent asthma without complication    New onset a-fib ([address]    S/P AVR (aortic valve replacement)    S/P CABG (coronary artery bypass graft)    Atrial fibrillation with rapid ventricular response (HCC)    Type 2 diabetes mellitus with diabetic neuropathy, without long-term current use of insulin (HCC)    PVD (peripheral vascular disease) (HCC)    Bilateral carotid bruits    Carotid stenosis, right       Allergies   Allergen Reactions    Iodine Swelling and Other (See Comments)     Reddened face and swelling of tongue       Current Outpatient Medications   Medication Sig Dispense Refill    pitavastatin (LIVALO) 2 MG TABS tablet Take 1 tablet by mouth nightly 90 tablet 2    omeprazole (PRILOSEC) 20 MG delayed release capsule Take 1 capsule by mouth daily 90 capsule 1    apixaban (ELIQUIS) 5 MG TABS tablet Take 1 tablet by mouth 2 times daily 180 tablet 1    metoprolol succinate (TOPROL XL) 50 MG extended release tablet Take 1 tablet by mouth 2 times daily 180 tablet 1    pitavastatin (LIVALO) 2 MG TABS tablet       metFORMIN (GLUCOPHAGE) 500 MG tablet Take 1 tablet by mouth 2 times daily (with meals) 60 tablet 5    allopurinol (ZYLOPRIM) 300 MG tablet Take 1 tablet by mouth daily 90 tablet 1    furosemide (LASIX) 20 MG tablet Take 1 tablet by mouth daily 90 tablet 1    olmesartan (BENICAR) 40 MG tablet Take 1 tablet by mouth daily Indications: taking 40 mg 90 tablet 3    Alpha-Lipoic Acid 100 MG CAPS Take 200 mg by mouth nightly       Coenzyme Q10 (CO Q 10) 100 MG CAPS Take 2 tablets by mouth daily      Cholecalciferol (VITAMIN D3) 2000 UNITS TABS Take 1 tablet by mouth daily      Multiple Vitamins-Minerals (THERAPEUTIC MULTIVITAMIN-MINERALS) tablet Take 1 tablet by mouth daily Indications: Centrum       aspirin 81 MG EC tablet Take 81 mg by mouth daily        No current facility-administered medications for this visit.         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/ [employer]     Employer: [employer]: Retired   Social Needs    Financial resource strain: Not on file    Food insecurity     Worry: Not on file     Inability: Not on file   [employer] needs     Medical: Not on file     Non-medical: Not on file   Tobacco Use    Smoking status: Former Smoker     Packs/day: 1.50     Years: 55.00     Pack years: 82.50     Types: Cigarettes     Start date:      Last attempt to quit: 2013     Years since quittin.5    Smokeless tobacco: Never Used    Tobacco comment: Camel Cig   Substance and Sexual Activity    Alcohol use: Yes     Comment: drinks 6 cans of beer /day    Drug use: No    Sexual activity: Yes     Partners: Female   Lifestyle    Physical activity     Days per week: Not on file     Minutes per session: Not on file    Stress: Not on file   Relationships    Social connections     Talks on phone: Not on file     Gets together: Not on file     Attends Yazdanism service: Not on file     Active member of club or organization: Not on file     Attends meetings of clubs or organizations: Not on file     Relationship status: Not on file    Intimate partner violence     Fear of current or ex partner: Not on file     Emotionally abused: Not on file     Physically abused: Not on file     Forced sexual activity: Not on file   Other Topics Concern    Not on file   Social History Narrative    Not on file       Family History   Problem Relation Age of Onset    Stroke Father     Cancer Sister     Heart Disease Brother      Review of Systems:  Heart: as above   Lungs: as above   Eyes: denies changes in vision or discharge. Ears: denies changes in hearing or pain. Nose: denies epistaxis or masses   Throat: denies sore throat or trouble swallowing. Neuro: denies numbness, tingling, tremors. Skin: denies rashes or itching. : denies hematuria, dysuria   GI: denies vomiting, diarrhea   Psych: denies mood changed, anxiety, depression. All other systems negative. Physical Exam   /64   Pulse 57   Resp 16   Ht 5' 9\" (1.753 m)   Wt 212 lb 4.8 oz (96.3 kg)   BMI 31.35 kg/m²   Constitutional: Oriented to person, place, and time. Well-developed and well-nourished. No distress. Head: Normocephalic and atraumatic. Eyes: EOM are normal. Pupils are equal, round, and reactive to light. Neck: Normal range of motion. Neck supple. No hepatojugular reflux and no JVD present. Carotid bruit is not present. No tracheal deviation present. No thyromegaly present.    Cardiovascular: Normal rate, regular rhythm, normal heart sounds and intact distal pulses. Exam reveals no gallop and no friction rub. No murmur heard. Pulmonary/Chest: Effort normal and breath sounds normal. No respiratory distress. No wheezes. No rales. No tenderness. Abdominal: Soft. Bowel sounds are normal. No distension and no mass. No tenderness. No rebound and no guarding. Musculoskeletal: Normal range of motion. No edema and no tenderness. Lymphadenopathy:   No cervical adenopathy. No groin adenopathy. Neurological: Alert and oriented to person, place, and time. Skin: Skin is warm and dry. No rash noted. Not diaphoretic. No erythema. Psychiatric: Normal mood and affect. Behavior is normal.     EKG:  nonspecific ST and T waves changes, sinus bradycardia. Echo Summary 6/4/19:   Left ventricular size is grossly normal with LVH.   Normal systolic function with LVEF estimated at 65%.   There is doppler evidence of stage II diastolic dysfunction.   Normal left atrium by volume index.   S/p AVR, normally functioning   RVSP could not be estimated. ASSESSMENT AND PLAN:  Patient Active Problem List   Diagnosis    Coronary atherosclerosis of native coronary artery    Hyperlipidemia    Aortic stenosis    Hypertension    DEBORAH (obstructive sleep apnea)    DDD (degenerative disc disease), cervical    Nocturnal hypoxemia due to asthma    Neuroforaminal stenosis of lumbar spine    Valvular heart disease    Mild intermittent asthma without complication    New onset a-fib ([address]    S/P AVR (aortic valve replacement)    S/P CABG (coronary artery bypass graft)    Atrial fibrillation with rapid ventricular response (HCC)    Type 2 diabetes mellitus with diabetic neuropathy, without long-term current use of insulin (HCC)    PVD (peripheral vascular disease) ([address]    Bilateral carotid bruits    Carotid stenosis, right     1. PAFib: In sinus. Toprol and Eliquis.     2. CAD-CABG:     S/p stent to LAD in 3/01 and s/p LIMA-LAD in 6/13. Normal stress 5/19/17. ASA/BB/statin. 3. VHD s/p bioprosthetic AVR in 6/13:     Echo okay 6/19.     4. HTN: Observe. 5. DEBORAH: Uses O2 at night. 6. Pre-op: Patient is an acceptable risk to proceed. Charisse Walters D.O.   Cardiologist  Cardiology, [facility]

## 2020-11-23 NOTE — TELEPHONE ENCOUNTER
Pt. Was cleared for surgery today, Urologist is asking if pt.  Can hold ASA 7days prior and eliquis 48hours prior, please advise

## 2020-11-24 NOTE — TELEPHONE ENCOUNTER
Yes, okay to hold eliquis for 2 days and ASA for 7 days. Harley Schaffer D.O.   Cardiologist  Cardiology, [address] [facility]

## 2020-12-01 ENCOUNTER — HOSPITAL ENCOUNTER (OUTPATIENT)
Age: 75
Discharge: HOME | End: 2020-12-03

## 2020-12-01 PROCEDURE — 88307 TISSUE EXAM BY PATHOLOGIST: CPT

## 2020-12-01 PROCEDURE — 88112 CYTOPATH CELL ENHANCE TECH: CPT

## 2020-12-01 PROCEDURE — 88305 TISSUE EXAM BY PATHOLOGIST: CPT

## 2020-12-03 ENCOUNTER — OFFICE VISIT (OUTPATIENT)
Dept: PRIMARY CARE CLINIC | Age: 75
End: 2020-12-03
Payer: MEDICARE

## 2020-12-03 VITALS
RESPIRATION RATE: 18 BRPM | DIASTOLIC BLOOD PRESSURE: 76 MMHG | TEMPERATURE: 96.9 F | SYSTOLIC BLOOD PRESSURE: 130 MMHG | HEART RATE: 64 BPM | HEIGHT: 69 IN | OXYGEN SATURATION: 97 % | WEIGHT: 212 LBS | BODY MASS INDEX: 31.4 KG/M2

## 2020-12-03 DIAGNOSIS — R73.03 PREDIABETES: ICD-10-CM

## 2020-12-03 DIAGNOSIS — E78.2 MIXED HYPERLIPIDEMIA: ICD-10-CM

## 2020-12-03 DIAGNOSIS — I48.0 PAROXYSMAL ATRIAL FIBRILLATION (HCC): ICD-10-CM

## 2020-12-03 DIAGNOSIS — E79.0 HYPERURICEMIA: ICD-10-CM

## 2020-12-03 DIAGNOSIS — I10 ESSENTIAL HYPERTENSION: Chronic | ICD-10-CM

## 2020-12-03 LAB
ALBUMIN SERPL-MCNC: 4.5 G/DL (ref 3.5–5.2)
ALP BLD-CCNC: 59 U/L (ref 40–129)
ALT SERPL-CCNC: 24 U/L (ref 0–40)
ANION GAP SERPL CALCULATED.3IONS-SCNC: 11 MMOL/L (ref 7–16)
AST SERPL-CCNC: 28 U/L (ref 0–39)
BASOPHILS ABSOLUTE: 0.08 E9/L (ref 0–0.2)
BASOPHILS RELATIVE PERCENT: 1.2 % (ref 0–2)
BILIRUB SERPL-MCNC: 0.4 MG/DL (ref 0–1.2)
BUN BLDV-MCNC: 21 MG/DL (ref 8–23)
CALCIUM SERPL-MCNC: 9.9 MG/DL (ref 8.6–10.2)
CHLORIDE BLD-SCNC: 103 MMOL/L (ref 98–107)
CHOLESTEROL, TOTAL: 174 MG/DL (ref 0–199)
CO2: 26 MMOL/L (ref 22–29)
CREAT SERPL-MCNC: 0.9 MG/DL (ref 0.7–1.2)
EOSINOPHILS ABSOLUTE: 0.13 E9/L (ref 0.05–0.5)
EOSINOPHILS RELATIVE PERCENT: 2 % (ref 0–6)
GFR AFRICAN AMERICAN: >60
GFR NON-AFRICAN AMERICAN: >60 ML/MIN/1.73
GLUCOSE BLD-MCNC: 181 MG/DL (ref 74–99)
HBA1C MFR BLD: 6 % (ref 4–5.6)
HCT VFR BLD CALC: 38.7 % (ref 37–54)
HDLC SERPL-MCNC: 61 MG/DL
HEMOGLOBIN: 12.5 G/DL (ref 12.5–16.5)
IMMATURE GRANULOCYTES #: 0.01 E9/L
IMMATURE GRANULOCYTES %: 0.2 % (ref 0–5)
LDL CHOLESTEROL CALCULATED: 65 MG/DL (ref 0–99)
LYMPHOCYTES ABSOLUTE: 1.76 E9/L (ref 1.5–4)
LYMPHOCYTES RELATIVE PERCENT: 26.8 % (ref 20–42)
MCH RBC QN AUTO: 31.3 PG (ref 26–35)
MCHC RBC AUTO-ENTMCNC: 32.3 % (ref 32–34.5)
MCV RBC AUTO: 96.8 FL (ref 80–99.9)
MONOCYTES ABSOLUTE: 0.48 E9/L (ref 0.1–0.95)
MONOCYTES RELATIVE PERCENT: 7.3 % (ref 2–12)
NEUTROPHILS ABSOLUTE: 4.1 E9/L (ref 1.8–7.3)
NEUTROPHILS RELATIVE PERCENT: 62.5 % (ref 43–80)
PDW BLD-RTO: 13.2 FL (ref 11.5–15)
PLATELET # BLD: 167 E9/L (ref 130–450)
PMV BLD AUTO: 11.1 FL (ref 7–12)
POTASSIUM SERPL-SCNC: 4.4 MMOL/L (ref 3.5–5)
RBC # BLD: 4 E12/L (ref 3.8–5.8)
SODIUM BLD-SCNC: 140 MMOL/L (ref 132–146)
TOTAL PROTEIN: 7.3 G/DL (ref 6.4–8.3)
TRIGL SERPL-MCNC: 240 MG/DL (ref 0–149)
TSH SERPL DL<=0.05 MIU/L-ACNC: 1.13 UIU/ML (ref 0.27–4.2)
URIC ACID, SERUM: 4.6 MG/DL (ref 3.4–7)
VLDLC SERPL CALC-MCNC: 48 MG/DL
WBC # BLD: 6.6 E9/L (ref 4.5–11.5)

## 2020-12-03 PROCEDURE — G0296 VISIT TO DETERM LDCT ELIG: HCPCS | Performed by: FAMILY MEDICINE

## 2020-12-03 PROCEDURE — 99214 OFFICE O/P EST MOD 30 MIN: CPT | Performed by: FAMILY MEDICINE

## 2020-12-03 RX ORDER — OLMESARTAN MEDOXOMIL 40 MG/1
40 TABLET ORAL DAILY
Qty: 90 TABLET | Refills: 1 | Status: SHIPPED
Start: 2020-12-03 | End: 2022-03-09 | Stop reason: DRUGHIGH

## 2020-12-03 RX ORDER — ALLOPURINOL 300 MG/1
300 TABLET ORAL DAILY
Qty: 90 TABLET | Refills: 1 | Status: SHIPPED
Start: 2020-12-03 | End: 2020-12-28 | Stop reason: SDUPTHER

## 2020-12-03 ASSESSMENT — ENCOUNTER SYMPTOMS
SINUS PRESSURE: 0
ABDOMINAL PAIN: 0
NAUSEA: 0
CHEST TIGHTNESS: 0
PHOTOPHOBIA: 0
DIARRHEA: 0
SHORTNESS OF BREATH: 0
BACK PAIN: 0
COUGH: 0
SORE THROAT: 0
WHEEZING: 0
COLOR CHANGE: 0
VOMITING: 0
BLOOD IN STOOL: 0
ALLERGIC/IMMUNOLOGIC NEGATIVE: 1
FACIAL SWELLING: 0
APNEA: 0

## 2020-12-03 NOTE — PROGRESS NOTES
Chief Complaint:   Chief Complaint   Patient presents with    Atrial Fibrillation     3 mo check        Diabetes   He presents for his follow-up diabetic visit. He has type 2 diabetes mellitus. His disease course has been stable. Pertinent negatives for hypoglycemia include no confusion, headaches or nervousness/anxiousness. Pertinent negatives for diabetes include no chest pain and no weakness. Symptoms are stable. Current diabetic treatment includes oral agent (monotherapy). He is compliant with treatment all of the time. Hypertension   This is a chronic problem. The current episode started more than 1 year ago. The problem has been resolved since onset. The problem is controlled. Pertinent negatives include no chest pain, headaches, palpitations or shortness of breath. Past treatments include angiotensin blockers, beta blockers and diuretics. The current treatment provides significant improvement. There are no compliance problems. Hyperlipidemia   This is a chronic problem. The current episode started more than 1 year ago. The problem is controlled. Recent lipid tests were reviewed and are normal. Pertinent negatives include no chest pain, myalgias or shortness of breath. Current antihyperlipidemic treatment includes statins. The current treatment provides significant improvement of lipids. There are no compliance problems.         Patient Active Problem List   Diagnosis    Coronary atherosclerosis of native coronary artery    Hyperlipidemia    Aortic stenosis    Hypertension    DEBORAH (obstructive sleep apnea)    DDD (degenerative disc disease), cervical    Nocturnal hypoxemia due to asthma    Neuroforaminal stenosis of lumbar spine    Valvular heart disease    Mild intermittent asthma without complication    New onset a-fib ([address]    S/P AVR (aortic valve replacement)    S/P CABG (coronary artery bypass graft)    Atrial fibrillation with rapid ventricular response (HCC)    Type 2 diabetes mellitus with diabetic neuropathy, without long-term current use of insulin ([address]    PVD (peripheral vascular disease) ([address]    Bilateral carotid bruits    Carotid stenosis, right       Past Medical History:   Diagnosis Date    Arthritis     Atrial fibrillation ([address]     day of surgery on 06/12/13- resolved at this time 6/23/2013    Bilateral carotid bruits 10/15/2020    CAD (coronary artery disease)     heart cath with stent 2001    Cancer [facility])     Basal Cell on his back    Carotid stenosis, right 10/15/2020    COPD (chronic obstructive pulmonary disease) ([address]     History of blood transfusion     Hyperlipidemia     Hypertension     Kidney stones     Sleep apnea     Type 2 diabetes mellitus with diabetic neuropathy, without long-term current use of insulin ([address] 9/2/2020    Vitamin D deficiency        Past Surgical History:   Procedure Laterality Date    ABDOMEN SURGERY      Bilateral groin hernias    AORTIC VALVE REPLACEMENT  6/12/2013    BACK SURGERY  2006    cervicle fusion   [facility] BACK SURGERY  1974, 1984    lumbar laminectomy    BACK SURGERY  [id number]    360 FUSION L4-L5    BACK SURGERY      CARDIAC SURGERY      CARPAL TUNNEL RELEASE  1-20013    both hands    COLONOSCOPY      CORONARY ARTERY BYPASS GRAFT      DIAGNOSTIC CARDIAC CATH LAB PROCEDURE      ENDOSCOPY, COLON, DIAGNOSTIC      FRACTURE SURGERY      Right arm Fx    FRACTURE SURGERY      Left finger Fx    HERNIA REPAIR      Bilateral    KIDNEY STONE SURGERY      open    NECK SURGERY      SHOULDER ARTHROSCOPY      right and left shoulders    SKIN BIOPSY         Current Outpatient Medications   Medication Sig Dispense Refill    metFORMIN (GLUCOPHAGE) 500 MG tablet TAKE ONE TABLET BY MOUTH TWICE A DAY WITH MEALS 180 tablet 1    allopurinol (ZYLOPRIM) 300 MG tablet Take 1 tablet by mouth daily 90 tablet 1    olmesartan (BENICAR) 40 MG tablet Take 1 tablet by mouth daily Indications: taking 40 mg 90 tablet 1    furosemide (LASIX) 20 MG tablet Take 1 tablet by mouth daily 90 tablet 1    pitavastatin (LIVALO) 2 MG TABS tablet Take 1 tablet by mouth nightly 90 tablet 2    omeprazole (PRILOSEC) 20 MG delayed release capsule Take 1 capsule by mouth daily 90 capsule 1    apixaban (ELIQUIS) 5 MG TABS tablet Take 1 tablet by mouth 2 times daily 180 tablet 1    metoprolol succinate (TOPROL XL) 50 MG extended release tablet Take 1 tablet by mouth 2 times daily 180 tablet 1    Alpha-Lipoic Acid 100 MG CAPS Take 200 mg by mouth nightly       Coenzyme Q10 (CO Q 10) 100 MG CAPS Take 2 tablets by mouth daily      Cholecalciferol (VITAMIN D3) 2000 UNITS TABS Take 1 tablet by mouth daily      Multiple Vitamins-Minerals (THERAPEUTIC MULTIVITAMIN-MINERALS) tablet Take 1 tablet by mouth daily Indications: Centrum       aspirin 81 MG EC tablet Take 81 mg by mouth daily        No current facility-administered medications for this visit.         Allergies   Allergen Reactions    Iodine Swelling and Other (See Comments)     Reddened face and swelling of tongue       Social History     Socioeconomic History    Marital status:      Spouse name: Liz Mejia    Number of children: 3    Years of education: 9    Highest education level: None   Occupational History    Occupation: retired- assembly line/ [employer]     [employer]er: [employer]: Retired   Social Needs    Financial resource strain: None    Food insecurity     Worry: None     Inability: None    Transportation needs     Medical: None     Non-medical: None   Tobacco Use    Smoking status: Former Smoker     Packs/day: 1.50     Years: 55.00     Pack years: 82.50     Types: Cigarettes     Start date:      Last attempt to quit: 2013     Years since quittin.6    Smokeless tobacco: Never Used    Tobacco comment: Camel Cig   Substance and Sexual Activity    Alcohol use: Yes     Comment: drinks 6 cans of beer /day    Drug use: No    Sexual activity: Yes Nose: Nose normal.   Eyes:      Conjunctiva/sclera: Conjunctivae normal.      Pupils: Pupils are equal, round, and reactive to light. Neck:      Musculoskeletal: Normal range of motion and neck supple. Thyroid: No thyromegaly. Vascular: No JVD. Cardiovascular:      Rate and Rhythm: Normal rate and regular rhythm. Heart sounds: Murmur present. Systolic murmur present with a grade of 2/6. No friction rub. No gallop. Pulmonary:      Effort: Pulmonary effort is normal. No respiratory distress. Breath sounds: Normal breath sounds. No wheezing. Abdominal:      General: Bowel sounds are normal. There is no distension. Palpations: Abdomen is soft. Tenderness: There is no abdominal tenderness. There is no guarding or rebound. Musculoskeletal: Normal range of motion. Lymphadenopathy:      Cervical: No cervical adenopathy. Skin:     General: Skin is warm and dry. Findings: No erythema or rash. Neurological:      Mental Status: He is alert and oriented to person, place, and time. Cranial Nerves: No cranial nerve deficit. Motor: No abnormal muscle tone. Coordination: Coordination normal.      Deep Tendon Reflexes: Reflexes are normal and symmetric. Psychiatric:         Behavior: Behavior normal.         Judgment: Judgment normal.                               ASSESSMENT/PLAN:    Anjana Barnett was seen today for atrial fibrillation. Diagnoses and all orders for this visit:    Encounter for screening for lung cancer  -     CT Lung Screen (Initial/Annual); Future    Paroxysmal atrial fibrillation (HCC)  -     allopurinol (ZYLOPRIM) 300 MG tablet; Take 1 tablet by mouth daily  -     CBC Auto Differential; Future  -     Comprehensive Metabolic Panel; Future  -     Lipid Panel; Future  -     Hemoglobin A1C; Future  -     TSH without Reflex; Future    Mixed hyperlipidemia  -     allopurinol (ZYLOPRIM) 300 MG tablet;  Take 1 tablet by mouth daily  -     CBC Auto Differential; Future  -     Comprehensive Metabolic Panel; Future  -     Lipid Panel; Future  -     Hemoglobin A1C; Future  -     TSH without Reflex; Future    Prediabetes  -     metFORMIN (GLUCOPHAGE) 500 MG tablet; TAKE ONE TABLET BY MOUTH TWICE A DAY WITH MEALS  -     allopurinol (ZYLOPRIM) 300 MG tablet; Take 1 tablet by mouth daily  -     CBC Auto Differential; Future  -     Comprehensive Metabolic Panel; Future  -     Lipid Panel; Future  -     Hemoglobin A1C; Future  -     TSH without Reflex; Future    Essential hypertension  -     allopurinol (ZYLOPRIM) 300 MG tablet; Take 1 tablet by mouth daily  -     olmesartan (BENICAR) 40 MG tablet; Take 1 tablet by mouth daily Indications: taking 40 mg  -     CBC Auto Differential; Future  -     Comprehensive Metabolic Panel; Future  -     Lipid Panel; Future  -     Hemoglobin A1C; Future  -     TSH without Reflex; Future    Hyperuricemia  -     allopurinol (ZYLOPRIM) 300 MG tablet; Take 1 tablet by mouth daily  -     CBC Auto Differential; Future  -     Comprehensive Metabolic Panel; Future  -     Lipid Panel; Future  -     Hemoglobin A1C; Future  -     TSH without Reflex; Future  -     URIC ACID; Future    Personal history of nicotine dependence   -     CT Lung Screen (Initial/Annual); Future    Personal history of tobacco use  -     VA VISIT TO DISCUSS LUNG CA SCREEN W LDCT            Melvi Delgado DO    12/3/2020  10:47 AM    Low Dose CT (LDCT) Lung Screening criteria met   Age 55-77   Pack year smoking >30   Still smoking or less than 15 year since quit   No sign or symptoms of lung cancer   > 11 months since last LDCT     Risks and benefits of lung cancer screening with LDCT scans discussed:    Significance of positive screen - False-positive LDCT results often occur. 95% of all positive results do not lead to a diagnosis of cancer. Usually further imaging can resolve most false-positive results; however, some patients may require invasive procedures.     Over

## 2020-12-09 ENCOUNTER — TELEPHONE (OUTPATIENT)
Dept: CASE MANAGEMENT | Age: 75
End: 2020-12-09

## 2020-12-09 NOTE — TELEPHONE ENCOUNTER
I called the patient and he confirmed his CT lung screening at [facility] on 12/10/2020 at 2:00 pm.  I reminded the patient to arrive at 1:30 pm, enter through the main entrance, and register. Patient confirmed.                   Electronically signed by Juan F Sharma on 12/9/20 at 11:43 AM EST

## 2020-12-10 ENCOUNTER — HOSPITAL ENCOUNTER (OUTPATIENT)
Dept: CT IMAGING | Age: 75
Discharge: HOME | End: 2020-12-12
Payer: MEDICARE

## 2020-12-10 PROCEDURE — G0297 LDCT FOR LUNG CA SCREEN: HCPCS

## 2020-12-14 ENCOUNTER — TELEPHONE (OUTPATIENT)
Dept: CASE MANAGEMENT | Age: 75
End: 2020-12-14

## 2020-12-14 NOTE — TELEPHONE ENCOUNTER
No call, encounter opened to process CT Lung Screening. CT Lung Screen: 12/10/2020  Impression   1. Stable 4.7 x 2.2 x 2.9 cm soft tissue pleural base mass within the right   lower lobe posteriorly.  This mass is unchanged compared to patient's prior   study of 05/15/2014 and therefore is benign in etiology. 2. There is no new mass, infiltrate or suspicious pulmonary nodule. LUNG RADS:   Per ACR Lung-RADS Version 1.1   Category 2, Benign Appearance or Behavior. Management:  Continue annual lung screening with LDCT in 12 months. RECOMMENDATIONS:   If you would like to register your patient with the [facility], please contact the Nurse Navigator at   [phone]. Pack years: 82.5    Social History     Tobacco Use  Smoking Status: Former Smoker    Start Date: 1958   Quit Date: 04/29/2013   Types: Cigarettes   Packs/Day: 1.5   Years: 55.00   Pack Years: 82.5   Smokeless Tobacco: Never Used         Results letter sent to patient via my chart or mailed.      [address]

## 2020-12-28 RX ORDER — ALLOPURINOL 300 MG/1
300 TABLET ORAL DAILY
Qty: 90 TABLET | Refills: 1 | Status: SHIPPED
Start: 2020-12-28 | End: 2021-06-23

## 2021-03-03 ENCOUNTER — OFFICE VISIT (OUTPATIENT)
Dept: PRIMARY CARE CLINIC | Age: 76
End: 2021-03-03
Payer: MEDICARE

## 2021-03-03 VITALS
TEMPERATURE: 97.5 F | SYSTOLIC BLOOD PRESSURE: 132 MMHG | WEIGHT: 219.6 LBS | HEART RATE: 62 BPM | BODY MASS INDEX: 32.53 KG/M2 | OXYGEN SATURATION: 97 % | HEIGHT: 69 IN | RESPIRATION RATE: 16 BRPM | DIASTOLIC BLOOD PRESSURE: 78 MMHG

## 2021-03-03 DIAGNOSIS — I10 ESSENTIAL HYPERTENSION: ICD-10-CM

## 2021-03-03 DIAGNOSIS — E79.0 HYPERURICEMIA: ICD-10-CM

## 2021-03-03 DIAGNOSIS — E78.2 MIXED HYPERLIPIDEMIA: ICD-10-CM

## 2021-03-03 DIAGNOSIS — I48.0 PAROXYSMAL ATRIAL FIBRILLATION (HCC): Primary | ICD-10-CM

## 2021-03-03 DIAGNOSIS — R73.03 PREDIABETES: ICD-10-CM

## 2021-03-03 DIAGNOSIS — I48.0 PAROXYSMAL ATRIAL FIBRILLATION (HCC): ICD-10-CM

## 2021-03-03 LAB
ALBUMIN SERPL-MCNC: 4.8 G/DL (ref 3.5–5.2)
ALP BLD-CCNC: 54 U/L (ref 40–129)
ALT SERPL-CCNC: 25 U/L (ref 0–40)
ANION GAP SERPL CALCULATED.3IONS-SCNC: 13 MMOL/L (ref 7–16)
AST SERPL-CCNC: 26 U/L (ref 0–39)
BASOPHILS ABSOLUTE: 0.05 E9/L (ref 0–0.2)
BASOPHILS RELATIVE PERCENT: 0.9 % (ref 0–2)
BILIRUB SERPL-MCNC: 0.4 MG/DL (ref 0–1.2)
BUN BLDV-MCNC: 26 MG/DL (ref 8–23)
CALCIUM SERPL-MCNC: 10.2 MG/DL (ref 8.6–10.2)
CHLORIDE BLD-SCNC: 105 MMOL/L (ref 98–107)
CHOLESTEROL, TOTAL: 172 MG/DL (ref 0–199)
CO2: 26 MMOL/L (ref 22–29)
CREAT SERPL-MCNC: 0.9 MG/DL (ref 0.7–1.2)
EOSINOPHILS ABSOLUTE: 0.13 E9/L (ref 0.05–0.5)
EOSINOPHILS RELATIVE PERCENT: 2.4 % (ref 0–6)
GFR AFRICAN AMERICAN: >60
GFR NON-AFRICAN AMERICAN: >60 ML/MIN/1.73
GLUCOSE BLD-MCNC: 111 MG/DL (ref 74–99)
HBA1C MFR BLD: 5.9 % (ref 4–5.6)
HCT VFR BLD CALC: 40.6 % (ref 37–54)
HDLC SERPL-MCNC: 57 MG/DL
HEMOGLOBIN: 12.9 G/DL (ref 12.5–16.5)
IMMATURE GRANULOCYTES #: 0.01 E9/L
IMMATURE GRANULOCYTES %: 0.2 % (ref 0–5)
LDL CHOLESTEROL CALCULATED: 70 MG/DL (ref 0–99)
LYMPHOCYTES ABSOLUTE: 1.77 E9/L (ref 1.5–4)
LYMPHOCYTES RELATIVE PERCENT: 32 % (ref 20–42)
MCH RBC QN AUTO: 30.7 PG (ref 26–35)
MCHC RBC AUTO-ENTMCNC: 31.8 % (ref 32–34.5)
MCV RBC AUTO: 96.7 FL (ref 80–99.9)
MONOCYTES ABSOLUTE: 0.52 E9/L (ref 0.1–0.95)
MONOCYTES RELATIVE PERCENT: 9.4 % (ref 2–12)
NEUTROPHILS ABSOLUTE: 3.05 E9/L (ref 1.8–7.3)
NEUTROPHILS RELATIVE PERCENT: 55.1 % (ref 43–80)
PDW BLD-RTO: 13.4 FL (ref 11.5–15)
PLATELET # BLD: 189 E9/L (ref 130–450)
PMV BLD AUTO: 10.9 FL (ref 7–12)
POTASSIUM SERPL-SCNC: 4.4 MMOL/L (ref 3.5–5)
RBC # BLD: 4.2 E12/L (ref 3.8–5.8)
SODIUM BLD-SCNC: 144 MMOL/L (ref 132–146)
TOTAL PROTEIN: 7.6 G/DL (ref 6.4–8.3)
TRIGL SERPL-MCNC: 225 MG/DL (ref 0–149)
TSH SERPL DL<=0.05 MIU/L-ACNC: 1.2 UIU/ML (ref 0.27–4.2)
URIC ACID, SERUM: 4.9 MG/DL (ref 3.4–7)
VLDLC SERPL CALC-MCNC: 45 MG/DL
WBC # BLD: 5.5 E9/L (ref 4.5–11.5)

## 2021-03-03 PROCEDURE — 99213 OFFICE O/P EST LOW 20 MIN: CPT | Performed by: FAMILY MEDICINE

## 2021-03-03 ASSESSMENT — ENCOUNTER SYMPTOMS
SINUS PRESSURE: 0
ALLERGIC/IMMUNOLOGIC NEGATIVE: 1
DIARRHEA: 0
NAUSEA: 0
PHOTOPHOBIA: 0
WHEEZING: 0
SHORTNESS OF BREATH: 0
SORE THROAT: 0
APNEA: 0
FACIAL SWELLING: 0
COLOR CHANGE: 0
COUGH: 0
BACK PAIN: 0
CHEST TIGHTNESS: 0
ABDOMINAL PAIN: 0
VOMITING: 0
BLOOD IN STOOL: 0

## 2021-03-03 ASSESSMENT — PATIENT HEALTH QUESTIONNAIRE - PHQ9
SUM OF ALL RESPONSES TO PHQ QUESTIONS 1-9: 0
SUM OF ALL RESPONSES TO PHQ9 QUESTIONS 1 & 2: 0
1. LITTLE INTEREST OR PLEASURE IN DOING THINGS: 0

## 2021-03-03 NOTE — PROGRESS NOTES
Chief Complaint:   Chief Complaint   Patient presents with    Atrial Fibrillation       Heart Problem  This is a chronic problem. The current episode started more than 1 year ago. The problem occurs daily. The problem has been resolved. Pertinent negatives include no abdominal pain, arthralgias, chest pain, congestion, coughing, headaches, joint swelling, myalgias, nausea, rash, sore throat, vomiting or weakness. Diabetes  He presents for his follow-up diabetic visit. He has type 2 diabetes mellitus. His disease course has been stable. Pertinent negatives for hypoglycemia include no confusion, headaches or nervousness/anxiousness. Pertinent negatives for diabetes include no chest pain and no weakness. Symptoms are stable. Current diabetic treatment includes oral agent (monotherapy). He is compliant with treatment all of the time. An ACE inhibitor/angiotensin II receptor blocker is being taken. Hyperlipidemia  This is a chronic problem. The current episode started more than 1 year ago. The problem is controlled. Recent lipid tests were reviewed and are normal. Pertinent negatives include no chest pain, myalgias or shortness of breath. Current antihyperlipidemic treatment includes statins. The current treatment provides significant improvement of lipids. There are no compliance problems.         Patient Active Problem List   Diagnosis    Coronary atherosclerosis of native coronary artery    Hyperlipidemia    Aortic stenosis    Hypertension    DEBORAH (obstructive sleep apnea)    DDD (degenerative disc disease), cervical    Nocturnal hypoxemia due to asthma    Neuroforaminal stenosis of lumbar spine    Valvular heart disease    Mild intermittent asthma without complication    New onset a-fib ([address]    S/P AVR (aortic valve replacement)    S/P CABG (coronary artery bypass graft)    Atrial fibrillation with rapid ventricular response ([address]  Type 2 diabetes mellitus with diabetic neuropathy, without long-term current use of insulin ([address]    PVD (peripheral vascular disease) ([address]    Bilateral carotid bruits    Carotid stenosis, right       Past Medical History:   Diagnosis Date    Arthritis     Atrial fibrillation ([address]     day of surgery on 06/12/13- resolved at this time 6/23/2013    Bilateral carotid bruits 10/15/2020    CAD (coronary artery disease)     heart cath with stent 2001    Cancer [facility])     Basal Cell on his back    Carotid stenosis, right 10/15/2020    COPD (chronic obstructive pulmonary disease) ([address]     History of blood transfusion     Hyperlipidemia     Hypertension     Kidney stones     Sleep apnea     Type 2 diabetes mellitus with diabetic neuropathy, without long-term current use of insulin ([address] 9/2/2020    Vitamin D deficiency        Past Surgical History:   Procedure Laterality Date    ABDOMEN SURGERY      Bilateral groin hernias    AORTIC VALVE REPLACEMENT  6/12/2013    BACK SURGERY  2006    cervicle fusion   [facility] BACK SURGERY  1974, 1984    lumbar laminectomy    BACK SURGERY  [id number]    360 FUSION L4-L5    BACK SURGERY      CARDIAC SURGERY      CARPAL TUNNEL RELEASE  1-20013    both hands    COLONOSCOPY      CORONARY ARTERY BYPASS GRAFT      DIAGNOSTIC CARDIAC CATH LAB PROCEDURE      ENDOSCOPY, COLON, DIAGNOSTIC      FRACTURE SURGERY      Right arm Fx    FRACTURE SURGERY      Left finger Fx    HERNIA REPAIR      Bilateral    KIDNEY STONE SURGERY      open    NECK SURGERY      SHOULDER ARTHROSCOPY      right and left shoulders    SKIN BIOPSY         Current Outpatient Medications   Medication Sig Dispense Refill    allopurinol (ZYLOPRIM) 300 MG tablet Take 1 tablet by mouth daily 90 tablet 1    metFORMIN (GLUCOPHAGE) 500 MG tablet TAKE ONE TABLET BY MOUTH TWICE A DAY WITH MEALS 180 tablet 1  olmesartan (BENICAR) 40 MG tablet Take 1 tablet by mouth daily Indications: taking 40 mg 90 tablet 1    furosemide (LASIX) 20 MG tablet Take 1 tablet by mouth daily 90 tablet 1    pitavastatin (LIVALO) 2 MG TABS tablet Take 1 tablet by mouth nightly 90 tablet 2    omeprazole (PRILOSEC) 20 MG delayed release capsule Take 1 capsule by mouth daily 90 capsule 1    apixaban (ELIQUIS) 5 MG TABS tablet Take 1 tablet by mouth 2 times daily 180 tablet 1    metoprolol succinate (TOPROL XL) 50 MG extended release tablet Take 1 tablet by mouth 2 times daily 180 tablet 1    Alpha-Lipoic Acid 100 MG CAPS Take 200 mg by mouth nightly       Coenzyme Q10 (CO Q 10) 100 MG CAPS Take 2 tablets by mouth daily      Cholecalciferol (VITAMIN D3) 2000 UNITS TABS Take 1 tablet by mouth daily      Multiple Vitamins-Minerals (THERAPEUTIC MULTIVITAMIN-MINERALS) tablet Take 1 tablet by mouth daily Indications: Centrum       aspirin 81 MG EC tablet Take 81 mg by mouth daily        No current facility-administered medications for this visit.         Allergies   Allergen Reactions    Iodine Swelling and Other (See Comments)     Reddened face and swelling of tongue       Social History     Socioeconomic History    Marital status:      Spouse name: Juan Carlos Bishop    Number of children: 3    Years of education: 9    Highest education level: None   Occupational History    Occupation: retired- [employer]/ [employer]     Employer: [employer] [address]: Retired   Social Needs    Financial resource strain: None    Food insecurity     Worry: None     Inability: None    Transportation needs     Medical: None     Non-medical: None   Tobacco Use    Smoking status: Former Smoker     Packs/day: 1.50     Years: 55.00     Pack years: 82.50     Types: Cigarettes     Start date:      Quit date: 2013     Years since quittin.8    Smokeless tobacco: Never Used    Tobacco comment: Camel Cig Constitutional:       General: He is not in acute distress. Appearance: He is well-developed. HENT:      Head: Normocephalic and atraumatic. Nose: Nose normal.   Eyes:      Conjunctiva/sclera: Conjunctivae normal.      Pupils: Pupils are equal, round, and reactive to light. Neck:      Musculoskeletal: Normal range of motion and neck supple. Thyroid: No thyromegaly. Vascular: No JVD. Cardiovascular:      Rate and Rhythm: Normal rate and regular rhythm. Heart sounds: Normal heart sounds. No murmur. No friction rub. No gallop. Pulmonary:      Effort: Pulmonary effort is normal. No respiratory distress. Breath sounds: Normal breath sounds. No wheezing. Abdominal:      General: Bowel sounds are normal. There is no distension. Palpations: Abdomen is soft. Tenderness: There is no abdominal tenderness. There is no guarding or rebound. Musculoskeletal: Normal range of motion. Lymphadenopathy:      Cervical: No cervical adenopathy. Skin:     General: Skin is warm and dry. Findings: No erythema or rash. Neurological:      Mental Status: He is alert and oriented to person, place, and time. Cranial Nerves: No cranial nerve deficit. Motor: No abnormal muscle tone. Coordination: Coordination normal.      Deep Tendon Reflexes: Reflexes are normal and symmetric. Psychiatric:         Behavior: Behavior normal.         Judgment: Judgment normal.                               ASSESSMENT/PLAN:    Nam Cui was seen today for atrial fibrillation. Diagnoses and all orders for this visit:    Paroxysmal atrial fibrillation ([facility] [address]  -     Comprehensive Metabolic Panel; Future  -     CBC Auto Differential; Future  -     Lipid Panel; Future  -     Hemoglobin A1C; Future  -     TSH without Reflex; Future    Mixed hyperlipidemia  -     Comprehensive Metabolic Panel; Future  -     CBC Auto Differential; Future  -     Lipid Panel;  Future  -     Hemoglobin A1C; Future -     TSH without Reflex; Future    Prediabetes  -     Comprehensive Metabolic Panel; Future  -     CBC Auto Differential; Future  -     Lipid Panel; Future  -     Hemoglobin A1C; Future  -     TSH without Reflex; Future    Essential hypertension  -     Comprehensive Metabolic Panel; Future  -     CBC Auto Differential; Future  -     Lipid Panel; Future  -     Hemoglobin A1C; Future  -     TSH without Reflex; Future    Hyperuricemia  -     URIC ACID; Future  -     Comprehensive Metabolic Panel; Future  -     CBC Auto Differential; Future  -     Lipid Panel; Future  -     Hemoglobin A1C; Future  -     TSH without Reflex;  Future            Kiana Fernando DO    3/3/2021  10:16 AM

## 2021-03-16 RX ORDER — METOPROLOL SUCCINATE 50 MG/1
TABLET, EXTENDED RELEASE ORAL
Qty: 180 TABLET | Refills: 0 | Status: SHIPPED
Start: 2021-03-16 | End: 2021-06-21 | Stop reason: SDUPTHER

## 2021-03-19 DIAGNOSIS — I48.0 PAROXYSMAL ATRIAL FIBRILLATION (HCC): ICD-10-CM

## 2021-03-22 DIAGNOSIS — I48.0 PAROXYSMAL ATRIAL FIBRILLATION (HCC): ICD-10-CM

## 2021-03-24 RX ORDER — OMEPRAZOLE 20 MG/1
CAPSULE, DELAYED RELEASE ORAL
Qty: 90 CAPSULE | Refills: 0 | Status: SHIPPED
Start: 2021-03-24 | End: 2021-06-30 | Stop reason: SDUPTHER

## 2021-03-29 ENCOUNTER — TELEPHONE (OUTPATIENT)
Dept: PRIMARY CARE CLINIC | Age: 76
End: 2021-03-29

## 2021-03-29 NOTE — TELEPHONE ENCOUNTER
The pt's wife is calling to refill his amlodipine, it looks like they discontinued it when he was released from the hospital 2/3/20.  Do you want the pt to be on this medication

## 2021-03-29 NOTE — TELEPHONE ENCOUNTER
Spoke with dion. He stopped the amlodipine as directed, and dose not need a refill yet on olmesartan.

## 2021-04-10 ENCOUNTER — HOSPITAL ENCOUNTER (OUTPATIENT)
Age: 76
Discharge: HOME | End: 2021-04-10
Payer: MEDICARE

## 2021-04-10 LAB
BASOPHILS ABSOLUTE: 0.08 E9/L (ref 0–0.2)
BASOPHILS RELATIVE PERCENT: 1.5 % (ref 0–2)
C-REACTIVE PROTEIN: 0.5 MG/DL (ref 0–0.4)
EOSINOPHILS ABSOLUTE: 0.15 E9/L (ref 0.05–0.5)
EOSINOPHILS RELATIVE PERCENT: 2.7 % (ref 0–6)
HCT VFR BLD CALC: 37.3 % (ref 37–54)
HEMOGLOBIN: 12.1 G/DL (ref 12.5–16.5)
IMMATURE GRANULOCYTES #: 0.01 E9/L
IMMATURE GRANULOCYTES %: 0.2 % (ref 0–5)
LYMPHOCYTES ABSOLUTE: 1.85 E9/L (ref 1.5–4)
LYMPHOCYTES RELATIVE PERCENT: 33.9 % (ref 20–42)
MCH RBC QN AUTO: 31.1 PG (ref 26–35)
MCHC RBC AUTO-ENTMCNC: 32.4 % (ref 32–34.5)
MCV RBC AUTO: 95.9 FL (ref 80–99.9)
MONOCYTES ABSOLUTE: 0.57 E9/L (ref 0.1–0.95)
MONOCYTES RELATIVE PERCENT: 10.4 % (ref 2–12)
NEUTROPHILS ABSOLUTE: 2.8 E9/L (ref 1.8–7.3)
NEUTROPHILS RELATIVE PERCENT: 51.3 % (ref 43–80)
PDW BLD-RTO: 13.7 FL (ref 11.5–15)
PLATELET # BLD: 202 E9/L (ref 130–450)
PMV BLD AUTO: 10.2 FL (ref 7–12)
RBC # BLD: 3.89 E12/L (ref 3.8–5.8)
SEDIMENTATION RATE, ERYTHROCYTE: 10 MM/HR (ref 0–15)
WBC # BLD: 5.5 E9/L (ref 4.5–11.5)

## 2021-04-10 PROCEDURE — 85025 COMPLETE CBC W/AUTO DIFF WBC: CPT

## 2021-04-10 PROCEDURE — 36415 COLL VENOUS BLD VENIPUNCTURE: CPT

## 2021-04-10 PROCEDURE — 85651 RBC SED RATE NONAUTOMATED: CPT

## 2021-04-10 PROCEDURE — 86140 C-REACTIVE PROTEIN: CPT

## 2021-04-12 ENCOUNTER — OFFICE VISIT (OUTPATIENT)
Dept: PRIMARY CARE CLINIC | Age: 76
End: 2021-04-12
Payer: MEDICARE

## 2021-04-12 VITALS
DIASTOLIC BLOOD PRESSURE: 80 MMHG | TEMPERATURE: 97.8 F | HEIGHT: 70 IN | OXYGEN SATURATION: 97 % | WEIGHT: 214.2 LBS | SYSTOLIC BLOOD PRESSURE: 134 MMHG | HEART RATE: 61 BPM | BODY MASS INDEX: 30.67 KG/M2 | RESPIRATION RATE: 16 BRPM

## 2021-04-12 DIAGNOSIS — H53.2 DIPLOPIA: ICD-10-CM

## 2021-04-12 DIAGNOSIS — H53.2 DIPLOPIA: Primary | ICD-10-CM

## 2021-04-12 DIAGNOSIS — E11.40 TYPE 2 DIABETES MELLITUS WITH DIABETIC NEUROPATHY, WITHOUT LONG-TERM CURRENT USE OF INSULIN (HCC): ICD-10-CM

## 2021-04-12 LAB
ALBUMIN SERPL-MCNC: 4.7 G/DL (ref 3.5–5.2)
ALP BLD-CCNC: 54 U/L (ref 40–129)
ALT SERPL-CCNC: 24 U/L (ref 0–40)
ANION GAP SERPL CALCULATED.3IONS-SCNC: 14 MMOL/L (ref 7–16)
AST SERPL-CCNC: 25 U/L (ref 0–39)
BASOPHILS ABSOLUTE: 0.08 E9/L (ref 0–0.2)
BASOPHILS RELATIVE PERCENT: 1.1 % (ref 0–2)
BILIRUB SERPL-MCNC: 0.2 MG/DL (ref 0–1.2)
BUN BLDV-MCNC: 24 MG/DL (ref 8–23)
CALCIUM SERPL-MCNC: 10.3 MG/DL (ref 8.6–10.2)
CHLORIDE BLD-SCNC: 107 MMOL/L (ref 98–107)
CO2: 25 MMOL/L (ref 22–29)
CREAT SERPL-MCNC: 1 MG/DL (ref 0.7–1.2)
EOSINOPHILS ABSOLUTE: 0.13 E9/L (ref 0.05–0.5)
EOSINOPHILS RELATIVE PERCENT: 1.7 % (ref 0–6)
GFR AFRICAN AMERICAN: >60
GFR NON-AFRICAN AMERICAN: >60 ML/MIN/1.73
GLUCOSE BLD-MCNC: 77 MG/DL (ref 74–99)
HCT VFR BLD CALC: 39.9 % (ref 37–54)
HEMOGLOBIN: 12.6 G/DL (ref 12.5–16.5)
IMMATURE GRANULOCYTES #: 0.02 E9/L
IMMATURE GRANULOCYTES %: 0.3 % (ref 0–5)
LYMPHOCYTES ABSOLUTE: 2.39 E9/L (ref 1.5–4)
LYMPHOCYTES RELATIVE PERCENT: 31.4 % (ref 20–42)
MCH RBC QN AUTO: 31.2 PG (ref 26–35)
MCHC RBC AUTO-ENTMCNC: 31.6 % (ref 32–34.5)
MCV RBC AUTO: 98.8 FL (ref 80–99.9)
MONOCYTES ABSOLUTE: 0.8 E9/L (ref 0.1–0.95)
MONOCYTES RELATIVE PERCENT: 10.5 % (ref 2–12)
NEUTROPHILS ABSOLUTE: 4.18 E9/L (ref 1.8–7.3)
NEUTROPHILS RELATIVE PERCENT: 55 % (ref 43–80)
PDW BLD-RTO: 13.7 FL (ref 11.5–15)
PLATELET # BLD: 203 E9/L (ref 130–450)
PMV BLD AUTO: 10.8 FL (ref 7–12)
POTASSIUM SERPL-SCNC: 5.1 MMOL/L (ref 3.5–5)
RBC # BLD: 4.04 E12/L (ref 3.8–5.8)
SODIUM BLD-SCNC: 146 MMOL/L (ref 132–146)
TOTAL PROTEIN: 7.4 G/DL (ref 6.4–8.3)
TSH SERPL DL<=0.05 MIU/L-ACNC: 1.11 UIU/ML (ref 0.27–4.2)
WBC # BLD: 7.6 E9/L (ref 4.5–11.5)

## 2021-04-12 PROCEDURE — 99214 OFFICE O/P EST MOD 30 MIN: CPT | Performed by: FAMILY MEDICINE

## 2021-04-12 ASSESSMENT — ENCOUNTER SYMPTOMS
NAUSEA: 0
WHEEZING: 0
SINUS PRESSURE: 0
ALLERGIC/IMMUNOLOGIC NEGATIVE: 1
PHOTOPHOBIA: 0
BACK PAIN: 0
DIARRHEA: 0
SORE THROAT: 0
COLOR CHANGE: 0
CHEST TIGHTNESS: 0
COUGH: 0
ABDOMINAL PAIN: 0
APNEA: 0
SHORTNESS OF BREATH: 0
BLOOD IN STOOL: 0
FACIAL SWELLING: 0
VOMITING: 0

## 2021-04-12 NOTE — PROGRESS NOTES
Chief Complaint:   Chief Complaint   Patient presents with    Eye Problem     sereing double& blurry vison, saw eye Dr. Jailene Grubbs are affected. This is a new problem. The current episode started in the past 7 days. The problem occurs daily. There was no injury mechanism. The pain is at a severity of 3/10. The pain is mild. Pertinent negatives include no nausea, photophobia, vomiting or weakness.        Patient Active Problem List   Diagnosis    Coronary atherosclerosis of native coronary artery    Hyperlipidemia    Aortic stenosis    Hypertension    DEBORAH (obstructive sleep apnea)    DDD (degenerative disc disease), cervical    Nocturnal hypoxemia due to asthma    Neuroforaminal stenosis of lumbar spine    Valvular heart disease    Mild intermittent asthma without complication    New onset a-fib ([address]    S/P AVR (aortic valve replacement)    S/P CABG (coronary artery bypass graft)    Atrial fibrillation with rapid ventricular response (HCC)    Type 2 diabetes mellitus with diabetic neuropathy, without long-term current use of insulin ([address]    PVD (peripheral vascular disease) ([address]    Bilateral carotid bruits    Carotid stenosis, right       Past Medical History:   Diagnosis Date    Arthritis     Atrial fibrillation ([address]     day of surgery on 06/12/13- resolved at this time 6/23/2013    Bilateral carotid bruits 10/15/2020    CAD (coronary artery disease)     heart cath with stent 2001    Cancer ([address]     Basal Cell on his back    Carotid stenosis, right 10/15/2020    COPD (chronic obstructive pulmonary disease) ([address]     History of blood transfusion     Hyperlipidemia     Hypertension     Kidney stones     Sleep apnea     Type 2 diabetes mellitus with diabetic neuropathy, without long-term current use of insulin ([address] 9/2/2020    Vitamin D deficiency        Past Surgical History:   Procedure Laterality Date    ABDOMEN SURGERY      Bilateral groin hernias    AORTIC VALVE REPLACEMENT  6/12/2013    BACK SURGERY  2006    cervicle fusion   [facility]GERY  1974, 1984    lumbar laminectomy    BACK SURGERY  [id number]    360 FUSION L4-L5    BACK SURGERY      CARDIAC SURGERY      CARPAL TUNNEL RELEASE  1-20013    both hands    COLONOSCOPY      CORONARY ARTERY BYPASS GRAFT      DIAGNOSTIC CARDIAC CATH LAB PROCEDURE      ENDOSCOPY, COLON, DIAGNOSTIC      FRACTURE SURGERY      Right arm Fx    FRACTURE SURGERY      Left finger Fx    HERNIA REPAIR      Bilateral    KIDNEY STONE SURGERY      open    NECK SURGERY      SHOULDER ARTHROSCOPY      right and left shoulders    SKIN BIOPSY         Current Outpatient Medications   Medication Sig Dispense Refill    omeprazole (PRILOSEC) 20 MG delayed release capsule TAKE ONE CAPSULE BY MOUTH DAILY 90 capsule 0    apixaban (ELIQUIS) 5 MG TABS tablet Take 1 tablet by mouth 2 times daily 180 tablet 1    metoprolol succinate (TOPROL XL) 50 MG extended release tablet TAKE ONE TABLET BY MOUTH TWO TIMES A  tablet 0    allopurinol (ZYLOPRIM) 300 MG tablet Take 1 tablet by mouth daily 90 tablet 1    metFORMIN (GLUCOPHAGE) 500 MG tablet TAKE ONE TABLET BY MOUTH TWICE A DAY WITH MEALS 180 tablet 1    olmesartan (BENICAR) 40 MG tablet Take 1 tablet by mouth daily Indications: taking 40 mg 90 tablet 1    furosemide (LASIX) 20 MG tablet Take 1 tablet by mouth daily (Patient taking differently: Take 20 mg by mouth as needed ) 90 tablet 1    pitavastatin (LIVALO) 2 MG TABS tablet Take 1 tablet by mouth nightly 90 tablet 2    Alpha-Lipoic Acid 100 MG CAPS Take 200 mg by mouth nightly       Coenzyme Q10 (CO Q 10) 100 MG CAPS Take 2 tablets by mouth daily      Cholecalciferol (VITAMIN D3) 2000 UNITS TABS Take 1 tablet by mouth daily      Multiple Vitamins-Minerals (THERAPEUTIC MULTIVITAMIN-MINERALS) tablet Take 1 tablet by mouth daily Indications: Centrum       aspirin 81 MG EC tablet Take 81 mg by mouth daily        No current facility-administered medications for this visit. Allergies   Allergen Reactions    Iodine Swelling and Other (See Comments)     Reddened face and swelling of tongue       Social History     Socioeconomic History    Marital status:      Spouse name: Bk Jackman    Number of children: 3    Years of education: 9    Highest education level: None   Occupational History    Occupation: retired- [employer]/ [employer]     Employer: [id number]: Retired   Social Needs    Financial resource strain: None    Food insecurity     Worry: None     Inability: None    Transportation needs     Medical: None     Non-medical: None   Tobacco Use    Smoking status: Former Smoker     Packs/day: 1.50     Years: 55.00     Pack years: 82.50     Types: Cigarettes     Start date:      Quit date: 2013     Years since quittin.9    Smokeless tobacco: Never Used    Tobacco comment: Camel Cig   Substance and Sexual Activity    Alcohol use: Yes     Comment: drinks 6 cans of beer /day    Drug use: No    Sexual activity: Yes     Partners: Female   Lifestyle    Physical activity     Days per week: None     Minutes per session: None    Stress: None   Relationships    Social connections     Talks on phone: None     Gets together: None     Attends Zoroastrianism service: None     Active member of club or organization: None     Attends meetings of clubs or organizations: None     Relationship status: None    Intimate partner violence     Fear of current or ex partner: None     Emotionally abused: None     Physically abused: None     Forced sexual activity: None   Other Topics Concern    None   Social History Narrative    None       Family History   Problem Relation Age of Onset    Stroke Father     Cancer Sister     Heart Disease Brother          Review of Systems   Constitutional: Negative.     HENT: Negative for congestion, facial swelling, hearing loss, nosebleeds, sinus pressure and sore throat. Eyes: Positive for visual disturbance. Negative for photophobia. Respiratory: Negative for apnea, cough, chest tightness, shortness of breath and wheezing. Cardiovascular: Negative for chest pain, palpitations and leg swelling. Gastrointestinal: Negative for abdominal pain, blood in stool, diarrhea, nausea and vomiting. Genitourinary: Negative for difficulty urinating, frequency and urgency. Musculoskeletal: Negative for arthralgias, back pain, joint swelling and myalgias. Skin: Negative for color change and rash. Allergic/Immunologic: Negative. Neurological: Negative for syncope, weakness, light-headedness and headaches. Hematological: Negative. Psychiatric/Behavioral: Negative for agitation, behavioral problems, confusion and self-injury. The patient is not nervous/anxious. All other systems reviewed and are negative. Physical Exam  Vitals signs and nursing note reviewed. Constitutional:       General: He is not in acute distress. Appearance: He is well-developed. HENT:      Head: Normocephalic and atraumatic. Nose: Nose normal.   Eyes:      Conjunctiva/sclera: Conjunctivae normal.      Pupils: Pupils are equal, round, and reactive to light. Neck:      Musculoskeletal: Normal range of motion and neck supple. Thyroid: No thyromegaly. Vascular: No JVD. Cardiovascular:      Rate and Rhythm: Normal rate and regular rhythm. Heart sounds: Normal heart sounds. No murmur. No friction rub. No gallop. Pulmonary:      Effort: Pulmonary effort is normal. No respiratory distress. Breath sounds: Normal breath sounds. No wheezing. Abdominal:      General: Bowel sounds are normal. There is no distension. Palpations: Abdomen is soft. Tenderness: There is no abdominal tenderness. There is no guarding or rebound. Musculoskeletal: Normal range of motion. Lymphadenopathy:      Cervical: No cervical adenopathy.    Skin:     General: Skin is warm and dry. Findings: No erythema or rash. Neurological:      Mental Status: He is alert and oriented to person, place, and time. Cranial Nerves: No cranial nerve deficit. Motor: No abnormal muscle tone. Coordination: Coordination normal.      Deep Tendon Reflexes: Reflexes are normal and symmetric. Psychiatric:         Behavior: Behavior normal.         Judgment: Judgment normal.                               ASSESSMENT/PLAN:    Gabrielle Newman was seen today for eye problem. Diagnoses and all orders for this visit:    Diplopia  -     MRI BRAIN WO CONTRAST; Future  -     Comprehensive Metabolic Panel; Future  -     CBC Auto Differential; Future  -     TSH without Reflex; Future    Type 2 diabetes mellitus with diabetic neuropathy, without long-term current use of insulin (HCC)  -     Comprehensive Metabolic Panel; Future  -     CBC Auto Differential; Future  -     TSH without Reflex;  Future            Sugar Dhillon DO    4/12/2021  2:49 PM

## 2021-04-13 ENCOUNTER — HOSPITAL ENCOUNTER (OUTPATIENT)
Dept: MRI IMAGING | Age: 76
Discharge: HOME | End: 2021-04-15
Payer: MEDICARE

## 2021-04-13 ENCOUNTER — TELEPHONE (OUTPATIENT)
Dept: PRIMARY CARE CLINIC | Age: 76
End: 2021-04-13

## 2021-04-13 DIAGNOSIS — H53.2 DIPLOPIA: ICD-10-CM

## 2021-04-13 DIAGNOSIS — H53.2 DIPLOPIA: Primary | ICD-10-CM

## 2021-04-13 PROCEDURE — 70551 MRI BRAIN STEM W/O DYE: CPT

## 2021-04-13 NOTE — TELEPHONE ENCOUNTER
Received call from pt's wife. Pt at labs drawn with Dr Blake Hickman and was told that he is slightly anemic and has inflammation but they did not give her any other information. Wife asking if you could review the results and advise them on any instruction.

## 2021-04-13 NOTE — TELEPHONE ENCOUNTER
Patients wife is asking if there is any way that the MRI can be moved up. She stated she is scheduled for 04/16/21 and when scheduled that was the earliest they had. She stated that the patient is having worsening pain in his eye. Does order need to be STAT? Please advise.

## 2021-04-19 ENCOUNTER — TELEPHONE (OUTPATIENT)
Dept: VASCULAR SURGERY | Age: 76
End: 2021-04-19

## 2021-04-19 NOTE — TELEPHONE ENCOUNTER
Patient's wife Jet Valdivia called. The patient has been having visual changes - blurred for two weeks. Went to [facility] and PCP and has had numerous labs and an MRI through [id number] [place] [address] and results all ok. Requesting appointment to see Dr. Jazmin Vargas, appointment was scheduled for 4-21-21 at 2:45.

## 2021-04-19 NOTE — TELEPHONE ENCOUNTER
Discussed with the patient and wife, patient has some blurring of vision, both eyes, some diplopia, no other neurological symptoms, on aspirin, following up with ophthalmologist last week again this week, keep appointment to see me this week, all the questions were answered

## 2021-04-21 ENCOUNTER — OFFICE VISIT (OUTPATIENT)
Dept: VASCULAR SURGERY | Age: 76
End: 2021-04-21
Payer: MEDICARE

## 2021-04-21 VITALS — HEIGHT: 69 IN | BODY MASS INDEX: 31.7 KG/M2 | WEIGHT: 214 LBS

## 2021-04-21 DIAGNOSIS — H53.2 DIPLOPIA: ICD-10-CM

## 2021-04-21 DIAGNOSIS — I65.21 CAROTID STENOSIS, RIGHT: Primary | ICD-10-CM

## 2021-04-21 DIAGNOSIS — R09.89 BILATERAL CAROTID BRUITS: ICD-10-CM

## 2021-04-21 PROCEDURE — 99214 OFFICE O/P EST MOD 30 MIN: CPT | Performed by: SURGERY

## 2021-04-21 NOTE — PROGRESS NOTES
tablets by mouth daily      Cholecalciferol (VITAMIN D3) 2000 UNITS TABS Take 1 tablet by mouth daily      Multiple Vitamins-Minerals (THERAPEUTIC MULTIVITAMIN-MINERALS) tablet Take 1 tablet by mouth daily Indications: Centrum       aspirin 81 MG EC tablet Take 81 mg by mouth daily        No current facility-administered medications for this visit.         Past Medical History:   Diagnosis Date    Arthritis     Atrial fibrillation ([address]     day of surgery on 06/12/13- resolved at this time 6/23/2013    Bilateral carotid bruits 10/15/2020    CAD (coronary artery disease)     heart cath with stent 2001    Cancer [facility])     Basal Cell on his back    Carotid stenosis, right 10/15/2020    COPD (chronic obstructive [facility] disease) [facility]     Diplopia 4/21/2021    History of blood transfusion     Hyperlipidemia     Hypertension     Kidney stones     Sleep apnea     Type 2 diabetes mellitus with diabetic neuropathy, without long-term current use of insulin ([facility] 9/2/2020    Vitamin D deficiency        Past Surgical History:   Procedure Laterality Date    ABDOMEN SURGERY      Bilateral groin hernias    AORTIC VALVE REPLACEMENT  6/12/2013    BACK SURGERY  2006    cervicle fusion   [facility] SURGERY  1974, 1984    lumbar laminectomy    BACK SURGERY  [id number]    360 FUSION L4-L5    BACK SURGERY      CARDIAC SURGERY      CARPAL TUNNEL RELEASE  1-20013    both hands    COLONOSCOPY      CORONARY ARTERY BYPASS GRAFT      DIAGNOSTIC CARDIAC CATH LAB PROCEDURE      ENDOSCOPY, COLON, DIAGNOSTIC      FRACTURE SURGERY      Right arm Fx    FRACTURE SURGERY      Left finger Fx    HERNIA REPAIR      Bilateral    KIDNEY STONE SURGERY      open    NECK SURGERY      SHOULDER ARTHROSCOPY      right and left shoulders    SKIN BIOPSY         Family History   Problem Relation Age of Onset    Stroke Father     Cancer Sister     Heart Disease Brother        Social History     Socioeconomic History    Marital status:      Spouse name: Alcon Nava    Number of children: 3    Years of education: 5    Highest education level: Not on file   Occupational History    Occupation: retired- assembly line/ [employer]     Employer: [id number]: Retired   Social Needs    Financial resource strain: Not on file    Food insecurity     Worry: Not on file     Inability: Not on file   [employer] needs     Medical: Not on file     Non-medical: Not on file   Tobacco Use    Smoking status: Former Smoker     Packs/day: 1.50     Years: 55.00     Pack years: 82.50     Types: Cigarettes     Start date:      Quit date: 2013     Years since quittin.9    Smokeless tobacco: Never Used    Tobacco comment: Camel Cig   Substance and Sexual Activity    Alcohol use: Yes     Comment: drinks 6 cans of beer /day    Drug use: No    Sexual activity: Yes     Partners: Female   Lifestyle    Physical activity     Days per week: Not on file     Minutes per session: Not on file    Stress: Not on file   Relationships    Social connections     Talks on phone: Not on file     Gets together: Not on file     Attends Nondenominational service: Not on file     Active member of club or organization: Not on file     Attends meetings of clubs or organizations: Not on file     Relationship status: Not on file    Intimate partner violence     Fear of current or ex partner: Not on file     Emotionally abused: Not on file     Physically abused: Not on file     Forced sexual activity: Not on file   Other Topics Concern    Not on file   Social History Narrative    Not on file       Review of Systems:    Eyes:  No blurring, diplopia or vision loss. Respiratory:  No cough, pleuritic chest pain, dyspnea, or wheezing. Obstructive sleep apnea  Cardiovascular: No angina, palpitations .   Coronary artery disease, history of atrial fibrillation, post cardiac bypass surgery post aortic valve replacement, hypertension, hyperlipidemia Musculoskeletal:  No arthritis or weakness. Neurologic:  No paralysis, paresis, paresthesia, seizures or headache. Endocrinology: Type 2 diabetes mellitus          Physical Exam:  General appearance:  Alert, awake, oriented x 3. No distress. Eyes:  Conjunctivae appear normal; PERRL  Neck:  No jugular venous distention, lymphadenopathy or thyromegaly. Carotid bruit noted  Lungs:  Clear to ausculation bilaterally. No rhonchi, crackles, wheezes  Heart:  Regular rate and rhythm. No rub or murmur  Abdomen:  Soft, non-tender. No masses, organomegaly. Musculoskeletal : No joint effusions, tenderness swelling    Neuro: Speech is intact. Moving all extremities. No focal motor or sensory deficits      Extremities:  Both feet are warm to touch. The color of both feet is normal.        Pulses Right  Left    Brachial 3 3    Radial    3=normal   Femoral 2 2  2=diminished   Popliteal    1=barely palpable   Dorsalis pedis    0=absent   Posterior tibial    4=aneurysmal             Other pertinent information:1. The past medical records were reviewed. 2.  MRI of the brain was personally reviewed, other than microvascular changes no major infarct noted    Assessment:    1. Carotid stenosis, right    2. Diplopia    3. Bilateral carotid bruits              Plan:       I had a long detailed discussion the patient and his wife, options risks benefits and alternatives explained, patient was reassured, as they were concerned, patient recommend complete work-up as outlined below and to call me as needed if any new symptoms, explained              Patient was instructed to continue walking program and to call if any worsening of symptoms and to call if any focal lateralizing neurological symptoms like loss of speech, vision or loss of function of extremity. All the questions were answered.       Orders Placed This Encounter   Procedures    MRA HEAD WO CONTRAST    MRA NECK W WO CONTRAST             Indicated follow-up: Call as needed

## 2021-04-23 DIAGNOSIS — H53.2 DIPLOPIA: ICD-10-CM

## 2021-04-23 DIAGNOSIS — I65.21 CAROTID STENOSIS, RIGHT: Primary | ICD-10-CM

## 2021-04-26 ENCOUNTER — TELEPHONE (OUTPATIENT)
Dept: VASCULAR SURGERY | Age: 76
End: 2021-04-26

## 2021-04-26 NOTE — TELEPHONE ENCOUNTER
Scheduled MRA head and carotid u/s at [facility] ([facility]) 5/2 beginning at 9:00 a.m, Mrs. Chase Kelly notified.

## 2021-04-28 ENCOUNTER — TELEPHONE (OUTPATIENT)
Dept: VASCULAR SURGERY | Age: 76
End: 2021-04-28

## 2021-04-28 DIAGNOSIS — H53.2 DIPLOPIA: ICD-10-CM

## 2021-04-28 DIAGNOSIS — I65.21 CAROTID STENOSIS, RIGHT: Primary | ICD-10-CM

## 2021-05-03 ENCOUNTER — HOSPITAL ENCOUNTER (OUTPATIENT)
Dept: MRI IMAGING | Age: 76
Discharge: HOME | End: 2021-05-05
Payer: MEDICARE

## 2021-05-03 DIAGNOSIS — I65.21 CAROTID STENOSIS, RIGHT: ICD-10-CM

## 2021-05-03 DIAGNOSIS — H53.2 DIPLOPIA: ICD-10-CM

## 2021-05-03 DIAGNOSIS — R09.89 BILATERAL CAROTID BRUITS: ICD-10-CM

## 2021-05-03 PROCEDURE — 6360000004 HC RX CONTRAST MEDICATION: Performed by: RADIOLOGY

## 2021-05-03 PROCEDURE — A9579 GAD-BASE MR CONTRAST NOS,1ML: HCPCS | Performed by: RADIOLOGY

## 2021-05-03 PROCEDURE — 70544 MR ANGIOGRAPHY HEAD W/O DYE: CPT

## 2021-05-03 PROCEDURE — 70549 MR ANGIOGRAPH NECK W/O&W/DYE: CPT

## 2021-05-03 RX ADMIN — GADOTERIDOL 20 ML: 279.3 INJECTION, SOLUTION INTRAVENOUS at 19:14

## 2021-05-05 ENCOUNTER — TELEPHONE (OUTPATIENT)
Dept: VASCULAR SURGERY | Age: 76
End: 2021-05-05

## 2021-05-05 NOTE — TELEPHONE ENCOUNTER
Discussed with the patient and his wife over the telephone regarding the moderate head, no major issues at all, dominant left vertebral artery    MRI of the neck done with contrast unfortunately significant motion artifact, and I carefully examined all the views, do not see any significant stenosis that needs, warrants intervention    Patient does have some's carotid stenosis on the right side based on ultrasound does have an appointment see me in October, he was instructed keep her appointment    Patient was instead, to follow-up with his ophthalmologist and if symptoms persist to discuss with his PCP regarding possible neurology evaluation for diplopia and to call me anytime with questions or concerns, all the questions were answered

## 2021-05-07 ENCOUNTER — TELEPHONE (OUTPATIENT)
Dept: PRIMARY CARE CLINIC | Age: 76
End: 2021-05-07

## 2021-05-07 DIAGNOSIS — H53.2 DIPLOPIA: Primary | ICD-10-CM

## 2021-05-07 NOTE — TELEPHONE ENCOUNTER
Patient's wife calling in to verify that you received the results from his recent head and neck MRA ordered by Dr. Jose Boogie.  She also would like a recommendation for a neurologist at [facility]

## 2021-05-20 DIAGNOSIS — I10 ESSENTIAL HYPERTENSION: Chronic | ICD-10-CM

## 2021-05-20 DIAGNOSIS — I48.0 PAROXYSMAL ATRIAL FIBRILLATION (HCC): ICD-10-CM

## 2021-05-20 RX ORDER — FUROSEMIDE 20 MG/1
TABLET ORAL
Qty: 90 TABLET | Refills: 0 | Status: SHIPPED
Start: 2021-05-20 | End: 2021-09-08 | Stop reason: ALTCHOICE

## 2021-06-04 ENCOUNTER — OFFICE VISIT (OUTPATIENT)
Dept: PRIMARY CARE CLINIC | Age: 76
End: 2021-06-04
Payer: MEDICARE

## 2021-06-04 VITALS
HEIGHT: 69 IN | RESPIRATION RATE: 20 BRPM | HEART RATE: 60 BPM | SYSTOLIC BLOOD PRESSURE: 138 MMHG | DIASTOLIC BLOOD PRESSURE: 66 MMHG | TEMPERATURE: 97.1 F | BODY MASS INDEX: 31.25 KG/M2 | WEIGHT: 211 LBS | OXYGEN SATURATION: 97 %

## 2021-06-04 DIAGNOSIS — Z00.00 ROUTINE GENERAL MEDICAL EXAMINATION AT A HEALTH CARE FACILITY: Primary | ICD-10-CM

## 2021-06-04 PROCEDURE — G0439 PPPS, SUBSEQ VISIT: HCPCS | Performed by: FAMILY MEDICINE

## 2021-06-04 ASSESSMENT — PATIENT HEALTH QUESTIONNAIRE - PHQ9
SUM OF ALL RESPONSES TO PHQ QUESTIONS 1-9: 0
1. LITTLE INTEREST OR PLEASURE IN DOING THINGS: 0
SUM OF ALL RESPONSES TO PHQ QUESTIONS 1-9: 0
SUM OF ALL RESPONSES TO PHQ9 QUESTIONS 1 & 2: 0
2. FEELING DOWN, DEPRESSED OR HOPELESS: 0
SUM OF ALL RESPONSES TO PHQ QUESTIONS 1-9: 0

## 2021-06-04 ASSESSMENT — LIFESTYLE VARIABLES
HOW OFTEN DURING THE LAST YEAR HAVE YOU FOUND THAT YOU WERE NOT ABLE TO STOP DRINKING ONCE YOU HAD STARTED: 0
HOW MANY STANDARD DRINKS CONTAINING ALCOHOL DO YOU HAVE ON A TYPICAL DAY: 2
HAVE YOU OR SOMEONE ELSE BEEN INJURED AS A RESULT OF YOUR DRINKING: 0
AUDIT-C TOTAL SCORE: 6
HOW OFTEN DURING THE LAST YEAR HAVE YOU FAILED TO DO WHAT WAS NORMALLY EXPECTED FROM YOU BECAUSE OF DRINKING: 0
AUDIT TOTAL SCORE: 6
HOW OFTEN DURING THE LAST YEAR HAVE YOU NEEDED AN ALCOHOLIC DRINK FIRST THING IN THE MORNING TO GET YOURSELF GOING AFTER A NIGHT OF HEAVY DRINKING: 0
HOW OFTEN DO YOU HAVE SIX OR MORE DRINKS ON ONE OCCASION: 0
HOW OFTEN DURING THE LAST YEAR HAVE YOU HAD A FEELING OF GUILT OR REMORSE AFTER DRINKING: 0
HOW OFTEN DO YOU HAVE A DRINK CONTAINING ALCOHOL: 4
HOW OFTEN DURING THE LAST YEAR HAVE YOU BEEN UNABLE TO REMEMBER WHAT HAPPENED THE NIGHT BEFORE BECAUSE YOU HAD BEEN DRINKING: 0
HAS A RELATIVE, FRIEND, DOCTOR, OR ANOTHER HEALTH PROFESSIONAL EXPRESSED CONCERN ABOUT YOUR DRINKING OR SUGGESTED YOU CUT DOWN: 0

## 2021-06-04 NOTE — PROGRESS NOTES
Medicare Annual Wellness Visit  Name: Anselmo Garcia Date: 2021   MRN: [id number] Sex: Male   Age: 68 y.o. Ethnicity: Non-/Non    : 1945 Race: Triston Brady is here for Medicare AWV    Screenings for behavioral, psychosocial and functional/safety risks, and cognitive dysfunction are all negative except as indicated below. These results, as well as other patient data from the [address] form, are documented in Flowsheets linked to this Encounter. Allergies   Allergen Reactions    Iodine Swelling and Other (See Comments)     Reddened face and swelling of tongue       Prior to Visit Medications    Medication Sig Taking?  Authorizing Provider   furosemide (LASIX) 20 MG tablet TAKE ONE TABLET BY MOUTH EVERY DAY Yes Dima Rdz, DO   omeprazole (PRILOSEC) 20 MG delayed release capsule TAKE ONE CAPSULE BY MOUTH DAILY Yes Fred Ivy DO   apixaban (ELIQUIS) 5 MG TABS tablet Take 1 tablet by mouth 2 times daily Yes Fred Ivy DO   metoprolol succinate (TOPROL XL) 50 MG extended release tablet TAKE ONE TABLET BY MOUTH TWO TIMES A DAY Yes Fred Ivy DO   allopurinol (ZYLOPRIM) 300 MG tablet Take 1 tablet by mouth daily Yes Fred Ivy DO   metFORMIN (GLUCOPHAGE) 500 MG tablet TAKE ONE TABLET BY MOUTH TWICE A DAY WITH MEALS Yes Fred Ivy DO   olmesartan (BENICAR) 40 MG tablet Take 1 tablet by mouth daily Indications: taking 40 mg Yes Fred Ivy DO   pitavastatin (LIVALO) 2 MG TABS tablet Take 1 tablet by mouth nightly Yes Fred Ivy DO   Alpha-Lipoic Acid 100 MG CAPS Take 200 mg by mouth nightly  Yes Historical Provider, MD   Coenzyme Q10 (CO Q 10) 100 MG CAPS Take 2 tablets by mouth daily Yes Historical Provider, MD   Cholecalciferol (VITAMIN D3) 2000 UNITS TABS Take 1 tablet by mouth daily Yes Historical Provider, MD   Multiple Vitamins-Minerals (THERAPEUTIC MULTIVITAMIN-MINERALS) tablet Take 1 tablet by mouth daily Indications: Centrum Yes Historical Provider, MD   aspirin 81 MG EC tablet Take 81 mg by mouth daily  Yes Historical Provider, MD       Past Medical History:   Diagnosis Date    Arthritis     Atrial fibrillation ([facility] [address]     day of surgery on 06/12/13- resolved at this time 6/23/2013    Bilateral carotid bruits 10/15/2020    CAD (coronary artery disease)     heart cath with stent 2001    Cancer [facility])     Basal Cell on his back    Carotid stenosis, right 10/15/2020    COPD (chronic obstructive pulmonary disease) ([address]     Diplopia 4/21/2021    History of blood transfusion     Hyperlipidemia     Hypertension     Kidney stones     Sleep apnea     Type 2 diabetes mellitus with diabetic neuropathy, without long-term current use of insulin ([address] 9/2/2020    Vitamin D deficiency        Past Surgical History:   Procedure Laterality Date    ABDOMEN SURGERY      Bilateral groin hernias    AORTIC VALVE REPLACEMENT  6/12/2013    BACK SURGERY  2006    cervicle fusion   [facility] SURGERY  1974, 1984    lumbar laminectomy    BACK SURGERY  [id number]    360 FUSION L4-L5    BACK SURGERY      CARDIAC SURGERY      CARPAL TUNNEL RELEASE  1-20013    both hands    COLONOSCOPY      CORONARY ARTERY BYPASS GRAFT      DIAGNOSTIC CARDIAC CATH LAB PROCEDURE      ENDOSCOPY, COLON, DIAGNOSTIC      FRACTURE SURGERY      Right arm Fx    FRACTURE SURGERY      Left finger Fx    HERNIA REPAIR      Bilateral    KIDNEY STONE SURGERY      open    NECK SURGERY      SHOULDER ARTHROSCOPY      right and left shoulders    SKIN BIOPSY         Family History   Problem Relation Age of Onset    Stroke Father     Cancer Sister     Heart Disease Brother        CareTeam (Including outside providers/suppliers regularly involved in providing care):   Patient Care Team:  Frances Hong DO as PCP - General (Family Medicine)  Frances Hong DO as PCP - [facility] Empaneled Provider  Juli Love MD as Consulting Physician (Cardiology)  Nando Bueno as Imaging Navigator    Wt Readings from Last 3 Encounters:   06/04/21 211 lb (95.7 kg)   04/21/21 214 lb (97.1 kg)   04/12/21 214 lb 3.2 oz (97.2 kg)     Vitals:    06/04/21 1058   BP: 138/66   Pulse: 60   Resp: 20   Temp: 97.1 °F (36.2 °C)   TempSrc: Temporal   SpO2: 97%   Weight: 211 lb (95.7 kg)   Height: 5' 9\" (1.753 m)     Body mass index is 31.16 kg/m². Based upon direct observation of the patient, evaluation of cognition reveals recent and remote memory intact. General Appearance: alert and oriented to person, place and time, well developed and well- nourished, in no acute distress  Skin: warm and dry, no rash or erythema  Head: normocephalic and atraumatic  Eyes: pupils equal, round, and reactive to light, extraocular eye movements intact, conjunctivae normal  ENT: tympanic membrane, external ear and ear canal normal bilaterally, nose without deformity, nasal mucosa and turbinates normal without polyps  Neck: supple and non-tender without mass, no thyromegaly or thyroid nodules, no cervical lymphadenopathy  Pulmonary/Chest: clear to auscultation bilaterally- no wheezes, rales or rhonchi, normal air movement, no respiratory distress  Cardiovascular: normal rate, regular rhythm, normal S1 and S2, pos murmurs, rubs, clicks, or gallops, distal pulses intact, no carotid bruits  Abdomen: soft, non-tender, non-distended, normal bowel sounds, no masses or organomegaly  Extremities: no cyanosis, clubbing or edema  Musculoskeletal: normal range of motion, no joint swelling, deformity or tenderness  Neurologic: reflexes normal and symmetric, no cranial nerve deficit, gait, coordination and speech normal    Patient's complete Health Risk Assessment and screening values have been reviewed and are found in Flowsheets. The following problems were reviewed today and where indicated follow up appointments were made and/or referrals ordered.     Positive Risk Factor Screenings with Interventions:           Health Habits/Nutrition: Health Habits/Nutrition  Do you exercise for at least 20 minutes 2-3 times per week?: Yes  Have you lost any weight without trying in the past 3 months?: No  Do you eat only one meal per day?: No  Have you seen the dentist within the past year?: Yes  Body mass index: (!) 31.16  Health Habits/Nutrition Interventions:  · Inadequate physical activity:  patient agrees to exercise for at least 150 minutes/week       Personalized Preventive Plan   Current Health Maintenance Status  Immunization History   Administered Date(s) Administered    COVID-19, Colon Peter, PF, 30mcg/0.3mL 03/05/2021, 03/26/2021    Influenza Vaccine, unspecified formulation 10/24/2011, 10/10/2012, 10/07/2013, 11/21/2014, 10/28/2015    Influenza, High Dose (Fluzone 65 yrs and older) 12/05/2016, 10/18/2017, 09/10/2018    Influenza, Quadv, adjuvanted, 65 yrs +, IM, PF (Fluad) 10/28/2020    Influenza, Triv, inactivated, subunit, adjuvanted, IM (Fluad 65 yrs and older) 10/16/2019    Pneumococcal Conjugate 13-valent ([id number]) 09/10/2018    Pneumococcal Polysaccharide ([id number]) 08/13/2011, 12/05/2016    Tdap (Boostrix, Adacel) 10/07/2013    Zoster Live (Zostavax) 08/13/2012        Health Maintenance   Topic Date Due    Shingles Vaccine (2 of 3) 10/08/2012    Annual Wellness Visit (AWV)  Never done    Low dose CT lung screening  12/10/2021    Lipid screen  03/03/2022    Potassium monitoring  04/12/2022    Creatinine monitoring  04/12/2022    DTaP/Tdap/Td vaccine (2 - Td or Tdap) 10/07/2023    Flu vaccine  Completed    Pneumococcal 65+ years Vaccine  Completed    COVID-19 Vaccine  Completed    Hepatitis C screen  Completed    Hepatitis A vaccine  Aged Out    Hib vaccine  Aged Out    Meningococcal (ACWY) vaccine  Aged Out     Recommendations for [employer] Due: see orders and patient instructions/AVS.  .   Recommended screening schedule for the next 5-10 years is provided to the patient in written form: see Patient

## 2021-06-04 NOTE — PATIENT INSTRUCTIONS
Personalized Preventive Plan for Caesar Ribeiro - 6/4/2021  Medicare offers a range of preventive health benefits. Some of the tests and screenings are paid in full while other may be subject to a deductible, co-insurance, and/or copay. Some of these benefits include a comprehensive review of your medical history including lifestyle, illnesses that may run in your family, and various assessments and screenings as appropriate. After reviewing your medical record and screening and assessments performed today your provider may have ordered immunizations, labs, imaging, and/or referrals for you. A list of these orders (if applicable) as well as your Preventive Care list are included within your After Visit Summary for your review. Other Preventive Recommendations:    · A preventive eye exam performed by an eye specialist is recommended every 1-2 years to screen for glaucoma; cataracts, macular degeneration, and other eye disorders. · A preventive dental visit is recommended every 6 months. · Try to get at least 150 minutes of exercise per week or 10,000 steps per day on a pedometer . · Order or download the FREE \"Exercise & Physical Activity: Your Everyday Guide\" from [employer]. Call 9-151.652.6090 or search [employer] online. · You need 8794-5225 mg of calcium and 9578-6662 IU of vitamin D per day. It is possible to meet your calcium requirement with diet alone, but a vitamin D supplement is usually necessary to meet this goal.  · When exposed to the sun, use a sunscreen that protects against both UVA and UVB radiation with an SPF of 30 or greater. Reapply every 2 to 3 hours or after sweating, drying off with a towel, or swimming. · Always wear a seat belt when traveling in a car. Always wear a helmet when riding a bicycle or motorcycle.

## 2021-06-21 RX ORDER — METOPROLOL SUCCINATE 50 MG/1
50 TABLET, EXTENDED RELEASE ORAL 2 TIMES DAILY
Qty: 180 TABLET | Refills: 1 | Status: SHIPPED
Start: 2021-06-21 | End: 2022-01-07 | Stop reason: SDUPTHER

## 2021-06-23 DIAGNOSIS — I48.0 PAROXYSMAL ATRIAL FIBRILLATION (HCC): ICD-10-CM

## 2021-06-23 DIAGNOSIS — E79.0 HYPERURICEMIA: ICD-10-CM

## 2021-06-23 DIAGNOSIS — R73.03 PREDIABETES: ICD-10-CM

## 2021-06-23 DIAGNOSIS — I10 ESSENTIAL HYPERTENSION: Chronic | ICD-10-CM

## 2021-06-23 DIAGNOSIS — E78.2 MIXED HYPERLIPIDEMIA: ICD-10-CM

## 2021-06-23 RX ORDER — PITAVASTATIN CALCIUM 2.09 MG/1
TABLET, FILM COATED ORAL
Qty: 90 TABLET | Refills: 0 | Status: SHIPPED
Start: 2021-06-23 | End: 2021-07-23

## 2021-06-23 RX ORDER — ALLOPURINOL 300 MG/1
TABLET ORAL
Qty: 90 TABLET | Refills: 0 | Status: SHIPPED
Start: 2021-06-23 | End: 2021-09-20

## 2021-06-23 NOTE — TELEPHONE ENCOUNTER
Surescripts request for refill on Glucophage, Zyloprim, and Livalo. Last OV 6/4/21 Next OV 9/8/21    [facility] [place] preferred pharmacy.

## 2021-06-30 RX ORDER — OMEPRAZOLE 20 MG/1
CAPSULE, DELAYED RELEASE ORAL
Qty: 90 CAPSULE | Refills: 3 | Status: SHIPPED
Start: 2021-06-30 | End: 2022-07-11 | Stop reason: SDUPTHER

## 2021-07-16 ENCOUNTER — TELEPHONE (OUTPATIENT)
Dept: PRIMARY CARE CLINIC | Age: 76
End: 2021-07-16

## 2021-07-16 ENCOUNTER — OFFICE VISIT (OUTPATIENT)
Dept: FAMILY MEDICINE CLINIC | Age: 76
End: 2021-07-16
Payer: MEDICARE

## 2021-07-16 VITALS
RESPIRATION RATE: 18 BRPM | WEIGHT: 212 LBS | HEIGHT: 69 IN | BODY MASS INDEX: 31.4 KG/M2 | TEMPERATURE: 97.6 F | OXYGEN SATURATION: 98 % | HEART RATE: 65 BPM

## 2021-07-16 DIAGNOSIS — N28.9 ACUTE RENAL INSUFFICIENCY: ICD-10-CM

## 2021-07-16 DIAGNOSIS — M62.838 MUSCLE SPASM: ICD-10-CM

## 2021-07-16 DIAGNOSIS — M62.838 MUSCLE SPASM: Primary | ICD-10-CM

## 2021-07-16 DIAGNOSIS — R74.8 ELEVATED CK: ICD-10-CM

## 2021-07-16 LAB
ALBUMIN SERPL-MCNC: 4.5 G/DL (ref 3.5–5.2)
ALP BLD-CCNC: 54 U/L (ref 40–129)
ALT SERPL-CCNC: 26 U/L (ref 0–40)
ANION GAP SERPL CALCULATED.3IONS-SCNC: 18 MMOL/L (ref 7–16)
AST SERPL-CCNC: 32 U/L (ref 0–39)
BILIRUB SERPL-MCNC: 0.5 MG/DL (ref 0–1.2)
BUN BLDV-MCNC: 49 MG/DL (ref 6–23)
CALCIUM SERPL-MCNC: 9.3 MG/DL (ref 8.6–10.2)
CHLORIDE BLD-SCNC: 103 MMOL/L (ref 98–107)
CO2: 15 MMOL/L (ref 22–29)
CREAT SERPL-MCNC: 2 MG/DL (ref 0.7–1.2)
GFR AFRICAN AMERICAN: 39
GFR NON-AFRICAN AMERICAN: 33 ML/MIN/1.73
GLUCOSE BLD-MCNC: 134 MG/DL (ref 74–99)
MAGNESIUM: 2 MG/DL (ref 1.6–2.6)
POTASSIUM SERPL-SCNC: 5 MMOL/L (ref 3.5–5)
SODIUM BLD-SCNC: 136 MMOL/L (ref 132–146)
TOTAL CK: 430 U/L (ref 20–200)
TOTAL PROTEIN: 7 G/DL (ref 6.4–8.3)

## 2021-07-16 PROCEDURE — 99203 OFFICE O/P NEW LOW 30 MIN: CPT | Performed by: PHYSICIAN ASSISTANT

## 2021-07-16 RX ORDER — TIZANIDINE 4 MG/1
4 TABLET ORAL 4 TIMES DAILY PRN
Qty: 20 TABLET | Refills: 0 | Status: SHIPPED
Start: 2021-07-16 | End: 2022-01-27 | Stop reason: ALTCHOICE

## 2021-07-16 NOTE — PROGRESS NOTES
21  Victorina Ann : 1945 Sex: male  Age 68 y.o. Subjective:  Chief Complaint   Patient presents with    Leg Pain     this is ongoing. HPI:   Victornia Ann , 68 y.o. male presents to express care for evaluation of muscle spasms    HPI  80-year-old male with a history of arthritis, atrial fibrillation, CAD, basal cell carcinoma to the back, COPD, hyperlipidemia, hypertension presents to express care for evaluation of cramping in the hands, arms, and the lower extremities. The patient states that he has had this ongoing for the last several months. Seems to wax and wane. The patient does not have any chest pain, shortness of breath. The patient is not any fevers or chills. The patient is anticoagulated on Eliquis. The patient did want to make appointment with PCP to discuss medications and review these. The patient states that he does not really consume a lot of water. The patient does drink quite a bit of beer. ROS:   Unless otherwise stated in this report the patient's positive and negative responses for review of systems for constitutional, eyes, ENT, cardiovascular, respiratory, gastrointestinal, neurological, , musculoskeletal, and integument systems and related systems to the presenting problem are either stated in the history of present illness or were not pertinent or were negative for the symptoms and/or complaints related to the presenting medical problem. Positives and pertinent negatives as per HPI. All others reviewed and are negative.       PMH:     Past Medical History:   Diagnosis Date    Arthritis     Atrial fibrillation ([address]     day of surgery on 13- resolved at this time 2013    Bilateral carotid bruits 10/15/2020    CAD (coronary artery disease)     heart cath with stent     Cancer [facility])     Basal Cell on his back    Carotid stenosis, right 10/15/2020    COPD (chronic obstructive pulmonary disease) ([facility]     Diplopia 4/21/2021    History of blood transfusion     Hyperlipidemia     Hypertension     Kidney stones     Sleep apnea     Type 2 diabetes mellitus with diabetic neuropathy, without long-term current use of insulin ([facility] [address] 9/2/2020    Vitamin D deficiency        Past Surgical History:   Procedure Laterality Date    ABDOMEN SURGERY      Bilateral groin hernias    AORTIC VALVE REPLACEMENT  6/12/2013    BACK SURGERY  2006    cervicle fusion   [facility] SURGERY  1974, 1984    lumbar laminectomy    BACK SURGERY  [id number]    360 FUSION L4-L5    BACK SURGERY      CARDIAC SURGERY      CARPAL TUNNEL RELEASE  1-20013    both hands    COLONOSCOPY      CORONARY ARTERY BYPASS GRAFT      DIAGNOSTIC CARDIAC CATH LAB PROCEDURE      ENDOSCOPY, COLON, DIAGNOSTIC      FRACTURE SURGERY      Right arm Fx    FRACTURE SURGERY      Left finger Fx    HERNIA REPAIR      Bilateral    KIDNEY STONE SURGERY      open    NECK SURGERY      SHOULDER ARTHROSCOPY      right and left shoulders    SKIN BIOPSY         Family History   Problem Relation Age of Onset    Stroke Father     Cancer Sister     Heart Disease Brother        Medications:     Current Outpatient Medications:     tiZANidine (ZANAFLEX) 4 MG tablet, Take 1 tablet by mouth 4 times daily as needed (muscle spasms), Disp: 20 tablet, Rfl: 0    omeprazole (PRILOSEC) 20 MG delayed release capsule, TAKE ONE CAPSULE BY MOUTH DAILY, Disp: 90 capsule, Rfl: 3    LIVALO 2 MG TABS tablet, TAKE ONE TABLET BY MOUTH nightly, Disp: 90 tablet, Rfl: 0    allopurinol (ZYLOPRIM) 300 MG tablet, TAKE ONE TABLET BY MOUTH DAILY, Disp: 90 tablet, Rfl: 0    metFORMIN (GLUCOPHAGE) 500 MG tablet, TAKE ONE TABLET BY MOUTH TWICE A DAY WITH MEALS, Disp: 180 tablet, Rfl: 0    metoprolol succinate (TOPROL XL) 50 MG extended release tablet, Take 1 tablet by mouth 2 times daily, Disp: 180 tablet, Rfl: 1    furosemide (LASIX) 20 MG tablet, TAKE ONE TABLET BY MOUTH EVERY DAY, Disp: 90 tablet, Rfl: 0    apixaban (ELIQUIS) 5 MG TABS tablet, Take 1 tablet by mouth 2 times daily, Disp: 180 tablet, Rfl: 1    olmesartan (BENICAR) 40 MG tablet, Take 1 tablet by mouth daily Indications: taking 40 mg, Disp: 90 tablet, Rfl: 1    Alpha-Lipoic Acid 100 MG CAPS, Take 200 mg by mouth nightly , Disp: , Rfl:     Coenzyme Q10 (CO Q 10) 100 MG CAPS, Take 2 tablets by mouth daily, Disp: , Rfl:     Cholecalciferol (VITAMIN D3) 2000 UNITS TABS, Take 1 tablet by mouth daily, Disp: , Rfl:     Multiple Vitamins-Minerals (THERAPEUTIC MULTIVITAMIN-MINERALS) tablet, Take 1 tablet by mouth daily Indications: Centrum , Disp: , Rfl:     aspirin 81 MG EC tablet, Take 81 mg by mouth daily , Disp: , Rfl:     Allergies: Allergies   Allergen Reactions    Iodine Swelling and Other (See Comments)     Reddened face and swelling of tongue       Social History:     Social History     Tobacco Use    Smoking status: Former Smoker     Packs/day: 2.00     Years: 55.00     Pack years: 110.00     Types: Cigarettes     Start date:      Quit date: 2013     Years since quittin.2    Smokeless tobacco: Never Used    Tobacco comment: [employer]   Vaping Use    Vaping Use: Never used    Passive vaping exposure Yes   Substance Use Topics    Alcohol use: Yes     Comment: drinks 6 cans of beer /day    Drug use: No       Patient lives at home. Physical Exam:     Vitals:    21 1206   Pulse: 65   Resp: 18   Temp: 97.6 °F (36.4 °C)   SpO2: 98%   Weight: 212 lb (96.2 kg)   Height: 5' 9\" (1.753 m)       Exam:  Physical Exam  Vital signs reviewed and nurse's notes. The patient is not hypoxic. General: Alert, no acute distress, patient resting comfortably   Skin: warm, intact, no pallor noted   Head: Normocephalic, atraumatic   Eye: Normal conjunctiva   Respiratory: No acute distress   Musculoskeletal: The patient has no obvious deformity noted to the upper or lower extremities.   The patient notes that at certain times during the anion gap of 18, glucose of 134. BUN and creatinine were 49 and 2.0 which does seem to be about double from his blood work about 3 months ago. The patient does have a total CK that is elevated at 430. The patient's magnesium is 2.0. The patient does need to increase his fluid intake. Limit his alcohol intake. He may need to follow-up with nephrology. The patient is to return if any of the signs or symptoms worsen. Push fluids get plenty of rest.  We also will start the patient on Zanaflex. The patient was encouraged to use tonic water. The patient is to return to express care or go directly to the emergency department should any of the signs or symptoms worsen. The patient is to followup with primary care physician in 2-3 days for repeat evaluation. The patient has no other questions or concerns at this time the patient will be discharged home. Clinical Impression:   Anjana Barnett was seen today for leg pain. Diagnoses and all orders for this visit:    Muscle spasm  -     COMPREHENSIVE METABOLIC PANEL; Future  -     CK; Future  -     MAGNESIUM; Future    Acute renal insufficiency    Elevated CK    Other orders  -     tiZANidine (ZANAFLEX) 4 MG tablet; Take 1 tablet by mouth 4 times daily as needed (muscle spasms)        The patient is to call for any concerns or return if any of the signs or symptoms worsen. The patient is to follow-up with PCP in the next 2-3 days for repeat evaluation repeat assessment or go directly to the emergency department.      SIGNATURE: Kory Goodson III, PA-C

## 2021-07-16 NOTE — TELEPHONE ENCOUNTER
Pt having severe cramping in leg last night. Per Dr Perry Saenz, patient advised to come through express care today. Pt notified.

## 2021-07-23 ENCOUNTER — OFFICE VISIT (OUTPATIENT)
Dept: PRIMARY CARE CLINIC | Age: 76
End: 2021-07-23
Payer: MEDICARE

## 2021-07-23 VITALS
SYSTOLIC BLOOD PRESSURE: 138 MMHG | WEIGHT: 212 LBS | HEIGHT: 69 IN | RESPIRATION RATE: 18 BRPM | DIASTOLIC BLOOD PRESSURE: 84 MMHG | HEART RATE: 59 BPM | BODY MASS INDEX: 31.4 KG/M2 | TEMPERATURE: 97.6 F | OXYGEN SATURATION: 98 %

## 2021-07-23 DIAGNOSIS — E79.0 HYPERURICEMIA: ICD-10-CM

## 2021-07-23 DIAGNOSIS — E11.40 TYPE 2 DIABETES MELLITUS WITH DIABETIC NEUROPATHY, WITHOUT LONG-TERM CURRENT USE OF INSULIN (HCC): ICD-10-CM

## 2021-07-23 DIAGNOSIS — I48.0 PAROXYSMAL ATRIAL FIBRILLATION (HCC): Primary | ICD-10-CM

## 2021-07-23 DIAGNOSIS — I48.0 PAROXYSMAL ATRIAL FIBRILLATION (HCC): ICD-10-CM

## 2021-07-23 DIAGNOSIS — I73.9 PVD (PERIPHERAL VASCULAR DISEASE) (HCC): ICD-10-CM

## 2021-07-23 DIAGNOSIS — M79.10 MYALGIA: ICD-10-CM

## 2021-07-23 DIAGNOSIS — Z12.5 PROSTATE CANCER SCREENING: ICD-10-CM

## 2021-07-23 DIAGNOSIS — E78.2 MIXED HYPERLIPIDEMIA: ICD-10-CM

## 2021-07-23 LAB
ALBUMIN SERPL-MCNC: 4.4 G/DL (ref 3.5–5.2)
ALP BLD-CCNC: 52 U/L (ref 40–129)
ALT SERPL-CCNC: 25 U/L (ref 0–40)
ANION GAP SERPL CALCULATED.3IONS-SCNC: 16 MMOL/L (ref 7–16)
AST SERPL-CCNC: 34 U/L (ref 0–39)
BASOPHILS ABSOLUTE: 0.04 E9/L (ref 0–0.2)
BASOPHILS RELATIVE PERCENT: 0.7 % (ref 0–2)
BILIRUB SERPL-MCNC: 0.3 MG/DL (ref 0–1.2)
BUN BLDV-MCNC: 30 MG/DL (ref 6–23)
CALCIUM SERPL-MCNC: 9.9 MG/DL (ref 8.6–10.2)
CHLORIDE BLD-SCNC: 104 MMOL/L (ref 98–107)
CHOLESTEROL, TOTAL: 192 MG/DL (ref 0–199)
CO2: 19 MMOL/L (ref 22–29)
CREAT SERPL-MCNC: 1 MG/DL (ref 0.7–1.2)
EOSINOPHILS ABSOLUTE: 0.2 E9/L (ref 0.05–0.5)
EOSINOPHILS RELATIVE PERCENT: 3.7 % (ref 0–6)
GFR AFRICAN AMERICAN: >60
GFR NON-AFRICAN AMERICAN: >60 ML/MIN/1.73
GLUCOSE BLD-MCNC: 107 MG/DL (ref 74–99)
HBA1C MFR BLD: 5.5 % (ref 4–5.6)
HCT VFR BLD CALC: 36.8 % (ref 37–54)
HDLC SERPL-MCNC: 59 MG/DL
HEMOGLOBIN: 11.9 G/DL (ref 12.5–16.5)
IMMATURE GRANULOCYTES #: 0.02 E9/L
IMMATURE GRANULOCYTES %: 0.4 % (ref 0–5)
LDL CHOLESTEROL CALCULATED: 90 MG/DL (ref 0–99)
LYMPHOCYTES ABSOLUTE: 1.96 E9/L (ref 1.5–4)
LYMPHOCYTES RELATIVE PERCENT: 36.6 % (ref 20–42)
MCH RBC QN AUTO: 31.3 PG (ref 26–35)
MCHC RBC AUTO-ENTMCNC: 32.3 % (ref 32–34.5)
MCV RBC AUTO: 96.8 FL (ref 80–99.9)
MONOCYTES ABSOLUTE: 0.6 E9/L (ref 0.1–0.95)
MONOCYTES RELATIVE PERCENT: 11.2 % (ref 2–12)
NEUTROPHILS ABSOLUTE: 2.53 E9/L (ref 1.8–7.3)
NEUTROPHILS RELATIVE PERCENT: 47.4 % (ref 43–80)
PDW BLD-RTO: 14 FL (ref 11.5–15)
PLATELET # BLD: 183 E9/L (ref 130–450)
PMV BLD AUTO: 10.5 FL (ref 7–12)
POTASSIUM SERPL-SCNC: 5 MMOL/L (ref 3.5–5)
PROSTATE SPECIFIC ANTIGEN: 1.27 NG/ML (ref 0–4)
RBC # BLD: 3.8 E12/L (ref 3.8–5.8)
SODIUM BLD-SCNC: 139 MMOL/L (ref 132–146)
TOTAL CK: 227 U/L (ref 20–200)
TOTAL PROTEIN: 7.1 G/DL (ref 6.4–8.3)
TRIGL SERPL-MCNC: 215 MG/DL (ref 0–149)
TSH SERPL DL<=0.05 MIU/L-ACNC: 1.84 UIU/ML (ref 0.27–4.2)
URIC ACID, SERUM: 4.6 MG/DL (ref 3.4–7)
VLDLC SERPL CALC-MCNC: 43 MG/DL
WBC # BLD: 5.4 E9/L (ref 4.5–11.5)

## 2021-07-23 PROCEDURE — 99214 OFFICE O/P EST MOD 30 MIN: CPT | Performed by: FAMILY MEDICINE

## 2021-07-23 ASSESSMENT — ENCOUNTER SYMPTOMS
ALLERGIC/IMMUNOLOGIC NEGATIVE: 1
NAUSEA: 0
CHEST TIGHTNESS: 0
COLOR CHANGE: 0
ABDOMINAL PAIN: 0
SINUS PRESSURE: 0
COUGH: 0
BLOOD IN STOOL: 0
SORE THROAT: 0
APNEA: 0
PHOTOPHOBIA: 0
FACIAL SWELLING: 0
SHORTNESS OF BREATH: 0
DIARRHEA: 0
WHEEZING: 0
VOMITING: 0
BACK PAIN: 0

## 2021-07-23 NOTE — PROGRESS NOTES
Chief Complaint:   Chief Complaint   Patient presents with    Leg Pain     doing better    Discuss Medications       Leg Pain   The incident occurred more than 1 week ago. There was no injury mechanism. The quality of the pain is described as aching. The pain is at a severity of 3/10. The pain is mild. The pain has been fluctuating since onset. Diabetes  He presents for his follow-up diabetic visit. He has type 2 diabetes mellitus. His disease course has been stable. Pertinent negatives for hypoglycemia include no confusion, headaches or nervousness/anxiousness. Pertinent negatives for diabetes include no chest pain and no weakness. Symptoms are stable. Current diabetic treatment includes oral agent (monotherapy). He is compliant with treatment all of the time. Hyperlipidemia  This is a new problem. The current episode started more than 1 year ago. The problem is controlled. Recent lipid tests were reviewed and are normal. Associated symptoms include leg pain. Pertinent negatives include no chest pain, myalgias or shortness of breath. Current antihyperlipidemic treatment includes statins. The current treatment provides significant improvement of lipids.        Patient Active Problem List   Diagnosis    Coronary atherosclerosis of native coronary artery    Hyperlipidemia    Aortic stenosis    Hypertension    DEBORAH (obstructive sleep apnea)    DDD (degenerative disc disease), cervical    Nocturnal hypoxemia due to asthma    Neuroforaminal stenosis of lumbar spine    Valvular heart disease    Mild intermittent asthma without complication    New onset a-fib ([address]    S/P AVR (aortic valve replacement)    S/P CABG (coronary artery bypass graft)    [facility] fibrillation with [facility] response [facility]    Type 2 diabetes mellitus with diabetic neuropathy, without long-term current use of insulin (HCC)    PVD (peripheral vascular disease) ([facility])    Bilateral carotid bruits    Carotid stenosis, right  Diplopia       Past Medical History:   Diagnosis Date    Arthritis     Atrial fibrillation ([facility] [address]     day of surgery on 06/12/13- resolved at this time 6/23/2013    Bilateral carotid bruits 10/15/2020    CAD (coronary artery disease)     heart cath with stent 2001    Cancer [facility])     Basal Cell on his back    Carotid stenosis, right 10/15/2020    COPD (chronic obstructive pulmonary disease) ([address]     Diplopia 4/21/2021    History of blood transfusion     Hyperlipidemia     Hypertension     Kidney stones     Sleep apnea     Type 2 diabetes mellitus with diabetic neuropathy, without long-term current use of insulin ([facility] [address] 9/2/2020    Vitamin D deficiency        Past Surgical History:   Procedure Laterality Date    ABDOMEN SURGERY      Bilateral groin hernias    AORTIC VALVE REPLACEMENT  6/12/2013    BACK SURGERY  2006    cervicle fusion   [facility] SURGERY  1974, 1984    lumbar laminectomy    BACK SURGERY  [id number]    360 FUSION L4-L5    BACK SURGERY      CARDIAC SURGERY      CARPAL TUNNEL RELEASE  1-20013    both hands    COLONOSCOPY      CORONARY ARTERY BYPASS GRAFT      DIAGNOSTIC CARDIAC CATH LAB PROCEDURE      ENDOSCOPY, COLON, DIAGNOSTIC      FRACTURE SURGERY      Right arm Fx    FRACTURE SURGERY      Left finger Fx    HERNIA REPAIR      Bilateral    KIDNEY STONE SURGERY      open    NECK SURGERY      SHOULDER ARTHROSCOPY      right and left shoulders    SKIN BIOPSY         Current Outpatient Medications   Medication Sig Dispense Refill    tiZANidine (ZANAFLEX) 4 MG tablet Take 1 tablet by mouth 4 times daily as needed (muscle spasms) 20 tablet 0    omeprazole (PRILOSEC) 20 MG delayed release capsule TAKE ONE CAPSULE BY MOUTH DAILY 90 capsule 3    allopurinol (ZYLOPRIM) 300 MG tablet TAKE ONE TABLET BY MOUTH DAILY 90 tablet 0    metoprolol succinate (TOPROL XL) 50 MG extended release tablet Take 1 tablet by mouth 2 times daily 180 tablet 1    furosemide (LASIX) 20 MG tablet TAKE ONE TABLET BY MOUTH EVERY DAY 90 tablet 0    apixaban (ELIQUIS) 5 MG TABS tablet Take 1 tablet by mouth 2 times daily 180 tablet 1    olmesartan (BENICAR) 40 MG tablet Take 1 tablet by mouth daily Indications: taking 40 mg 90 tablet 1    Alpha-Lipoic Acid 100 MG CAPS Take 200 mg by mouth nightly       Coenzyme Q10 (CO Q 10) 100 MG CAPS Take 2 tablets by mouth daily      Cholecalciferol (VITAMIN D3) 2000 UNITS TABS Take 1 tablet by mouth daily      Multiple Vitamins-Minerals (THERAPEUTIC MULTIVITAMIN-MINERALS) tablet Take 1 tablet by mouth daily Indications: Centrum       aspirin 81 MG EC tablet Take 81 mg by mouth daily        No current facility-administered medications for this visit.        Allergies   Allergen Reactions    Iodine Swelling and Other (See Comments)     Reddened face and swelling of tongue       Social History     Socioeconomic History    Marital status:      Spouse name: Filomena Herndon    Number of children: 3    Years of education: 9    Highest education level: None   Occupational History    Occupation: retired- [employer]     Employer: [employer] [address]: Retired   Tobacco Use    Smoking status: Former Smoker     Packs/day: 2.00     Years: 55.00     Pack years: 110.00     Types: Cigarettes     Start date:      Quit date: 2013     Years since quittin.2    Smokeless tobacco: Never Used    Tobacco comment: [employer]   Vaping Use    Vaping Use: Never used    Passive vaping exposure Yes   Substance and Sexual Activity    Alcohol use: Yes     Comment: drinks 6 cans of beer /day    Drug use: No    Sexual activity: Yes     Partners: Female   Other Topics Concern    None   Social History Narrative    None     Social Determinants of Health     Financial Resource Strain:     Difficulty of Paying Living Expenses:    Food Insecurity:     Worried About Running Out of Food in the Last Year:     Gwen of Food in the Last Year: HENT:      Head: Normocephalic and atraumatic. Nose: Nose normal.   Eyes:      Conjunctiva/sclera: Conjunctivae normal.      Pupils: Pupils are equal, round, and reactive to light. Neck:      Thyroid: No thyromegaly. Vascular: No JVD. Cardiovascular:      Rate and Rhythm: Normal rate and regular rhythm. Heart sounds: Normal heart sounds. No murmur heard. No friction rub. No gallop. Pulmonary:      Effort: Pulmonary effort is normal. No respiratory distress. Breath sounds: Normal breath sounds. No wheezing. Abdominal:      General: Bowel sounds are normal. There is no distension. Palpations: Abdomen is soft. Tenderness: There is no abdominal tenderness. There is no guarding or rebound. Musculoskeletal:         General: Normal range of motion. Cervical back: Normal range of motion and neck supple. Lymphadenopathy:      Cervical: No cervical adenopathy. Skin:     General: Skin is warm and dry. Findings: No erythema or rash. Neurological:      Mental Status: He is alert and oriented to person, place, and time. Cranial Nerves: No cranial nerve deficit. Motor: No abnormal muscle tone. Coordination: Coordination normal.      Deep Tendon Reflexes: Reflexes are normal and symmetric. Psychiatric:         Behavior: Behavior normal.         Judgment: Judgment normal.                               ASSESSMENT/PLAN:    Roma Cee was seen today for leg pain and discuss medications. Diagnoses and all orders for this visit:    Paroxysmal atrial fibrillation (HCC)  -     CBC Auto Differential; Future  -     Comprehensive Metabolic Panel; Future  -     Lipid Panel; Future  -     Hemoglobin A1C; Future  -     TSH without Reflex; Future  -     CK; Future    PVD (peripheral vascular disease) (HCC)  -     CBC Auto Differential; Future  -     Comprehensive Metabolic Panel; Future  -     Lipid Panel;  Future  -     Hemoglobin A1C; Future  -     TSH without Reflex; Future  -     CK; Future    Hyperuricemia  -     CBC Auto Differential; Future  -     Comprehensive Metabolic Panel; Future  -     Lipid Panel; Future  -     Hemoglobin A1C; Future  -     TSH without Reflex; Future  -     CK; Future  -     URIC ACID; Future    Mixed hyperlipidemia  -     CBC Auto Differential; Future  -     Comprehensive Metabolic Panel; Future  -     Lipid Panel; Future  -     Hemoglobin A1C; Future  -     TSH without Reflex; Future  -     CK; Future    Myalgia  -     CBC Auto Differential; Future  -     Comprehensive Metabolic Panel; Future  -     Lipid Panel; Future  -     Hemoglobin A1C; Future  -     TSH without Reflex; Future  -     CK; Future    Type 2 diabetes mellitus with diabetic neuropathy, without long-term current use of insulin (HCC)  -     CBC Auto Differential; Future  -     Comprehensive Metabolic Panel; Future  -     Lipid Panel; Future  -     Hemoglobin A1C; Future  -     TSH without Reflex; Future  -     CK; Future    Prostate cancer screening  -     PSA SCREENING;  Future    stop metformin and livalo   F/u 1 mo        Kelle Mcdonnell DO    7/23/2021  7:56 AM

## 2021-07-26 ENCOUNTER — TELEPHONE (OUTPATIENT)
Dept: PRIMARY CARE CLINIC | Age: 76
End: 2021-07-26

## 2021-07-26 NOTE — TELEPHONE ENCOUNTER
The pt's wife is calling and asking if you think the pt should go off his lasix because of his kidneys

## 2021-07-26 NOTE — TELEPHONE ENCOUNTER
Kidney function improved  Take lasix as needed -   Weigh pt daily - if gain > 3 lbs restart lasix and notify me

## 2021-07-28 ENCOUNTER — TELEPHONE (OUTPATIENT)
Dept: VASCULAR SURGERY | Age: 76
End: 2021-07-28

## 2021-07-28 ENCOUNTER — OFFICE VISIT (OUTPATIENT)
Dept: VASCULAR SURGERY | Age: 76
End: 2021-07-28
Payer: MEDICARE

## 2021-07-28 DIAGNOSIS — R09.89 BILATERAL CAROTID BRUITS: ICD-10-CM

## 2021-07-28 DIAGNOSIS — I65.21 CAROTID STENOSIS, RIGHT: Primary | ICD-10-CM

## 2021-07-28 PROCEDURE — 99214 OFFICE O/P EST MOD 30 MIN: CPT | Performed by: SURGERY

## 2021-07-28 NOTE — PROGRESS NOTES
Chief Complaint:   Chief Complaint   Patient presents with    Circulatory Problem     Dull numbness right side of neck, patient states Dr. Rebecca Montero heard a 'blockage' in his neck.           HPI: Patient came to the office, because of his symptoms he has, occasionally sharp shooting tingling symptoms with numbness and pain on the right side of neck radiating to the right side of the scalp on and off for the last 3 months, recently was seen by his PCP, who referred the patient for further evaluation because of previous history of normal right carotid stenosis documented with carotid ultrasound done last year    Patient tells me he did undergo extensive cervical spine surgery with spinal fusion by Dr. General Cherry many years ago    Patient did undergo an  MRA of the carotids and the head done without contrast, revealed no evidence of any major abnormal findings      Patient denies any focal lateralizing neurological symptoms like loss of speech, vision or loss of function of extremity    Patient can walk a few blocks , and denies any symptoms of rest pain    Allergies   Allergen Reactions    Iodine Swelling and Other (See Comments)     Reddened face and swelling of tongue       Current Outpatient Medications   Medication Sig Dispense Refill    tiZANidine (ZANAFLEX) 4 MG tablet Take 1 tablet by mouth 4 times daily as needed (muscle spasms) 20 tablet 0    omeprazole (PRILOSEC) 20 MG delayed release capsule TAKE ONE CAPSULE BY MOUTH DAILY 90 capsule 3    allopurinol (ZYLOPRIM) 300 MG tablet TAKE ONE TABLET BY MOUTH DAILY 90 tablet 0    metoprolol succinate (TOPROL XL) 50 MG extended release tablet Take 1 tablet by mouth 2 times daily 180 tablet 1    apixaban (ELIQUIS) 5 MG TABS tablet Take 1 tablet by mouth 2 times daily 180 tablet 1    olmesartan (BENICAR) 40 MG tablet Take 1 tablet by mouth daily Indications: taking 40 mg 90 tablet 1    Alpha-Lipoic Acid 100 MG CAPS Take 200 mg by mouth nightly       Coenzyme Q10 (CO Q 10) 100 MG CAPS Take 2 tablets by mouth daily      Cholecalciferol (VITAMIN D3) 2000 UNITS TABS Take 1 tablet by mouth daily      Multiple Vitamins-Minerals (THERAPEUTIC MULTIVITAMIN-MINERALS) tablet Take 1 tablet by mouth daily Indications: Centrum       aspirin 81 MG EC tablet Take 81 mg by mouth daily       furosemide (LASIX) 20 MG tablet TAKE ONE TABLET BY MOUTH EVERY DAY (Patient not taking: Reported on 7/28/2021) 90 tablet 0     No current facility-administered medications for this visit.        Past Medical History:   Diagnosis Date    Arthritis     Atrial fibrillation ([address]     day of surgery on 06/12/13- resolved at this time 6/23/2013    Bilateral carotid bruits 10/15/2020    CAD (coronary artery disease)     heart cath with stent 2001    [facility])     Basal Cell on his back    Carotid stenosis, right 10/15/2020    COPD (chronic obstructive pulmonary disease) ([address]     Diplopia 4/21/2021    History of blood transfusion     Hyperlipidemia     Hypertension     Kidney stones     Sleep apnea     Type 2 diabetes mellitus with diabetic neuropathy, without long-term current use of insulin ([facility] [address] 9/2/2020    Vitamin D deficiency        Past Surgical History:   Procedure Laterality Date    ABDOMEN SURGERY      Bilateral groin hernias    AORTIC VALVE REPLACEMENT  6/12/2013    BACK SURGERY  2006    cervicle fusion   [facility] SURGERY  1974, 1984    lumbar laminectomy    BACK SURGERY  [id number]    360 FUSION L4-L5    BACK SURGERY      CARDIAC SURGERY      CARPAL TUNNEL RELEASE  1-20013    both hands    COLONOSCOPY      CORONARY ARTERY BYPASS GRAFT      DIAGNOSTIC CARDIAC CATH LAB PROCEDURE      ENDOSCOPY, COLON, DIAGNOSTIC      FRACTURE SURGERY      Right arm Fx    FRACTURE SURGERY      Left finger Fx    HERNIA REPAIR      Bilateral    KIDNEY STONE SURGERY      open    NECK SURGERY      SHOULDER ARTHROSCOPY      right and left shoulders    SKIN BIOPSY         Family History   Problem loss.  Respiratory:  No cough, pleuritic chest pain, dyspnea, or wheezing. Cardiovascular: No angina, palpitations . Coronary artery disease, history of atrial fibrillation, hypertension  Musculoskeletal:  No arthritis or weakness. History of gout  Neurologic:  No paralysis, paresis, paresthesia, seizures or headache. Endocrinology:           Physical Exam:  General appearance:  Alert, awake, oriented x 3. No distress. Eyes:  Conjunctivae appear normal; PERRL  Neck:  No jugular venous distention, lymphadenopathy or thyromegaly. No evidence of carotid bruit  Lungs:  Clear to ausculation bilaterally. No rhonchi, crackles, wheezes  Heart:  Regular rate and rhythm. No rub or murmur  Abdomen:  Soft, non-tender. No masses, organomegaly. Musculoskeletal : No joint effusions, tenderness swelling    Neuro: Speech is intact. Moving all extremities. No focal motor or sensory deficits      Extremities:  Both feet are warm to touch. The color of both feet is normal.        Pulses Right  Left    Brachial 3 3    Radial    3=normal   Femoral 2 2  2=diminished   Popliteal    1=barely palpable   Dorsalis pedis    0=absent   Posterior tibial    4=aneurysmal             Other pertinent information:1. The past medical records were reviewed. 2.  The last carotid ultrasound as well MRI of the carotids and MRA head were reviewed    Assessment:    1. Carotid stenosis, right    2.  Bilateral carotid bruits              Plan:       Discussed with the patient and his wife in the room, options, risks benefits and alternatives explained, it is felt, his symptoms are consistent with a peripheral nerve pathology particularly of history of cervical spine fusion surgery rather than a central TIA type of issue from the carotid artery    Nevertheless, patient was recommended a follow-up carotid ultrasound because of history of known carotid artery disease on the right side based on the ultrasound testing that was done last year              Patient was instructed to continue walking program and to call if any worsening of symptoms and to call if any focal lateralizing neurological symptoms like loss of speech, vision or loss of function of extremity. All the questions were answered.       Orders Placed This Encounter   Procedures    US CAROTID ARTERY BILATERAL             Indicated follow-up: Call as needed in 1 year

## 2021-08-06 ENCOUNTER — HOSPITAL ENCOUNTER (OUTPATIENT)
Dept: ULTRASOUND IMAGING | Age: 76
Discharge: HOME | End: 2021-08-08
Payer: MEDICARE

## 2021-08-06 DIAGNOSIS — I65.21 CAROTID STENOSIS, RIGHT: ICD-10-CM

## 2021-08-06 PROCEDURE — 93880 EXTRACRANIAL BILAT STUDY: CPT

## 2021-08-10 ENCOUNTER — TELEPHONE (OUTPATIENT)
Dept: VASCULAR SURGERY | Age: 76
End: 2021-08-10

## 2021-08-10 NOTE — TELEPHONE ENCOUNTER
Called and informed patient that carotid ultrasound 40% plus stenosis on right, keep next appointment.

## 2021-09-08 ENCOUNTER — OFFICE VISIT (OUTPATIENT)
Dept: PRIMARY CARE CLINIC | Age: 76
End: 2021-09-08
Payer: MEDICARE

## 2021-09-08 VITALS
SYSTOLIC BLOOD PRESSURE: 142 MMHG | HEIGHT: 69 IN | BODY MASS INDEX: 31.55 KG/M2 | WEIGHT: 213 LBS | HEART RATE: 56 BPM | RESPIRATION RATE: 18 BRPM | DIASTOLIC BLOOD PRESSURE: 76 MMHG | OXYGEN SATURATION: 98 % | TEMPERATURE: 97.6 F

## 2021-09-08 DIAGNOSIS — E11.40 TYPE 2 DIABETES MELLITUS WITH DIABETIC NEUROPATHY, WITHOUT LONG-TERM CURRENT USE OF INSULIN (HCC): ICD-10-CM

## 2021-09-08 DIAGNOSIS — I48.0 PAROXYSMAL ATRIAL FIBRILLATION (HCC): Primary | ICD-10-CM

## 2021-09-08 DIAGNOSIS — E78.2 MIXED HYPERLIPIDEMIA: ICD-10-CM

## 2021-09-08 DIAGNOSIS — I35.9 AORTIC VALVE DISEASE: ICD-10-CM

## 2021-09-08 DIAGNOSIS — I73.9 PVD (PERIPHERAL VASCULAR DISEASE) (HCC): ICD-10-CM

## 2021-09-08 PROCEDURE — 99213 OFFICE O/P EST LOW 20 MIN: CPT | Performed by: FAMILY MEDICINE

## 2021-09-08 ASSESSMENT — ENCOUNTER SYMPTOMS
ALLERGIC/IMMUNOLOGIC NEGATIVE: 1
VOMITING: 0
COLOR CHANGE: 0
COUGH: 0
APNEA: 0
SHORTNESS OF BREATH: 0
ABDOMINAL PAIN: 0
SINUS PRESSURE: 0
PHOTOPHOBIA: 0
BLOOD IN STOOL: 0
BACK PAIN: 0
DIARRHEA: 0
FACIAL SWELLING: 0
CHEST TIGHTNESS: 0
WHEEZING: 0
NAUSEA: 0
SORE THROAT: 0

## 2021-09-08 NOTE — PROGRESS NOTES
Chief Complaint:   Chief Complaint   Patient presents with   [address] clinic for Cardio.  Hypertension    Atrial Fibrillation       Hypertension  This is a chronic problem. The current episode started more than 1 year ago. The problem has been resolved since onset. The problem is controlled. Pertinent negatives include no chest pain, headaches, palpitations or shortness of breath. Past treatments include angiotensin blockers and beta blockers. The current treatment provides significant improvement. There are no compliance problems. Fall  Pertinent negatives include no abdominal pain, headaches, nausea or vomiting. Coronary Artery Disease  Presents for follow-up visit. Pertinent negatives include no chest pain, chest tightness, leg swelling, palpitations or shortness of breath. The symptoms have been resolved. Compliance with diet is good. Compliance with exercise is good. Compliance with medications is good.        Patient Active Problem List   Diagnosis    Coronary atherosclerosis of native coronary artery    Hyperlipidemia    Aortic stenosis    Hypertension    DEBORAH (obstructive sleep apnea)    DDD (degenerative disc disease), cervical    Nocturnal hypoxemia due to asthma    Neuroforaminal stenosis of lumbar spine    Valvular heart disease    Mild intermittent asthma without complication    New onset a-fib ([address]    S/P AVR (aortic valve replacement)    S/P CABG (coronary artery bypass graft)    Atrial fibrillation with rapid ventricular response (HCC)    Type 2 diabetes mellitus with diabetic neuropathy, without long-term current use of insulin (HCC)    PVD (peripheral vascular disease) ([address]    Bilateral carotid bruits    Carotid stenosis, right    Diplopia       Past Medical History:   Diagnosis Date    Arthritis     Atrial fibrillation ([address]     day of surgery on 06/12/13- resolved at this time 6/23/2013    Bilateral carotid bruits 10/15/2020    CAD (coronary artery disease)     heart cath with stent 2001    Cancer [facility])     Basal Cell on his back    Carotid stenosis, right 10/15/2020    COPD [facility]ch[facility] obstructive [facility] disease) [facility]     Diplopia 4/21/2021    History of blood transfusion     Hyperlipidemia     Hypertension     Kidney stones     Sleep apnea     Type 2 diabetes mellitus with diabetic neuropathy, without long-term current use of insulin ([facility] 9/2/2020    Vitamin D deficiency        Past Surgical History:   Procedure Laterality Date    ABDOMEN SURGERY      Bilateral groin hernias    AORTIC VALVE REPLACEMENT  6/12/2013    BACK SURGERY  2006    cervicle fusion   [facility] SURGERY  1974, 1984    lumbar laminectomy    BACK SURGERY  [id number]    360 FUSION L4-L5    BACK SURGERY      CARDIAC SURGERY      CARPAL TUNNEL RELEASE  1-20013    both hands    COLONOSCOPY      CORONARY ARTERY BYPASS GRAFT      DIAGNOSTIC CARDIAC CATH LAB PROCEDURE      ENDOSCOPY, COLON, DIAGNOSTIC      FRACTURE SURGERY      Right arm Fx    FRACTURE SURGERY      Left finger Fx    HERNIA REPAIR      Bilateral    KIDNEY STONE SURGERY      open    NECK SURGERY      SHOULDER ARTHROSCOPY      right and left shoulders    SKIN BIOPSY         Current Outpatient Medications   Medication Sig Dispense Refill    tiZANidine (ZANAFLEX) 4 MG tablet Take 1 tablet by mouth 4 times daily as needed (muscle spasms) 20 tablet 0    omeprazole (PRILOSEC) 20 MG delayed release capsule TAKE ONE CAPSULE BY MOUTH DAILY 90 capsule 3    allopurinol (ZYLOPRIM) 300 MG tablet TAKE ONE TABLET BY MOUTH DAILY 90 tablet 0    metoprolol succinate (TOPROL XL) 50 MG extended release tablet Take 1 tablet by mouth 2 times daily 180 tablet 1    apixaban (ELIQUIS) 5 MG TABS tablet Take 1 tablet by mouth 2 times daily 180 tablet 1    olmesartan (BENICAR) 40 MG tablet Take 1 tablet by mouth daily Indications: taking 40 mg 90 tablet 1    Alpha-Lipoic Acid 100 MG CAPS Take 200 mg by mouth nightly       Coenzyme Q10 (CO Q 10) 100 MG CAPS Take 2 tablets by mouth daily      Cholecalciferol (VITAMIN D3) 2000 UNITS TABS Take 1 tablet by mouth daily      Multiple Vitamins-Minerals (THERAPEUTIC MULTIVITAMIN-MINERALS) tablet Take 1 tablet by mouth daily Indications: Centrum       aspirin 81 MG EC tablet Take 81 mg by mouth daily        No current facility-administered medications for this visit. Allergies   Allergen Reactions    Iodine Swelling and Other (See Comments)     Reddened face and swelling of tongue       Social History     Socioeconomic History    Marital status:      Spouse name: Chuck Martinez    Number of children: 3    Years of education: 9    Highest education level: None   Occupational History    Occupation: retired- assembly line/ [employer]     Employer: [employer]: Retired   Tobacco Use    Smoking status: Former Smoker     Packs/day: 2.00     Years: 55.00     Pack years: 110.00     Types: Cigarettes     Start date:      Quit date: 2013     Years since quittin.3    Smokeless tobacco: Never Used    Tobacco comment: Camel Cig   Vaping Use    Vaping Use: Never used    Passive vaping exposure Yes   Substance and Sexual Activity    Alcohol use: Yes     Comment: drinks 6 cans of beer /day    Drug use: No    Sexual activity: Yes     Partners: Female   Other Topics Concern    None   Social History Narrative    None     Social Determinants of Health     Financial Resource Strain:     Difficulty of Paying Living Expenses:    Food Insecurity:     Worried About Running Out of Food in the Last Year:     Ran Out of Food in the Last Year:    Transportation Needs:     Lack of Transportation (Medical):      Lack of Transportation (Non-Medical):    Physical Activity:     Days of Exercise per Week:     Minutes of Exercise per Session:    Stress:     Feeling of Stress :    Social Connections:     Frequency of Communication with Friends and Family:     Frequency of Social Gatherings with Friends and Family:     Attends Denominational Services:     Active Member of Clubs or Organizations:     Attends Club or Organization Meetings:     Marital Status:    Intimate Partner Violence:     Fear of Current or Ex-Partner:     Emotionally Abused:     Physically Abused:     Sexually Abused:        Family History   Problem Relation Age of Onset    Stroke Father     Cancer Sister     Heart Disease Brother          Review of Systems   Constitutional: Negative. HENT: Negative for congestion, facial swelling, hearing loss, nosebleeds, sinus pressure and sore throat. Eyes: Negative for photophobia and visual disturbance. Respiratory: Negative for apnea, cough, chest tightness, shortness of breath and wheezing. Cardiovascular: Negative for chest pain, palpitations and leg swelling. Gastrointestinal: Negative for abdominal pain, blood in stool, diarrhea, nausea and vomiting. Genitourinary: Negative for difficulty urinating, frequency and urgency. Musculoskeletal: Negative for arthralgias, back pain, joint swelling and myalgias. Skin: Negative for color change and rash. Allergic/Immunologic: Negative. Neurological: Negative for syncope, weakness, light-headedness and headaches. Hematological: Negative. Psychiatric/Behavioral: Negative for agitation, behavioral problems, confusion and self-injury. The patient is not nervous/anxious. All other systems reviewed and are negative. Physical Exam  Vitals and nursing note reviewed. Constitutional:       General: He is not in acute distress. Appearance: He is well-developed. HENT:      Head: Normocephalic and atraumatic. Nose: Nose normal.   Eyes:      Conjunctiva/sclera: Conjunctivae normal.      Pupils: Pupils are equal, round, and reactive to light. Neck:      Thyroid: No thyromegaly. Vascular: No JVD.    Cardiovascular:      Rate and Rhythm: Normal rate and regular rhythm. Heart sounds: Murmur heard. Systolic murmur is present. No friction rub. No gallop. Pulmonary:      Effort: Pulmonary effort is normal. No respiratory distress. Breath sounds: Normal breath sounds. No wheezing. Abdominal:      General: Bowel sounds are normal. There is no distension. Palpations: Abdomen is soft. Tenderness: There is no abdominal tenderness. There is no guarding or rebound. Musculoskeletal:         General: Normal range of motion. Cervical back: Normal range of motion and neck supple. Lymphadenopathy:      Cervical: No cervical adenopathy. Skin:     General: Skin is warm and dry. Findings: No erythema or rash. Neurological:      Mental Status: He is alert and oriented to person, place, and time. Cranial Nerves: No cranial nerve deficit. Motor: No abnormal muscle tone. Coordination: Coordination normal.      Deep Tendon Reflexes: Reflexes are normal and symmetric. Psychiatric:         Behavior: Behavior normal.         Judgment: Judgment normal.                               ASSESSMENT/PLAN:    Sherman Jolley was seen today for 3 month follow-up, hypertension and atrial fibrillation.     Diagnoses and all orders for this visit:    Paroxysmal atrial fibrillation ([address]    PVD (peripheral vascular disease) ([address]    Mixed hyperlipidemia    Type 2 diabetes mellitus with diabetic neuropathy, without long-term current use of insulin ([address]    Aortic valve disease      F/u cardiology      Danilo Purcell DO    9/8/2021  11:04 AM

## 2021-09-18 DIAGNOSIS — E78.2 MIXED HYPERLIPIDEMIA: ICD-10-CM

## 2021-09-18 DIAGNOSIS — I10 ESSENTIAL HYPERTENSION: Chronic | ICD-10-CM

## 2021-09-18 DIAGNOSIS — E79.0 HYPERURICEMIA: ICD-10-CM

## 2021-09-18 DIAGNOSIS — I48.0 PAROXYSMAL ATRIAL FIBRILLATION (HCC): ICD-10-CM

## 2021-09-18 DIAGNOSIS — R73.03 PREDIABETES: ICD-10-CM

## 2021-09-20 ENCOUNTER — TELEPHONE (OUTPATIENT)
Dept: PRIMARY CARE CLINIC | Age: 76
End: 2021-09-20

## 2021-09-20 DIAGNOSIS — R73.03 PREDIABETES: ICD-10-CM

## 2021-09-20 RX ORDER — ALLOPURINOL 300 MG/1
TABLET ORAL
Qty: 90 TABLET | Refills: 0 | Status: SHIPPED
Start: 2021-09-20 | End: 2021-12-08 | Stop reason: SDUPTHER

## 2021-10-04 DIAGNOSIS — I48.0 PAROXYSMAL ATRIAL FIBRILLATION (HCC): ICD-10-CM

## 2021-11-10 ENCOUNTER — TELEPHONE (OUTPATIENT)
Dept: PRIMARY CARE CLINIC | Age: 76
End: 2021-11-10

## 2021-11-10 DIAGNOSIS — J44.9 CHRONIC OBSTRUCTIVE PULMONARY DISEASE, UNSPECIFIED COPD TYPE (HCC): Primary | ICD-10-CM

## 2021-11-10 DIAGNOSIS — G47.33 OSA (OBSTRUCTIVE SLEEP APNEA): ICD-10-CM

## 2021-11-10 NOTE — TELEPHONE ENCOUNTER
The pt's wife is calling for the pt to see if he can get a referral to see a new pulmonologist. They are asking if you know anything about Dr. iMllie Redding, what you think of him and if the pt can get a referral

## 2021-11-10 NOTE — TELEPHONE ENCOUNTER
Dr Devin Johnson is good lung doctor  FYI there is probably going to be a while before he is seen  Can place referral Patient was in the office today for pacemaker check. She is in persistent atrial flutter with an average rate of 80s. She has some right lower extremity edam that began on Friday. It has not worsened and the pinkness has improved over the weekend. She elevates it as often as possible. She request being switched back to coreg from metoprolol. The metoprolol was discontinued and she is to go back to taking coreg 3.125 BID.  I will follow up with her on Friday to see how her heart rate, lower extremity edema, and fatigue. AL

## 2021-12-08 ENCOUNTER — OFFICE VISIT (OUTPATIENT)
Dept: PRIMARY CARE CLINIC | Age: 76
End: 2021-12-08
Payer: MEDICARE

## 2021-12-08 VITALS
TEMPERATURE: 97.5 F | BODY MASS INDEX: 32.73 KG/M2 | OXYGEN SATURATION: 95 % | HEART RATE: 88 BPM | HEIGHT: 69 IN | SYSTOLIC BLOOD PRESSURE: 138 MMHG | WEIGHT: 221 LBS | DIASTOLIC BLOOD PRESSURE: 82 MMHG

## 2021-12-08 DIAGNOSIS — E78.2 MIXED HYPERLIPIDEMIA: ICD-10-CM

## 2021-12-08 DIAGNOSIS — I48.0 PAROXYSMAL ATRIAL FIBRILLATION (HCC): ICD-10-CM

## 2021-12-08 DIAGNOSIS — R73.03 PREDIABETES: ICD-10-CM

## 2021-12-08 DIAGNOSIS — I10 ESSENTIAL HYPERTENSION: ICD-10-CM

## 2021-12-08 DIAGNOSIS — E11.40 TYPE 2 DIABETES MELLITUS WITH DIABETIC NEUROPATHY, WITHOUT LONG-TERM CURRENT USE OF INSULIN (HCC): ICD-10-CM

## 2021-12-08 DIAGNOSIS — J44.1 COPD EXACERBATION (HCC): ICD-10-CM

## 2021-12-08 DIAGNOSIS — E79.0 HYPERURICEMIA: ICD-10-CM

## 2021-12-08 DIAGNOSIS — J44.1 COPD EXACERBATION (HCC): Primary | ICD-10-CM

## 2021-12-08 LAB
ALBUMIN SERPL-MCNC: 4.6 G/DL (ref 3.5–5.2)
ALP BLD-CCNC: 71 U/L (ref 40–129)
ALT SERPL-CCNC: 34 U/L (ref 0–40)
ANION GAP SERPL CALCULATED.3IONS-SCNC: 15 MMOL/L (ref 7–16)
AST SERPL-CCNC: 35 U/L (ref 0–39)
BASOPHILS ABSOLUTE: 0.07 E9/L (ref 0–0.2)
BASOPHILS RELATIVE PERCENT: 0.8 % (ref 0–2)
BILIRUB SERPL-MCNC: 0.5 MG/DL (ref 0–1.2)
BUN BLDV-MCNC: 22 MG/DL (ref 6–23)
CALCIUM SERPL-MCNC: 10.2 MG/DL (ref 8.6–10.2)
CHLORIDE BLD-SCNC: 103 MMOL/L (ref 98–107)
CHOLESTEROL, TOTAL: 269 MG/DL (ref 0–199)
CO2: 24 MMOL/L (ref 22–29)
CREAT SERPL-MCNC: 1.1 MG/DL (ref 0.7–1.2)
EOSINOPHILS ABSOLUTE: 0.2 E9/L (ref 0.05–0.5)
EOSINOPHILS RELATIVE PERCENT: 2.2 % (ref 0–6)
GFR AFRICAN AMERICAN: >60
GFR NON-AFRICAN AMERICAN: >60 ML/MIN/1.73
GLUCOSE BLD-MCNC: 119 MG/DL (ref 74–99)
HBA1C MFR BLD: 6 % (ref 4–5.6)
HCT VFR BLD CALC: 38.8 % (ref 37–54)
HDLC SERPL-MCNC: 57 MG/DL
HEMOGLOBIN: 12.6 G/DL (ref 12.5–16.5)
IMMATURE GRANULOCYTES #: 0.03 E9/L
IMMATURE GRANULOCYTES %: 0.3 % (ref 0–5)
LDL CHOLESTEROL CALCULATED: 171 MG/DL (ref 0–99)
LYMPHOCYTES ABSOLUTE: 2.39 E9/L (ref 1.5–4)
LYMPHOCYTES RELATIVE PERCENT: 25.7 % (ref 20–42)
MCH RBC QN AUTO: 31.9 PG (ref 26–35)
MCHC RBC AUTO-ENTMCNC: 32.5 % (ref 32–34.5)
MCV RBC AUTO: 98.2 FL (ref 80–99.9)
MONOCYTES ABSOLUTE: 0.84 E9/L (ref 0.1–0.95)
MONOCYTES RELATIVE PERCENT: 9 % (ref 2–12)
NEUTROPHILS ABSOLUTE: 5.76 E9/L (ref 1.8–7.3)
NEUTROPHILS RELATIVE PERCENT: 62 % (ref 43–80)
PDW BLD-RTO: 13.2 FL (ref 11.5–15)
PLATELET # BLD: 172 E9/L (ref 130–450)
PMV BLD AUTO: 11.1 FL (ref 7–12)
POTASSIUM SERPL-SCNC: 5.4 MMOL/L (ref 3.5–5)
RBC # BLD: 3.95 E12/L (ref 3.8–5.8)
SODIUM BLD-SCNC: 142 MMOL/L (ref 132–146)
TOTAL PROTEIN: 7.5 G/DL (ref 6.4–8.3)
TRIGL SERPL-MCNC: 205 MG/DL (ref 0–149)
TSH SERPL DL<=0.05 MIU/L-ACNC: 1.6 UIU/ML (ref 0.27–4.2)
URIC ACID, SERUM: 5.2 MG/DL (ref 3.4–7)
VLDLC SERPL CALC-MCNC: 41 MG/DL
WBC # BLD: 9.3 E9/L (ref 4.5–11.5)

## 2021-12-08 PROCEDURE — 99214 OFFICE O/P EST MOD 30 MIN: CPT | Performed by: FAMILY MEDICINE

## 2021-12-08 PROCEDURE — 90694 VACC AIIV4 NO PRSRV 0.5ML IM: CPT | Performed by: FAMILY MEDICINE

## 2021-12-08 PROCEDURE — G0008 ADMIN INFLUENZA VIRUS VAC: HCPCS | Performed by: FAMILY MEDICINE

## 2021-12-08 RX ORDER — PREDNISONE 20 MG/1
TABLET ORAL
Qty: 11 TABLET | Refills: 0 | Status: SHIPPED
Start: 2021-12-08 | End: 2021-12-20

## 2021-12-08 RX ORDER — ALLOPURINOL 300 MG/1
TABLET ORAL
Qty: 90 TABLET | Refills: 0 | Status: SHIPPED
Start: 2021-12-08 | End: 2022-04-13 | Stop reason: SDUPTHER

## 2021-12-08 RX ORDER — DOXYCYCLINE HYCLATE 100 MG
100 TABLET ORAL 2 TIMES DAILY
Qty: 20 TABLET | Refills: 0 | Status: SHIPPED | OUTPATIENT
Start: 2021-12-08 | End: 2021-12-18

## 2021-12-08 RX ORDER — CEFDINIR 300 MG/1
300 CAPSULE ORAL 2 TIMES DAILY
Qty: 20 CAPSULE | Refills: 0 | Status: ON HOLD
Start: 2021-12-08 | End: 2021-12-14 | Stop reason: HOSPADM

## 2021-12-08 ASSESSMENT — ENCOUNTER SYMPTOMS
FACIAL SWELLING: 0
APNEA: 0
SINUS PRESSURE: 0
ALLERGIC/IMMUNOLOGIC NEGATIVE: 1
PHOTOPHOBIA: 0
SORE THROAT: 0
CHEST TIGHTNESS: 0
DIARRHEA: 0
NAUSEA: 0
COLOR CHANGE: 0
ABDOMINAL PAIN: 0
BACK PAIN: 0
COUGH: 1
WHEEZING: 1
SHORTNESS OF BREATH: 0
BLOOD IN STOOL: 0
VOMITING: 0

## 2021-12-08 ASSESSMENT — COPD QUESTIONNAIRES: COPD: 1

## 2021-12-08 NOTE — PROGRESS NOTES
Chief Complaint:   Chief Complaint   Patient presents with    3 Month Follow-Up       Hyperlipidemia  This is a chronic problem. The current episode started more than 1 year ago. The problem is controlled. Recent lipid tests were reviewed and are normal. Pertinent negatives include no chest pain, myalgias or shortness of breath. Current antihyperlipidemic treatment includes statins. The current treatment provides significant improvement of lipids. There are no compliance problems. Diabetes  He presents for his follow-up diabetic visit. He has type 2 diabetes mellitus. His disease course has been stable. Pertinent negatives for hypoglycemia include no confusion, headaches or nervousness/anxiousness. Pertinent negatives for diabetes include no chest pain and no weakness. Symptoms are stable. Current diabetic treatment includes diet. He is compliant with treatment most of the time. Coronary Artery Disease  Presents for follow-up visit. Pertinent negatives include no chest pain, chest tightness, leg swelling, palpitations or shortness of breath. Risk factors include hyperlipidemia. The symptoms have been resolved. Compliance with diet is good. Compliance with exercise is good. Compliance with medications is good. COPD  He complains of cough and wheezing. There is no shortness of breath. This is a new problem. The current episode started in the past 7 days. The problem occurs daily. Pertinent negatives include no chest pain, headaches, myalgias or sore throat.        Patient Active Problem List   Diagnosis    Coronary atherosclerosis of native coronary artery    Hyperlipidemia    Aortic stenosis    Hypertension    DEBORAH (obstructive sleep apnea)    DDD (degenerative disc disease), cervical    Nocturnal hypoxemia due to asthma    Neuroforaminal stenosis of lumbar spine    Valvular heart disease    Mild intermittent asthma without complication    New onset a-fib ([address]    S/P AVR (aortic valve replacement)  S/P CABG (coronary artery bypass graft)    Atrial fibrillation with rapid ventricular response (HCC)    Type 2 diabetes mellitus with diabetic neuropathy, without long-term current use of insulin (HCC)    PVD (peripheral vascular disease) ([address]    Bilateral carotid bruits    Carotid stenosis, right    Diplopia       Past Medical History:   Diagnosis Date    Arthritis     Atrial fibrillation ([address]     day of surgery on 06/12/13- resolved at this time 6/23/2013    Bilateral carotid bruits 10/15/2020    CAD (coronary artery disease)     heart cath with stent 2001    Cancer [facility])     Basal Cell on his back    Carotid stenosis, right 10/15/2020    COPD (chronic obstructive pulmonary disease) ([address]     Diplopia 4/21/2021    History of blood transfusion     Hyperlipidemia     Hypertension     Kidney stones     Sleep apnea     Type 2 diabetes mellitus with diabetic neuropathy, without long-term current use of insulin ([address] 9/2/2020    Vitamin D deficiency        Past Surgical History:   Procedure Laterality Date    ABDOMEN SURGERY      Bilateral groin hernias    AORTIC VALVE REPLACEMENT  6/12/2013    BACK SURGERY  2006    cervicle fusion   [facility] BACK SURGERY  1974, 1984    lumbar laminectomy    BACK SURGERY  [id number]    360 FUSION L4-L5    BACK SURGERY      CARDIAC SURGERY      CARPAL TUNNEL RELEASE  1-20013    both hands    COLONOSCOPY      CORONARY ARTERY BYPASS GRAFT      DIAGNOSTIC CARDIAC CATH LAB PROCEDURE      ENDOSCOPY, COLON, DIAGNOSTIC      FRACTURE SURGERY      Right arm Fx    FRACTURE SURGERY      Left finger Fx    HERNIA REPAIR      Bilateral    KIDNEY STONE SURGERY      open    NECK SURGERY      SHOULDER ARTHROSCOPY      right and left shoulders    SKIN BIOPSY         Current Outpatient Medications   Medication Sig Dispense Refill    predniSONE (DELTASONE) 20 MG tablet 2 tabs qd x 3 days  1 tab qd x 3 days 0.5 tab qd x 3 days 11 tablet 0    doxycycline hyclate (VIBRA-TABS) 100 MG tablet Take 1 tablet by mouth 2 times daily for 10 days 20 tablet 0    allopurinol (ZYLOPRIM) 300 MG tablet TAKE ONE TABLET BY MOUTH DAILY 90 tablet 0    apixaban (ELIQUIS) 5 MG TABS tablet Take 1 tablet by mouth 2 times daily 180 tablet 1    tiZANidine (ZANAFLEX) 4 MG tablet Take 1 tablet by mouth 4 times daily as needed (muscle spasms) 20 tablet 0    omeprazole (PRILOSEC) 20 MG delayed release capsule TAKE ONE CAPSULE BY MOUTH DAILY 90 capsule 3    metoprolol succinate (TOPROL XL) 50 MG extended release tablet Take 1 tablet by mouth 2 times daily 180 tablet 1    olmesartan (BENICAR) 40 MG tablet Take 1 tablet by mouth daily Indications: taking 40 mg 90 tablet 1    Alpha-Lipoic Acid 100 MG CAPS Take 200 mg by mouth nightly       Coenzyme Q10 (CO Q 10) 100 MG CAPS Take 2 tablets by mouth daily      Cholecalciferol (VITAMIN D3) 2000 UNITS TABS Take 1 tablet by mouth daily      Multiple Vitamins-Minerals (THERAPEUTIC MULTIVITAMIN-MINERALS) tablet Take 1 tablet by mouth daily Indications: Centrum       aspirin 81 MG EC tablet Take 81 mg by mouth daily        No current facility-administered medications for this visit.        Allergies   Allergen Reactions    Iodine Swelling and Other (See Comments)     Reddened face and swelling of tongue       Social History     Socioeconomic History    Marital status:      Spouse name: Matthew Soto    Number of children: 3    Years of education: 9    Highest education level: Not on file   Occupational History    Occupation: retired- [employer]/ [employer]     Employer: [employer] [address]: Retired   Tobacco Use    Smoking status: Former Smoker     Packs/day: 2.00     Years: 55.00     Pack years: 110.00     Types: Cigarettes     Start date:      Quit date: 2013     Years since quittin.6    Smokeless tobacco: Never Used    Tobacco comment: Camel Cig   Vaping Use    Vaping Use: Never used    Passive vaping exposure: Yes   Substance and Sexual Activity    Alcohol use: Yes     Comment: drinks 6 cans of beer /day    Drug use: No    Sexual activity: Yes     Partners: Female   Other Topics Concern    Not on file   Social History Narrative    Not on file     Social Determinants of Health     Financial Resource Strain:     Difficulty of Paying Living Expenses: Not on file   Food Insecurity:     Worried About Running Out of Food in the Last Year: Not on file    Gwen of Food in the Last Year: Not on file   Transportation Needs:     Lack of Transportation (Medical): Not on file    Lack of Transportation (Non-Medical): Not on file   Physical Activity:     Days of Exercise per Week: Not on file    Minutes of Exercise per Session: Not on file   Stress:     Feeling of Stress : Not on file   Social Connections:     Frequency of Communication with Friends and Family: Not on file    Frequency of Social Gatherings with Friends and Family: Not on file    Attends Hindu Services: Not on file    Active Member of [address] [place] [address] or Organizations: Not on file    Attends Club or Organization Meetings: Not on file    Marital Status: Not on file   Intimate Partner Violence:     Fear of Current or Ex-Partner: Not on file    Emotionally Abused: Not on file    Physically Abused: Not on file    Sexually Abused: Not on file   Housing Stability:     Unable to Pay for Housing in the Last Year: Not on file    Number of [place] in the Last Year: Not on file    Unstable Housing in the Last Year: Not on file       Family History   Problem Relation Age of Onset    Stroke Father     Cancer Sister     Heart Disease Brother          Review of Systems   Constitutional: Negative. HENT: Negative for congestion, facial swelling, hearing loss, nosebleeds, sinus pressure and sore throat. Eyes: Negative for photophobia and visual disturbance. Respiratory: Positive for cough and wheezing.  Negative for apnea, chest tightness and shortness Cranial Nerves: No cranial nerve deficit. Motor: No abnormal muscle tone. Coordination: Coordination normal.      Deep Tendon Reflexes: Reflexes are normal and symmetric. Psychiatric:         Behavior: Behavior normal.         Judgment: Judgment normal.                               ASSESSMENT/PLAN:    Rubén Castro was seen today for 3 month follow-up. Diagnoses and all orders for this visit:    COPD exacerbation ([facility]  -     predniSONE (DELTASONE) 20 MG tablet; 2 tabs qd x 3 days  1 tab qd x 3 days 0.5 tab qd x 3 days  -     doxycycline hyclate (VIBRA-TABS) 100 MG tablet; Take 1 tablet by mouth 2 times daily for 10 days  -     CBC Auto Differential; Future  -     Comprehensive Metabolic Panel; Future  -     Lipid Panel; Future  -     Hemoglobin A1C; Future  -     TSH without Reflex; Future  -     XR CHEST (2 VW); Future    Paroxysmal atrial fibrillation (HCC)  -     CBC Auto Differential; Future  -     Comprehensive Metabolic Panel; Future  -     Lipid Panel; Future  -     Hemoglobin A1C; Future  -     TSH without Reflex; Future  -     allopurinol (ZYLOPRIM) 300 MG tablet; TAKE ONE TABLET BY MOUTH DAILY    Essential hypertension  -     CBC Auto Differential; Future  -     Comprehensive Metabolic Panel; Future  -     Lipid Panel; Future  -     Hemoglobin A1C; Future  -     TSH without Reflex; Future  -     allopurinol (ZYLOPRIM) 300 MG tablet; TAKE ONE TABLET BY MOUTH DAILY    Mixed hyperlipidemia  -     CBC Auto Differential; Future  -     Comprehensive Metabolic Panel; Future  -     Lipid Panel; Future  -     Hemoglobin A1C; Future  -     TSH without Reflex; Future  -     allopurinol (ZYLOPRIM) 300 MG tablet; TAKE ONE TABLET BY MOUTH DAILY    Type 2 diabetes mellitus with diabetic neuropathy, without long-term current use of insulin (HCC)  -     CBC Auto Differential; Future  -     Comprehensive Metabolic Panel; Future  -     Lipid Panel;  Future  -     Hemoglobin A1C; Future  -     TSH without Reflex; Future  -     URIC ACID; Future    Hyperuricemia  -     URIC ACID;  Future  -     allopurinol (ZYLOPRIM) 300 MG tablet; TAKE ONE TABLET BY MOUTH DAILY    Prediabetes  -     allopurinol (ZYLOPRIM) 300 MG tablet; TAKE ONE TABLET BY MOUTH DAILY            Rosibel Barrios DO    12/8/2021  11:20 AM

## 2021-12-10 ENCOUNTER — TELEPHONE (OUTPATIENT)
Dept: PRIMARY CARE CLINIC | Age: 76
End: 2021-12-10

## 2021-12-10 DIAGNOSIS — E78.2 MIXED HYPERLIPIDEMIA: Primary | ICD-10-CM

## 2021-12-10 RX ORDER — EZETIMIBE 10 MG/1
10 TABLET ORAL DAILY
Qty: 90 TABLET | Refills: 1 | Status: SHIPPED
Start: 2021-12-10 | End: 2022-01-27 | Stop reason: ALTCHOICE

## 2021-12-10 NOTE — TELEPHONE ENCOUNTER
Arn Labette calling for patient, when he was in he was supposed to see his pulmonologist the next day and appt was cancelled and moved to January 3rd. Asking if medications should help. He is having   Some shortness of breath with exertion and patient did go get covid test done today but they do not know results yet. Is there anything else you would like patient to do since unable to see pulmonology until January 3rd.

## 2021-12-11 ENCOUNTER — HOSPITAL ENCOUNTER (OUTPATIENT)
Age: 76
Setting detail: OBSERVATION
Discharge: HOME | End: 2021-12-14
Attending: STUDENT IN AN ORGANIZED HEALTH CARE EDUCATION/TRAINING PROGRAM | Admitting: INTERNAL MEDICINE
Payer: MEDICARE

## 2021-12-11 ENCOUNTER — APPOINTMENT (OUTPATIENT)
Dept: GENERAL RADIOLOGY | Age: 76
End: 2021-12-11
Payer: MEDICARE

## 2021-12-11 ENCOUNTER — NURSE TRIAGE (OUTPATIENT)
Dept: OTHER | Facility: CLINIC | Age: 76
End: 2021-12-11

## 2021-12-11 DIAGNOSIS — J44.9 CHRONIC OBSTRUCTIVE PULMONARY DISEASE, UNSPECIFIED COPD TYPE (HCC): ICD-10-CM

## 2021-12-11 DIAGNOSIS — I48.91 ATRIAL FIBRILLATION, UNSPECIFIED TYPE (HCC): ICD-10-CM

## 2021-12-11 DIAGNOSIS — I50.9 CONGESTIVE HEART FAILURE, UNSPECIFIED HF CHRONICITY, UNSPECIFIED HEART FAILURE TYPE (HCC): Primary | ICD-10-CM

## 2021-12-11 LAB
ALBUMIN SERPL-MCNC: 4.4 G/DL (ref 3.5–5.2)
ALP BLD-CCNC: 72 U/L (ref 40–129)
ALT SERPL-CCNC: 62 U/L (ref 0–40)
AMYLASE: 49 U/L (ref 20–100)
ANION GAP SERPL CALCULATED.3IONS-SCNC: 11 MMOL/L (ref 7–16)
APTT: 31.5 SEC (ref 24.5–35.1)
AST SERPL-CCNC: 34 U/L (ref 0–39)
BASOPHILS ABSOLUTE: 0.04 E9/L (ref 0–0.2)
BASOPHILS RELATIVE PERCENT: 0.4 % (ref 0–2)
BILIRUB SERPL-MCNC: 0.4 MG/DL (ref 0–1.2)
BILIRUBIN URINE: NEGATIVE
BLOOD, URINE: NEGATIVE
BUN BLDV-MCNC: 27 MG/DL (ref 6–23)
CALCIUM SERPL-MCNC: 9.5 MG/DL (ref 8.6–10.2)
CHLORIDE BLD-SCNC: 104 MMOL/L (ref 98–107)
CLARITY: CLEAR
CO2: 23 MMOL/L (ref 22–29)
COLOR: YELLOW
CREAT SERPL-MCNC: 1 MG/DL (ref 0.7–1.2)
EOSINOPHILS ABSOLUTE: 0 E9/L (ref 0.05–0.5)
EOSINOPHILS RELATIVE PERCENT: 0 % (ref 0–6)
GFR AFRICAN AMERICAN: >60
GFR NON-AFRICAN AMERICAN: >60 ML/MIN/1.73
GLUCOSE BLD-MCNC: 170 MG/DL (ref 74–99)
GLUCOSE URINE: NEGATIVE MG/DL
HCT VFR BLD CALC: 34.9 % (ref 37–54)
HEMOGLOBIN: 11.4 G/DL (ref 12.5–16.5)
IMMATURE GRANULOCYTES #: 0.04 E9/L
IMMATURE GRANULOCYTES %: 0.4 % (ref 0–5)
INR BLD: 1.1
KETONES, URINE: NEGATIVE MG/DL
LACTIC ACID, SEPSIS: 1.5 MMOL/L (ref 0.5–1.9)
LEUKOCYTE ESTERASE, URINE: NEGATIVE
LIPASE: 22 U/L (ref 13–60)
LYMPHOCYTES ABSOLUTE: 0.93 E9/L (ref 1.5–4)
LYMPHOCYTES RELATIVE PERCENT: 9 % (ref 20–42)
MCH RBC QN AUTO: 31.8 PG (ref 26–35)
MCHC RBC AUTO-ENTMCNC: 32.7 % (ref 32–34.5)
MCV RBC AUTO: 97.2 FL (ref 80–99.9)
MONOCYTES ABSOLUTE: 0.52 E9/L (ref 0.1–0.95)
MONOCYTES RELATIVE PERCENT: 5 % (ref 2–12)
NEUTROPHILS ABSOLUTE: 8.78 E9/L (ref 1.8–7.3)
NEUTROPHILS RELATIVE PERCENT: 85.2 % (ref 43–80)
NITRITE, URINE: NEGATIVE
PDW BLD-RTO: 13.7 FL (ref 11.5–15)
PH UA: 5 (ref 5–9)
PLATELET # BLD: 176 E9/L (ref 130–450)
PMV BLD AUTO: 10.9 FL (ref 7–12)
POTASSIUM REFLEX MAGNESIUM: 4.4 MMOL/L (ref 3.5–5)
PRO-BNP: 1299 PG/ML (ref 0–450)
PROTEIN UA: NEGATIVE MG/DL
PROTHROMBIN TIME: 12.8 SEC (ref 9.3–12.4)
RBC # BLD: 3.59 E12/L (ref 3.8–5.8)
SODIUM BLD-SCNC: 138 MMOL/L (ref 132–146)
SPECIFIC GRAVITY UA: 1.02 (ref 1–1.03)
TOTAL PROTEIN: 6.8 G/DL (ref 6.4–8.3)
TROPONIN, HIGH SENSITIVITY: 8 NG/L (ref 0–11)
UROBILINOGEN, URINE: 0.2 E.U./DL
WBC # BLD: 10.3 E9/L (ref 4.5–11.5)

## 2021-12-11 PROCEDURE — G0378 HOSPITAL OBSERVATION PER HR: HCPCS

## 2021-12-11 PROCEDURE — 96374 THER/PROPH/DIAG INJ IV PUSH: CPT

## 2021-12-11 PROCEDURE — 80053 COMPREHEN METABOLIC PANEL: CPT

## 2021-12-11 PROCEDURE — 71045 X-RAY EXAM CHEST 1 VIEW: CPT

## 2021-12-11 PROCEDURE — 85730 THROMBOPLASTIN TIME PARTIAL: CPT

## 2021-12-11 PROCEDURE — 94640 AIRWAY INHALATION TREATMENT: CPT

## 2021-12-11 PROCEDURE — 93005 ELECTROCARDIOGRAM TRACING: CPT | Performed by: PHYSICIAN ASSISTANT

## 2021-12-11 PROCEDURE — 85610 PROTHROMBIN TIME: CPT

## 2021-12-11 PROCEDURE — 81003 URINALYSIS AUTO W/O SCOPE: CPT

## 2021-12-11 PROCEDURE — 83690 ASSAY OF LIPASE: CPT

## 2021-12-11 PROCEDURE — 83605 ASSAY OF LACTIC ACID: CPT

## 2021-12-11 PROCEDURE — 83880 ASSAY OF NATRIURETIC PEPTIDE: CPT

## 2021-12-11 PROCEDURE — 6370000000 HC RX 637 (ALT 250 FOR IP): Performed by: STUDENT IN AN ORGANIZED HEALTH CARE EDUCATION/TRAINING PROGRAM

## 2021-12-11 PROCEDURE — 6360000002 HC RX W HCPCS: Performed by: STUDENT IN AN ORGANIZED HEALTH CARE EDUCATION/TRAINING PROGRAM

## 2021-12-11 PROCEDURE — 87088 URINE BACTERIA CULTURE: CPT

## 2021-12-11 PROCEDURE — 85025 COMPLETE CBC W/AUTO DIFF WBC: CPT

## 2021-12-11 PROCEDURE — 87040 BLOOD CULTURE FOR BACTERIA: CPT

## 2021-12-11 PROCEDURE — 99284 EMERGENCY DEPT VISIT MOD MDM: CPT

## 2021-12-11 PROCEDURE — 84484 ASSAY OF TROPONIN QUANT: CPT

## 2021-12-11 PROCEDURE — 82150 ASSAY OF AMYLASE: CPT

## 2021-12-11 PROCEDURE — 96375 TX/PRO/DX INJ NEW DRUG ADDON: CPT

## 2021-12-11 RX ORDER — FUROSEMIDE 10 MG/ML
20 INJECTION INTRAMUSCULAR; INTRAVENOUS ONCE
Status: COMPLETED | OUTPATIENT
Start: 2021-12-11 | End: 2021-12-11

## 2021-12-11 RX ORDER — SODIUM CHLORIDE 0.9 % (FLUSH) 0.9 %
10 SYRINGE (ML) INJECTION PRN
Status: DISCONTINUED | OUTPATIENT
Start: 2021-12-11 | End: 2021-12-14 | Stop reason: HOSPADM

## 2021-12-11 RX ORDER — METHYLPREDNISOLONE SODIUM SUCCINATE 125 MG/2ML
125 INJECTION, POWDER, LYOPHILIZED, FOR SOLUTION INTRAMUSCULAR; INTRAVENOUS ONCE
Status: COMPLETED | OUTPATIENT
Start: 2021-12-11 | End: 2021-12-11

## 2021-12-11 RX ORDER — IPRATROPIUM BROMIDE AND ALBUTEROL SULFATE 2.5; .5 MG/3ML; MG/3ML
3 SOLUTION RESPIRATORY (INHALATION) ONCE
Status: COMPLETED | OUTPATIENT
Start: 2021-12-11 | End: 2021-12-11

## 2021-12-11 RX ADMIN — IPRATROPIUM BROMIDE AND ALBUTEROL SULFATE 3 AMPULE: .5; 2.5 SOLUTION RESPIRATORY (INHALATION) at 19:32

## 2021-12-11 RX ADMIN — FUROSEMIDE 20 MG: 10 INJECTION, SOLUTION INTRAMUSCULAR; INTRAVENOUS at 21:31

## 2021-12-11 RX ADMIN — METHYLPREDNISOLONE SODIUM SUCCINATE 125 MG: 125 INJECTION, POWDER, FOR SOLUTION INTRAMUSCULAR; INTRAVENOUS at 19:03

## 2021-12-11 NOTE — TELEPHONE ENCOUNTER
Received call from Jenelle Farah at [facility] with Red Flag Complaint. Brief description of triage: breathing concerns and chest tightness currently with PNA on prednisone and antibiotics feels worse     Triage indicates for patient to go to ED now spouse will drive and pt and spouse agree with plan provided pt and spouse reasons to call back    Care advice provided, patient verbalizes understanding; denies any other questions or concerns; instructed to call back for any new or worsening symptoms. Attention Provider: Thank you for allowing me to participate in the care of your patient. The patient was connected to triage in response to information provided to the ECC/PSC. Please do not respond through this encounter as the response is not directed to a shared pool. Reason for Disposition   [1] MODERATE difficulty breathing (e.g., speaks in phrases, SOB even at rest, pulse 100-120) AND [2] NEW-onset or WORSE than normal    Answer Assessment - Initial Assessment Questions  1. RESPIRATORY STATUS: \"Describe your breathing? \" (e.g., wheezing, shortness of breath, unable to speak, severe coughing)       Wheezing per spouse . Spoke with pt wheezing couple days. coughing productive SOB with resting    2. ONSET: \"When did this breathing problem begin? \"       Couple days worse today     3. PATTERN \"Does the difficult breathing come and go, or has it been constant since it started? \"       Constant     4. SEVERITY: \"How bad is your breathing? \" (e.g., mild, moderate, severe)     - MILD: No SOB at rest, mild SOB with walking, speaks normally in sentences, can lay down, no retractions, pulse < 100.     - MODERATE: SOB at rest, SOB with minimal exertion and prefers to sit, cannot lie down flat, speaks in phrases, mild retractions, audible wheezing, pulse 100-120.     - SEVERE: Very SOB at rest, speaks in single words, struggling to breathe, sitting hunched forward, retractions, pulse > 120     Moderate , SOB when lying down better when sitting up denies rapid heart rate    5. RECURRENT SYMPTOM: \"Have you had difficulty breathing before? \" If so, ask: \"When was the last time? \" and \"What happened that time? \"       Denies    6. CARDIAC HISTORY: \"Do you have any history of heart disease? \" (e.g., heart attack, angina, bypass surgery, angioplasty)       A fib     7. LUNG HISTORY: \"Do you have any history of lung disease? \"  (e.g., pulmonary embolus, asthma, emphysema)      Currently PNA, 92% is pulse oximetry was 88%    8. CAUSE: \"What do you think is causing the breathing problem? \"       PNA     9. OTHER SYMPTOMS: \"Do you have any other symptoms? (e.g., dizziness, runny nose, cough, chest pain, fever)      Cough productive,   Denies fever, runny nose, dizziness    10. PREGNANCY: \"Is there any chance you are pregnant? \" \"When was your last menstrual period? \"        N/a    11. TRAVEL: \"Have you traveled out of the country in the last month? \" (e.g., travel history, exposures)        Denies    Protocols used: BREATHING DIFFICULTY-ADULT-AH

## 2021-12-11 NOTE — ED NOTES
Department of Emergency Medicine  FIRST PROVIDER TRIAGE NOTE             Independent MLP           12/11/21  2:55 PM EST    Date of Encounter: 12/11/21   MRN: [id number]      HPI: Devyn Sandy is a 68 y.o. male who presents to the ED for Shortness of Breath (has pneumonia-on antibiotics. increasing SOB, low pulse ox at home )  Diagnosed with pneumonia 3 days ago. On Omnicef and doxycycline along with prednisone. Overall pulse oximetry is between 80 and 90% at home. Feeling worse, advised to come to the hospital by primary care. ROS: Negative for vomiting, diarrhea or urinary complaints. PE: Gen Appearance/Constitutional: alert  CV: tachycardia     Initial Plan of Care: All treatment areas with department are currently occupied. Plan to order/Initiate the following while awaiting opening in ED: labs, EKG, imaging studies, antibiotics and IV fluids.     Initial Plan of Care: Initiate Treatment-Testing, Proceed toTreatment Area When Bed Available for ED Attending/MLP to Continue Care    Electronically signed by MARIO Peterson   DD: 12/11/21         MARIO Talley  12/11/21 0994

## 2021-12-12 ENCOUNTER — APPOINTMENT (OUTPATIENT)
Dept: GENERAL RADIOLOGY | Age: 76
End: 2021-12-12
Payer: MEDICARE

## 2021-12-12 LAB
EKG ATRIAL RATE: 288 BPM
EKG Q-T INTERVAL: 378 MS
EKG QRS DURATION: 98 MS
EKG QTC CALCULATION (BAZETT): 452 MS
EKG R AXIS: 91 DEGREES
EKG T AXIS: 10 DEGREES
EKG VENTRICULAR RATE: 86 BPM
PRO-BNP: 1504 PG/ML (ref 0–450)

## 2021-12-12 PROCEDURE — 71045 X-RAY EXAM CHEST 1 VIEW: CPT

## 2021-12-12 PROCEDURE — G0378 HOSPITAL OBSERVATION PER HR: HCPCS

## 2021-12-12 PROCEDURE — 83880 ASSAY OF NATRIURETIC PEPTIDE: CPT

## 2021-12-12 PROCEDURE — 93010 ELECTROCARDIOGRAM REPORT: CPT | Performed by: INTERNAL MEDICINE

## 2021-12-12 PROCEDURE — 2580000003 HC RX 258: Performed by: PHYSICIAN ASSISTANT

## 2021-12-12 PROCEDURE — 6370000000 HC RX 637 (ALT 250 FOR IP): Performed by: INTERNAL MEDICINE

## 2021-12-12 PROCEDURE — 36415 COLL VENOUS BLD VENIPUNCTURE: CPT

## 2021-12-12 RX ORDER — PANTOPRAZOLE SODIUM 40 MG/1
40 TABLET, DELAYED RELEASE ORAL DAILY
Status: DISCONTINUED | OUTPATIENT
Start: 2021-12-12 | End: 2021-12-14 | Stop reason: HOSPADM

## 2021-12-12 RX ORDER — OMEPRAZOLE 20 MG/1
20 CAPSULE, DELAYED RELEASE ORAL EVERY MORNING
Status: DISCONTINUED | OUTPATIENT
Start: 2021-12-12 | End: 2021-12-12 | Stop reason: CLARIF

## 2021-12-12 RX ORDER — DOXYCYCLINE HYCLATE 100 MG/1
100 CAPSULE ORAL EVERY 12 HOURS SCHEDULED
Status: DISCONTINUED | OUTPATIENT
Start: 2021-12-12 | End: 2021-12-14 | Stop reason: HOSPADM

## 2021-12-12 RX ORDER — LOSARTAN POTASSIUM 50 MG/1
100 TABLET ORAL DAILY
Status: DISCONTINUED | OUTPATIENT
Start: 2021-12-12 | End: 2021-12-14 | Stop reason: HOSPADM

## 2021-12-12 RX ORDER — EZETIMIBE 10 MG/1
10 TABLET ORAL DAILY
Status: DISCONTINUED | OUTPATIENT
Start: 2021-12-12 | End: 2021-12-14 | Stop reason: HOSPADM

## 2021-12-12 RX ORDER — TIZANIDINE 4 MG/1
4 TABLET ORAL 4 TIMES DAILY PRN
Status: DISCONTINUED | OUTPATIENT
Start: 2021-12-12 | End: 2021-12-14 | Stop reason: HOSPADM

## 2021-12-12 RX ORDER — M-VIT,TX,IRON,MINS/CALC/FOLIC 27MG-0.4MG
1 TABLET ORAL DAILY
Status: DISCONTINUED | OUTPATIENT
Start: 2021-12-12 | End: 2021-12-14 | Stop reason: HOSPADM

## 2021-12-12 RX ORDER — OLMESARTAN MEDOXOMIL 40 MG/1
40 TABLET ORAL DAILY
Status: DISCONTINUED | OUTPATIENT
Start: 2021-12-12 | End: 2021-12-12 | Stop reason: CLARIF

## 2021-12-12 RX ORDER — ASPIRIN 81 MG/1
81 TABLET ORAL DAILY
Status: DISCONTINUED | OUTPATIENT
Start: 2021-12-12 | End: 2021-12-14 | Stop reason: HOSPADM

## 2021-12-12 RX ORDER — ALLOPURINOL 300 MG/1
300 TABLET ORAL DAILY
Status: DISCONTINUED | OUTPATIENT
Start: 2021-12-12 | End: 2021-12-14 | Stop reason: HOSPADM

## 2021-12-12 RX ORDER — CHOLECALCIFEROL (VITAMIN D3) 50 MCG
2000 TABLET ORAL DAILY
Status: DISCONTINUED | OUTPATIENT
Start: 2021-12-12 | End: 2021-12-14 | Stop reason: HOSPADM

## 2021-12-12 RX ORDER — PREDNISONE 20 MG/1
20 TABLET ORAL DAILY
Status: DISCONTINUED | OUTPATIENT
Start: 2021-12-12 | End: 2021-12-14 | Stop reason: HOSPADM

## 2021-12-12 RX ORDER — METOPROLOL SUCCINATE 50 MG/1
50 TABLET, EXTENDED RELEASE ORAL 2 TIMES DAILY
Status: DISCONTINUED | OUTPATIENT
Start: 2021-12-12 | End: 2021-12-14 | Stop reason: HOSPADM

## 2021-12-12 RX ADMIN — SODIUM CHLORIDE, PRESERVATIVE FREE 10 ML: 5 INJECTION INTRAVENOUS at 21:53

## 2021-12-12 RX ADMIN — METOPROLOL SUCCINATE 50 MG: 50 TABLET, EXTENDED RELEASE ORAL at 21:53

## 2021-12-12 RX ADMIN — PANTOPRAZOLE SODIUM 40 MG: 40 TABLET, DELAYED RELEASE ORAL at 14:17

## 2021-12-12 RX ADMIN — ASPIRIN 81 MG: 81 TABLET, COATED ORAL at 14:19

## 2021-12-12 RX ADMIN — LOSARTAN POTASSIUM 100 MG: 50 TABLET, FILM COATED ORAL at 14:17

## 2021-12-12 RX ADMIN — APIXABAN 5 MG: 5 TABLET, FILM COATED ORAL at 21:53

## 2021-12-12 RX ADMIN — ALLOPURINOL 300 MG: 300 TABLET ORAL at 14:18

## 2021-12-12 RX ADMIN — DOXYCYCLINE HYCLATE 100 MG: 100 CAPSULE ORAL at 22:34

## 2021-12-12 RX ADMIN — PREDNISONE 20 MG: 20 TABLET ORAL at 14:17

## 2021-12-12 RX ADMIN — Medication 1 TABLET: at 14:18

## 2021-12-12 RX ADMIN — Medication 2000 UNITS: at 14:17

## 2021-12-12 RX ADMIN — TIZANIDINE 4 MG: 4 TABLET ORAL at 14:18

## 2021-12-12 RX ADMIN — METOPROLOL SUCCINATE 50 MG: 50 TABLET, EXTENDED RELEASE ORAL at 14:17

## 2021-12-12 RX ADMIN — EZETIMIBE 10 MG: 10 TABLET ORAL at 14:18

## 2021-12-12 ASSESSMENT — PAIN SCALES - GENERAL
PAINLEVEL_OUTOF10: 0

## 2021-12-12 ASSESSMENT — PAIN DESCRIPTION - PAIN TYPE: TYPE: ACUTE PAIN

## 2021-12-12 NOTE — H&P
History & Physical      PCP: Ramsey Marques DO    Date of Admission: 12/11/2021    Date of Service: Pt seen/examined on 12/12/2021 and is  admitted to observation with expected LOS greater than two midnights due to medical therapy. Chief Complaint:  had concerns including Shortness of Breath (has pneumonia-on antibiotics. increasing SOB, low pulse ox at home ). History Of Present Illness:    Mr. Mao Goncalves, a 68y.o. year old male  who  has a past medical history of Arthritis, Atrial fibrillation ([address] Bilateral carotid bruits, CAD (coronary artery disease), Cancer ([address] Carotid stenosis, right, Congestive heart failure of unknown etiology ([address] COPD (chronic obstructive pulmonary disease) ([address] Diplopia, History of blood transfusion, Hyperlipidemia, Hypertension, Kidney stones, Sleep apnea, Type 2 diabetes mellitus with diabetic neuropathy, without long-term current use of insulin ([address] and Vitamin D deficiency. This is a 68-year-old male with a past history significant for arthritis and atrial fibrillation and carotid disease and heart disease and heart failure and COPD. Patient had been seen in the outpatient setting by his physician and started on oral antibiotics. He was brought in for complaints of slightly worsening shortness of breath. Patient did have a cough with some moisture. Patient CT did not show any acute changes. Showed A. fib which is his baseline. His x-ray was negative. BNP was significantly elevated. Patient was almost ready to be discharged however on exertion he did become tachycardic and desaturated. He was admitted for heart failure exacerbation COPD exacerbation.         Past Medical History:        Diagnosis Date    Arthritis     Atrial fibrillation ([address]     day of surgery on 06/12/13- resolved at this time 6/23/2013    Bilateral carotid bruits 10/15/2020    CAD (coronary artery disease)     heart cath with stent 2001    Cancer [facility])     Basal Cell on his back    Carotid stenosis, right 10/15/2020    Congestive [facility] failure of [facility] etiology [facility] 12/11/2021    COPD (chronic obstructive pulmonary disease) [facility]     Diplopia 4/21/2021    History of blood transfusion     Hyperlipidemia     Hypertension     Kidney stones     Sleep apnea     Type 2 diabetes mellitus with diabetic neuropathy, without long-term current use of insulin ([facility] 9/2/2020    Vitamin D deficiency        Past Surgical History:        Procedure Laterality Date    ABDOMEN SURGERY      Bilateral groin hernias    AORTIC VALVE REPLACEMENT  6/12/2013    BACK SURGERY  2006    cervicle fusion   [facility] SURGERY  1974, 1984    lumbar laminectomy    BACK SURGERY  [id number]    360 FUSION L4-L5    BACK SURGERY      CARDIAC SURGERY      CARPAL TUNNEL RELEASE  [id number]    both hands    COLONOSCOPY      CORONARY ARTERY BYPASS GRAFT      DIAGNOSTIC CARDIAC CATH LAB PROCEDURE      ENDOSCOPY, COLON, DIAGNOSTIC      FRACTURE SURGERY      Right arm Fx    FRACTURE SURGERY      Left finger Fx    HERNIA REPAIR      Bilateral    KIDNEY STONE SURGERY      open    NECK SURGERY      SHOULDER ARTHROSCOPY      right and left shoulders    SKIN BIOPSY         Medications Prior to Admission:      Prior to Admission medications    Medication Sig Start Date End Date Taking?  Authorizing Provider   ezetimibe (ZETIA) 10 MG tablet Take 1 tablet by mouth daily 12/10/21   Karen Bingham DO   predniSONE (DELTASONE) 20 MG tablet 2 tabs qd x 3 days  1 tab qd x 3 days 0.5 tab qd x 3 days 12/8/21   Karen Bingham DO   doxycycline hyclate (VIBRA-TABS) 100 MG tablet Take 1 tablet by mouth 2 times daily for 10 days 12/8/21 12/18/21  Karen Bingham DO   allopurinol (ZYLOPRIM) 300 MG tablet TAKE ONE TABLET BY MOUTH DAILY 12/8/21   Karen Bingham DO   cefdinir (OMNICEF) 300 MG capsule Take 1 capsule by mouth 2 times daily for 10 days 12/8/21 12/18/21  Karen Bingham DO   apixaban (ELIQUIS) 5 MG TABS tablet Take 1 tablet by mouth 2 times daily 10/4/21   Jacklynn Oz, DO   tiZANidine (ZANAFLEX) 4 MG tablet Take 1 tablet by mouth 4 times daily as needed (muscle spasms) 7/16/21   MARIO Rivera III   omeprazole (PRILOSEC) 20 MG delayed release capsule TAKE ONE CAPSULE BY MOUTH DAILY 6/30/21   Amparonn Oz, DO   metoprolol succinate (TOPROL XL) 50 MG extended release tablet Take 1 tablet by mouth 2 times daily 6/21/21   Jacktyrone Oz, DO   olmesartan (BENICAR) 40 MG tablet Take 1 tablet by mouth daily Indications: taking 40 mg 12/3/20   Amparotyrone Oz, DO   Alpha-Lipoic Acid 100 MG CAPS Take 200 mg by mouth nightly     Historical Provider, MD   Coenzyme Q10 (CO Q 10) 100 MG CAPS Take 2 tablets by mouth daily    Historical Provider, MD   Cholecalciferol (VITAMIN D3) 2000 UNITS TABS Take 1 tablet by mouth daily    Historical Provider, MD   Multiple Vitamins-Minerals (THERAPEUTIC MULTIVITAMIN-MINERALS) tablet Take 1 tablet by mouth daily Indications: Centrum     Historical Provider, MD   aspirin 81 MG EC tablet Take 81 mg by mouth daily     Historical Provider, MD       Allergies:  Iodine    Social History:    TOBACCO:   reports that he quit smoking about 8 years ago. His smoking use included cigarettes. He started smoking about 63 years ago. He has a 110.00 pack-year smoking history. He has never used smokeless tobacco.  ETOH:   reports current alcohol use. Family History:    Reviewed in detail and negative for DM, CAD, Cancer, CVA. Positive as follows\"      Problem Relation Age of Onset    Stroke Father     Cancer Sister     Heart Disease Brother        REVIEW OF SYSTEMS:   Pertinent positives as noted in the HPI. All other systems reviewed and negative. PHYSICAL EXAM:  BP (!) 150/73   Pulse 77   Temp 97.8 °F (36.6 °C)   Resp 19   SpO2 97%   General appearance: No apparent distress, appears stated age and cooperative. HEENT: Normal cephalic, atraumatic without obvious deformity.  Pupils equal, round, and reactive to light. Extra ocular muscles intact. Conjunctivae/corneas clear. Neck: . Trachea midline. Respiratory: Distant and decreased  Cardiovascular: Irregular  Abdomen: Nontender  Musculoskeletal: No clubbing, cyanosis, edema of bilateral lower extremities. Brisk capillary refill. Skin: Normal skin color. No rashes or lesions. Neurologic:  Neurovascularly intact without any focal sensory/motor deficits. Cranial nerves: II-XII intact, grossly non-focal.  Psychiatric: Alert and oriented, thought content appropriate, normal insight      CBC:   Recent Labs     12/11/21  1502   WBC 10.3   RBC 3.59*   HGB 11.4*   HCT 34.9*   MCV 97.2   RDW 13.7        BMP:   Recent Labs     12/11/21  1502      K 4.4      CO2 23   BUN 27*   CREATININE 1.0     LFT:  Recent Labs     12/11/21  1502   PROT 6.8   ALKPHOS 72   ALT 62*   AST 34   BILITOT 0.4   AMYLASE 49   LIPASE 22     CE:  No results for input(s): Dulce Babcock in the last 72 hours. PT/INR:   Recent Labs     12/11/21  1502   INR 1.1   APTT 31.5     BNP: No results for input(s): BNP in the last 72 hours.   ESR:   Lab Results   Component Value Date    SEDRATE 10 04/10/2021     CRP:   Lab Results   Component Value Date    CRP 0.5 (H) 04/10/2021     D Dimer:   Lab Results   Component Value Date    DDIMER <200 05/19/2017      Folate and B12: No results found for: [id number], No results found for: FOLATE  Lactic Acid:   Lab Results   Component Value Date    LACTA 2.2 07/09/2013     Thyroid Studies:   Lab Results   Component Value Date    TSH 1.600 12/08/2021       Oupatient labs:  Lab Results   Component Value Date    CHOL 269 (H) 12/08/2021    TRIG 205 (H) 12/08/2021    HDL 57 12/08/2021    LDLCALC 171 (H) 12/08/2021    TSH 1.600 12/08/2021    PSA 1.27 07/23/2021    INR 1.1 12/11/2021    LABA1C 6.0 (H) 12/08/2021       Urinalysis:    Lab Results   Component Value Date    NITRU Negative 12/11/2021    WBCUA 2-5 11/20/2020    BACTERIA FEW 11/20/2020    RBCUA 1-3 11/20/2020    RBCUA 0-1 07/09/2013    BLOODU Negative 12/11/2021    SPECGRAV 1.025 12/11/2021    GLUCOSEU Negative 12/11/2021       Imaging:  XR CHEST (2 VW)    Result Date: 12/8/2021  EXAMINATION: TWO XRAY VIEWS OF THE CHEST 12/8/2021 11:23 am COMPARISON: None. HISTORY: ORDERING SYSTEM PROVIDED HISTORY: COPD exacerbation (HCC) FINDINGS: The cardiac silhouette is within normal limits. Postoperative sternotomy changes are noted. There is a vague infiltrate within the right lower lobe. Left lung is grossly clear. There is no right or left pleural effusion. Vague infiltrate within the right lower lobe. XR CHEST 1 VIEW    Result Date: 12/11/2021  EXAMINATION: ONE XRAY VIEW OF THE CHEST 12/11/2021 3:32 pm COMPARISON: 12/08/2021 HISTORY: ORDERING SYSTEM PROVIDED HISTORY: RLL infiltrate TECHNOLOGIST PROVIDED HISTORY: Reason for exam:->RLL infiltrate What reading provider will be dictating this exam?->CRC FINDINGS: The lungs are without acute focal process. There is no effusion or pneumothorax. Stable cardiomegaly. The osseous structures are without acute process. No acute process. No definitive infiltrate. ASSESSMENT:    Active Problems:    Congestive heart failure of unknown etiology ([address]  Resolved Problems:    * No resolved hospital problems. *  A. Fib  Anticoagulation  Diabetes  Hypertension  Hyperlipidemia    PLAN:    1. More hypoxia that caused admission exacerbation of his underlying A. Fib  2. Chest x-ray appears to be normal along with white count. 3.  Follow-up BNP today could be ordered  4. He will be admitted for observation and then likely okay for discharge if he does not need any O2. At this point he does not appear to need any IV antibiotics. We can maintain his outpatient regimen with doxycycline  5. Resumption of diet and premorbid meds and Lasix was given once IV.       Diet: No diet orders on file  Code Status: Prior  Surrogate decision maker confirmed with patient: Extended Emergency Contact Information  Primary Emergency Contact: Cherrie Shanks  Address: [address] [place] [address] Road           [place] [address] Phone: [phone]  Mobile Phone: [phone]  Relation: Spouse  Secondary Emergency Contact: Natalio [address] [place] [address] Phone: [phone]  Work Phone: [phone]  Mobile Phone: [phone]  Relation: Child      DVT Prophylaxis: []Lovenox []Heparin []PCD [x] [address] []Encouraged ambulation  Disposition: []Med/Surg [x] Intermediate [] ICU/CCU  Admit status: [x] Observation [] Inpatient     +++++++++++++++++++++++++++++++++++++++++++++++++  [address], [place]  +++++++++++++++++++++++++++++++++++++++++++++++++  NOTE: This report was transcribed using voice recognition software. Every effort was made to ensure accuracy; however, inadvertent computerized transcription errors may be present.

## 2021-12-12 NOTE — ED PROVIDER NOTES
Department of Emergency Medicine   ED  Provider Note  Admit Date/RoomTime: 12/11/2021  6:31 PM  [facility]          History of Present Illness:  12/11/21, Time: 8:59 PM EST  Chief Complaint   Patient presents with    Shortness of Breath     has pneumonia-on antibiotics. increasing SOB, low pulse ox at home        Harjinder Paniagua is a 68 y.o. male presenting to the ED for shortness of breath. The patient states that he has past medical history of COPD and atrial fibrillation on Eliquis. He states for the past 2 to 3 days he has had shortness of breath. He went to his primary care physician and was told he had a \"touch \"of pneumonia. He was started on doxycycline and cefdinir after having an x-ray performed. He has been taking prednisone. He was tested for Covid and found to be negative yesterday. His shortness of breath however has been persistent. He does not have an inhaler at home as he ran out. He has been coughing. He also has associated chest pain which is midsternal tightness. This radiates to his left arm. Laying flat seems to worsen this and sitting improves it. Denies any abdominal pain, nausea or vomiting. No recent fevers. He is vaccinated for Covid. The patient endorses compliance with his anticoagulation.     Review of Systems:   Constitutional symptoms: Denies fever  Eyes: Denies eye pain  Ears, Nose, Mouth, Throat: Denies ear pain  Cardiovascular: Positive for chest pain  Respiratory: Positive for shortness of breath and cough  Gastrointestinal: Denies blood per rectum  Genitourinary: Denies hematuria  Skin: Denies rashes  Neurological: Denies headache  Musculoskeletal: Positive for pain rating to left arm    --------------------------------------------- PAST HISTORY ---------------------------------------------  Past Medical History:  has a past medical history of Arthritis, Atrial fibrillation ([facility] [address] Bilateral carotid bruits, CAD (coronary artery disease), Cancer ([facility] [address] Carotid stenosis, right, Congestive heart failure of unknown etiology ([facility] COPD (chronic obstructive pulmonary disease) ([facility] [address] Diplopia, History of blood transfusion, Hyperlipidemia, Hypertension, Kidney stones, Sleep apnea, Type 2 diabetes mellitus with diabetic neuropathy, without long-term current use of insulin ([facility] [address] and Vitamin D deficiency. Past Surgical History:  has a past surgical history that includes Kidney stone surgery; Shoulder arthroscopy; Carpal tunnel release ([id number]); back surgery (2006); back surgery (1974, 1984); back surgery ([id number]); Aortic valve replacement (6/12/2013); Neck surgery; Diagnostic Cardiac Cath Lab Procedure; Coronary artery bypass graft; Cardiac surgery; back surgery; Abdomen surgery; Colonoscopy; Endoscopy, colon, diagnostic; fracture surgery; fracture surgery; hernia repair; and skin biopsy. Social History:  reports that he quit smoking about 8 years ago. His smoking use included cigarettes. He started smoking about 63 years ago. He has a 110.00 pack-year smoking history. He has never used smokeless tobacco. He reports current alcohol use. He reports that he does not use drugs. Family History: family history includes Cancer in his sister; Heart Disease in his brother; Stroke in his father. . Unless otherwise noted, family history is non contributory    The patients home medications have been reviewed. Allergies: Iodine    I have reviewed the past medical history, past surgical history, social history, and family history    ---------------------------------------------------PHYSICAL EXAM--------------------------------------    General: The patient is conversational and lying comfortably in bed. Head: Normocephalic and atraumatic. Eyes: Sclera non-icteric and no conjunctival injection  ENT: Nasal and oral membranes moist  Neck: Trachea midline. No JVD  Respiratory: Patient has end expiratory wheezing throughout. Minimal crackles at the lung bases.   Patient speaking in full sentences. Cardiovascular: Regular rate. Trace symmetric edema of the lower extremities  Gastrointestinal:  Abdomen is soft and nondistended. Bowel sounds present. There is no tenderness. There is no guarding or rebound. Musculoskeletal: No deformity  Skin: Skin warm and dry. No rashes. Neurologic: No gross motor deficits. No aphasia. Psychiatric: Normal affect.    -------------------------------------------------- RESULTS -------------------------------------------------  I have personally reviewed all laboratory and imaging results for this patient. Results are listed below.      LABS: (Lab results interpreted by me)  Results for orders placed or performed during the hospital encounter of 12/11/21   CBC auto differential   Result Value Ref Range    WBC 10.3 4.5 - 11.5 E9/L    RBC 3.59 (L) 3.80 - 5.80 E12/L    Hemoglobin 11.4 (L) 12.5 - 16.5 g/dL    Hematocrit 34.9 (L) 37.0 - 54.0 %    MCV 97.2 80.0 - 99.9 fL    MCH 31.8 26.0 - 35.0 pg    MCHC 32.7 32.0 - 34.5 %    RDW 13.7 11.5 - 15.0 fL    Platelets 608 996 - 297 E9/L    MPV 10.9 7.0 - 12.0 fL    Neutrophils % 85.2 (H) 43.0 - 80.0 %    Immature Granulocytes % 0.4 0.0 - 5.0 %    Lymphocytes % 9.0 (L) 20.0 - 42.0 %    Monocytes % 5.0 2.0 - 12.0 %    Eosinophils % 0.0 0.0 - 6.0 %    Basophils % 0.4 0.0 - 2.0 %    Neutrophils Absolute 8.78 (H) 1.80 - 7.30 E9/L    Immature Granulocytes # 0.04 E9/L    Lymphocytes Absolute 0.93 (L) 1.50 - 4.00 E9/L    Monocytes Absolute 0.52 0.10 - 0.95 E9/L    Eosinophils Absolute 0.00 (L) 0.05 - 0.50 E9/L    Basophils Absolute 0.04 0.00 - 0.20 E9/L   Comprehensive Metabolic Panel w/ Reflex to MG   Result Value Ref Range    Sodium 138 132 - 146 mmol/L    Potassium reflex Magnesium 4.4 3.5 - 5.0 mmol/L    Chloride 104 98 - 107 mmol/L    CO2 23 22 - 29 mmol/L    Anion Gap 11 7 - 16 mmol/L    Glucose 170 (H) 74 - 99 mg/dL    BUN 27 (H) 6 - 23 mg/dL    CREATININE 1.0 0.7 - 1.2 mg/dL    GFR Non-African American >60 >=60 mL/min/1.73    GFR African American >60     Calcium 9.5 8.6 - 10.2 mg/dL    Total Protein 6.8 6.4 - 8.3 g/dL    Albumin 4.4 3.5 - 5.2 g/dL    Total Bilirubin 0.4 0.0 - 1.2 mg/dL    Alkaline Phosphatase 72 40 - 129 U/L    ALT 62 (H) 0 - 40 U/L    AST 34 0 - 39 U/L   Urinalysis   Result Value Ref Range    Color, UA Yellow Straw/Yellow    Clarity, UA Clear Clear    Glucose, Ur Negative Negative mg/dL    Bilirubin Urine Negative Negative    Ketones, Urine Negative Negative mg/dL    Specific Gravity, UA 1.025 1.005 - 1.030    Blood, Urine Negative Negative    pH, UA 5.0 5.0 - 9.0    Protein, UA Negative Negative mg/dL    Urobilinogen, Urine 0.2 <2.0 E.U./dL    Nitrite, Urine Negative Negative    Leukocyte Esterase, Urine Negative Negative   Lactate, Sepsis   Result Value Ref Range    Lactic Acid, Sepsis 1.5 0.5 - 1.9 mmol/L   APTT   Result Value Ref Range    aPTT 31.5 24.5 - 35.1 sec   Protime-INR   Result Value Ref Range    Protime 12.8 (H) 9.3 - 12.4 sec    INR 1.1    Amylase   Result Value Ref Range    Amylase 49 20 - 100 U/L   Lipase   Result Value Ref Range    Lipase 22 13 - 60 U/L   Brain Natriuretic Peptide   Result Value Ref Range    Pro-BNP 1,299 (H) 0 - 450 pg/mL   Troponin   Result Value Ref Range    Troponin, High Sensitivity 8 0 - 11 ng/L   ,     RADIOLOGY:  Interpreted by Radiologist unless otherwise specified  XR CHEST 1 VIEW   Final Result   No acute process. No definitive infiltrate. XR CHEST (2 VW)    (Results Pending)       ------------------------- NURSING NOTES AND VITALS REVIEWED ---------------------------   The nursing notes within the ED encounter and vital signs as below have been reviewed by myself  BP (!) 152/79   Pulse 81   Temp 97.8 °F (36.6 °C)   Resp 18   SpO2 95%     Oxygen Saturation Interpretation: Abnormal    The patients available past medical records and past encounters were reviewed.       ------------------------------ ED COURSE/MEDICAL DECISION MAKING----------------------  Medications   sodium chloride flush 0.9 % injection 10 mL (has no administration in time range)   methylPREDNISolone sodium (SOLU-MEDROL) injection 125 mg (125 mg IntraVENous Given 12/11/21 1903)   ipratropium-albuterol (DUONEB) nebulizer solution 3 ampule (3 ampules Inhalation Given 12/11/21 1932)   furosemide (LASIX) injection 20 mg (20 mg IntraVENous Given 12/11/21 2131)       Medical Decision Making: This is a 68 y.o. male presenting to the ED for shortness of breath. On initial presentation, the patient's vital signs are interpreted as hypertensive, afebrile and minimally hypoxic. Based on history and physical exam, we have a broad differential.  With the patient's wheezing and concern for COPD exacerbation. He is currently being treated for pneumonia. Because of his age and risk factors I also considered ACS, arrhythmia and heart failure exacerbation. At this time, will obtain chest x-ray, EKG and laboratory studies. Patient will be symptomatically treated with steroids and DuoNeb breathing treatments. Of note, the patient endorses compliance with his anticoagulation so PE will not be further explored. A 12-lead EKG was performed and interpreted by myself. The rate is 86 with irregularly irregular. There is a large amount of artifact. The QRS interval is 98, and QTC interval is 452. No acute ST elevation or depression. Poor R wave progression. There is T wave inversion in lead III. This is atrial fibrillation with nonspecific abnormality. Laboratory studies show mildly elevated BUN of 27. Electrolytes within normal limits other than mild hyperglycemia. Minimally elevated ALT. No leukocytosis. Mild anemia with hemoglobin of 11.4. Chest x-ray shows no acute process. No infiltrate. This is interpreted by radiology. BNP elevated at 1299. Troponin VIII. Urinalysis shows no evidence of infection. On reevaluation, the patient endorses some improvement. However on exertion the patient desaturates and becomes tachycardic to the 140s. As he is having heart failure exacerbation as well as COPD exacerbation and becomes significantly tachypneic with ambulation I do feel he requires observation admission. He was given a dose of Lasix. His primary care physician admits to Dr. Aleksander Anthony. I spoke with the admitting physician and she is agreeable. This patient's ED course included: a personal history and physicial examination and re-evaluation prior to disposition    This patient has remained unchanged during their ED course. Consultations:  Internal medicine    The cardiac monitor revealed atrial fibrillation with a heart rate in the 80s as interpreted by me. The cardiac monitor was ordered secondary to the patient's shortness of breath and to monitor the patient for dysrhythmia. CPT [id number]    Oxygen Saturation Interpretation: 93 % on room air. Abnormal    Counseling:   I have spoken with the patient and discussed todays results, in addition to providing specific details for the plan of care and counseling regarding the diagnosis and prognosis. Questions are answered at this time and they are agreeable with the plan.     --------------------------------- IMPRESSION AND DISPOSITION ---------------------------------    IMPRESSION  1. Congestive heart failure, unspecified HF chronicity, unspecified heart failure type ([facility] [address]    2. Chronic obstructive pulmonary disease, unspecified COPD type ([address]    3. [facility]        DISPOSITION  Disposition: Admit to telemetry  Patient condition is fair    IDr. Jacinta, am the primary provider of record    NOTE: This report was transcribed using voice recognition software.  Every effort was made to ensure accuracy; however, inadvertent computerized transcription errors may be present        Jacinta Chaney MD  12/11/21 5068

## 2021-12-12 NOTE — PROGRESS NOTES
Pharmacy Note    Uriel Warren was ordered Alpha-lipoic acid capsules and Co Q 10 capsules. As per the [address] [place] [address], herbals and certain dietary supplements will be discontinued.   The herbal or dietary supplement may be continued after discharge from the hospital.    Jordan Colindres, PharmD   Pharmacy Resident   Phone: [phone]  12/12/2021 1:03 PM

## 2021-12-12 NOTE — ED NOTES
Assumed care of patient. Pt denies complaints, he states he feels much better and then when he came in.      [address] [facility], [address] [place] [address]  12/12/21 0244

## 2021-12-12 NOTE — ED NOTES
Resting pulse ox 96% on RA HR 90. . Pt up walked the unit pulse Pulse ox 92%-95% HR increased to max of 142. Pt increased work load of breathing. C/O SOB states,\"I'm sucking air. \"     Penny Palencia, RN  12/11/21 2019

## 2021-12-13 PROCEDURE — 6370000000 HC RX 637 (ALT 250 FOR IP): Performed by: INTERNAL MEDICINE

## 2021-12-13 PROCEDURE — 99205 OFFICE O/P NEW HI 60 MIN: CPT | Performed by: INTERNAL MEDICINE

## 2021-12-13 PROCEDURE — APPSS60 APP SPLIT SHARED TIME 46-60 MINUTES: Performed by: PHYSICIAN ASSISTANT

## 2021-12-13 PROCEDURE — G0378 HOSPITAL OBSERVATION PER HR: HCPCS

## 2021-12-13 PROCEDURE — 96376 TX/PRO/DX INJ SAME DRUG ADON: CPT

## 2021-12-13 PROCEDURE — 6360000002 HC RX W HCPCS: Performed by: INTERNAL MEDICINE

## 2021-12-13 RX ORDER — FUROSEMIDE 10 MG/ML
40 INJECTION INTRAMUSCULAR; INTRAVENOUS DAILY
Status: DISCONTINUED | OUTPATIENT
Start: 2021-12-13 | End: 2021-12-14

## 2021-12-13 RX ADMIN — METOPROLOL SUCCINATE 50 MG: 50 TABLET, EXTENDED RELEASE ORAL at 09:11

## 2021-12-13 RX ADMIN — APIXABAN 5 MG: 5 TABLET, FILM COATED ORAL at 21:40

## 2021-12-13 RX ADMIN — DOXYCYCLINE HYCLATE 100 MG: 100 CAPSULE ORAL at 09:11

## 2021-12-13 RX ADMIN — ASPIRIN 81 MG: 81 TABLET, COATED ORAL at 09:11

## 2021-12-13 RX ADMIN — LOSARTAN POTASSIUM 100 MG: 50 TABLET, FILM COATED ORAL at 09:11

## 2021-12-13 RX ADMIN — Medication 2000 UNITS: at 09:11

## 2021-12-13 RX ADMIN — PANTOPRAZOLE SODIUM 40 MG: 40 TABLET, DELAYED RELEASE ORAL at 05:50

## 2021-12-13 RX ADMIN — FUROSEMIDE 40 MG: 10 INJECTION, SOLUTION INTRAMUSCULAR; INTRAVENOUS at 18:52

## 2021-12-13 RX ADMIN — Medication 1 TABLET: at 09:11

## 2021-12-13 RX ADMIN — ALLOPURINOL 300 MG: 300 TABLET ORAL at 09:11

## 2021-12-13 RX ADMIN — METOPROLOL SUCCINATE 50 MG: 50 TABLET, EXTENDED RELEASE ORAL at 21:40

## 2021-12-13 RX ADMIN — DOXYCYCLINE HYCLATE 100 MG: 100 CAPSULE ORAL at 21:40

## 2021-12-13 RX ADMIN — PREDNISONE 20 MG: 20 TABLET ORAL at 09:11

## 2021-12-13 RX ADMIN — EZETIMIBE 10 MG: 10 TABLET ORAL at 09:11

## 2021-12-13 RX ADMIN — APIXABAN 5 MG: 5 TABLET, FILM COATED ORAL at 09:11

## 2021-12-13 ASSESSMENT — PAIN DESCRIPTION - PAIN TYPE: TYPE: ACUTE PAIN

## 2021-12-13 ASSESSMENT — PAIN DESCRIPTION - LOCATION: LOCATION: CHEST

## 2021-12-13 ASSESSMENT — PAIN SCALES - GENERAL
PAINLEVEL_OUTOF10: 0
PAINLEVEL_OUTOF10: 4

## 2021-12-13 ASSESSMENT — PAIN DESCRIPTION - ONSET: ONSET: AWAKENED FROM SLEEP

## 2021-12-13 NOTE — PLAN OF CARE
Problem: Falls - Risk of:  Goal: Will remain free from falls  Description: Will remain free from falls  12/13/2021 0321 by Jeana Null RN  Outcome: Met This Shift  12/13/2021 0320 by Jeana Null RN  Outcome: Met This Shift  12/12/2021 1731 by Sulema Almaguer RN  Outcome: Met This Shift  Goal: Absence of physical injury  Description: Absence of physical injury  12/13/2021 0321 by Jeana Null RN  Outcome: Met This Shift  12/13/2021 0320 by Jeana Null RN  Outcome: Met This Shift  12/12/2021 1731 by Sulema Almaguer RN  Outcome: Met This Shift

## 2021-12-13 NOTE — PROGRESS NOTES
Hospital Medicine    Subjective:  Pt alert conversive c/o sob with exertion      Current Facility-Administered Medications:     allopurinol (ZYLOPRIM) tablet 300 mg, 300 mg, Oral, Daily, Lazaro Aguilera MD, 300 mg at 12/12/21 1418    apixaban (ELIQUIS) tablet 5 mg, 5 mg, Oral, BID, Lazaro Aguilera MD, 5 mg at 12/12/21 2153    aspirin EC tablet 81 mg, 81 mg, Oral, Daily, Lazaro Aguilera MD, 81 mg at 12/12/21 1419    vitamin D (CHOLECALCIFEROL) tablet 2,000 Units, 2,000 Units, Oral, Daily, Lazaro Aguilera MD, 2,000 Units at 12/12/21 1417    ezetimibe (ZETIA) tablet 10 mg, 10 mg, Oral, Daily, Lazaro Aguilera MD, 10 mg at 12/12/21 1418    metoprolol succinate (TOPROL XL) extended release tablet 50 mg, 50 mg, Oral, BID, Lazaro Aguilera MD, 50 mg at 12/12/21 2153    therapeutic multivitamin-minerals 1 tablet, 1 tablet, Oral, Daily, Lazaro Aguilera MD, 1 tablet at 12/12/21 1418    predniSONE (DELTASONE) tablet 20 mg, 20 mg, Oral, Daily, Lazaro Aguilera MD, 20 mg at 12/12/21 1417    tiZANidine (ZANAFLEX) tablet 4 mg, 4 mg, Oral, 4x Daily PRN, Lazaro Aguilera MD, 4 mg at 12/12/21 1418    doxycycline hyclate (VIBRAMYCIN) capsule 100 mg, 100 mg, Oral, 2 times per day, Lazaro Aguilera MD, 100 mg at 12/12/21 2234    losartan (COZAAR) tablet 100 mg, 100 mg, Oral, Daily, Lazaro Aguilera MD, 100 mg at 12/12/21 1417    pantoprazole (PROTONIX) tablet 40 mg, 40 mg, Oral, Daily, Lazaro Aguilera MD, 40 mg at 12/13/21 0550    sodium chloride flush 0.9 % injection 10 mL, 10 mL, IntraVENous, PRN, MARIO Talley, 10 mL at 12/12/21 2153    Objective:    /70   Pulse 69   Temp 97.6 °F (36.4 °C) (Temporal)   Resp 18   Ht 5' 9\" (1.753 m)   Wt 220 lb (99.8 kg)   SpO2 98%   BMI 32.49 kg/m²     Heart:  irreg  Lungs:  ctab  Abd: + bs sogt nontender  Extrem:  Min edema legs    CBC with Differential:    Lab Results   Component Value Date    WBC 10.3 12/11/2021    RBC 3.59 12/11/2021    HGB 11.4 12/11/2021    HCT 34.9 12/11/2021     12/11/2021    MCV 97.2 12/11/2021    MCH 31.8 12/11/2021    MCHC 32.7 12/11/2021    RDW 13.7 12/11/2021    SEGSPCT 81 07/09/2013    LYMPHOPCT 9.0 12/11/2021    MONOPCT 5.0 12/11/2021    BASOPCT 0.4 12/11/2021    MONOSABS 0.52 12/11/2021    LYMPHSABS 0.93 12/11/2021    EOSABS 0.00 12/11/2021    BASOSABS 0.04 12/11/2021     CMP:    Lab Results   Component Value Date     12/11/2021    K 4.4 12/11/2021     12/11/2021    CO2 23 12/11/2021    BUN 27 12/11/2021    CREATININE 1.0 12/11/2021    GFRAA >60 12/11/2021    LABGLOM >60 12/11/2021    GLUCOSE 170 12/11/2021    GLUCOSE 89 02/11/2012    PROT 6.8 12/11/2021    LABALBU 4.4 12/11/2021    CALCIUM 9.5 12/11/2021    BILITOT 0.4 12/11/2021    ALKPHOS 72 12/11/2021    AST 34 12/11/2021    ALT 62 12/11/2021     Warfarin PT/INR:    Lab Results   Component Value Date    INR 1.1 12/11/2021    INR 1.1 02/01/2020    INR 1.0 06/04/2019    PROTIME 12.8 (H) 12/11/2021    PROTIME 12.1 02/01/2020    PROTIME 11.0 06/04/2019       Assessment:    Active Problems:    Congestive heart failure of unknown etiology ([address]  Resolved Problems:    * No resolved hospital problems.  *  at [facility] heart     Plan:  Await cardio input        Lorene Smith DO  8:31 AM  12/13/2021

## 2021-12-13 NOTE — PLAN OF CARE
Patient's chart updated to reflect:      . - HF care plan, HF education points and HF discharge instructions.  -Orders: 2 gram sodium diet, daily weights, I/O.  -PCP and cardiology follow up appointments to be scheduled within 7 days of hospital discharge.   -CHF education session will be provided to the patient prior to hospital discharge.  -Awaiting Cardiology consult  Jensen Wen RN RN, BSN  Heart Failure Navigator

## 2021-12-13 NOTE — CONSULTS
cefdinir (OMNICEF) 300 MG capsule Take 1 capsule by mouth 2 times daily for 10 days 12/8/21 12/18/21 Yes Geradine Heart, DO   apixaban (ELIQUIS) 5 MG TABS tablet Take 1 tablet by mouth 2 times daily 10/4/21  Yes Geradine Heart, DO   omeprazole (PRILOSEC) 20 MG delayed release capsule TAKE ONE CAPSULE BY MOUTH DAILY 6/30/21  Yes Geradine Heart, DO   metoprolol succinate (TOPROL XL) 50 MG extended release tablet Take 1 tablet by mouth 2 times daily 6/21/21  Yes Geradine Heart, DO   olmesartan (BENICAR) 40 MG tablet Take 1 tablet by mouth daily Indications: taking 40 mg 12/3/20  Yes Geradine Heart, DO   Alpha-Lipoic Acid 100 MG CAPS Take 200 mg by mouth nightly    Yes Historical Provider, MD   Coenzyme Q10 (CO Q 10) 100 MG CAPS Take 2 tablets by mouth daily   Yes Historical Provider, MD   Cholecalciferol (VITAMIN D3) 2000 UNITS TABS Take 1 tablet by mouth daily   Yes Historical Provider, MD   Multiple Vitamins-Minerals (THERAPEUTIC MULTIVITAMIN-MINERALS) tablet Take 1 tablet by mouth daily Indications: Centrum    Yes Historical Provider, MD   aspirin 81 MG EC tablet Take 81 mg by mouth daily    Yes Historical Provider, MD   ezetimibe (ZETIA) 10 MG tablet Take 1 tablet by mouth daily 12/10/21   Geradine Heart, DO   tiZANidine (ZANAFLEX) 4 MG tablet Take 1 tablet by mouth 4 times daily as needed (muscle spasms) 7/16/21   MARIO Gaytan III       Current Medications:    Current Facility-Administered Medications: allopurinol (ZYLOPRIM) tablet 300 mg, 300 mg, Oral, Daily  apixaban (ELIQUIS) tablet 5 mg, 5 mg, Oral, BID  aspirin EC tablet 81 mg, 81 mg, Oral, Daily  vitamin D (CHOLECALCIFEROL) tablet 2,000 Units, 2,000 Units, Oral, Daily  ezetimibe (ZETIA) tablet 10 mg, 10 mg, Oral, Daily  metoprolol succinate (TOPROL XL) extended release tablet 50 mg, 50 mg, Oral, BID  therapeutic multivitamin-minerals 1 tablet, 1 tablet, Oral, Daily  predniSONE (DELTASONE) tablet 20 mg, 20 mg, Oral, Daily  tiZANidine (ZANAFLEX) tablet 4 mg, 4 mg, Oral, 4x Daily PRN  doxycycline hyclate (VIBRAMYCIN) capsule 100 mg, 100 mg, Oral, 2 times per day  losartan (COZAAR) tablet 100 mg, 100 mg, Oral, Daily  pantoprazole (PROTONIX) tablet 40 mg, 40 mg, Oral, Daily  sodium chloride flush 0.9 % injection 10 mL, 10 mL, IntraVENous, PRN    Allergies:  Iodine    Social History:    Live with his wife in two story home. Completes ADL with chronic CASTELLANO but tolerable. Does not require supplemental O2. Uses a cane for ambulation assistance when his arthritis flairs. Previous tobacco abuse: quit 13 years ago   Alcohol use 4-5 beers   Denies illicit drug use     Full code     Family History: This information was not obtained at this time as it is found noncontributory secondary to the patients advanced age. REVIEW OF SYSTEMS:     · Constitutional: Denies fevers, chills, night sweats, + fatigue  · HEENT: Denies headaches, nose bleeds, and blurred vision,oral pain, abscess or lesion. · Musculoskeletal: Denies falls, pain to BLE with ambulation  + edema to BLE. · Neurological: Denies dizziness and lightheadedness, numbness and tingling  · Cardiovascular: + chest pressure, palpitations, and feelings of heart racing. · Respiratory: Denies orthopnea and PND, + cough, CASTELLANO  · Gastrointestinal: Denies heartburn, nausea/vomiting, diarrhea and constipation, black/bloody, and tarry stools. + abdominal swelling / distention   · Genitourinary: Denies dysuria and hematuria  · Hematologic: Denies excessive bruising or bleeding  · Lymphatic: Denies lumps and bumps to neck, axilla, breast, and groin      PHYSICAL EXAM:   BP (!) 118/55   Pulse 95   Temp 97.6 °F (36.4 °C) (Temporal)   Resp 16   Ht 5' 9\" (1.753 m)   Wt 220 lb (99.8 kg)   SpO2 97%   BMI 32.49 kg/m²   CONST:  Well developed, obese  male who appears his stated age.  Awake, alert, cooperative, no apparent distress  HEENT:   Head- Normocephalic, atraumatic   Eyes- Conjunctivae pink, anicteric  Throat- Oral mucosa pink and moist  Neck-  No stridor, trachea midline, no jugular venous distention. CHEST: Chest symmetrical and non-tender to palpation. RESPIRATORY: Lung sounds clear throughout fields bilaterally. No wheeze or rhonchi noted. CARDIOVASCULAR:     No carotid bruit noted bilaterally   Heart Ausculation- Irregular rate and rhythm, no murmur or rub   PV: No lower extremity edema. No varicosities. ABDOMEN: Soft, non-tender to light palpation. Bowel sounds present. Distended belly (obese)   MS: Good muscle strength and tone. No atrophy or abnormal movements. : Deferred   SKIN: Warm and dry no statis dermatitis or ulcers   NEURO / PSYCH: Oriented to person, place and time. Speech clear and appropriate. Follows all commands.  Pleasant affect     DATA:    EKG 12/11/2021 Atrial fibrillation HR 82   EKG 12/13/2021: Atrial fibrillation HR 82 PVSc   Tele strips: Atrial fibrillation HR      Diagnostic:      Intake/Output Summary (Last 24 hours) at 12/13/2021 1005  Last data filed at 12/13/2021 0450  Gross per 24 hour   Intake 200 ml   Output 275 ml   Net -75 ml       Labs:   CBC:   Recent Labs     12/11/21  1502   WBC 10.3   HGB 11.4*   HCT 34.9*        BMP:   Recent Labs     12/11/21  1502      K 4.4   CO2 23   BUN 27*   CREATININE 1.0   LABGLOM >60   CALCIUM 9.5     HgA1c:   Lab Results   Component Value Date    LABA1C 6.0 (H) 12/08/2021     PT/INR:   Recent Labs     12/11/21  1502   PROTIME 12.8*   INR 1.1     APTT:  Recent Labs     12/11/21  1502   APTT 31.5     FASTING LIPID PANEL:  Lab Results   Component Value Date    CHOL 269 12/08/2021    HDL 57 12/08/2021    LDLCALC 171 12/08/2021    TRIG 205 12/08/2021     LIVER PROFILE:  Recent Labs     12/11/21  1502   AST 34   ALT 62*   LABALBU 4.4       Assessment and Plan per Dr Jhonny Dumont  Electronically signed by Ramila Huffma on 12/13/2021 at 10:05 AM    I personally and independently saw and examined patient and reviewed all done pertinent laboratory data, imaging studies, ECGs and rhythm strips. I have reviewed and agree with the APN history and physical exam as documented in the above note. Assessment:  1. Acute heart failure preserved EF. (EF 65+/- 5% on echo 9/2021)  2. Coronary artery disease s/p single vessel bypass (LIMA-LAD) 2013 with negative stress 2017.   3. S/p AVR with bioprosthetic valve 2013 with prosthesis failure with mod-severe AS and moderate AR on echo 9/2021. Patient follows with [facility]  4. Recurrent atrial fibrillation, with CVR. date of onset unclear. Known history of PAF on Eliquis. 5. Hypertension   6. Hyperlipidemia   7. Diabetes mellitus type 2   8. obesity  9. Obstructive sleep apnea, not compliant with CPAP   10. Ex smoker, quit 13 years ago   6. Alcohol abuse (4-5 beers daily)   12. Severe osteoarthritis, right hip. Uses a cane at times   13. Being treated for bronchitis with oral antibiotics and steroids (symptoms started one week ago)       Plan:   1. IV to PO Lasix   2. Rest of home cardiac medications same   3. Patient needs to follow up [facility] post discharge in regards to his failing bioprosthetic Aortic valve.   4. Will follow      Electronically signed by Sydnee Mckeon MD on 12/13/2021 at 12:47 PM

## 2021-12-13 NOTE — CARE COORDINATION
Transition of care: Cardio consulted. Met with pt in room. Pt lives with his wife, Ravindra, in a ranch style home. Independent with ADLs and drives. Pt is a  but does not use [facility] services. DME- CPAP. Discharge plan is to return home with no needs anticipated. PCP is Dr Kortney Cornejo. Ieradha and pharmacy is [facility] in [facility].  Sw/cm will follow

## 2021-12-14 VITALS
HEART RATE: 78 BPM | HEIGHT: 69 IN | DIASTOLIC BLOOD PRESSURE: 60 MMHG | TEMPERATURE: 98 F | WEIGHT: 210.2 LBS | BODY MASS INDEX: 31.13 KG/M2 | RESPIRATION RATE: 18 BRPM | OXYGEN SATURATION: 96 % | SYSTOLIC BLOOD PRESSURE: 136 MMHG

## 2021-12-14 PROBLEM — I48.91 ATRIAL FIBRILLATION (HCC): Chronic | Status: ACTIVE | Noted: 2020-02-01

## 2021-12-14 PROBLEM — I50.31 ACUTE DIASTOLIC CHF (CONGESTIVE HEART FAILURE) (HCC): Status: ACTIVE | Noted: 2021-12-14

## 2021-12-14 PROBLEM — E66.9 OBESITY: Chronic | Status: ACTIVE | Noted: 2021-12-14

## 2021-12-14 LAB
ANION GAP SERPL CALCULATED.3IONS-SCNC: 8 MMOL/L (ref 7–16)
BUN BLDV-MCNC: 34 MG/DL (ref 6–23)
CALCIUM SERPL-MCNC: 9.2 MG/DL (ref 8.6–10.2)
CHLORIDE BLD-SCNC: 99 MMOL/L (ref 98–107)
CO2: 27 MMOL/L (ref 22–29)
CREAT SERPL-MCNC: 1.1 MG/DL (ref 0.7–1.2)
GFR AFRICAN AMERICAN: >60
GFR NON-AFRICAN AMERICAN: >60 ML/MIN/1.73
GLUCOSE BLD-MCNC: 111 MG/DL (ref 74–99)
POTASSIUM REFLEX MAGNESIUM: 3.6 MMOL/L (ref 3.5–5)
SODIUM BLD-SCNC: 134 MMOL/L (ref 132–146)
URINE CULTURE, ROUTINE: NORMAL

## 2021-12-14 PROCEDURE — 99215 OFFICE O/P EST HI 40 MIN: CPT | Performed by: INTERNAL MEDICINE

## 2021-12-14 PROCEDURE — 80048 BASIC METABOLIC PNL TOTAL CA: CPT

## 2021-12-14 PROCEDURE — 36415 COLL VENOUS BLD VENIPUNCTURE: CPT

## 2021-12-14 PROCEDURE — G0378 HOSPITAL OBSERVATION PER HR: HCPCS

## 2021-12-14 PROCEDURE — 6370000000 HC RX 637 (ALT 250 FOR IP): Performed by: INTERNAL MEDICINE

## 2021-12-14 RX ORDER — POTASSIUM CHLORIDE 750 MG/1
20 TABLET, EXTENDED RELEASE ORAL DAILY
Qty: 30 TABLET | Refills: 0 | Status: SHIPPED | OUTPATIENT
Start: 2021-12-14 | End: 2021-12-14 | Stop reason: SDUPTHER

## 2021-12-14 RX ORDER — POTASSIUM CHLORIDE 750 MG/1
20 TABLET, EXTENDED RELEASE ORAL DAILY
Qty: 30 TABLET | Refills: 0 | Status: SHIPPED | OUTPATIENT
Start: 2021-12-14 | End: 2022-01-27 | Stop reason: ALTCHOICE

## 2021-12-14 RX ORDER — FUROSEMIDE 40 MG/1
40 TABLET ORAL DAILY
Status: DISCONTINUED | OUTPATIENT
Start: 2021-12-15 | End: 2021-12-14 | Stop reason: HOSPADM

## 2021-12-14 RX ORDER — FUROSEMIDE 40 MG/1
40 TABLET ORAL DAILY
Qty: 60 TABLET | Refills: 3 | Status: SHIPPED | OUTPATIENT
Start: 2021-12-15 | End: 2021-12-22

## 2021-12-14 RX ORDER — FUROSEMIDE 40 MG/1
40 TABLET ORAL DAILY
Qty: 30 TABLET | Refills: 0 | Status: SHIPPED | OUTPATIENT
Start: 2021-12-14 | End: 2021-12-20

## 2021-12-14 RX ADMIN — APIXABAN 5 MG: 5 TABLET, FILM COATED ORAL at 09:40

## 2021-12-14 RX ADMIN — DOXYCYCLINE HYCLATE 100 MG: 100 CAPSULE ORAL at 09:40

## 2021-12-14 RX ADMIN — Medication 1 TABLET: at 09:40

## 2021-12-14 RX ADMIN — Medication 2000 UNITS: at 09:40

## 2021-12-14 RX ADMIN — LOSARTAN POTASSIUM 100 MG: 50 TABLET, FILM COATED ORAL at 09:40

## 2021-12-14 RX ADMIN — PREDNISONE 20 MG: 20 TABLET ORAL at 09:00

## 2021-12-14 RX ADMIN — METOPROLOL SUCCINATE 50 MG: 50 TABLET, EXTENDED RELEASE ORAL at 09:39

## 2021-12-14 RX ADMIN — PANTOPRAZOLE SODIUM 40 MG: 40 TABLET, DELAYED RELEASE ORAL at 09:39

## 2021-12-14 RX ADMIN — ALLOPURINOL 300 MG: 300 TABLET ORAL at 09:40

## 2021-12-14 RX ADMIN — EZETIMIBE 10 MG: 10 TABLET ORAL at 09:40

## 2021-12-14 RX ADMIN — ASPIRIN 81 MG: 81 TABLET, COATED ORAL at 09:41

## 2021-12-14 ASSESSMENT — PAIN SCALES - GENERAL
PAINLEVEL_OUTOF10: 0
PAINLEVEL_OUTOF10: 0

## 2021-12-14 NOTE — PROGRESS NOTES
CLINICAL PHARMACY NOTE: MEDS TO BEDS    Total # of Prescriptions Filled: 2   The following medications were delivered to the patient:  · Furosemide 40mg  · Potassium er 10mg    Additional Documentation:    Delivered 12/14 3:40

## 2021-12-14 NOTE — PROGRESS NOTES
BNP: No results for input(s): BNP in the last 72 hours. ABGs: No results found for: PHART, PO2ART, ATA8DVR  INR:   Recent Labs     21  1502   INR 1.1         -----------------------------------------------------------------  RAD:   Results for orders placed during the hospital encounter of 21    XR CHEST [id number] :  Patient is status post median sternotomy. There are signs of interlobular  septal thickening concerning for fibrotic change. Pulmonary vasculature and  heart size are within the normal range. There are no signs of pneumothorax,  pleural effusion or congestion. Impression  1. No signs of an acute cardiopulmonary process  2. Interlobular septal thickening concerning for underlying pulmonary  fibrotic disease, stable      Micro:  Recent Labs     21  1502   BC 24 Hours no growth     No results for input(s): ORG in the last 72 hours. Recent Labs     21  1845   BLOODCULT2 24 Hours no growth     No results for input(s): STREPPNEUMAG in the last 72 hours. No results for input(s): LEGUR in the last 72 hours. No results for input(s): ORG in the last 72 hours. No results for input(s): ASO in the last 72 hours. No results for input(s): CULTRESP in the last 72 hours.   Recent Labs     21  1845   LABURIN Growth not present, incubation continues     Vent Information  Skin Assessment: Clean, dry, & intact  SpO2: 96 %    Additional Respiratory  Assessments  Pulse: 86  Resp: 16  SpO2: 96 %    Objective:   Vitals:   Vitals:    21 0510   BP: 132/67   Pulse: 86   Resp: 16   Temp: 96.6 °F (35.9 °C)   SpO2: 96%      TEMP:Current: Temp: 96.6 °F (35.9 °C)  Max: Temp  Av.3 °F (36.3 °C)  Min: 96.6 °F (35.9 °C)  Max: 97.8 °F (36.6 °C)    BP Range: Systolic (94QAH), MITCH:715 , Min:118 , UDU:733     Diastolic (92DCE), ZCN:44, Min:55, Max:67      General appearance: alert, appears stated age and cooperative  In no acute distress  Skin: No rashes or lesions  HEENT: mucous membranes are moist  Neck: No JVD  Lungs: symmetrical expansion, diminished, clear to auscultation, no use of accessory muscles  Heart: Irregular, S1S2 no murmurs,  Abdomen: soft, non tender,   Extremities: no peripheral edema  Neurologic: Alert, oriented times 3,  Affect: pleasant      Assessment:   Patient Active Problem List:     Coronary atherosclerosis of native coronary artery     Hyperlipidemia     Aortic stenosis     Hypertension     DEBORAH (obstructive sleep apnea)     DDD (degenerative disc disease), cervical     Nocturnal hypoxemia due to asthma     Neuroforaminal stenosis of lumbar spine     Valvular heart disease     Mild intermittent asthma without complication     New onset a-fib ([facility])     S/P AVR (aortic valve replacement)     S/P CABG (coronary artery bypass graft)     Atrial fibrillation with rapid ventricular response ([facility])     Type 2 diabetes mellitus with diabetic neuropathy, without long-term current use of insulin ([facility])     PVD (peripheral vascular disease) ([facility])     Bilateral carotid bruits     Carotid stenosis, right     Diplopia     Congestive heart failure of unknown etiology ([facility] [address]    Plan:   1. Continue Doxy  2. Continue Prednisone for now - will taper  3. On Lasix per cards and to follow up with [facility] for failing bioprosthestic valve  4. On Eliquis  5. On room air, O2 if needed  6. If not improvement with cardiac issues will check HRCT. MARIO Best - CNP     Seen and examined, agree with above except cough now gone as of today and breathing okay. Okay to dc on po doxy and prednisone taper with fu with us in 4 weeks.

## 2021-12-14 NOTE — CARE COORDINATION
12/14: Update CM Note: CM meet with pt bedside. Pt's d/c plan is home. Pt has no current needs. Pt's daughter can transport him home. Pt is on Eliquis this is an old medication. Sw/ANAHY will continue to follow.  Electronically signed by Lovena Holter, RN on 12/14/2021 at 10:15 AM

## 2021-12-14 NOTE — PROGRESS NOTES
Hospital Medicine    Subjective:  Pt alert conversive in no distress      Current Facility-Administered Medications:     furosemide (LASIX) injection 40 mg, 40 mg, IntraVENous, Daily, Colette Perla MD, 40 mg at 12/13/21 1852    allopurinol (ZYLOPRIM) tablet 300 mg, 300 mg, Oral, Daily, Kris Garcia MD, 300 mg at 12/13/21 0911    apixaban (ELIQUIS) tablet 5 mg, 5 mg, Oral, BID, Kris Garcia MD, 5 mg at 12/13/21 2140    aspirin EC tablet 81 mg, 81 mg, Oral, Daily, Kris Garcia MD, 81 mg at 12/13/21 0911    vitamin D (CHOLECALCIFEROL) tablet 2,000 Units, 2,000 Units, Oral, Daily, Kris Garcia MD, 2,000 Units at 12/13/21 0911    ezetimibe (ZETIA) tablet 10 mg, 10 mg, Oral, Daily, Kris Garcia MD, 10 mg at 12/13/21 0911    metoprolol succinate (TOPROL XL) extended release tablet 50 mg, 50 mg, Oral, BID, Kris Garcia MD, 50 mg at 12/13/21 2140    therapeutic multivitamin-minerals 1 tablet, 1 tablet, Oral, Daily, Kris Garcia MD, 1 tablet at 12/13/21 0911    predniSONE (DELTASONE) tablet 20 mg, 20 mg, Oral, Daily, Kris Garcia MD, 20 mg at 12/13/21 0911    tiZANidine (ZANAFLEX) tablet 4 mg, 4 mg, Oral, 4x Daily PRN, Kris Garcia MD, 4 mg at 12/12/21 1418    doxycycline hyclate (VIBRAMYCIN) capsule 100 mg, 100 mg, Oral, 2 times per day, Kris Garcia MD, 100 mg at 12/13/21 2140    losartan (COZAAR) tablet 100 mg, 100 mg, Oral, Daily, Kris Garcia MD, 100 mg at 12/13/21 0911    pantoprazole (PROTONIX) tablet 40 mg, 40 mg, Oral, Daily, Kris Garcia MD, 40 mg at 12/13/21 0550    sodium chloride flush 0.9 % injection 10 mL, 10 mL, IntraVENous, PRN, MARIO Cruz, 10 mL at 12/12/21 0656    Objective:    /67   Pulse 86   Temp 96.6 °F (35.9 °C) (Temporal)   Resp 16   Ht 5' 9\" (1.753 m)   Wt 210 lb 3.2 oz (95.3 kg)   SpO2 96%   BMI 31.04 kg/m²     Heart:  irreg  Lungs:  ctab  Abd: + bs soft nontender  Extrem:  Min edema legs    CBC with Differential:    Lab Results   Component Value Date WBC 10.3 12/11/2021    RBC 3.59 12/11/2021    HGB 11.4 12/11/2021    HCT 34.9 12/11/2021     12/11/2021    MCV 97.2 12/11/2021    MCH 31.8 12/11/2021    MCHC 32.7 12/11/2021    RDW 13.7 12/11/2021    SEGSPCT 81 07/09/2013    LYMPHOPCT 9.0 12/11/2021    MONOPCT 5.0 12/11/2021    BASOPCT 0.4 12/11/2021    MONOSABS 0.52 12/11/2021    LYMPHSABS 0.93 12/11/2021    EOSABS 0.00 12/11/2021    BASOSABS 0.04 12/11/2021     CMP:    Lab Results   Component Value Date     12/14/2021    K 3.6 12/14/2021    CL 99 12/14/2021    CO2 27 12/14/2021    BUN 34 12/14/2021    CREATININE 1.1 12/14/2021    GFRAA >60 12/14/2021    LABGLOM >60 12/14/2021    GLUCOSE 111 12/14/2021    GLUCOSE 89 02/11/2012    PROT 6.8 12/11/2021    LABALBU 4.4 12/11/2021    CALCIUM 9.2 12/14/2021    BILITOT 0.4 12/11/2021    ALKPHOS 72 12/11/2021    AST 34 12/11/2021    ALT 62 12/11/2021     Warfarin PT/INR:    Lab Results   Component Value Date    INR 1.1 12/11/2021    INR 1.1 02/01/2020    INR 1.0 06/04/2019    PROTIME 12.8 (H) 12/11/2021    PROTIME 12.1 02/01/2020    PROTIME 11.0 06/04/2019       Assessment:    Principal Problem:    Acute diastolic CHF (congestive heart failure) (HCC)  Active Problems:    Coronary atherosclerosis of native coronary artery    Hyperlipidemia    Hypertension    DEBORAH (obstructive sleep apnea)    S/P AVR (aortic valve replacement)    S/P CABG (coronary artery bypass graft)    Atrial fibrillation ([address]    Obesity  Resolved Problems:    * No resolved hospital problems.  *      Plan:  Change to po diuretic start daily k dc home discussed case with dr betancourt f/u with [facility] to discuss valve procedure        [id number] Juan Youngblood DO  8:03 AM  12/14/2021

## 2021-12-14 NOTE — PROGRESS NOTES
Inpatient Cardiology Progress note     PATIENT IS BEING FOLLOWED FOR: CHF. [facility]    Junior Rowe is a 68 y.o. male who follows with Dr Ajit Arellano and with the [facility]     SUBJECTIVE: Denies SOB. Denies CP  OBJECTIVE: No apparent distress     ROS:  Consist: Denies fevers, chills or night sweats  Heart: Denies chest pain, palpitations, lightheadedness, dizziness or syncope  Lungs: Denies SOB, cough, wheezing, orthopnea or PND  GI: Denies abdominal pain, vomiting or diarrhea    PHYSICAL EXAM:   /88   Pulse 99   Temp 98 °F (36.7 °C) (Temporal)   Resp 18   Ht 5' 9\" (1.753 m)   Wt 210 lb 3.2 oz (95.3 kg)   SpO2 98%   BMI 31.04 kg/m²    B/P Range last 24 hours: Systolic (90FVM), YRY:596 , Min:118 , VYL:385    Diastolic (00YZF), FGD:99, Min:55, Max:88    CONST: Well developed, well nourished male who appears of  stated age. Awake, alert and cooperative. No apparent distress  HEENT:   Head- Normocephalic, atraumatic   Eyes- Conjunctivae pink, anicteric  Throat- Oral mucosa pink and moist  Neck-  No stridor, trachea midline, no jugular venous distention. No carotid bruit  CHEST: Chest symmetrical and non-tender to palpation. No accessory muscle use or intercostal retractions  RESPIRATORY:  Lung sounds - clear throughout fields   CARDIOVASCULAR:     Heart Inspection- shows no noted pulsations  Heart Palpation- no heaves or thrills; PMI is non-displaced   Heart Ausculation- Irregular rate and rhythm. 1/6 SM aortic area murmur. No s3 or rub   PV: No lower extremity edema. No varicosities. Pedal pulses palpable, no clubbing or cyanosis   ABDOMEN: Soft, non-tender to light palpation. Bowel sounds present. No palpable masses no organomegaly; no abdominal bruit  MS: Good muscle strength and tone. No atrophy or abnormal movements. : Deferred  SKIN: Warm and dry no statis dermatitis or ulcers   NEURO / PSYCH: Oriented to person, place and time. Speech clear and appropriate. Follows all commands.  Pleasant affect Intake/Output Summary (Last 24 hours) at 12/14/2021 0841  Last data filed at 12/14/2021 0600  Gross per 24 hour   Intake 660 ml   Output 2700 ml   Net -2040 ml       Weight:   Wt Readings from Last 3 Encounters:   12/14/21 210 lb 3.2 oz (95.3 kg)   12/08/21 221 lb (100.2 kg)   09/08/21 213 lb (96.6 kg)     Current Inpatient Medications:   [START ON 12/15/2021] furosemide  40 mg Oral Daily    allopurinol  300 mg Oral Daily    apixaban  5 mg Oral BID    aspirin  81 mg Oral Daily    vitamin D  2,000 Units Oral Daily    ezetimibe  10 mg Oral Daily    metoprolol succinate  50 mg Oral BID    therapeutic multivitamin-minerals  1 tablet Oral Daily    predniSONE  20 mg Oral Daily    doxycycline hyclate  100 mg Oral 2 times per day    losartan  100 mg Oral Daily    pantoprazole  40 mg Oral Daily       IV Infusions (if any):      DIAGNOSTIC/ LABORATORY DATA:  Labs:   CBC:   Recent Labs     12/11/21  1502   WBC 10.3   HGB 11.4*   HCT 34.9*        BMP:   Recent Labs     12/11/21  1502 12/14/21  0611    134   K 4.4 3.6   CO2 23 27   BUN 27* 34*   CREATININE 1.0 1.1   LABGLOM >60 >60   CALCIUM 9.5 9.2     Mag: No results for input(s): MG in the last 72 hours. Phos: No results for input(s): PHOS in the last 72 hours. TFT:   Lab Results   Component Value Date    TSH 1.600 12/08/2021      HgA1c:   Lab Results   Component Value Date    LABA1C 6.0 (H) 12/08/2021     No results found for: EAG    BNP: No results for input(s): BNP in the last 72 hours. PT/INR:   Recent Labs     12/11/21  1502   PROTIME 12.8*   INR 1.1     APTT:  Recent Labs     12/11/21  1502   APTT 31.5     CARDIAC ENZYMES:  Recent Labs     12/11/21  1845   TROPHS 8     FASTING LIPID PANEL:  Lab Results   Component Value Date    CHOL 269 12/08/2021    HDL 57 12/08/2021    LDLCALC 171 12/08/2021    TRIG 205 12/08/2021     LIVER PROFILE:  Recent Labs     12/11/21  1502   AST 34   ALT 62*   LABALBU 4.4       CXR 12/12/2021:   1.  No signs of an acute cardiopulmonary process  2. Interlobular septal thickening concerning for underlying pulmonary  fibrotic disease, stable      Telemetry: Afib, rate 90's to 100's    12 lead EKG 12/13/21:  Atrial fibrillation with premature ventricular or aberrantly conducted complexes  Rightward axis  Abnormal QRS-T angle, consider primary T wave abnormality  Abnormal ECG      ASSESSMENT:   1. Acute heart failure preserved EF. (EF 65+/- 5% on echo 9/2021)  2. Coronary artery disease s/p single vessel bypass (LIMA-LAD) 2013 with negative stress 2017.   3. S/p AVR with bioprosthetic valve 2013 with prosthesis failure with mod-severe AS and moderate AR on echo 9/2021. Patient follows with [facility]  4. Recurrent atrial fibrillation, with CVR. date of onset unclear. Known history of PAF on Eliquis. 5. Hypertension   6. Hyperlipidemia   7. Diabetes mellitus type 2   8. obesity  9. Obstructive sleep apnea, not compliant with CPAP   10. Ex smoker, quit 13 years ago   6. Alcohol abuse (4-5 beers daily)   12. Severe osteoarthritis, right hip. Uses a cane at times   13. Being treated for bronchitis with oral antibiotics and steroids (symptoms started one week ago)   14. Prerenal azotemia           PLAN:  1. Discontinue IV diuretics and start PO diuretics as of tomorrow. Check BMP later this week  2. Rest of home cardiac medications same  3. Consider outpatient DCCV. Consider AAD  4. Patient to see his primary cardiologist Dr. Yordy Armas post discharge  5. Patient needs to follow up [facility] post discharge in regards to his failing bioprosthetic Aortic valve.   6. OK for discharge from cardiology stand point      Electronically signed by Abi Figueroa MD on 12/14/2021 at 8:41 AM

## 2021-12-14 NOTE — CONSULTS
[address]                  [address] [facility],  [facility]                                  CONSULTATION    PATIENT NAME: Makayla Herrera                     :        1945  MED REC NO:   [id number]                            ROOM:       63  ACCOUNT NO:   [de-identified]                           ADMIT DATE: 2021  PROVIDER:     Karin Lynch MD    CONSULT DATE:  2021    REQUESTED BY:  Alexx Alves DO    REASON FOR CONSULTATION:  Shortness of breath. IMPRESSION:  Multifactorial shortness of breath with a large component  being cardiac, although I think despite having just an abnormal DLCO on  PFTs that I reviewed at least the numbers of that there may be some  airflow obstruction, as he has a cough with sputum as well. Nonetheless  most of the symptoms are orthopnea, exertional dyspnea and with his  leaky aortic prosthetic valve, I think that is the crux of the problem. He also has obstructive sleep apnea and is on CPAP sporadically at best.  It sounds like he needs to be more compliant with that. He does not  have evidence of airflow obstruction on 2021 breathing test, just  an abnormal DLCO. Nonetheless, there is a slight possibility of COPD  missed on the PFT. He has minimal changes on his chest x-ray. They are  read as some interstitial changes. I am not terribly impressed, but if  he fails to improve with treatment of his heart, we will get a  noncontrast high-resolution CT of the chest.    As he sounds like he at least had some type of bronchitis, I think  doxycycline is reasonable and prednisone is not harmful in the short  term. HISTORY:  A 66-year-old white male previously known to Dr. Melinda Alves, has  obstructive sleep apnea for which he uses a CPAP and also had an aortic  valve replaced with a porcine valve. He has been short of breath with  walking.   Two days ago while showering, he became extremely dyspneic,  had a hard time getting back up steps because of the shortness of  breath. He even coughing up some green mucus and has been wheezing  occasionally, especially then as well. He has not been on any inhalers  for quite some time. He did not have any fevers or chills that I can  tell. He was a former heavy one and a half pack a day smoker as well as  working as a  and . He has not really been  wearing a CPAP very much. He has felt by the cardiologist to be in  acute congestive heart failure. PAST MEDICAL HISTORY:  Includes hypertension, diabetes, hyperlipidemia,  arthritic problems, and basal cell skin cancers, were excised. PAST SURGICAL HISTORY:  Includes porcine valve, four back surgeries and  neck surgery. FAMILY HISTORY:  Positive for stroke, diabetes, breast cancer. SOCIAL HISTORY:  As above. ALLERGIES:  To IODINE. REVIEW OF SYSTEMS:  No fevers or chills. Hearing loss requiring hearing  aids. He had heartburn today. He has urinary hesitancy. He has  arthritis. No history of blood clots. Review of systems is otherwise  negative. MEDICATIONS:  Currently are allopurinol 300 daily, Eliquis 5 b.i.d.,  Ecotrin 81 daily, doxycycline 100 b.i.d., Zetia 10 daily, losartan 100  daily, metoprolol succinate 50 b.i.d., Protonix 40 daily, prednisone 20  daily, multivitamin daily, Zanaflex 4 q.i.d. p.r.n. and vitamin D 2000  units daily. PHYSICAL EXAMINATION:  GENERAL:  He is in no acute distress, alert, sitting comfortably in no  distress on room air. VITAL SIGNS:  Temperature 36.4, pulse 95, respiratory rate 16, blood  pressure 118/55. HEENT:  Eyes had clear sclerae. Palate and gums were  normal.  NECK:  Without masses or adenopathy. CHEST:  Symmetric. There was no accessory muscle use. HEART:  Irregularly irregular. LUNGS:  Fairly clear. ABDOMEN:  Soft, nontender without masses or organomegaly. EXTREMITIES:  Showed no clubbing or cyanosis.   There is 1+ edema. NEUROLOGICAL:  He was alert and appropriate. DATA:  Chest x-rays were reviewed and showed minimal interstitial  changes. There may be a little bit of congestion, but it is very  minimal.  White count is 10.3 two days ago with a hemoglobin of 11.4,  platelet count of 744. PBNP is up to 15.04 from 12.99, BUN 27,  creatinine 1, glucose is 170. Lactic acid was 1.5 and serum bicarb was  23. Old PFTs were reviewed.         Althea Douglass MD    D: 12/13/2021 16:00:49       T: 12/13/2021 16:05:12     [email or web address]  Job#: [id number]     Doc#: [id number]    CC:

## 2021-12-14 NOTE — PROGRESS NOTES
Notified via CMR of pt's frequent runs of PVC's. No more than 5 at a time. Strips sent over. Told monitor tech not to send anymore unless having more than 10 at a time. CMR tech also notified this RN pt tends to have \"pauses\". \"He'll be in the 80's then drop to the high 40's, the come back\". Pt's wave form varies between SR and Afib. Informed tech pt has hx of afib.      Will continue to monitor and alert day shift RN to alert Cardiologist.

## 2021-12-14 NOTE — PLAN OF CARE
Problem: Falls - Risk of:  Goal: Will remain free from falls  Description: Will remain free from falls  Outcome: Met This Shift  Goal: Absence of physical injury  Description: Absence of physical injury  Outcome: Met This Shift     Problem: Pain:  Goal: Pain level will decrease  Description: Pain level will decrease  Outcome: Ongoing  Goal: Control of acute pain  Description: Control of acute pain  Outcome: Met This Shift  Goal: Control of chronic pain  Description: Control of chronic pain  Outcome: Met This Shift     Problem: OXYGENATION/RESPIRATORY FUNCTION  Goal: Patient will maintain patent airway  Outcome: Met This Shift     Problem: HEMODYNAMIC STATUS  Goal: Patient has stable vital signs and fluid balance  Outcome: Met This Shift

## 2021-12-15 ENCOUNTER — TELEPHONE (OUTPATIENT)
Dept: PRIMARY CARE CLINIC | Age: 76
End: 2021-12-15

## 2021-12-15 ENCOUNTER — CARE COORDINATION (OUTPATIENT)
Dept: CASE MANAGEMENT | Age: 76
End: 2021-12-15

## 2021-12-15 DIAGNOSIS — I50.9 ACUTE CONGESTIVE HEART FAILURE, UNSPECIFIED HEART FAILURE TYPE (HCC): Primary | ICD-10-CM

## 2021-12-15 LAB
EKG ATRIAL RATE: 102 BPM
EKG Q-T INTERVAL: 392 MS
EKG QRS DURATION: 110 MS
EKG QTC CALCULATION (BAZETT): 457 MS
EKG R AXIS: 90 DEGREES
EKG T AXIS: -3 DEGREES
EKG VENTRICULAR RATE: 82 BPM

## 2021-12-15 NOTE — CARE COORDINATION
[address] Transitions Initial Follow Up Call    Call within 2 business days of discharge: Yes    Patient: Dirk Langston Patient : 1945   MRN: <[id number]>  Reason for Admission: 2021 - 2021 [facility]. CHF, COPD. Discharge Date: 21 RARS: No data recorded  NR  CT  Last Discharge [facility]       Complaint Diagnosis Description Type Department Provider    21 Shortness of Breath Congestive heart failure, unspecified HF chronicity, unspecified heart failure type ([facility] [address] . .. ED to Hosp-Admission (Discharged) (ADMITTED) SEYZ [facility] Jeramie De Santiago DO; Itz Seek,... Initial CT outreach attempt leaving Hippa VM and appt reminder. PCP  11:00  Follow up with MARIO Rodríguez CNP (Nurse Practitioner)  Schedule an appointment with Marilyn Saucedo MD (Pulmonology)    PMH: CHF, AF, s/p CABG, HTN, HLD, DEBORAH-CPAP, Asthma, COPD, DM. ED presentation: Under ATB tx for PN, Increasing SOB & cough over 3 days, chest pain in ED, home sats 80-90%.       Care Transitions 24 Hour Call    Do you have all of your prescriptions and are they filled?: Yes  Care Transitions Interventions         Follow Up  Future Appointments   Date Time Provider Dexter Ambrose   2021 11:00 AM [facility]   3/9/2022 11:00 AM DO [facility]   2022 10:00 AM Ninfa Marquez MD [facility]/ZENY Orellana RN

## 2021-12-16 ENCOUNTER — CARE COORDINATION (OUTPATIENT)
Dept: CASE MANAGEMENT | Age: 76
End: 2021-12-16

## 2021-12-16 ENCOUNTER — TELEPHONE (OUTPATIENT)
Dept: PHARMACY | Facility: CLINIC | Age: 76
End: 2021-12-16

## 2021-12-16 DIAGNOSIS — I50.31 ACUTE DIASTOLIC CHF (CONGESTIVE HEART FAILURE) (HCC): Primary | ICD-10-CM

## 2021-12-16 LAB
BLOOD CULTURE, ROUTINE: NORMAL
CULTURE, BLOOD 2: NORMAL

## 2021-12-16 NOTE — CARE COORDINATION
[address] Transitions Initial Follow Up Call    Call within 2 business days of discharge: Yes    Patient: Cole Landeros Patient : 1945   MRN: <[id number]>  Reason for Admission: 2021 - 2021 [facility]. CHF, COPD. Discharge Date: 21 RARS: No data recorded  NR  CT  Last Discharge [address]       Complaint Diagnosis Description Type Department Provider    21 Shortness of Breath Congestive heart failure, unspecified HF chronicity, unspecified heart failure type ([facility] [address] . .. ED to Hosp-Admission (Discharged) (ADMITTED) DILIA [facility] [place] Michelle DO; Moshe Mari,. ..           PCP  11:00  Follow up with MARIO Huertas CNP (Nurse Practitioner)  Schedule an appointment with Ethan David MD (Pulmonology)     PMH: CHF, AF, s/p CABG, HTN, HLD, DEBORAH-CPAP, Asthma, COPD, DM.         Transitions of Care Initial Call    Was this an external facility discharge? No Discharge Facility:     Challenges to be reviewed by the provider   Additional needs identified to be addressed with provider: No  none             Method of communication with provider : none      Advance Care Planning:   Does patient have an Advance Directive: Joaquin Hull primary decision maker [phone], Denise Topete/Kasey secondary decision maker [phone]. Was this a readmission? No  Patient stated reason for admission: ED presentation: Under ATB tx for PN, Increasing SOB & cough over 3 days, chest pain in ED, home sats 80-90%. CV-19 neg. Current: Gamal Oreilly reports he is doing better. Continues to have some chest tightness w/ deep breath effort. Home sats 95-97%. Has CPAP and states he rarely uses. Denies any cough or congestion concerns. Some exertional SOB. Denies any edema or wt gains concerns. Does not monitor home BS. States he follows no added low sodium diet. Had covid vaccination. Denies any HHC needs. Pharm referral for med rec/ed.  ACM referral.    Patients top risk factors for readmission: COPD, CHF. Care Transition Nurse (CTN) contacted the patient by telephone to perform post hospital discharge assessment. Verified name and  with patient as identifiers. Provided introduction to self, and explanation of the CTN role. CTN reviewed discharge instructions, medical action plan and red flags with patient who verbalized understanding. Patient given an opportunity to ask questions and does not have any further questions or concerns at this time. Were discharge instructions available to patient? Yes. Reviewed appropriate site of care based on symptoms and resources available to patient including: When to call 911. The patient agrees to contact the PCP office for questions related to their healthcare. Medication reconciliation was performed with patient, who verbalizes understanding of administration of home medications. Advised obtaining a 90-day supply of all daily and as-needed medications. CTN provided contact information. No further follow-up call indicated based on severity of symptoms and risk factors. CTN s/o. ACM referral placed.     Care Transitions 24 Hour Call    Do you have any ongoing symptoms?: Yes  Patient-reported symptoms: Shortness of Breath  Do you have a copy of your discharge instructions?: Yes  Do you have all of your prescriptions and are they filled?: Yes  Have you scheduled your follow up appointment?: Yes  Were you discharged with any Home Care or Post Acute Services: No  Do you feel like you have everything you need to keep you well at home?: Yes  Care Transitions Interventions    Pharmacist: Completed           Follow Up  Future Appointments   Date Time Provider Dexter Ambrose   2021 11:00 AM DO FRANCES Decker [facility]   3/9/2022 11:00 AM DO FRANCES Decker [facility]   2022 10:00 AM Kalli Núñez MD [facility]/ZENY Orellana RN

## 2021-12-16 NOTE — TELEPHONE ENCOUNTER
Received a referral:  from Care Coordinator to review patients medications. COPD, CHF. Needs dc med rec/ed    Called patient to schedule a time to speak with a pharmacist over the telephone. Spoke to patient and advised them of the above message. Patient verified understanding and scheduled their appointment: 12/20 at [address] [place] [address].    [address] [place] [address] free: [phone]

## 2021-12-20 ENCOUNTER — OFFICE VISIT (OUTPATIENT)
Dept: PRIMARY CARE CLINIC | Age: 76
End: 2021-12-20
Payer: MEDICARE

## 2021-12-20 ENCOUNTER — TELEPHONE (OUTPATIENT)
Dept: CARDIOLOGY CLINIC | Age: 76
End: 2021-12-20

## 2021-12-20 ENCOUNTER — SCHEDULED TELEPHONE ENCOUNTER (OUTPATIENT)
Dept: PHARMACY | Facility: CLINIC | Age: 76
End: 2021-12-20

## 2021-12-20 ENCOUNTER — HOSPITAL ENCOUNTER (OUTPATIENT)
Age: 76
Discharge: HOME | End: 2021-12-20
Payer: MEDICARE

## 2021-12-20 VITALS
HEIGHT: 69 IN | HEART RATE: 84 BPM | TEMPERATURE: 97.2 F | DIASTOLIC BLOOD PRESSURE: 64 MMHG | RESPIRATION RATE: 18 BRPM | BODY MASS INDEX: 31.55 KG/M2 | WEIGHT: 213 LBS | OXYGEN SATURATION: 96 % | SYSTOLIC BLOOD PRESSURE: 116 MMHG

## 2021-12-20 DIAGNOSIS — E78.2 MIXED HYPERLIPIDEMIA: ICD-10-CM

## 2021-12-20 DIAGNOSIS — I25.10 ATHEROSCLEROSIS OF NATIVE CORONARY ARTERY OF NATIVE HEART WITHOUT ANGINA PECTORIS: ICD-10-CM

## 2021-12-20 DIAGNOSIS — I50.9 ACUTE CONGESTIVE HEART FAILURE, UNSPECIFIED HEART FAILURE TYPE (HCC): Primary | ICD-10-CM

## 2021-12-20 DIAGNOSIS — E11.40 TYPE 2 DIABETES MELLITUS WITH DIABETIC NEUROPATHY, WITHOUT LONG-TERM CURRENT USE OF INSULIN (HCC): ICD-10-CM

## 2021-12-20 DIAGNOSIS — I50.31 ACUTE DIASTOLIC CHF (CONGESTIVE HEART FAILURE) (HCC): Primary | ICD-10-CM

## 2021-12-20 DIAGNOSIS — Z78.9 STATIN INTOLERANCE: ICD-10-CM

## 2021-12-20 DIAGNOSIS — I50.9 ACUTE CONGESTIVE HEART FAILURE, UNSPECIFIED HEART FAILURE TYPE (HCC): ICD-10-CM

## 2021-12-20 LAB
ANION GAP SERPL CALCULATED.3IONS-SCNC: 12 MMOL/L (ref 7–16)
BUN BLDV-MCNC: 32 MG/DL (ref 6–23)
CALCIUM SERPL-MCNC: 9.9 MG/DL (ref 8.6–10.2)
CHLORIDE BLD-SCNC: 105 MMOL/L (ref 98–107)
CO2: 24 MMOL/L (ref 22–29)
CREAT SERPL-MCNC: 1.2 MG/DL (ref 0.7–1.2)
GFR AFRICAN AMERICAN: >60
GFR NON-AFRICAN AMERICAN: 59 ML/MIN/1.73
GLUCOSE BLD-MCNC: 135 MG/DL (ref 74–99)
POTASSIUM SERPL-SCNC: 4.6 MMOL/L (ref 3.5–5)
SODIUM BLD-SCNC: 141 MMOL/L (ref 132–146)

## 2021-12-20 PROCEDURE — 80048 BASIC METABOLIC PNL TOTAL CA: CPT

## 2021-12-20 PROCEDURE — 1111F DSCHRG MED/CURRENT MED MERGE: CPT | Performed by: FAMILY MEDICINE

## 2021-12-20 PROCEDURE — 36415 COLL VENOUS BLD VENIPUNCTURE: CPT

## 2021-12-20 PROCEDURE — 99214 OFFICE O/P EST MOD 30 MIN: CPT | Performed by: FAMILY MEDICINE

## 2021-12-20 RX ORDER — EVOLOCUMAB 140 MG/ML
1 INJECTION, SOLUTION SUBCUTANEOUS
Qty: 2.1 ML | Refills: 1 | Status: SHIPPED
Start: 2021-12-20 | End: 2021-12-22

## 2021-12-20 RX ORDER — AMLODIPINE BESYLATE 10 MG/1
10 TABLET ORAL DAILY
COMMUNITY
End: 2022-03-09

## 2021-12-20 ASSESSMENT — ENCOUNTER SYMPTOMS
WHEEZING: 0
CHEST TIGHTNESS: 0
SORE THROAT: 0
FACIAL SWELLING: 0
SHORTNESS OF BREATH: 1
DIARRHEA: 0
APNEA: 0
VOMITING: 0
COUGH: 0
ALLERGIC/IMMUNOLOGIC NEGATIVE: 1
COLOR CHANGE: 0
BLOOD IN STOOL: 0
PHOTOPHOBIA: 0
NAUSEA: 0
ABDOMINAL PAIN: 0
SINUS PRESSURE: 0
BACK PAIN: 0

## 2021-12-20 NOTE — PROGRESS NOTES
Chief Complaint:   Chief Complaint   Patient presents with    Shortness of Breath    Congestive Heart Failure    Discuss Labs       Shortness of Breath  This is a new problem. The current episode started 1 to 4 weeks ago. The problem has been gradually improving. Pertinent negatives include no abdominal pain, chest pain, headaches, leg swelling, rash, sore throat, vomiting or wheezing. Congestive Heart Failure  Presents for follow-up visit. Associated symptoms include shortness of breath. Pertinent negatives include no abdominal pain, chest pain or palpitations. The symptoms have been worsening.    down 8 lbs    Patient Active Problem List   Diagnosis    Coronary atherosclerosis of native coronary artery    Hyperlipidemia    Aortic stenosis    Hypertension    DEBORAH (obstructive sleep apnea)    DDD (degenerative disc disease), cervical    Nocturnal hypoxemia due to asthma    Neuroforaminal stenosis of lumbar spine    Valvular heart disease    Mild intermittent asthma without complication    New onset a-fib ([address]    S/P AVR (aortic valve replacement)    S/P CABG (coronary artery bypass graft)    Atrial fibrillation with rapid ventricular response (HCC)    Atrial fibrillation (HCC)    Type 2 diabetes mellitus with diabetic neuropathy, without long-term current use of insulin (HCC)    PVD (peripheral vascular disease) ([address]    Bilateral carotid bruits    Carotid stenosis, right    Diplopia    Congestive heart failure of unknown etiology ([address]    Acute diastolic CHF (congestive heart failure) ([address]    Obesity       Past Medical History:   Diagnosis Date    Arthritis     Atrial fibrillation ([address]     day of surgery on 06/12/13- resolved at this time 6/23/2013    Bilateral carotid bruits 10/15/2020    CAD (coronary artery disease)     heart cath with stent 2001    Cancer [facility])     Basal Cell on his back    Carotid stenosis, right 10/15/2020    Congestive heart failure of unknown etiology ([facility] [address] 12/11/2021    COPD (chronic obstructive pulmonary disease) ([facility]     Diplopia 4/21/2021    History of blood transfusion     Hyperlipidemia     Hypertension     Kidney stones     Sleep apnea     Type 2 diabetes mellitus with diabetic neuropathy, without long-term current use of insulin ([facility] [address] 9/2/2020    Vitamin D deficiency        Past Surgical History:   Procedure Laterality Date    ABDOMEN SURGERY      Bilateral groin hernias    AORTIC VALVE REPLACEMENT  6/12/2013    BACK SURGERY  2006    cervicle fusion   [facility] SURGERY  1974, 1984    lumbar laminectomy    BACK SURGERY  [id number]    360 FUSION L4-L5    BACK SURGERY      CARDIAC SURGERY      CARPAL TUNNEL RELEASE  [id number]    both hands    COLONOSCOPY      CORONARY ARTERY BYPASS GRAFT      DIAGNOSTIC CARDIAC CATH LAB PROCEDURE      ENDOSCOPY, COLON, DIAGNOSTIC      FRACTURE SURGERY      Right arm Fx    FRACTURE SURGERY      Left finger Fx    HERNIA REPAIR      Bilateral    KIDNEY STONE SURGERY      open    NECK SURGERY      SHOULDER ARTHROSCOPY      right and left shoulders    SKIN BIOPSY         Current Outpatient Medications   Medication Sig Dispense Refill    amLODIPine (NORVASC) 10 MG tablet Take 10 mg by mouth daily      Evolocumab (REPATHA) 140 MG/ML SOSY Inject 1 mL into the skin every 14 days 2.1 mL 1    furosemide (LASIX) 40 MG tablet Take 1 tablet by mouth daily 60 tablet 3    potassium chloride (KLOR-CON M) 10 MEQ extended release tablet Take 2 tablets by mouth daily 30 tablet 0    ezetimibe (ZETIA) 10 MG tablet Take 1 tablet by mouth daily 90 tablet 1    allopurinol (ZYLOPRIM) 300 MG tablet TAKE ONE TABLET BY MOUTH DAILY 90 tablet 0    apixaban (ELIQUIS) 5 MG TABS tablet Take 1 tablet by mouth 2 times daily 180 tablet 1    tiZANidine (ZANAFLEX) 4 MG tablet Take 1 tablet by mouth 4 times daily as needed (muscle spasms) 20 tablet 0    omeprazole (PRILOSEC) 20 MG delayed release capsule TAKE ONE CAPSULE BY MOUTH DAILY 90 capsule 3    metoprolol succinate (TOPROL XL) 50 MG extended release tablet Take 1 tablet by mouth 2 times daily 180 tablet 1    olmesartan (BENICAR) 40 MG tablet Take 1 tablet by mouth daily Indications: taking 40 mg 90 tablet 1    Alpha-Lipoic Acid 100 MG CAPS Take 200 mg by mouth nightly       Coenzyme Q10 (CO Q 10) 100 MG CAPS Take 2 tablets by mouth daily      Cholecalciferol (VITAMIN D3) 2000 UNITS TABS Take 1 tablet by mouth daily      Multiple Vitamins-Minerals (THERAPEUTIC MULTIVITAMIN-MINERALS) tablet Take 1 tablet by mouth daily Indications: Centrum       aspirin 81 MG EC tablet Take 81 mg by mouth daily        No current facility-administered medications for this visit.        Allergies   Allergen Reactions    Iodine Swelling and Other (See Comments)     Reddened face and swelling of tongue       Social History     Socioeconomic History    Marital status:      Spouse name: Iris aLws    Number of children: 3    Years of education: 9    Highest education level: None   Occupational History    Occupation: retired- [employer]/ [employer]     Employer: [employer]: Retired   Tobacco Use    Smoking status: Former Smoker     Packs/day: 2.00     Years: 55.00     Pack years: 110.00     Types: Cigarettes     Start date:      Quit date: 2013     Years since quittin.6    Smokeless tobacco: Never Used    Tobacco comment: [employer]   Vaping Use    Vaping Use: Never used    Passive vaping exposure: Yes   Substance and Sexual Activity    Alcohol use: Yes     Comment: drinks 6 cans of beer /day    Drug use: No    Sexual activity: Yes     Partners: Female   Other Topics Concern    None   Social History Narrative    None     Social Determinants of Health     Financial Resource Strain:     Difficulty of Paying Living Expenses: Not on file   Food Insecurity:     Worried About Running Out of Food in the Last Year: Not on file    [address] [facility] [address] in the light-headedness and headaches. Hematological: Negative. Psychiatric/Behavioral: Negative for agitation, behavioral problems, confusion and self-injury. The patient is not nervous/anxious. All other systems reviewed and are negative. Physical Exam  Vitals and nursing note reviewed. Constitutional:       General: He is not in acute distress. Appearance: He is well-developed. HENT:      Head: Normocephalic and atraumatic. Nose: Nose normal.   Eyes:      Conjunctiva/sclera: Conjunctivae normal.      Pupils: Pupils are equal, round, and reactive to light. Neck:      Thyroid: No thyromegaly. Vascular: No JVD. Cardiovascular:      Rate and Rhythm: Normal rate and regular rhythm. Heart sounds: Murmur heard. Systolic murmur is present with a grade of 2/6. No friction rub. No gallop. Pulmonary:      Effort: Pulmonary effort is normal. No respiratory distress. Breath sounds: Normal breath sounds. No wheezing. Abdominal:      General: Bowel sounds are normal. There is no distension. Palpations: Abdomen is soft. Tenderness: There is no abdominal tenderness. There is no guarding or rebound. Musculoskeletal:         General: Normal range of motion. Cervical back: Normal range of motion and neck supple. Lymphadenopathy:      Cervical: No cervical adenopathy. Skin:     General: Skin is warm and dry. Findings: No erythema or rash. Neurological:      Mental Status: He is alert and oriented to person, place, and time. Cranial Nerves: No cranial nerve deficit. Motor: No abnormal muscle tone. Coordination: Coordination normal.      Deep Tendon Reflexes: Reflexes are normal and symmetric. Psychiatric:         Behavior: Behavior normal.         Judgment: Judgment normal.                               ASSESSMENT/PLAN:    Jasper Carrion was seen today for shortness of breath, congestive heart failure and discuss labs.     Diagnoses and all orders for this visit:    Acute congestive heart failure, unspecified heart failure type ([facility] [address]  -     BASIC METABOLIC PANEL; Future  -     BRAIN NATRIURETIC PEPTIDE;  Future    Mixed hyperlipidemia  -     Evolocumab (REPATHA) 140 MG/ML SOSY; Inject 1 mL into the skin every 14 days    Type 2 diabetes mellitus with diabetic neuropathy, without long-term current use of insulin (HCC)    Atherosclerosis of native coronary artery of native heart without angina pectoris  -     Evolocumab (REPATHA) 140 MG/ML SOSY; Inject 1 mL into the skin every 14 days    Statin intolerance  -     Evolocumab (REPATHA) 140 MG/ML SOSY; Inject 1 mL into the skin every 14 days    labs reviewed  Could not tolerate multiple statins including lipitor kacie Montenegro DO    12/20/2021  1:59 PM

## 2021-12-20 NOTE — TELEPHONE ENCOUNTER
[facility] CLINICAL PHARMACY REVIEW: Post-Discharge Transitions of Care (VIRGEN)  Subjective/Objective:  Liza Banuelos is a 68 y.o. male. Patient was discharged from [facility] on 12/14/21 with a diagnosis of Acute Diastolic CHF. Patient outreach to review discharge medications and provide medication review and management. Spoke with patient and wife. Allergies   Allergen Reactions    Iodine Swelling and Other (See Comments)     Reddened face and swelling of tongue       Discharge Medications (as per discharging medication list found in AVS): There are NEW medications for you.  START taking them after you leave the hospital:  Current Outpatient Medications   Medication Sig Dispense Refill    furosemide (LASIX) 40 MG tablet Take 1 tablet by mouth daily 30 tablet 0    furosemide (LASIX) 40 MG tablet Take 1 tablet by mouth daily 60 tablet 3    potassium chloride (KLOR-CON M) 10 MEQ extended release tablet Take 2 tablets by mouth daily 30 tablet 0     You told us you were taking these medications at home, but the amount or how often you take this medication has CHANGED:  None    These are medications you told us you were taking at home, CONTINUE taking them after you leave the hospital:  Current Outpatient Medications   Medication Sig Dispense Refill    ezetimibe (ZETIA) 10 MG tablet Take 1 tablet by mouth daily 90 tablet 1     allopurinol (ZYLOPRIM) 300 MG tablet TAKE ONE TABLET BY MOUTH DAILY 90 tablet 0    apixaban (ELIQUIS) 5 MG TABS tablet Take 1 tablet by mouth 2 times daily 180 tablet 1    tiZANidine (ZANAFLEX) 4 MG tablet Take 1 tablet by mouth 4 times daily as needed (muscle spasms) 20 tablet 0    omeprazole (PRILOSEC) 20 MG delayed release capsule TAKE ONE CAPSULE BY MOUTH DAILY 90 capsule 3    metoprolol succinate (TOPROL XL) 50 MG extended release tablet Take 1 tablet by mouth 2 times daily 180 tablet 1    olmesartan (BENICAR) 40 MG tablet Take 1 tablet by mouth daily Indications: taking 40 mg 90 tablet 1    Alpha-Lipoic Acid 100 MG CAPS Take 200 mg by mouth nightly       Coenzyme Q10 (CO Q 10) 100 MG CAPS Take 2 tablets by mouth daily      Cholecalciferol (VITAMIN D3) 2000 UNITS TABS Take 1 tablet by mouth daily      Multiple Vitamins-Minerals (THERAPEUTIC MULTIVITAMIN-MINERALS) tablet Take 1 tablet by mouth daily Indications: Centrum       aspirin 81 MG EC tablet Take 81 mg by mouth daily        No current facility-administered medications for this visit. These are the medications you have told us you were taking at home, STOP taking them after you leave the hospital:   Cefdinir    Medictions Removed:  · Prednisone    Medications Added:  · Amlodipine 10mg daily- Patient reported that he was taking this. I found this as an active med in [facility] with the [facility]. It looks like it had been stopped, then restarted again in [address]. Estimated Creatinine Clearance: 60 mL/min (based on SCr of 1.2 mg/dL). Assessment/Plan:  - Medication reconciliation completed. Patient has filled new medications ordered after this hospital discharge and is taking medications as directed by discharging provider. - Instructions per discharge list provided except per below documentation. Identified medication discrepancies/issues:   · Patient reported he was taking amlodipine and it was not on his med list.  Added to med list.  · Medication list updated as appropriate/noted    - Identified Potential Medication Interactions: No clinically significant interactions identified via [employer] Interaction Analysis as category D or higher.    - Renal Dosing: No renal adjustments necessary.     - Patient reported that in the past, he had been on a low dose Lasix. He said he had been instructed to stop by a provider and only use prn. He stopped all together because his legs and hands were not too swollen.   He thinks this may be why he ended up in the hospital. I explained the importance of daily weights and how they might signal an issue with fluid retention much sooner than just looking at his legs. PT and wife verbalized understanding. He reported that he was still having SOB while laying down. Instructed to bring up to provider at visit today. - Encouraged patient to reach out to office of [facility]. They tried calling to schedule, but could not reach him because he was getting labs drawn    - Follow up appointment(s):  · Reminded of upcoming appointment(s)  Future Appointments   Date Time Provider Dexter Ambrose   12/20/2021  1:15 PM Rosibel Ours, DO [place] [facility]   12/21/2021  1:30 PM Rosibel Ours, DO [place] [facility]   1/10/2022 12:45 PM DO Fernie Vela [facility]   3/9/2022 11:00 AM Rosibel Ours, DO [place] [facility]   8/11/2022 10:00 AM Shahnaz Nur MD [facility]       FORTUNATO Christiansen, PharmD, [address] [facility]  Department, toll free: [phone]      For Pharmacy Admin Tracking Only   Gap Closed?: Yes    Time Spent (min): 60

## 2021-12-22 ENCOUNTER — HOSPITAL ENCOUNTER (OUTPATIENT)
Dept: CT IMAGING | Age: 76
Discharge: HOME | End: 2021-12-24
Payer: MEDICARE

## 2021-12-22 ENCOUNTER — OFFICE VISIT (OUTPATIENT)
Dept: CARDIOLOGY CLINIC | Age: 76
End: 2021-12-22
Payer: MEDICARE

## 2021-12-22 ENCOUNTER — TELEPHONE (OUTPATIENT)
Dept: CARDIOLOGY CLINIC | Age: 76
End: 2021-12-22

## 2021-12-22 ENCOUNTER — OFFICE VISIT (OUTPATIENT)
Dept: FAMILY MEDICINE CLINIC | Age: 76
End: 2021-12-22
Payer: MEDICARE

## 2021-12-22 VITALS
TEMPERATURE: 97.9 F | SYSTOLIC BLOOD PRESSURE: 126 MMHG | OXYGEN SATURATION: 95 % | HEART RATE: 97 BPM | DIASTOLIC BLOOD PRESSURE: 60 MMHG | WEIGHT: 214 LBS | BODY MASS INDEX: 30.71 KG/M2

## 2021-12-22 VITALS
HEIGHT: 70 IN | BODY MASS INDEX: 30.43 KG/M2 | DIASTOLIC BLOOD PRESSURE: 82 MMHG | HEART RATE: 89 BPM | RESPIRATION RATE: 18 BRPM | WEIGHT: 212.6 LBS | SYSTOLIC BLOOD PRESSURE: 126 MMHG

## 2021-12-22 DIAGNOSIS — R06.02 SHORTNESS OF BREATH: ICD-10-CM

## 2021-12-22 DIAGNOSIS — R05.9 COUGH: ICD-10-CM

## 2021-12-22 DIAGNOSIS — R05.9 COUGH: Primary | ICD-10-CM

## 2021-12-22 DIAGNOSIS — I25.10 ATHEROSCLEROSIS OF NATIVE CORONARY ARTERY OF NATIVE HEART WITHOUT ANGINA PECTORIS: Primary | ICD-10-CM

## 2021-12-22 DIAGNOSIS — I50.33 ACUTE ON CHRONIC DIASTOLIC CONGESTIVE HEART FAILURE (HCC): ICD-10-CM

## 2021-12-22 DIAGNOSIS — R06.02 SOB (SHORTNESS OF BREATH): ICD-10-CM

## 2021-12-22 LAB
Lab: NORMAL
QC PASS/FAIL: NORMAL
SARS-COV-2 RDRP RESP QL NAA+PROBE: NEGATIVE

## 2021-12-22 PROCEDURE — 99214 OFFICE O/P EST MOD 30 MIN: CPT | Performed by: INTERNAL MEDICINE

## 2021-12-22 PROCEDURE — 99203 OFFICE O/P NEW LOW 30 MIN: CPT | Performed by: PHYSICIAN ASSISTANT

## 2021-12-22 PROCEDURE — 71250 CT THORAX DX C-: CPT

## 2021-12-22 PROCEDURE — 93000 ELECTROCARDIOGRAM COMPLETE: CPT | Performed by: INTERNAL MEDICINE

## 2021-12-22 PROCEDURE — 87635 SARS-COV-2 COVID-19 AMP PRB: CPT | Performed by: PHYSICIAN ASSISTANT

## 2021-12-22 RX ORDER — TORSEMIDE 10 MG/1
20 TABLET ORAL DAILY
Qty: 60 TABLET | Refills: 3 | Status: SHIPPED
Start: 2021-12-22 | End: 2022-04-14

## 2021-12-22 RX ORDER — VITAMIN B COMPLEX
1 CAPSULE ORAL DAILY
COMMUNITY

## 2021-12-22 ASSESSMENT — ENCOUNTER SYMPTOMS
DIARRHEA: 0
VOMITING: 0
NAUSEA: 0
ABDOMINAL PAIN: 0
BACK PAIN: 0
PHOTOPHOBIA: 0
SHORTNESS OF BREATH: 1
COUGH: 1
SORE THROAT: 0

## 2021-12-22 NOTE — PATIENT INSTRUCTIONS
Stop lasix. Start demadex 20 mg daily. Bloodwork next Wednesday. Take mucinex DM twice a day until cough improves.

## 2021-12-22 NOTE — PROGRESS NOTES
CHIEF COMPLAINT: PAF/CAD/AVR/CHF    HISTORY OF PRESENT ILLNESS: Patient is a 68 y.o. male seen at the request of Sravan Pearson DO. Seen in hospital follow up. Recent CHF admission. Significant CASTELLANO. No CP.     Past Medical History:   Diagnosis Date    Arthritis     Atrial fibrillation ([address]     day of surgery on 06/12/13- resolved at this time 6/23/2013    Bilateral carotid bruits 10/15/2020    CAD (coronary artery disease)     heart cath with stent 2001    Cancer [facility])     Basal Cell on his back    Carotid stenosis, right 10/15/2020    Congestive heart failure of unknown etiology ([address] 12/11/2021    COPD (chronic obstructive pulmonary disease) ([address]     Diplopia 4/21/2021    History of blood transfusion     Hyperlipidemia     Hypertension     Kidney stones     Sleep apnea     Type 2 diabetes mellitus with diabetic neuropathy, without long-term current use of insulin ([address] 9/2/2020    Vitamin D deficiency        Patient Active Problem List   Diagnosis    Coronary atherosclerosis of native coronary artery    Hyperlipidemia    Aortic stenosis    Hypertension    DEBORAH (obstructive sleep apnea)    DDD (degenerative disc disease), cervical    Nocturnal hypoxemia due to asthma    Neuroforaminal stenosis of lumbar spine    Valvular heart disease    Mild intermittent asthma without complication    New onset a-fib ([address]    S/P AVR (aortic valve replacement)    S/P CABG (coronary artery bypass graft)    Atrial fibrillation with rapid ventricular response (HCC)    Atrial fibrillation (HCC)    Type 2 diabetes mellitus with diabetic neuropathy, without long-term current use of insulin (HCC)    PVD (peripheral vascular disease) ([address]    Bilateral carotid bruits    Carotid stenosis, right    Diplopia    Congestive heart failure of unknown etiology ([address]    Acute diastolic CHF (congestive heart failure) (HCC)    Obesity       Allergies   Allergen Reactions    Iodine Swelling and Other (See Comments)     Reddened face and swelling of tongue       Current Outpatient Medications   Medication Sig Dispense Refill    b complex vitamins capsule Take 1 capsule by mouth daily      amLODIPine (NORVASC) 10 MG tablet Take 10 mg by mouth daily      furosemide (LASIX) 40 MG tablet Take 1 tablet by mouth daily 60 tablet 3    potassium chloride (KLOR-CON M) 10 MEQ extended release tablet Take 2 tablets by mouth daily 30 tablet 0    ezetimibe (ZETIA) 10 MG tablet Take 1 tablet by mouth daily 90 tablet 1    allopurinol (ZYLOPRIM) 300 MG tablet TAKE ONE TABLET BY MOUTH DAILY 90 tablet 0    apixaban (ELIQUIS) 5 MG TABS tablet Take 1 tablet by mouth 2 times daily 180 tablet 1    tiZANidine (ZANAFLEX) 4 MG tablet Take 1 tablet by mouth 4 times daily as needed (muscle spasms) 20 tablet 0    omeprazole (PRILOSEC) 20 MG delayed release capsule TAKE ONE CAPSULE BY MOUTH DAILY 90 capsule 3    metoprolol succinate (TOPROL XL) 50 MG extended release tablet Take 1 tablet by mouth 2 times daily 180 tablet 1    olmesartan (BENICAR) 40 MG tablet Take 1 tablet by mouth daily Indications: taking 40 mg 90 tablet 1    Alpha-Lipoic Acid 100 MG CAPS Take 200 mg by mouth nightly       Coenzyme Q10 (CO Q 10) 100 MG CAPS Take 2 tablets by mouth daily      Cholecalciferol (VITAMIN D3) 2000 UNITS TABS Take 1 tablet by mouth daily      Multiple Vitamins-Minerals (THERAPEUTIC MULTIVITAMIN-MINERALS) tablet Take 1 tablet by mouth daily Indications: Centrum       aspirin 81 MG EC tablet Take 81 mg by mouth daily       Evolocumab (REPATHA) 140 MG/ML SOSY Inject 1 mL into the skin every 14 days (Patient not taking: Reported on 12/22/2021) 2.1 mL 1     No current facility-administered medications for this visit.        Social History     Socioeconomic History    Marital status:      Spouse name: Stella Bernal    Number of children: 3    Years of education: 5    Highest education level: Not on file   Occupational History    Occupation: retired- assembly line/ [employer]     Employer: [id number]: Retired   Tobacco Use    Smoking status: Former Smoker     Packs/day: 2.00     Years: 55.00     Pack years: 110.00     Types: Cigarettes     Start date:      Quit date: 2013     Years since quittin.6    Smokeless tobacco: Never Used    Tobacco comment: [employer]   Vaping Use    Vaping Use: Never used    Passive vaping exposure: Yes   Substance and Sexual Activity    Alcohol use: Yes     Comment: drinks 6 cans of beer /day    Drug use: No    Sexual activity: Yes     Partners: Female   Other Topics Concern    Not on file   Social History Narrative    Not on file     Social Determinants of Health     Financial Resource Strain:     Difficulty of Paying Living Expenses: Not on file   Food Insecurity:     Worried About Running Out of Food in the Last Year: Not on file    Gwen of Food in the Last Year: Not on file   Transportation Needs:     Lack of Transportation (Medical): Not on file    Lack of Transportation (Non-Medical):  Not on file   Physical Activity:     Days of Exercise per Week: Not on file    Minutes of Exercise per Session: Not on file   Stress:     Feeling of Stress : Not on file   Social Connections:     Frequency of Communication with Friends and Family: Not on file    Frequency of Social Gatherings with Friends and Family: Not on file    Attends Confucianism Services: Not on file    Active Member of Clubs or Organizations: Not on file    Attends Club or Organization Meetings: Not on file    Marital Status: Not on file   Intimate Partner Violence:     Fear of Current or Ex-Partner: Not on file    Emotionally Abused: Not on file    Physically Abused: Not on file    Sexually Abused: Not on file   Housing Stability:     Unable to Pay for Housing in the Last Year: Not on file    Number of [place] in the Last Year: Not on file    Unstable Housing in the Last Year: Not on file       Family History   Problem Relation Age of Onset    Stroke Father     Cancer Sister     Heart Disease Brother      Review of Systems:  Heart: as above   Lungs: as above   Eyes: denies changes in vision or discharge. Ears: denies changes in hearing or pain. Nose: denies epistaxis or masses   Throat: denies sore throat or trouble swallowing. Neuro: denies numbness, tingling, tremors. Skin: denies rashes or itching. : denies hematuria, dysuria   GI: denies vomiting, diarrhea   Psych: denies mood changed, anxiety, depression. All other systems negative. Physical Exam   /82   Pulse 89   Resp 18   Ht 5' 10\" (1.778 m)   Wt 212 lb 9.6 oz (96.4 kg)   BMI 30.50 kg/m²   Constitutional: Oriented to person, place, and time. Well-developed and well-nourished. No distress. Head: Normocephalic and atraumatic. Eyes: EOM are normal. Pupils are equal, round, and reactive to light. Neck: Normal range of motion. Neck supple. No hepatojugular reflux and no JVD present. Carotid bruit is not present. No tracheal deviation present. No thyromegaly present. Cardiovascular: Normal rate, irregular rhythm, normal heart sounds and intact distal pulses. Exam reveals no gallop and no friction rub. No murmur heard. Pulmonary/Chest: Effort normal and breath sounds normal. No respiratory distress. No wheezes. No rales. No tenderness. Abdominal: Soft. Bowel sounds are normal. No distension and no mass. No tenderness. No rebound and no guarding. Musculoskeletal: Normal range of motion. No edema and no tenderness. Lymphadenopathy:   No cervical adenopathy. No groin adenopathy. Neurological: Alert and oriented to person, place, and time. Skin: Skin is warm and dry. No rash noted. Not diaphoretic. No erythema. Psychiatric: Normal mood and affect. Behavior is normal.     EKG:  Afib-flutter, nonspecific ST and T waves changes.     Echo Summary 6/4/19:   Left ventricular size is grossly normal with LVH.   Normal systolic function with LVEF estimated at 65%.   There is doppler evidence of stage II diastolic dysfunction.   Normal left atrium by volume index.   S/p AVR, normally functioning   RVSP could not be estimated. ECHO CONCLUSIONS 8/17/2021:   - Exam indication: Re-evaluation of Hypertensive heart disease without change in clinical status   - The left ventricle is normal in size. Left ventricular systolic function is normal. EF = 68 ± 5% (2D biplane)   - The right ventricle is normal in size. Right ventricular systolic function is normal.   - The left atrial cavity is mildly dilated. - Trifecta prosthetic aortic valve (size #23). There is mild (1+) aortic valve regurgitation. The peak gradient is 59 mmHg, the mean gradient is 28 mmHg and the dimensionless valve index is 0.50. Prior peak/mean gradients of 29/15 mmHg. Possible paravalvular 1+ AR suggest RYDER for better evaluation.   - Exam was compared with the prior CC echocardiographic exam performed on 03/10/2020. Increased AV gradients compared to previously     RYDER CONCLUSIONS 8/17/2021:   - Exam indication: AR / MR mechanism study   - The left ventricle is normal in size. Left ventricular systolic function is normal. EF = 65 ± 5% (visual est.)   - The right ventricle is normal in size. Right ventricular systolic function is normal.   - The left atrial cavity is mildly dilated. - Trifecta prosthetic aortic valve (size #23). There is moderate (2+) aortic valve  regurgitation due to prosthetic valve dysfunction. There is moderately severe aortic valve stenosis caused by prosthetic thickening/calcification. AV area is 1.22 cm² by continuity, VTI. The peak gradient is 68 mmHg, the mean gradient is 37  mmHg. - There is no patent foramen ovale as detected by Doppler and agitated saline contrast.   - Exam was compared with the prior [facility] echocardiographic exam performed on 8/17/21 (TTE).  Higher aortic valve gradients obtained from transgastric views. ASSESSMENT AND PLAN:  Patient Active Problem List   Diagnosis    Coronary atherosclerosis of native coronary artery    Hyperlipidemia    Aortic stenosis    Hypertension    DEBORAH (obstructive sleep apnea)    DDD (degenerative disc disease), cervical    Nocturnal hypoxemia due to asthma    Neuroforaminal stenosis of lumbar spine    Valvular heart disease    Mild intermittent asthma without complication    New onset a-fib ([address]    S/P AVR (aortic valve replacement)    S/P CABG (coronary artery bypass graft)    Atrial fibrillation with rapid ventricular response (HCC)    Atrial fibrillation (HCC)    Type 2 diabetes mellitus with diabetic neuropathy, without long-term current use of insulin (HCC)    P[address] (peripheral vascular disease) [address]    Bilateral carotid bruits    Carotid stenosis, right    Diplopia    Congestive heart failure of unknown etiology [facility]    Acute diastolic CHF (congestive heart failure) ([address]    Obesity     1. Diastolic CHF: Monitor volume. 2. PAFib: In afib-flutter today. Toprol and Eliquis. Arrange RYDER/DCCV. 3. CAD-CABG:     S/p stent to LAD in 3/01 and s/p LIMA-LAD in 6/13. Normal stress 5/19/17. ASA/BB. Consider Repatha. 4. VHD s/p bioprosthetic AVR in 6/13:     Echo and RYDER from [facility] as above. 5. HTN: Observe. 6. Lipids: Per PCP. 7. DM: Per PCP. 8. DEBORAH: Uses O2 at night. Sina Denise D.O.   Cardiologist  Cardiology, [address] [facility]

## 2021-12-22 NOTE — PROGRESS NOTES
21  Everette Hellertead : 1945 Sex: male  Age 68 y.o. Subjective:  Chief Complaint   Patient presents with    Cough    Shortness of Breath         51-year-old male with a history of CAD, atrial fibrillation, CHF sleep apnea, hyperlipidemia and type 2 diabetes presents to the walk-in clinic with his wife for a COVID-19 test.  Patient states that his pulmonologist sent him in for a COVID-19 test.  He states that he has had a cough and shortness of breath for the last several weeks. He was hospitalized last week from Saturday to Tuesday and diagnosed with congestive heart failure. He also has atrial fibrillation. He has followed up with his cardiologist.  He states he is also followed up with his pulmonologist who ordered a CT of the chest without contrast today. He is supposed to have a procedure next week. Patient has had no fever or chills. No nausea or vomiting. He denies any chest pain. No hemoptysis. Review of Systems   Constitutional: Negative for chills and fever. HENT: Negative for congestion, ear pain and sore throat. Eyes: Negative for photophobia and visual disturbance. Respiratory: Positive for cough and shortness of breath. Cardiovascular: Negative for chest pain. Gastrointestinal: Negative for abdominal pain, diarrhea, nausea and vomiting. Genitourinary: Negative for difficulty urinating, dysuria, frequency and urgency. Musculoskeletal: Negative for back pain, neck pain and neck stiffness. Skin: Negative for rash. Neurological: Negative for dizziness, syncope, weakness, light-headedness and headaches. Hematological: Negative for adenopathy. Does not bruise/bleed easily. Psychiatric/Behavioral: Negative for agitation and confusion. All other systems reviewed and are negative.         PMH:     Past Medical History:   Diagnosis Date    Arthritis     Atrial fibrillation ([address]     day of surgery on 13- resolved at this time 2013   Natalee Gan Bilateral carotid bruits 10/15/2020    CAD (coronary artery disease)     heart cath with stent 2001    Cancer [facility])     Basal Cell on his back    Carotid stenosis, right 10/15/2020    Congestive heart failure of unknown etiology ([facility] 12/11/2021    COPD (chronic obstructive pulmonary disease) ([facility]     Diplopia 4/21/2021    History of blood transfusion     Hyperlipidemia     Hypertension     Kidney stones     Sleep apnea     Type 2 diabetes mellitus with diabetic neuropathy, without long-term current use of insulin ([facility] 9/2/2020    Vitamin D deficiency        Past Surgical History:   Procedure Laterality Date    ABDOMEN SURGERY      Bilateral groin hernias    AORTIC VALVE REPLACEMENT  6/12/2013    BACK SURGERY  2006    cervicle fusion   [facility] SURGERY  1974, 1984    lumbar laminectomy    BACK SURGERY  [id number]    360 FUSION L4-L5    BACK SURGERY      CARDIAC SURGERY      CARPAL TUNNEL RELEASE  [id number]    both hands    COLONOSCOPY      CORONARY ARTERY BYPASS GRAFT      DIAGNOSTIC CARDIAC CATH LAB PROCEDURE      ENDOSCOPY, COLON, DIAGNOSTIC      FRACTURE SURGERY      Right arm Fx    FRACTURE SURGERY      Left finger Fx    HERNIA REPAIR      Bilateral    KIDNEY STONE SURGERY      open    NECK SURGERY      SHOULDER ARTHROSCOPY      right and left shoulders    SKIN BIOPSY         Family History   Problem Relation Age of Onset    Stroke Father     Cancer Sister     Heart Disease Brother        Medications:     Current Outpatient Medications:     b complex vitamins capsule, Take 1 capsule by mouth daily, Disp: , Rfl:     torsemide (DEMADEX) 10 MG tablet, Take 2 tablets by mouth daily, Disp: 60 tablet, Rfl: 3    amLODIPine (NORVASC) 10 MG tablet, Take 10 mg by mouth daily, Disp: , Rfl:     potassium chloride (KLOR-CON M) 10 MEQ extended release tablet, Take 2 tablets by mouth daily, Disp: 30 tablet, Rfl: 0    ezetimibe (ZETIA) 10 MG tablet, Take 1 tablet by mouth daily, Disp: 90 tablet, Rfl: 1    allopurinol (ZYLOPRIM) 300 MG tablet, TAKE ONE TABLET BY MOUTH DAILY, Disp: 90 tablet, Rfl: 0    apixaban (ELIQUIS) 5 MG TABS tablet, Take 1 tablet by mouth 2 times daily, Disp: 180 tablet, Rfl: 1    tiZANidine (ZANAFLEX) 4 MG tablet, Take 1 tablet by mouth 4 times daily as needed (muscle spasms), Disp: 20 tablet, Rfl: 0    omeprazole (PRILOSEC) 20 MG delayed release capsule, TAKE ONE CAPSULE BY MOUTH DAILY, Disp: 90 capsule, Rfl: 3    metoprolol succinate (TOPROL XL) 50 MG extended release tablet, Take 1 tablet by mouth 2 times daily, Disp: 180 tablet, Rfl: 1    olmesartan (BENICAR) 40 MG tablet, Take 1 tablet by mouth daily Indications: taking 40 mg, Disp: 90 tablet, Rfl: 1    Alpha-Lipoic Acid 100 MG CAPS, Take 200 mg by mouth nightly , Disp: , Rfl:     Coenzyme Q10 (CO Q 10) 100 MG CAPS, Take 2 tablets by mouth daily, Disp: , Rfl:     Cholecalciferol (VITAMIN D3) 2000 UNITS TABS, Take 1 tablet by mouth daily, Disp: , Rfl:     Multiple Vitamins-Minerals (THERAPEUTIC MULTIVITAMIN-MINERALS) tablet, Take 1 tablet by mouth daily Indications: Centrum , Disp: , Rfl:     aspirin 81 MG EC tablet, Take 81 mg by mouth daily , Disp: , Rfl:     Allergies: Allergies   Allergen Reactions    Iodine Swelling and Other (See Comments)     Reddened face and swelling of tongue       Social History:     Social History     Tobacco Use    Smoking status: Former Smoker     Packs/day: 2.00     Years: 55.00     Pack years: 110.00     Types: Cigarettes     Start date:      Quit date: 2013     Years since quittin.6    Smokeless tobacco: Never Used    Tobacco comment: [employer]   Vaping Use    Vaping Use: Never used    Passive vaping exposure: Yes   Substance Use Topics    Alcohol use: Yes     Comment: drinks 6 cans of beer /day    Drug use: No       Patient lives at home.     Physical Exam:     Vitals:    21 1518   BP: 126/60   Pulse: 97   Temp: 97.9 °F (36.6 °C)   SpO2: 95% Weight: 214 lb (97.1 kg)       Exam:  Physical Exam  Vitals and nursing note reviewed. Constitutional:       General: He is not in acute distress. Appearance: He is well-developed. HENT:      Head: Normocephalic and atraumatic. Right Ear: Tympanic membrane normal.      Left Ear: Tympanic membrane normal.      Nose: Nose normal.      Mouth/Throat:      Mouth: Mucous membranes are moist.      Pharynx: Oropharynx is clear. Eyes:      Conjunctiva/sclera: Conjunctivae normal.      Pupils: Pupils are equal, round, and reactive to light. Cardiovascular:      Rate and Rhythm: Rhythm irregular. Pulmonary:      Effort: Pulmonary effort is normal. No respiratory distress. Breath sounds: Normal breath sounds. Abdominal:      General: Bowel sounds are normal.      Palpations: Abdomen is soft. Tenderness: There is no abdominal tenderness. Musculoskeletal:         General: Normal range of motion. Cervical back: Normal range of motion. No rigidity. Lymphadenopathy:      Cervical: No cervical adenopathy. Skin:     General: Skin is warm and dry. Neurological:      General: No focal deficit present. Mental Status: He is alert and oriented to person, place, and time. Psychiatric:         Mood and Affect: Mood normal.         Behavior: Behavior normal.         Thought Content: Thought content normal.         Judgment: Judgment normal.           Testing:           Medical Decision Making:     Patient upon arrival did not appear toxic or lethargic. Vital signs were reviewed. 95% on room air. Past medical history reviewed. Allergies reviewed. Medications reviewed. Patient is presenting with the above complaint of cough and shortness of breath. Rapid antigen is negative. COVID-19 PCR is pending. Differential diagnosis was discussed. At this point he will quarantine until we have the results of his Covid test back.   The patient's wife has tested positive in the office for COVID-19. The patient is to treat his symptoms with over-the-counter analgesics and decongestants. He is to maintain good oral intake. If positive for COVID-19 he is to isolate for 10 days from the onset of his symptoms as well as remain fever free for 24 hours with his other symptoms improving until he is able to return to work duties. Patient understands plan is agreeable. We discussed signs and symptoms that would warrant emergent evaluation the emergency department. Patient will follow up as needed with his PCP. Clinical Impression:   Dg De Leon was seen today for cough and shortness of breath. Diagnoses and all orders for this visit:    Cough  -     POCT COVID-19, Rapid  -     COVID-19 Ambulatory; Future    Shortness of breath        The patient is to call for any concerns or return if any of the signs or symptoms worsen. The patient is to follow-up with PCP in the next 2-3 days for repeat evaluation repeat assessment or go directly to the emergency department. SIGNATURE: Yulissa King Friday, BESSY I have re-evaluated the patient's fluid status and reviewed vital signs. Clinical perfusion assessment was performed.

## 2021-12-22 NOTE — TELEPHONE ENCOUNTER
Patient is scheduled for a RYDER/DCCV on 1/4 at 7:30am   [facility]  No auth [id number] per Patience Allison. Patient is COVID vaccinated  Patient was given and understood instructions.

## 2021-12-23 ENCOUNTER — TELEPHONE (OUTPATIENT)
Dept: CARDIOLOGY CLINIC | Age: 76
End: 2021-12-23

## 2021-12-23 NOTE — TELEPHONE ENCOUNTER
----- Message from MARIO Villalta CNP sent at 12/15/2021 10:11 AM EST -----  Please schedule hospital follow up    Thank you   ----- Message -----  From: Gurmeet Mclaughlin DO  Sent: 12/14/2021   6:29 PM EST  To: MARIO Villalta CNP

## 2021-12-24 LAB
SARS-COV-2: NOT DETECTED
SOURCE: NORMAL

## 2021-12-28 ENCOUNTER — CARE COORDINATION (OUTPATIENT)
Dept: CARE COORDINATION | Age: 76
End: 2021-12-28

## 2021-12-28 NOTE — CARE COORDINATION
-ACM attempted to reach patient to offer enrollment into Care Coordination program, however Pt's wife, Lazaro Peres answered the phone. -ACM introduced self, reason for call, and brief explanation of program.  -Lazaro Peres said Pt was admitted inpatient on 12- at [facility]. [employer]in[employer] Peres said Pt is having a heart catheterization tomorrow, 12/29/2021.   -[employer]in[employer] Peres reports she is interested Care Coordination when Pt is discharged. Lazaro Peres took AC's contact information and will call ACM when patient discharged.  -If ACM doesn't hear from Pt or wife, [place] Bryon will make a follow up call.

## 2022-01-06 ENCOUNTER — TELEPHONE (OUTPATIENT)
Dept: PRIMARY CARE CLINIC | Age: 77
End: 2022-01-06

## 2022-01-07 RX ORDER — METOPROLOL SUCCINATE 50 MG/1
50 TABLET, EXTENDED RELEASE ORAL 2 TIMES DAILY
Qty: 180 TABLET | Refills: 1 | Status: SHIPPED | OUTPATIENT
Start: 2022-01-07

## 2022-01-14 NOTE — DISCHARGE SUMMARY
[address]                  [address] [facility],  [facility]                               DISCHARGE SUMMARY    PATIENT NAME: Aleksandra Haley                     :        1945  MED REC NO:   [id number]                            ROOM:       [id number]  ACCOUNT NO:   [de-identified]                           ADMIT DATE: 2021  PROVIDER:     Rimma Barkley DO                  DISCHARGE DATE:  2021    DISCHARGE DIAGNOSES:  Acute diastolic congestive heart failure, chronic  anticoagulation, coronary artery disease status post aortic valve  replacement, aortic stenosis, aortic regurgitation, atrial fibrillation,  hypertension, elevated cholesterol. HOSPITAL COURSE:  The patient is a 68-year-old male who presented to the  hospital with complaints of shortness of breath. He was admitted to the  hospital.  He was seen by Cardiology and Pulmonary. He was felt to have  acute diastolic congestive heart failure. He was diuresed. His status  improved. He was discharged to home in stable condition. Cardiology  recommended outpatient followup with [facility] for his valvular  heart disease. DISCHARGE MEDICATIONS:  As per discharge med rec which include potassium  chloride 10 mEq 2 tablets daily, allopurinol, Alpha-Lipoic-Acid,  Eliquis, aspirin, CoQ10, Zetia, olmesartan, omeprazole, multivitamin,  tizanidine p.r.n., vitamin D, doxycycline. FOLLOWUP:  The patient instructed to follow up with [facility] for  followup of his valvular heart disease. Follow up with Dr. Justin Pham, call  office for appointment. Follow up with Pulmonary, call office for  appointment.           Jarrod Orr DO    D: 2022 9:31:17       T: 2022 9:33:31     [email or web address]  Job#: [id number]     Doc#: [id number]    CC:

## 2022-01-27 ENCOUNTER — HOSPITAL ENCOUNTER (OUTPATIENT)
Dept: CARDIAC REHAB | Age: 77
Setting detail: THERAPIES SERIES
Discharge: HOME | End: 2022-01-27
Payer: MEDICARE

## 2022-01-27 VITALS
SYSTOLIC BLOOD PRESSURE: 97 MMHG | OXYGEN SATURATION: 97 % | DIASTOLIC BLOOD PRESSURE: 63 MMHG | BODY MASS INDEX: 29.49 KG/M2 | HEART RATE: 74 BPM | RESPIRATION RATE: 20 BRPM | WEIGHT: 206 LBS | HEIGHT: 70 IN

## 2022-01-27 RX ORDER — HYDRALAZINE HYDROCHLORIDE 10 MG/1
20 TABLET, FILM COATED ORAL 3 TIMES DAILY
COMMUNITY

## 2022-01-27 ASSESSMENT — PATIENT HEALTH QUESTIONNAIRE - PHQ9
SUM OF ALL RESPONSES TO PHQ QUESTIONS 1-9: 0

## 2022-01-28 ENCOUNTER — HOSPITAL ENCOUNTER (OUTPATIENT)
Dept: CARDIAC REHAB | Age: 77
Setting detail: THERAPIES SERIES
Discharge: HOME | End: 2022-01-28
Payer: MEDICARE

## 2022-01-28 PROCEDURE — 93798 PHYS/QHP OP CAR RHAB W/ECG: CPT

## 2022-01-31 ENCOUNTER — HOSPITAL ENCOUNTER (OUTPATIENT)
Dept: CARDIAC REHAB | Age: 77
Setting detail: THERAPIES SERIES
End: 2022-01-31
Payer: MEDICARE

## 2022-02-02 ENCOUNTER — TELEPHONE (OUTPATIENT)
Dept: PRIMARY CARE CLINIC | Age: 77
End: 2022-02-02

## 2022-02-02 ENCOUNTER — HOSPITAL ENCOUNTER (OUTPATIENT)
Dept: CARDIAC REHAB | Age: 77
Setting detail: THERAPIES SERIES
Discharge: HOME | End: 2022-02-02
Payer: MEDICARE

## 2022-02-02 DIAGNOSIS — I50.9 ACUTE CONGESTIVE HEART FAILURE, UNSPECIFIED HEART FAILURE TYPE (HCC): Primary | ICD-10-CM

## 2022-02-02 PROCEDURE — 93798 PHYS/QHP OP CAR RHAB W/ECG: CPT

## 2022-02-02 NOTE — TELEPHONE ENCOUNTER
Spoke with wife, pt needs a BMP and ekg done for a virtual visit with their cardiologist on 2/11. No appt with you is needed at this time. I placed pt on the nurse schedule for the EKG on 2/9. Can you please place BMP order for pt to get done that day as well. Please send back to me when placed so I can call wife. Thanks!

## 2022-02-07 ENCOUNTER — HOSPITAL ENCOUNTER (OUTPATIENT)
Dept: CARDIAC REHAB | Age: 77
Setting detail: THERAPIES SERIES
Discharge: HOME | End: 2022-02-07
Payer: MEDICARE

## 2022-02-07 PROCEDURE — 93798 PHYS/QHP OP CAR RHAB W/ECG: CPT

## 2022-02-09 ENCOUNTER — NURSE ONLY (OUTPATIENT)
Dept: PRIMARY CARE CLINIC | Age: 77
End: 2022-02-09
Payer: MEDICARE

## 2022-02-09 ENCOUNTER — HOSPITAL ENCOUNTER (OUTPATIENT)
Dept: CARDIAC REHAB | Age: 77
Setting detail: THERAPIES SERIES
Discharge: HOME | End: 2022-02-09
Payer: MEDICARE

## 2022-02-09 DIAGNOSIS — Z00.8 ENCOUNTER FOR ELECTROCARDIOGRAM: Primary | ICD-10-CM

## 2022-02-09 DIAGNOSIS — I50.9 ACUTE CONGESTIVE HEART FAILURE, UNSPECIFIED HEART FAILURE TYPE (HCC): ICD-10-CM

## 2022-02-09 LAB
ANION GAP SERPL CALCULATED.3IONS-SCNC: 17 MMOL/L (ref 7–16)
BUN BLDV-MCNC: 23 MG/DL (ref 6–23)
CALCIUM SERPL-MCNC: 10.5 MG/DL (ref 8.6–10.2)
CHLORIDE BLD-SCNC: 103 MMOL/L (ref 98–107)
CO2: 24 MMOL/L (ref 22–29)
CREAT SERPL-MCNC: 1.1 MG/DL (ref 0.7–1.2)
GFR AFRICAN AMERICAN: >60
GFR NON-AFRICAN AMERICAN: >60 ML/MIN/1.73
GLUCOSE BLD-MCNC: 120 MG/DL (ref 74–99)
POTASSIUM SERPL-SCNC: 4.7 MMOL/L (ref 3.5–5)
SODIUM BLD-SCNC: 144 MMOL/L (ref 132–146)

## 2022-02-09 PROCEDURE — 93000 ELECTROCARDIOGRAM COMPLETE: CPT | Performed by: FAMILY MEDICINE

## 2022-02-09 PROCEDURE — 93798 PHYS/QHP OP CAR RHAB W/ECG: CPT

## 2022-02-11 ENCOUNTER — HOSPITAL ENCOUNTER (OUTPATIENT)
Dept: CARDIAC REHAB | Age: 77
Setting detail: THERAPIES SERIES
Discharge: HOME | End: 2022-02-11
Payer: MEDICARE

## 2022-02-11 PROCEDURE — 93798 PHYS/QHP OP CAR RHAB W/ECG: CPT

## 2022-02-14 ENCOUNTER — HOSPITAL ENCOUNTER (OUTPATIENT)
Dept: CARDIAC REHAB | Age: 77
Setting detail: THERAPIES SERIES
Discharge: HOME | End: 2022-02-14
Payer: MEDICARE

## 2022-02-14 PROCEDURE — 93798 PHYS/QHP OP CAR RHAB W/ECG: CPT

## 2022-02-16 ENCOUNTER — HOSPITAL ENCOUNTER (OUTPATIENT)
Dept: CARDIAC REHAB | Age: 77
Setting detail: THERAPIES SERIES
Discharge: HOME | End: 2022-02-16
Payer: MEDICARE

## 2022-02-16 PROCEDURE — 93798 PHYS/QHP OP CAR RHAB W/ECG: CPT

## 2022-02-18 ENCOUNTER — HOSPITAL ENCOUNTER (OUTPATIENT)
Dept: CARDIAC REHAB | Age: 77
Setting detail: THERAPIES SERIES
Discharge: HOME | End: 2022-02-18
Payer: MEDICARE

## 2022-02-18 PROCEDURE — 93798 PHYS/QHP OP CAR RHAB W/ECG: CPT

## 2022-02-21 ENCOUNTER — HOSPITAL ENCOUNTER (OUTPATIENT)
Dept: CARDIAC REHAB | Age: 77
Setting detail: THERAPIES SERIES
Discharge: HOME | End: 2022-02-21
Payer: MEDICARE

## 2022-02-21 PROCEDURE — 93798 PHYS/QHP OP CAR RHAB W/ECG: CPT

## 2022-02-21 RX ORDER — EVOLOCUMAB 140 MG/ML
INJECTION, SOLUTION SUBCUTANEOUS
Qty: 2 ML | Refills: 5 | Status: SHIPPED
Start: 2022-02-21 | End: 2022-07-18 | Stop reason: SDUPTHER

## 2022-02-23 ENCOUNTER — HOSPITAL ENCOUNTER (OUTPATIENT)
Dept: CARDIAC REHAB | Age: 77
Setting detail: THERAPIES SERIES
Discharge: HOME | End: 2022-02-23
Payer: MEDICARE

## 2022-02-23 PROCEDURE — 93798 PHYS/QHP OP CAR RHAB W/ECG: CPT

## 2022-02-24 ENCOUNTER — TELEPHONE (OUTPATIENT)
Dept: PRIMARY CARE CLINIC | Age: 77
End: 2022-02-24

## 2022-02-24 DIAGNOSIS — I48.91 ATRIAL FIBRILLATION, UNSPECIFIED TYPE (HCC): ICD-10-CM

## 2022-02-24 DIAGNOSIS — E11.40 TYPE 2 DIABETES MELLITUS WITH DIABETIC NEUROPATHY, WITHOUT LONG-TERM CURRENT USE OF INSULIN (HCC): ICD-10-CM

## 2022-02-24 DIAGNOSIS — E78.5 HYPERLIPIDEMIA, UNSPECIFIED HYPERLIPIDEMIA TYPE: ICD-10-CM

## 2022-02-24 DIAGNOSIS — E79.0 HYPERURICEMIA: ICD-10-CM

## 2022-02-24 DIAGNOSIS — E78.2 MIXED HYPERLIPIDEMIA: Primary | ICD-10-CM

## 2022-02-24 NOTE — TELEPHONE ENCOUNTER
The pt's wife is calling to see if you can order the pt some lab work so he can go and get them drawn before his appointment

## 2022-02-28 ENCOUNTER — HOSPITAL ENCOUNTER (OUTPATIENT)
Dept: CARDIAC REHAB | Age: 77
Setting detail: THERAPIES SERIES
Discharge: HOME | End: 2022-02-28
Payer: MEDICARE

## 2022-02-28 ENCOUNTER — CARE COORDINATION (OUTPATIENT)
Dept: CARE COORDINATION | Age: 77
End: 2022-02-28

## 2022-02-28 DIAGNOSIS — E79.0 HYPERURICEMIA: ICD-10-CM

## 2022-02-28 DIAGNOSIS — I48.91 ATRIAL FIBRILLATION, UNSPECIFIED TYPE (HCC): ICD-10-CM

## 2022-02-28 DIAGNOSIS — E11.40 TYPE 2 DIABETES MELLITUS WITH DIABETIC NEUROPATHY, WITHOUT LONG-TERM CURRENT USE OF INSULIN (HCC): ICD-10-CM

## 2022-02-28 DIAGNOSIS — E78.5 HYPERLIPIDEMIA, UNSPECIFIED HYPERLIPIDEMIA TYPE: ICD-10-CM

## 2022-02-28 DIAGNOSIS — E78.2 MIXED HYPERLIPIDEMIA: ICD-10-CM

## 2022-02-28 LAB
ALBUMIN SERPL-MCNC: 4.1 G/DL (ref 3.5–5.2)
ALP BLD-CCNC: 62 U/L (ref 40–129)
ALT SERPL-CCNC: 16 U/L (ref 0–40)
ANION GAP SERPL CALCULATED.3IONS-SCNC: 17 MMOL/L (ref 7–16)
AST SERPL-CCNC: 21 U/L (ref 0–39)
BASOPHILS ABSOLUTE: 0.04 E9/L (ref 0–0.2)
BASOPHILS RELATIVE PERCENT: 0.7 % (ref 0–2)
BILIRUB SERPL-MCNC: 0.4 MG/DL (ref 0–1.2)
BUN BLDV-MCNC: 25 MG/DL (ref 6–23)
CALCIUM SERPL-MCNC: 9.5 MG/DL (ref 8.6–10.2)
CHLORIDE BLD-SCNC: 105 MMOL/L (ref 98–107)
CHOLESTEROL, TOTAL: 127 MG/DL (ref 0–199)
CO2: 20 MMOL/L (ref 22–29)
CREAT SERPL-MCNC: 1 MG/DL (ref 0.7–1.2)
EOSINOPHILS ABSOLUTE: 0.2 E9/L (ref 0.05–0.5)
EOSINOPHILS RELATIVE PERCENT: 3.4 % (ref 0–6)
GFR AFRICAN AMERICAN: >60
GFR NON-AFRICAN AMERICAN: >60 ML/MIN/1.73
GLUCOSE BLD-MCNC: 133 MG/DL (ref 74–99)
HBA1C MFR BLD: 5.9 % (ref 4–5.6)
HCT VFR BLD CALC: 39 % (ref 37–54)
HDLC SERPL-MCNC: 57 MG/DL
HEMOGLOBIN: 12.4 G/DL (ref 12.5–16.5)
IMMATURE GRANULOCYTES #: 0.01 E9/L
IMMATURE GRANULOCYTES %: 0.2 % (ref 0–5)
LDL CHOLESTEROL CALCULATED: 32 MG/DL (ref 0–99)
LYMPHOCYTES ABSOLUTE: 1.89 E9/L (ref 1.5–4)
LYMPHOCYTES RELATIVE PERCENT: 32 % (ref 20–42)
MCH RBC QN AUTO: 30.6 PG (ref 26–35)
MCHC RBC AUTO-ENTMCNC: 31.8 % (ref 32–34.5)
MCV RBC AUTO: 96.3 FL (ref 80–99.9)
MONOCYTES ABSOLUTE: 0.68 E9/L (ref 0.1–0.95)
MONOCYTES RELATIVE PERCENT: 11.5 % (ref 2–12)
NEUTROPHILS ABSOLUTE: 3.09 E9/L (ref 1.8–7.3)
NEUTROPHILS RELATIVE PERCENT: 52.2 % (ref 43–80)
PDW BLD-RTO: 14.4 FL (ref 11.5–15)
PLATELET # BLD: 179 E9/L (ref 130–450)
PMV BLD AUTO: 10.3 FL (ref 7–12)
POTASSIUM SERPL-SCNC: 4.2 MMOL/L (ref 3.5–5)
RBC # BLD: 4.05 E12/L (ref 3.8–5.8)
SODIUM BLD-SCNC: 142 MMOL/L (ref 132–146)
TOTAL PROTEIN: 6.9 G/DL (ref 6.4–8.3)
TRIGL SERPL-MCNC: 192 MG/DL (ref 0–149)
TSH SERPL DL<=0.05 MIU/L-ACNC: 1.68 UIU/ML (ref 0.27–4.2)
URIC ACID, SERUM: 6.1 MG/DL (ref 3.4–7)
VLDLC SERPL CALC-MCNC: 38 MG/DL
WBC # BLD: 5.9 E9/L (ref 4.5–11.5)

## 2022-02-28 PROCEDURE — 93798 PHYS/QHP OP CAR RHAB W/ECG: CPT

## 2022-02-28 NOTE — CARE COORDINATION
-ACM spoke to patient to offer enrollment into Care Coordination program.  -Introduced self, reason for call, and explanation of program.   -Patient declined at this time.  -Patient encouraged to contact ACM or inform PCP if he should change his mind.  -[facility]  -Pt reports he goes to cardiac rehab 3 days per week. When cardiac rehab is completed, he is thinking about continuing exercising at [employer].   -Pt reports he weights himself every morning, after he empties his bladder and before breakfast. Today's weight 209.6 lbs.    -Pt reports he has a LW & POA.   -Will remove name from care team.

## 2022-03-02 ENCOUNTER — HOSPITAL ENCOUNTER (OUTPATIENT)
Dept: CARDIAC REHAB | Age: 77
Setting detail: THERAPIES SERIES
Discharge: HOME | End: 2022-03-02
Payer: MEDICARE

## 2022-03-02 PROCEDURE — 93798 PHYS/QHP OP CAR RHAB W/ECG: CPT

## 2022-03-04 ENCOUNTER — HOSPITAL ENCOUNTER (OUTPATIENT)
Dept: CARDIAC REHAB | Age: 77
Setting detail: THERAPIES SERIES
Discharge: HOME | End: 2022-03-04
Payer: MEDICARE

## 2022-03-04 PROCEDURE — 93798 PHYS/QHP OP CAR RHAB W/ECG: CPT

## 2022-03-07 ENCOUNTER — HOSPITAL ENCOUNTER (OUTPATIENT)
Dept: CARDIAC REHAB | Age: 77
Setting detail: THERAPIES SERIES
Discharge: HOME | End: 2022-03-07
Payer: MEDICARE

## 2022-03-07 PROCEDURE — 93798 PHYS/QHP OP CAR RHAB W/ECG: CPT

## 2022-03-09 ENCOUNTER — APPOINTMENT (OUTPATIENT)
Dept: CARDIAC REHAB | Age: 77
End: 2022-03-09
Payer: MEDICARE

## 2022-03-09 ENCOUNTER — OFFICE VISIT (OUTPATIENT)
Dept: PRIMARY CARE CLINIC | Age: 77
End: 2022-03-09
Payer: MEDICARE

## 2022-03-09 VITALS
DIASTOLIC BLOOD PRESSURE: 74 MMHG | WEIGHT: 211 LBS | OXYGEN SATURATION: 98 % | SYSTOLIC BLOOD PRESSURE: 120 MMHG | BODY MASS INDEX: 30.21 KG/M2 | HEIGHT: 70 IN | HEART RATE: 109 BPM | RESPIRATION RATE: 18 BRPM | TEMPERATURE: 97.6 F

## 2022-03-09 DIAGNOSIS — E78.2 MIXED HYPERLIPIDEMIA: Primary | ICD-10-CM

## 2022-03-09 DIAGNOSIS — I10 ESSENTIAL HYPERTENSION: Chronic | ICD-10-CM

## 2022-03-09 DIAGNOSIS — E11.40 TYPE 2 DIABETES MELLITUS WITH DIABETIC NEUROPATHY, WITHOUT LONG-TERM CURRENT USE OF INSULIN (HCC): ICD-10-CM

## 2022-03-09 DIAGNOSIS — I50.33 ACUTE ON CHRONIC DIASTOLIC CONGESTIVE HEART FAILURE (HCC): ICD-10-CM

## 2022-03-09 DIAGNOSIS — I73.9 PVD (PERIPHERAL VASCULAR DISEASE) (HCC): ICD-10-CM

## 2022-03-09 DIAGNOSIS — I48.0 PAROXYSMAL ATRIAL FIBRILLATION (HCC): ICD-10-CM

## 2022-03-09 DIAGNOSIS — J44.9 CHRONIC OBSTRUCTIVE PULMONARY DISEASE, UNSPECIFIED COPD TYPE (HCC): ICD-10-CM

## 2022-03-09 PROCEDURE — 99214 OFFICE O/P EST MOD 30 MIN: CPT | Performed by: FAMILY MEDICINE

## 2022-03-09 RX ORDER — AMLODIPINE BESYLATE 5 MG/1
5 TABLET ORAL DAILY
Qty: 90 TABLET | Refills: 1 | Status: SHIPPED
Start: 2022-03-09 | End: 2022-09-06

## 2022-03-09 RX ORDER — LANOLIN ALCOHOL/MO/W.PET/CERES
CREAM (GRAM) TOPICAL
COMMUNITY

## 2022-03-09 RX ORDER — OLMESARTAN MEDOXOMIL 20 MG/1
20 TABLET ORAL DAILY
Qty: 30 TABLET | Refills: 2 | Status: SHIPPED
Start: 2022-03-09

## 2022-03-09 ASSESSMENT — ENCOUNTER SYMPTOMS
FACIAL SWELLING: 0
ABDOMINAL PAIN: 0
VOMITING: 0
SHORTNESS OF BREATH: 0
WHEEZING: 0
CHEST TIGHTNESS: 0
NAUSEA: 0
COUGH: 0
DIARRHEA: 0
SORE THROAT: 0
SINUS PRESSURE: 0
BLOOD IN STOOL: 0
PHOTOPHOBIA: 0
APNEA: 0
ALLERGIC/IMMUNOLOGIC NEGATIVE: 1
COLOR CHANGE: 0
BACK PAIN: 0

## 2022-03-11 ENCOUNTER — HOSPITAL ENCOUNTER (OUTPATIENT)
Dept: CARDIAC REHAB | Age: 77
Setting detail: THERAPIES SERIES
Discharge: HOME | End: 2022-03-11
Payer: MEDICARE

## 2022-03-11 ENCOUNTER — APPOINTMENT (OUTPATIENT)
Dept: CARDIAC REHAB | Age: 77
End: 2022-03-11
Payer: MEDICARE

## 2022-03-11 PROCEDURE — 93798 PHYS/QHP OP CAR RHAB W/ECG: CPT

## 2022-03-14 ENCOUNTER — HOSPITAL ENCOUNTER (OUTPATIENT)
Dept: CARDIAC REHAB | Age: 77
Setting detail: THERAPIES SERIES
Discharge: HOME | End: 2022-03-14
Payer: MEDICARE

## 2022-03-14 ENCOUNTER — APPOINTMENT (OUTPATIENT)
Dept: CARDIAC REHAB | Age: 77
End: 2022-03-14
Payer: MEDICARE

## 2022-03-14 PROCEDURE — 93798 PHYS/QHP OP CAR RHAB W/ECG: CPT

## 2022-03-16 ENCOUNTER — APPOINTMENT (OUTPATIENT)
Dept: CARDIAC REHAB | Age: 77
End: 2022-03-16
Payer: MEDICARE

## 2022-03-16 ENCOUNTER — HOSPITAL ENCOUNTER (OUTPATIENT)
Dept: CARDIAC REHAB | Age: 77
Setting detail: THERAPIES SERIES
Discharge: HOME | End: 2022-03-16
Payer: MEDICARE

## 2022-03-16 PROCEDURE — 93798 PHYS/QHP OP CAR RHAB W/ECG: CPT

## 2022-03-18 ENCOUNTER — APPOINTMENT (OUTPATIENT)
Dept: CARDIAC REHAB | Age: 77
End: 2022-03-18
Payer: MEDICARE

## 2022-03-21 ENCOUNTER — APPOINTMENT (OUTPATIENT)
Dept: CARDIAC REHAB | Age: 77
End: 2022-03-21
Payer: MEDICARE

## 2022-03-21 ENCOUNTER — HOSPITAL ENCOUNTER (OUTPATIENT)
Dept: CARDIAC REHAB | Age: 77
Setting detail: THERAPIES SERIES
Discharge: HOME | End: 2022-03-21
Payer: MEDICARE

## 2022-03-21 PROCEDURE — 93798 PHYS/QHP OP CAR RHAB W/ECG: CPT

## 2022-03-23 ENCOUNTER — APPOINTMENT (OUTPATIENT)
Dept: CARDIAC REHAB | Age: 77
End: 2022-03-23
Payer: MEDICARE

## 2022-03-23 ENCOUNTER — HOSPITAL ENCOUNTER (OUTPATIENT)
Dept: CARDIAC REHAB | Age: 77
Setting detail: THERAPIES SERIES
Discharge: HOME | End: 2022-03-23
Payer: MEDICARE

## 2022-03-23 PROCEDURE — 93798 PHYS/QHP OP CAR RHAB W/ECG: CPT

## 2022-03-25 ENCOUNTER — APPOINTMENT (OUTPATIENT)
Dept: CARDIAC REHAB | Age: 77
End: 2022-03-25
Payer: MEDICARE

## 2022-03-28 ENCOUNTER — HOSPITAL ENCOUNTER (OUTPATIENT)
Dept: CARDIAC REHAB | Age: 77
Setting detail: THERAPIES SERIES
Discharge: HOME | End: 2022-03-28
Payer: MEDICARE

## 2022-03-28 ENCOUNTER — APPOINTMENT (OUTPATIENT)
Dept: CARDIAC REHAB | Age: 77
End: 2022-03-28
Payer: MEDICARE

## 2022-03-28 PROCEDURE — 93798 PHYS/QHP OP CAR RHAB W/ECG: CPT

## 2022-03-30 ENCOUNTER — APPOINTMENT (OUTPATIENT)
Dept: CARDIAC REHAB | Age: 77
End: 2022-03-30
Payer: MEDICARE

## 2022-03-30 ENCOUNTER — HOSPITAL ENCOUNTER (OUTPATIENT)
Dept: CARDIAC REHAB | Age: 77
Setting detail: THERAPIES SERIES
Discharge: HOME | End: 2022-03-30
Payer: MEDICARE

## 2022-03-30 PROCEDURE — 93798 PHYS/QHP OP CAR RHAB W/ECG: CPT

## 2022-04-01 ENCOUNTER — APPOINTMENT (OUTPATIENT)
Dept: CARDIAC REHAB | Age: 77
End: 2022-04-01
Payer: MEDICARE

## 2022-04-01 ENCOUNTER — HOSPITAL ENCOUNTER (OUTPATIENT)
Dept: CARDIAC REHAB | Age: 77
Setting detail: THERAPIES SERIES
Discharge: HOME | End: 2022-04-01
Payer: MEDICARE

## 2022-04-01 PROCEDURE — 93798 PHYS/QHP OP CAR RHAB W/ECG: CPT

## 2022-04-04 ENCOUNTER — HOSPITAL ENCOUNTER (OUTPATIENT)
Dept: CARDIAC REHAB | Age: 77
Setting detail: THERAPIES SERIES
Discharge: HOME | End: 2022-04-04
Payer: MEDICARE

## 2022-04-04 ENCOUNTER — APPOINTMENT (OUTPATIENT)
Dept: CARDIAC REHAB | Age: 77
End: 2022-04-04
Payer: MEDICARE

## 2022-04-04 PROCEDURE — 93798 PHYS/QHP OP CAR RHAB W/ECG: CPT

## 2022-04-06 ENCOUNTER — HOSPITAL ENCOUNTER (OUTPATIENT)
Dept: CARDIAC REHAB | Age: 77
Setting detail: THERAPIES SERIES
Discharge: HOME | End: 2022-04-06
Payer: MEDICARE

## 2022-04-06 ENCOUNTER — APPOINTMENT (OUTPATIENT)
Dept: CARDIAC REHAB | Age: 77
End: 2022-04-06
Payer: MEDICARE

## 2022-04-06 PROCEDURE — 93798 PHYS/QHP OP CAR RHAB W/ECG: CPT

## 2022-04-08 ENCOUNTER — HOSPITAL ENCOUNTER (OUTPATIENT)
Dept: CARDIAC REHAB | Age: 77
Setting detail: THERAPIES SERIES
Discharge: HOME | End: 2022-04-08
Payer: MEDICARE

## 2022-04-08 ENCOUNTER — APPOINTMENT (OUTPATIENT)
Dept: CARDIAC REHAB | Age: 77
End: 2022-04-08
Payer: MEDICARE

## 2022-04-08 PROCEDURE — 93798 PHYS/QHP OP CAR RHAB W/ECG: CPT

## 2022-04-11 ENCOUNTER — APPOINTMENT (OUTPATIENT)
Dept: CARDIAC REHAB | Age: 77
End: 2022-04-11
Payer: MEDICARE

## 2022-04-11 ENCOUNTER — HOSPITAL ENCOUNTER (OUTPATIENT)
Dept: CARDIAC REHAB | Age: 77
Setting detail: THERAPIES SERIES
Discharge: HOME | End: 2022-04-11
Payer: MEDICARE

## 2022-04-11 PROCEDURE — 93798 PHYS/QHP OP CAR RHAB W/ECG: CPT

## 2022-04-13 ENCOUNTER — HOSPITAL ENCOUNTER (OUTPATIENT)
Dept: CARDIAC REHAB | Age: 77
Setting detail: THERAPIES SERIES
Discharge: HOME | End: 2022-04-13
Payer: MEDICARE

## 2022-04-13 ENCOUNTER — OFFICE VISIT (OUTPATIENT)
Dept: PRIMARY CARE CLINIC | Age: 77
End: 2022-04-13
Payer: MEDICARE

## 2022-04-13 ENCOUNTER — APPOINTMENT (OUTPATIENT)
Dept: CARDIAC REHAB | Age: 77
End: 2022-04-13
Payer: MEDICARE

## 2022-04-13 VITALS
DIASTOLIC BLOOD PRESSURE: 84 MMHG | WEIGHT: 211 LBS | HEART RATE: 76 BPM | RESPIRATION RATE: 18 BRPM | HEIGHT: 69 IN | OXYGEN SATURATION: 98 % | SYSTOLIC BLOOD PRESSURE: 124 MMHG | BODY MASS INDEX: 31.25 KG/M2 | TEMPERATURE: 97.9 F

## 2022-04-13 DIAGNOSIS — I48.0 PAROXYSMAL ATRIAL FIBRILLATION (HCC): ICD-10-CM

## 2022-04-13 DIAGNOSIS — I10 ESSENTIAL HYPERTENSION: ICD-10-CM

## 2022-04-13 DIAGNOSIS — E78.2 MIXED HYPERLIPIDEMIA: ICD-10-CM

## 2022-04-13 DIAGNOSIS — E11.40 TYPE 2 DIABETES MELLITUS WITH DIABETIC NEUROPATHY, WITHOUT LONG-TERM CURRENT USE OF INSULIN (HCC): Primary | ICD-10-CM

## 2022-04-13 DIAGNOSIS — E79.0 HYPERURICEMIA: ICD-10-CM

## 2022-04-13 PROCEDURE — 3044F HG A1C LEVEL LT 7.0%: CPT | Performed by: FAMILY MEDICINE

## 2022-04-13 PROCEDURE — 99214 OFFICE O/P EST MOD 30 MIN: CPT | Performed by: FAMILY MEDICINE

## 2022-04-13 PROCEDURE — 93798 PHYS/QHP OP CAR RHAB W/ECG: CPT

## 2022-04-13 RX ORDER — ALLOPURINOL 300 MG/1
TABLET ORAL
Qty: 90 TABLET | Refills: 1 | Status: SHIPPED
Start: 2022-04-13 | End: 2022-10-07 | Stop reason: SDUPTHER

## 2022-04-13 ASSESSMENT — ENCOUNTER SYMPTOMS
NAUSEA: 0
APNEA: 0
DIARRHEA: 0
ALLERGIC/IMMUNOLOGIC NEGATIVE: 1
BACK PAIN: 0
BLOOD IN STOOL: 0
SINUS PRESSURE: 0
FACIAL SWELLING: 0
ABDOMINAL PAIN: 0
CHEST TIGHTNESS: 0
WHEEZING: 0
VOMITING: 0
COUGH: 0
PHOTOPHOBIA: 0
SHORTNESS OF BREATH: 0
COLOR CHANGE: 0
SORE THROAT: 0

## 2022-04-13 NOTE — PROGRESS NOTES
Chief Complaint:   Chief Complaint   Patient presents with    1 Month Follow-Up    Medication Refill       Diabetes  He presents for his follow-up diabetic visit. He has type 2 diabetes mellitus. His disease course has been stable. Pertinent negatives for hypoglycemia include no confusion, headaches or nervousness/anxiousness. Pertinent negatives for diabetes include no chest pain and no weakness. Symptoms are stable. Current diabetic treatment includes diet. He is compliant with treatment all of the time. Heart Problem  This is a chronic problem. The current episode started more than 1 month ago. The problem occurs daily. The problem has been unchanged. Pertinent negatives include no abdominal pain, arthralgias, chest pain, congestion, coughing, headaches, joint swelling, myalgias, nausea, rash, sore throat, vomiting or weakness. Hyperlipidemia  This is a chronic problem. The current episode started more than 1 year ago. The problem is controlled. Recent lipid tests were reviewed and are normal. Pertinent negatives include no chest pain, myalgias or shortness of breath. Treatments tried: repatha. The current treatment provides significant improvement of lipids. There are no compliance problems.         Patient Active Problem List   Diagnosis    Coronary atherosclerosis of native coronary artery    Hyperlipidemia    Aortic stenosis    Essential hypertension    DEBORAH (obstructive sleep apnea)    DDD (degenerative disc disease), cervical    Nocturnal hypoxemia due to asthma    Neuroforaminal stenosis of lumbar spine    Valvular heart disease    Mild intermittent asthma without complication    New onset a-fib ([address]    S/P AVR (aortic valve replacement)    S/P CABG (coronary artery bypass graft)    Atrial fibrillation with rapid ventricular response (HCC)    Atrial fibrillation (HCC)    Type 2 diabetes mellitus with diabetic neuropathy, without long-term current use of insulin ([address]    PVD (peripheral vascular disease) ([address]    Bilateral carotid bruits    Carotid stenosis, right    Diplopia    Congestive heart failure of unknown etiology ([address]    Acute diastolic CHF (congestive heart failure) [facility]    Obesity    Chronic obstructive pulmonary disease, unspecified COPD type ([address]    Hyperuricemia       Past Medical History:   Diagnosis Date    Arthritis     Atrial fibrillation ([address]     day of surgery on 06/12/13- resolved at this time 6/23/2013    Bilateral carotid bruits 10/15/2020    CAD (coronary artery disease)     heart cath with stent 2001    Cancer ([address]     Basal Cell on his back    Carotid stenosis, right 10/15/2020    Congestive heart failure of unknown etiology ([address] 12/11/2021    COPD (chronic obstructive pulmonary disease) ([address]     Diplopia 4/21/2021    History of blood transfusion     Hyperlipidemia     Hypertension     Kidney stones     Sleep apnea     Type 2 diabetes mellitus with diabetic neuropathy, without long-term current use of insulin ([address] 9/2/2020    Vitamin D deficiency        Past Surgical History:   Procedure Laterality Date    ABDOMEN SURGERY      Bilateral groin hernias    AORTIC VALVE REPLACEMENT  6/12/2013    BACK SURGERY  2006    cervicle fusion   [facility] BACK SURGERY  1974, 1984    lumbar laminectomy    BACK SURGERY  [id number]    360 FUSION L4-L5    BACK SURGERY      CARDIAC SURGERY      CARPAL TUNNEL RELEASE  [id number]    both hands    COLONOSCOPY      CORONARY ARTERY BYPASS GRAFT      DIAGNOSTIC CARDIAC CATH LAB PROCEDURE      ENDOSCOPY, COLON, DIAGNOSTIC      FRACTURE SURGERY      Right arm Fx    FRACTURE SURGERY      Left finger Fx    HERNIA REPAIR      Bilateral    KIDNEY STONE SURGERY      open    NECK SURGERY      SHOULDER ARTHROSCOPY      right and left shoulders    SKIN BIOPSY         Current Outpatient Medications   Medication Sig Dispense Refill    apixaban (ELIQUIS) 5 MG TABS tablet Take 1 tablet by mouth 2 times daily 180 tablet 1    allopurinol (ZYLOPRIM) 300 MG tablet TAKE ONE TABLET BY MOUTH DAILY 90 tablet 1    thiamine 100 MG tablet       olmesartan (BENICAR) 20 MG tablet Take 1 tablet by mouth daily Indications: taking 40 mg 30 tablet 2    amLODIPine (NORVASC) 5 MG tablet Take 1 tablet by mouth daily 90 tablet 1    Evolocumab (REPATHA) 140 MG/ML SOSY Inject 2 times a month 2 mL 5    hydrALAZINE (APRESOLINE) 10 MG tablet Take 20 mg by mouth 3 times daily      metoprolol succinate (TOPROL XL) 50 MG extended release tablet Take 1 tablet by mouth 2 times daily 180 tablet 1    b complex vitamins capsule Take 1 capsule by mouth daily      torsemide (DEMADEX) 10 MG tablet Take 2 tablets by mouth daily 60 tablet 3    omeprazole (PRILOSEC) 20 MG delayed release capsule TAKE ONE CAPSULE BY MOUTH DAILY 90 capsule 3    Alpha-Lipoic Acid 100 MG CAPS Take 200 mg by mouth nightly       Coenzyme Q10 (CO Q 10) 100 MG CAPS Take 2 tablets by mouth daily      Cholecalciferol (VITAMIN D3) 2000 UNITS TABS Take 1 tablet by mouth daily      Multiple Vitamins-Minerals (THERAPEUTIC MULTIVITAMIN-MINERALS) tablet Take 1 tablet by mouth daily Indications: Centrum       aspirin 81 MG EC tablet Take 81 mg by mouth daily        No current facility-administered medications for this visit.        Allergies   Allergen Reactions    Iodine Swelling and Other (See Comments)     Reddened face and swelling of tongue       Social History     Socioeconomic History    Marital status:      Spouse name: Luke Glass    Number of children: 3    Years of education: 9    Highest education level: Not on file   Occupational History    Occupation: retired- assembly line/ forklift     Employer: [employer] [address]: Retired   Tobacco Use    Smoking status: Former Smoker     Packs/day: 2.00     Years: 55.00     Pack years: 110.00     Types: Cigarettes     Start date:      Quit date: 2013     Years since quittin.9    Smokeless tobacco: Never Used    Tobacco comment: [employer]   Vaping Use    Vaping Use: Never used    Passive vaping exposure: Yes   Substance and Sexual Activity    Alcohol use: Yes     Comment: drinks 6 cans of beer /day    Drug use: No    Sexual activity: Yes     Partners: Female   Other Topics Concern    Not on file   Social History Narrative    Not on file     Social Determinants of Health     Financial Resource Strain:     Difficulty of Paying Living Expenses: Not on file   Food Insecurity:     Worried About Running Out of Food in the Last Year: Not on file    Gwen of Food in the Last Year: Not on file   Transportation Needs:     Lack of Transportation (Medical): Not on file    Lack of Transportation (Non-Medical): Not on file   Physical Activity:     Days of Exercise per Week: Not on file    Minutes of Exercise per Session: Not on file   Stress:     Feeling of Stress : Not on file   Social Connections:     Frequency of Communication with Friends and Family: Not on file    Frequency of Social Gatherings with Friends and Family: Not on file    Attends Sikh Services: Not on file    Active Member of [address] [place] [address] or Organizations: Not on file    Attends Club or Organization Meetings: Not on file    Marital Status: Not on file   Intimate Partner Violence:     Fear of Current or Ex-Partner: Not on file    Emotionally Abused: Not on file    Physically Abused: Not on file    Sexually Abused: Not on file   Housing Stability:     Unable to Pay for Housing in the Last Year: Not on file    Number of [place] in the Last Year: Not on file    Unstable Housing in the Last Year: Not on file       Family History   Problem Relation Age of Onset    Stroke Father     Cancer Sister     Heart Disease Brother          Review of Systems   Constitutional: Negative. HENT: Negative for congestion, facial swelling, hearing loss, nosebleeds, sinus pressure and sore throat.     Eyes: Negative for photophobia and visual disturbance. Respiratory: Negative for apnea, cough, chest tightness, shortness of breath and wheezing. Cardiovascular: Negative for chest pain, palpitations and leg swelling. Gastrointestinal: Negative for abdominal pain, blood in stool, diarrhea, nausea and vomiting. Genitourinary: Negative for difficulty urinating, frequency and urgency. Musculoskeletal: Negative for arthralgias, back pain, joint swelling and myalgias. Skin: Negative for color change and rash. Allergic/Immunologic: Negative. Neurological: Negative for syncope, weakness, light-headedness and headaches. Hematological: Negative. Psychiatric/Behavioral: Negative for agitation, behavioral problems, confusion and self-injury. The patient is not nervous/anxious. All other systems reviewed and are negative. Physical Exam  Vitals and nursing note reviewed. Constitutional:       General: He is not in acute distress. Appearance: He is well-developed. HENT:      Head: Normocephalic and atraumatic. Nose: Nose normal.   Eyes:      Conjunctiva/sclera: Conjunctivae normal.      Pupils: Pupils are equal, round, and reactive to light. Neck:      Thyroid: No thyromegaly. Vascular: No JVD. Cardiovascular:      Rate and Rhythm: Normal rate. Rhythm irregularly irregular. Heart sounds: Murmur heard. Systolic murmur is present with a grade of 1/6. No friction rub. No gallop. Pulmonary:      Effort: Pulmonary effort is normal. No respiratory distress. Breath sounds: Normal breath sounds. No wheezing. Abdominal:      General: Bowel sounds are normal. There is no distension. Palpations: Abdomen is soft. Tenderness: There is no abdominal tenderness. There is no guarding or rebound. Musculoskeletal:         General: Normal range of motion. Cervical back: Normal range of motion and neck supple. Lymphadenopathy:      Cervical: No cervical adenopathy.    Skin:     General: Skin is warm and dry. Findings: No erythema or rash. Neurological:      Mental Status: He is alert and oriented to person, place, and time. Cranial Nerves: No cranial nerve deficit. Motor: No abnormal muscle tone. Coordination: Coordination normal.      Deep Tendon Reflexes: Reflexes are normal and symmetric. Psychiatric:         Behavior: Behavior normal.         Judgment: Judgment normal.                               ASSESSMENT/PLAN:    Linnea Osborn was seen today for 1 month follow-up and medication refill. Diagnoses and all orders for this visit:    Type 2 diabetes mellitus with diabetic neuropathy, without long-term current use of insulin (HCC)    Paroxysmal atrial fibrillation (HCC)  -     apixaban (ELIQUIS) 5 MG TABS tablet; Take 1 tablet by mouth 2 times daily    Essential hypertension    Mixed hyperlipidemia    Hyperuricemia  -     allopurinol (ZYLOPRIM) 300 MG tablet; TAKE ONE TABLET BY MOUTH DAILY      The current medical regimen is effective;  continue present plan and medications.   Labs reviewed      Jesus Alberto Garrido DO    4/13/2022  8:40 AM

## 2022-04-14 RX ORDER — TORSEMIDE 10 MG/1
TABLET ORAL
Qty: 60 TABLET | Refills: 3 | Status: SHIPPED | OUTPATIENT
Start: 2022-04-14

## 2022-04-15 ENCOUNTER — APPOINTMENT (OUTPATIENT)
Dept: CARDIAC REHAB | Age: 77
End: 2022-04-15
Payer: MEDICARE

## 2022-04-18 ENCOUNTER — APPOINTMENT (OUTPATIENT)
Dept: CARDIAC REHAB | Age: 77
End: 2022-04-18
Payer: MEDICARE

## 2022-04-20 ENCOUNTER — APPOINTMENT (OUTPATIENT)
Dept: CARDIAC REHAB | Age: 77
End: 2022-04-20
Payer: MEDICARE

## 2022-04-20 ENCOUNTER — HOSPITAL ENCOUNTER (OUTPATIENT)
Dept: CARDIAC REHAB | Age: 77
Setting detail: THERAPIES SERIES
Discharge: HOME | End: 2022-04-20
Payer: MEDICARE

## 2022-04-20 PROCEDURE — 93798 PHYS/QHP OP CAR RHAB W/ECG: CPT

## 2022-04-22 ENCOUNTER — APPOINTMENT (OUTPATIENT)
Dept: CARDIAC REHAB | Age: 77
End: 2022-04-22
Payer: MEDICARE

## 2022-04-22 ENCOUNTER — HOSPITAL ENCOUNTER (OUTPATIENT)
Dept: CARDIAC REHAB | Age: 77
Setting detail: THERAPIES SERIES
Discharge: HOME | End: 2022-04-22
Payer: MEDICARE

## 2022-04-22 PROCEDURE — 93798 PHYS/QHP OP CAR RHAB W/ECG: CPT

## 2022-04-25 ENCOUNTER — APPOINTMENT (OUTPATIENT)
Dept: CARDIAC REHAB | Age: 77
End: 2022-04-25
Payer: MEDICARE

## 2022-04-27 ENCOUNTER — HOSPITAL ENCOUNTER (OUTPATIENT)
Dept: CARDIAC REHAB | Age: 77
Setting detail: THERAPIES SERIES
Discharge: HOME | End: 2022-04-27
Payer: MEDICARE

## 2022-04-27 ENCOUNTER — APPOINTMENT (OUTPATIENT)
Dept: CARDIAC REHAB | Age: 77
End: 2022-04-27
Payer: MEDICARE

## 2022-04-27 PROCEDURE — 93798 PHYS/QHP OP CAR RHAB W/ECG: CPT

## 2022-04-29 ENCOUNTER — HOSPITAL ENCOUNTER (OUTPATIENT)
Dept: CARDIAC REHAB | Age: 77
Setting detail: THERAPIES SERIES
Discharge: HOME | End: 2022-04-29
Payer: MEDICARE

## 2022-04-29 ENCOUNTER — APPOINTMENT (OUTPATIENT)
Dept: CARDIAC REHAB | Age: 77
End: 2022-04-29
Payer: MEDICARE

## 2022-04-29 PROCEDURE — 93798 PHYS/QHP OP CAR RHAB W/ECG: CPT

## 2022-05-04 ENCOUNTER — HOSPITAL ENCOUNTER (OUTPATIENT)
Dept: CARDIAC REHAB | Age: 77
Setting detail: THERAPIES SERIES
Discharge: HOME | End: 2022-05-04
Payer: MEDICARE

## 2022-05-04 PROCEDURE — 93798 PHYS/QHP OP CAR RHAB W/ECG: CPT

## 2022-05-06 ENCOUNTER — HOSPITAL ENCOUNTER (OUTPATIENT)
Dept: CARDIAC REHAB | Age: 77
Setting detail: THERAPIES SERIES
Discharge: HOME | End: 2022-05-06
Payer: MEDICARE

## 2022-05-06 PROCEDURE — 93798 PHYS/QHP OP CAR RHAB W/ECG: CPT

## 2022-05-09 ENCOUNTER — HOSPITAL ENCOUNTER (OUTPATIENT)
Dept: CARDIAC REHAB | Age: 77
Setting detail: THERAPIES SERIES
Discharge: HOME | End: 2022-05-09
Payer: MEDICARE

## 2022-05-09 PROCEDURE — 93798 PHYS/QHP OP CAR RHAB W/ECG: CPT

## 2022-05-11 ENCOUNTER — HOSPITAL ENCOUNTER (OUTPATIENT)
Dept: CARDIAC REHAB | Age: 77
Setting detail: THERAPIES SERIES
Discharge: HOME | End: 2022-05-11
Payer: MEDICARE

## 2022-05-11 PROCEDURE — 93798 PHYS/QHP OP CAR RHAB W/ECG: CPT

## 2022-05-13 ENCOUNTER — HOSPITAL ENCOUNTER (OUTPATIENT)
Dept: CARDIAC REHAB | Age: 77
Setting detail: THERAPIES SERIES
Discharge: HOME | End: 2022-05-13
Payer: MEDICARE

## 2022-05-13 PROCEDURE — 93798 PHYS/QHP OP CAR RHAB W/ECG: CPT

## 2022-06-10 RX ORDER — METOPROLOL SUCCINATE 25 MG/1
25 TABLET, EXTENDED RELEASE ORAL DAILY
COMMUNITY
End: 2022-06-10 | Stop reason: SDUPTHER

## 2022-06-10 RX ORDER — METOPROLOL SUCCINATE 25 MG/1
TABLET, EXTENDED RELEASE ORAL
Qty: 180 TABLET | Refills: 3 | Status: SHIPPED | OUTPATIENT
Start: 2022-06-10

## 2022-06-10 NOTE — TELEPHONE ENCOUNTER
Patient states seeing [address] [place] [address] and they reduced his metoprolol to 25 mg bid instead of 50, can you please send in new dose if ok?

## 2022-07-11 RX ORDER — OMEPRAZOLE 20 MG/1
CAPSULE, DELAYED RELEASE ORAL
Qty: 90 CAPSULE | Refills: 3 | Status: SHIPPED | OUTPATIENT
Start: 2022-07-11

## 2022-07-13 ENCOUNTER — OFFICE VISIT (OUTPATIENT)
Dept: PRIMARY CARE CLINIC | Age: 77
End: 2022-07-13
Payer: MEDICARE

## 2022-07-13 VITALS
TEMPERATURE: 97.5 F | WEIGHT: 213 LBS | BODY MASS INDEX: 31.55 KG/M2 | SYSTOLIC BLOOD PRESSURE: 130 MMHG | DIASTOLIC BLOOD PRESSURE: 82 MMHG | OXYGEN SATURATION: 97 % | HEART RATE: 68 BPM | HEIGHT: 69 IN

## 2022-07-13 DIAGNOSIS — I48.0 PAROXYSMAL ATRIAL FIBRILLATION (HCC): Primary | ICD-10-CM

## 2022-07-13 DIAGNOSIS — I48.0 PAROXYSMAL ATRIAL FIBRILLATION (HCC): ICD-10-CM

## 2022-07-13 DIAGNOSIS — E78.2 MIXED HYPERLIPIDEMIA: ICD-10-CM

## 2022-07-13 DIAGNOSIS — E11.40 TYPE 2 DIABETES MELLITUS WITH DIABETIC NEUROPATHY, WITHOUT LONG-TERM CURRENT USE OF INSULIN (HCC): ICD-10-CM

## 2022-07-13 DIAGNOSIS — I10 ESSENTIAL HYPERTENSION: ICD-10-CM

## 2022-07-13 DIAGNOSIS — Z00.00 MEDICARE ANNUAL WELLNESS VISIT, SUBSEQUENT: ICD-10-CM

## 2022-07-13 DIAGNOSIS — Z12.5 PROSTATE CANCER SCREENING: ICD-10-CM

## 2022-07-13 DIAGNOSIS — E79.0 HYPERURICEMIA: ICD-10-CM

## 2022-07-13 LAB
ALBUMIN SERPL-MCNC: 4.4 G/DL (ref 3.5–5.2)
ALP BLD-CCNC: 61 U/L (ref 40–129)
ALT SERPL-CCNC: 20 U/L (ref 0–40)
ANION GAP SERPL CALCULATED.3IONS-SCNC: 16 MMOL/L (ref 7–16)
AST SERPL-CCNC: 24 U/L (ref 0–39)
BASOPHILS ABSOLUTE: 0.08 E9/L (ref 0–0.2)
BASOPHILS RELATIVE PERCENT: 1.2 % (ref 0–2)
BILIRUB SERPL-MCNC: 0.4 MG/DL (ref 0–1.2)
BUN BLDV-MCNC: 17 MG/DL (ref 6–23)
CALCIUM SERPL-MCNC: 9.8 MG/DL (ref 8.6–10.2)
CHLORIDE BLD-SCNC: 104 MMOL/L (ref 98–107)
CHOLESTEROL, TOTAL: 156 MG/DL (ref 0–199)
CO2: 21 MMOL/L (ref 22–29)
CREAT SERPL-MCNC: 0.8 MG/DL (ref 0.7–1.2)
EOSINOPHILS ABSOLUTE: 0.16 E9/L (ref 0.05–0.5)
EOSINOPHILS RELATIVE PERCENT: 2.4 % (ref 0–6)
GFR AFRICAN AMERICAN: >60
GFR NON-AFRICAN AMERICAN: >60 ML/MIN/1.73
GLUCOSE BLD-MCNC: 116 MG/DL (ref 74–99)
HCT VFR BLD CALC: 39.5 % (ref 37–54)
HDLC SERPL-MCNC: 58 MG/DL
HEMOGLOBIN: 13 G/DL (ref 12.5–16.5)
IMMATURE GRANULOCYTES #: 0.01 E9/L
IMMATURE GRANULOCYTES %: 0.2 % (ref 0–5)
LDL CHOLESTEROL CALCULATED: 50 MG/DL (ref 0–99)
LYMPHOCYTES ABSOLUTE: 2.04 E9/L (ref 1.5–4)
LYMPHOCYTES RELATIVE PERCENT: 30.9 % (ref 20–42)
MCH RBC QN AUTO: 31.3 PG (ref 26–35)
MCHC RBC AUTO-ENTMCNC: 32.9 % (ref 32–34.5)
MCV RBC AUTO: 95 FL (ref 80–99.9)
MONOCYTES ABSOLUTE: 0.58 E9/L (ref 0.1–0.95)
MONOCYTES RELATIVE PERCENT: 8.8 % (ref 2–12)
NEUTROPHILS ABSOLUTE: 3.74 E9/L (ref 1.8–7.3)
NEUTROPHILS RELATIVE PERCENT: 56.5 % (ref 43–80)
PDW BLD-RTO: 13.4 FL (ref 11.5–15)
PLATELET # BLD: 170 E9/L (ref 130–450)
PMV BLD AUTO: 11 FL (ref 7–12)
POTASSIUM SERPL-SCNC: 4.5 MMOL/L (ref 3.5–5)
PROSTATE SPECIFIC ANTIGEN: 1.07 NG/ML (ref 0–4)
RBC # BLD: 4.16 E12/L (ref 3.8–5.8)
SODIUM BLD-SCNC: 141 MMOL/L (ref 132–146)
TOTAL PROTEIN: 7.3 G/DL (ref 6.4–8.3)
TRIGL SERPL-MCNC: 239 MG/DL (ref 0–149)
TSH SERPL DL<=0.05 MIU/L-ACNC: 1.15 UIU/ML (ref 0.27–4.2)
URIC ACID, SERUM: 4.9 MG/DL (ref 3.4–7)
VLDLC SERPL CALC-MCNC: 48 MG/DL
WBC # BLD: 6.6 E9/L (ref 4.5–11.5)

## 2022-07-13 PROCEDURE — 3044F HG A1C LEVEL LT 7.0%: CPT | Performed by: FAMILY MEDICINE

## 2022-07-13 PROCEDURE — 1123F ACP DISCUSS/DSCN MKR DOCD: CPT | Performed by: FAMILY MEDICINE

## 2022-07-13 PROCEDURE — G0439 PPPS, SUBSEQ VISIT: HCPCS | Performed by: FAMILY MEDICINE

## 2022-07-13 RX ORDER — DOFETILIDE 0.25 MG/1
CAPSULE ORAL
COMMUNITY
Start: 2022-06-09

## 2022-07-13 ASSESSMENT — PATIENT HEALTH QUESTIONNAIRE - PHQ9
1. LITTLE INTEREST OR PLEASURE IN DOING THINGS: 0
SUM OF ALL RESPONSES TO PHQ QUESTIONS 1-9: 0
2. FEELING DOWN, DEPRESSED OR HOPELESS: 0
SUM OF ALL RESPONSES TO PHQ QUESTIONS 1-9: 0
SUM OF ALL RESPONSES TO PHQ9 QUESTIONS 1 & 2: 0

## 2022-07-13 ASSESSMENT — LIFESTYLE VARIABLES: HOW OFTEN DO YOU HAVE A DRINK CONTAINING ALCOHOL: NEVER

## 2022-07-13 NOTE — PATIENT INSTRUCTIONS
Personalized Preventive Plan for Karin Schwab - 7/13/2022  Medicare offers a range of preventive health benefits. Some of the tests and screenings are paid in full while other may be subject to a deductible, co-insurance, and/or copay. Some of these benefits include a comprehensive review of your medical history including lifestyle, illnesses that may run in your family, and various assessments and screenings as appropriate. After reviewing your medical record and screening and assessments performed today your provider may have ordered immunizations, labs, imaging, and/or referrals for you. A list of these orders (if applicable) as well as your Preventive Care list are included within your After Visit Summary for your review. Other Preventive Recommendations:    · A preventive eye exam performed by an eye specialist is recommended every 1-2 years to screen for glaucoma; cataracts, macular degeneration, and other eye disorders. · A preventive dental visit is recommended every 6 months. · Try to get at least 150 minutes of exercise per week or 10,000 steps per day on a pedometer . · Order or download the FREE \"Exercise & Physical Activity: Your Everyday Guide\" from [employer]. Call 0-249.259.9553 or search [employer] online. · You need 6473-5851 mg of calcium and 8769-8461 IU of vitamin D per day. It is possible to meet your calcium requirement with diet alone, but a vitamin D supplement is usually necessary to meet this goal.  · When exposed to the sun, use a sunscreen that protects against both UVA and UVB radiation with an SPF of 30 or greater. Reapply every 2 to 3 hours or after sweating, drying off with a towel, or swimming. · Always wear a seat belt when traveling in a car. Always wear a helmet when riding a bicycle or motorcycle.

## 2022-07-13 NOTE — PROGRESS NOTES
Medicare Annual Wellness Visit    Riki Glass is here for Medicare AWV    Assessment & Plan      Recommendations for Preventive Services Due: see orders and patient instructions/AVS.  Recommended screening schedule for the next 5-10 years is provided to the patient in written form: see Patient Instructions/AVS.     No follow-ups on file. Subjective   Doing well no new issues    Patient's complete Health Risk Assessment and screening values have been reviewed and are found in Flowsheets. The following problems were reviewed today and where indicated follow up appointments were made and/or referrals ordered. Positive Risk Factor Screenings with Interventions:              Health Habits/Nutrition:     Physical Activity: Insufficiently Active    Days of Exercise per Week: 2 days    Minutes of Exercise per Session: 60 min     Have you lost any weight without trying in the past 3 months?: No  Body mass index: (!) 31.45  Have you seen the dentist within the past year?: Yes    Health Habits/Nutrition Interventions:  · Inadequate physical activity:  patient is not ready to increase his/her physical activity level at this time     Safety:  Do you have working smoke detectors?: Yes  Do you have any tripping hazards - loose or unsecured carpets or rugs?: No  Do you have any tripping hazards - clutter in doorways, halls, or stairs?: No  Do you have either shower bars, grab bars, non-slip mats or non-slip surfaces in your shower or bathtub?: (!) No  Do all of your stairways have a railing or banister?: Yes  Do you always fasten your seatbelt when you are in a car?: Yes    Safety Interventions:  · Home safety tips provided           Objective   Vitals:    07/13/22 0851   BP: 130/82   Pulse: 68   Temp: 97.5 °F (36.4 °C)   SpO2: 97%   Weight: 213 lb (96.6 kg)   Height: 5' 9\" (1.753 m)      Body mass index is 31.45 kg/m².         General Appearance: alert and oriented to person, place and time, well developed and well- nourished, in no acute distress  Skin: warm and dry, no rash or erythema  Head: normocephalic and atraumatic  Eyes: pupils equal, round, and reactive to light, extraocular eye movements intact, conjunctivae normal  ENT: tympanic membrane, external ear and ear canal normal bilaterally, nose without deformity, nasal mucosa and turbinates normal without polyps  Neck: supple and non-tender without mass, no thyromegaly or thyroid nodules, no cervical lymphadenopathy  Pulmonary/Chest: clear to auscultation bilaterally- no wheezes, rales or rhonchi, normal air movement, no respiratory distress  Cardiovascular: normal rate, regular rhythm, normal S1 and S2, pos murmurs, rubs, clicks, or gallops, distal pulses intact, no carotid bruits  Abdomen: soft, non-tender, non-distended, normal bowel sounds, no masses or organomegaly  Extremities: no cyanosis, clubbing or edema  Musculoskeletal: normal range of motion, no joint swelling, deformity or tenderness  Neurologic: reflexes normal and symmetric, no cranial nerve deficit, gait, coordination and speech normal       Allergies   Allergen Reactions    Iodine Swelling and Other (See Comments)     Reddened face and swelling of tongue     Prior to Visit Medications    Medication Sig Taking?  Authorizing Provider   dofetilide (TIKOSYN) 250 MCG capsule  Yes Historical Provider, MD   omeprazole (PRILOSEC) 20 MG delayed release capsule TAKE ONE CAPSULE BY MOUTH DAILY Yes Nomi Duarte DO   metoprolol succinate (TOPROL XL) 25 MG extended release tablet 1 po bid Yes Ganesh Peng, DO   torsemide (DEMADEX) 10 MG tablet TAKE TWO TABLETS BY MOUTH EVERY DAY Yes Ozzy Flores, DO   apixaban (ELIQUIS) 5 MG TABS tablet Take 1 tablet by mouth 2 times daily Yes Nomi Duarte DO   allopurinol (ZYLOPRIM) 300 MG tablet TAKE ONE TABLET BY MOUTH DAILY Yes Nomi Duarte DO   thiamine 100 MG tablet  Yes Historical Provider, MD   olmesartan (BENICAR) 20 MG tablet Take 1 tablet by mouth daily Indications: taking 40 mg Yes Shannon Zambrano DO   amLODIPine (NORVASC) 5 MG tablet Take 1 tablet by mouth daily Yes Shannon Zambrano DO   Evolocumab (REPATHA) 140 MG/ML SOSY Inject 2 times a month Yes Shannon Zambrano DO   hydrALAZINE (APRESOLINE) 10 MG tablet Take 20 mg by mouth 3 times daily Yes Historical Provider, MD   metoprolol succinate (TOPROL XL) 50 MG extended release tablet Take 1 tablet by mouth 2 times daily Yes Shannon Zambrano DO   b complex vitamins capsule Take 1 capsule by mouth daily Yes Historical Provider, MD   Alpha-Lipoic Acid 100 MG CAPS Take 200 mg by mouth nightly  Yes Historical Provider, MD   Coenzyme Q10 (CO Q 10) 100 MG CAPS Take 2 tablets by mouth daily Yes Historical Provider, MD   Cholecalciferol (VITAMIN D3) 2000 UNITS TABS Take 1 tablet by mouth daily Yes Historical Provider, MD   Multiple Vitamins-Minerals (THERAPEUTIC MULTIVITAMIN-MINERALS) tablet Take 1 tablet by mouth daily Indications: Centrum  Yes Historical Provider, MD   aspirin 81 MG EC tablet Take 81 mg by mouth daily  Yes Historical Provider, MD Yuan (Including outside providers/suppliers regularly involved in providing care):   Patient Care Team:  Shannon Zambrano DO as PCP - General (Family Medicine)  Shannon Zambrano DO as PCP - REHABILITATION [facility] Empaneled Provider  Cindy Sweeney MD as Consulting Physician (Cardiology)  Liz Trujillo as Imaging Navigator     Reviewed and updated this visit:  Allergies  Meds

## 2022-07-18 RX ORDER — EVOLOCUMAB 140 MG/ML
INJECTION, SOLUTION SUBCUTANEOUS
Qty: 6 ML | Refills: 1 | Status: SHIPPED
Start: 2022-07-18 | End: 2022-08-15 | Stop reason: SDUPTHER

## 2022-07-19 ENCOUNTER — TELEPHONE (OUTPATIENT)
Dept: PRIMARY CARE CLINIC | Age: 77
End: 2022-07-19

## 2022-07-19 NOTE — TELEPHONE ENCOUNTER
The pt is calling because the pharmacy is telling him that the Evolocumab (REPATHA) 140 MG/ML SOSY needs prior authorized

## 2022-07-20 NOTE — TELEPHONE ENCOUNTER
The pt's wife is calling and is very nervous that the pt's not getting his medication what can we do

## 2022-07-22 ENCOUNTER — TELEPHONE (OUTPATIENT)
Dept: PRIMARY CARE CLINIC | Age: 77
End: 2022-07-22

## 2022-07-25 NOTE — TELEPHONE ENCOUNTER
Spoke with pt wife. Will be bringing letter they received over the weekend for us to take a look at.

## 2022-08-10 ENCOUNTER — TELEPHONE (OUTPATIENT)
Dept: VASCULAR SURGERY | Age: 77
End: 2022-08-10

## 2022-08-11 ENCOUNTER — OFFICE VISIT (OUTPATIENT)
Dept: VASCULAR SURGERY | Age: 77
End: 2022-08-11
Payer: MEDICARE

## 2022-08-11 VITALS — BODY MASS INDEX: 31.25 KG/M2 | HEIGHT: 69 IN | WEIGHT: 211 LBS

## 2022-08-11 DIAGNOSIS — I65.21 CAROTID STENOSIS, RIGHT: Primary | ICD-10-CM

## 2022-08-11 DIAGNOSIS — R09.89 BILATERAL CAROTID BRUITS: ICD-10-CM

## 2022-08-11 PROCEDURE — 1123F ACP DISCUSS/DSCN MKR DOCD: CPT | Performed by: SURGERY

## 2022-08-11 PROCEDURE — 99213 OFFICE O/P EST LOW 20 MIN: CPT | Performed by: SURGERY

## 2022-08-11 NOTE — PROGRESS NOTES
Chief Complaint:   Chief Complaint   Patient presents with    Circulatory Problem     Carotid stenosis  rt.          HPI: Patient came to the office, for follow-up evaluation of right carotid stenosis, approximate 40% plus based upon the testing in the past, overall doing well      Patient denies any focal lateralizing neurological symptoms like loss of speech, vision or loss of function of extremity    Patient can walk a few blocks , and denies any symptoms of rest pain    Allergies   Allergen Reactions    Iodine Swelling and Other (See Comments)     Reddened face and swelling of tongue       Current Outpatient Medications   Medication Sig Dispense Refill    Evolocumab (REPATHA) 140 MG/ML SOSY Inject 2 times a month 6 mL 1    dofetilide (TIKOSYN) 250 MCG capsule       omeprazole (PRILOSEC) 20 MG delayed release capsule TAKE ONE CAPSULE BY MOUTH DAILY 90 capsule 3    metoprolol succinate (TOPROL XL) 25 MG extended release tablet 1 po bid 180 tablet 3    torsemide (DEMADEX) 10 MG tablet TAKE TWO TABLETS BY MOUTH EVERY DAY 60 tablet 3    apixaban (ELIQUIS) 5 MG TABS tablet Take 1 tablet by mouth 2 times daily 180 tablet 1    allopurinol (ZYLOPRIM) 300 MG tablet TAKE ONE TABLET BY MOUTH DAILY 90 tablet 1    thiamine 100 MG tablet       olmesartan (BENICAR) 20 MG tablet Take 1 tablet by mouth daily Indications: taking 40 mg 30 tablet 2    amLODIPine (NORVASC) 5 MG tablet Take 1 tablet by mouth daily 90 tablet 1    hydrALAZINE (APRESOLINE) 10 MG tablet Take 20 mg by mouth 3 times daily      metoprolol succinate (TOPROL XL) 50 MG extended release tablet Take 1 tablet by mouth 2 times daily 180 tablet 1    b complex vitamins capsule Take 1 capsule by mouth daily      Alpha-Lipoic Acid 100 MG CAPS Take 200 mg by mouth nightly       Coenzyme Q10 (CO Q 10) 100 MG CAPS Take 2 tablets by mouth daily      Cholecalciferol (VITAMIN D3) 2000 UNITS TABS Take 1 tablet by mouth daily      Multiple Vitamins-Minerals (THERAPEUTIC MULTIVITAMIN-MINERALS) tablet Take 1 tablet by mouth daily Indications: Centrum       aspirin 81 MG EC tablet Take 81 mg by mouth daily        No current facility-administered medications for this visit.        Past Medical History:   Diagnosis Date    Arthritis     Atrial fibrillation ([address]     day of surgery on 06/12/13- resolved at this time 6/23/2013    Bilateral carotid bruits 10/15/2020    CAD (coronary artery disease)     heart cath with stent 2001    Cancer [facility])     Basal Cell on his back    Carotid stenosis, right 10/15/2020    Congestive heart failure of unknown etiology ([address] 12/11/2021    COPD (chronic obstructive pulmonary disease) ([address]     Diplopia 4/21/2021    History of blood transfusion     Hyperlipidemia     Hypertension     Kidney stones     Sleep apnea     Type 2 diabetes mellitus with diabetic neuropathy, without long-term current use of insulin ([address] 9/2/2020    Vitamin D deficiency        Past Surgical History:   Procedure Laterality Date    ABDOMEN SURGERY      Bilateral groin hernias    AORTIC VALVE REPLACEMENT  6/12/2013    BACK SURGERY  2006    cervicle fusion    BACK SURGERY  1974, 1984    lumbar laminectomy    BACK SURGERY  [id number]    360 FUSION L4-L5    BACK SURGERY      CARDIAC SURGERY      CARPAL TUNNEL RELEASE  1-20013    both hands    COLONOSCOPY      CORONARY ARTERY BYPASS GRAFT      DIAGNOSTIC CARDIAC CATH LAB PROCEDURE      ENDOSCOPY, COLON, DIAGNOSTIC      FRACTURE SURGERY      Right arm Fx    FRACTURE SURGERY      Left finger Fx    HERNIA REPAIR      Bilateral    KIDNEY STONE SURGERY      open    NECK SURGERY      SHOULDER ARTHROSCOPY      right and left shoulders    SKIN BIOPSY         Family History   Problem Relation Age of Onset    Stroke Father     Cancer Sister     Heart Disease Brother        Social History     Socioeconomic History    Marital status:      Spouse name: Juliette Buckley    Number of children: 3    Years of education: 9    Highest education level: Not on file   Occupational History    Occupation: retired- assembly line/ [employer]     Employer: [employer]     Comment: Retired   Tobacco Use    Smoking status: Former     Packs/day: 2.00     Years: 55.00     Pack years: 110.00     Types: Cigarettes     Start date:      Quit date: 2013     Years since quittin.2    Smokeless tobacco: Never    Tobacco comments:     [employer]   Vaping Use    Vaping Use: Never used    Passive vaping exposure: Yes   Substance and Sexual Activity    Alcohol use: Yes     Comment: drinks 6 cans of beer /day    Drug use: No    Sexual activity: Yes     Partners: Female   Other Topics Concern    Not on file   Social History Narrative    Not on file     Social Determinants of Health     Financial Resource Strain: Not on file   Food Insecurity: Not on file   Transportation Needs: Not on file   Physical Activity: Insufficiently Active    Days of Exercise per Week: 2 days    Minutes of Exercise per Session: 60 min   Stress: Not on file   Social Connections: Not on file   Intimate Partner Violence: Not on file   Housing Stability: Not on file       Review of Systems:    Eyes:  No blurring, diplopia or vision loss. Respiratory:  No cough, pleuritic chest pain, dyspnea, or wheezing. Cardiovascular: No angina, palpitations . Coronary artery disease, atrial fibrillation, valvular heart disease, hypertension  Musculoskeletal:  No arthritis or weakness. Neurologic:  No paralysis, paresis, paresthesia, seizures or headache. Endocrinology:           Physical Exam:  General appearance:  Alert, awake, oriented x 3. No distress. Eyes:  Conjunctivae appear normal; PERRL  Neck:  No jugular venous distention, lymphadenopathy or thyromegaly. Carotid bruit noted  Lungs:  Clear to ausculation bilaterally. No rhonchi, crackles, wheezes  Heart:  Regular rate and rhythm. No rub or murmur  Abdomen:  Soft, non-tender. No masses, organomegaly.   Musculoskeletal : No joint effusions, tenderness swelling    Neuro: Speech is intact. Moving all extremities. No focal motor or sensory deficits      Extremities:  Both feet are warm to touch. The color of both feet is normal.        Pulses Right  Left    Brachial 3 3    Radial    3=normal   Femoral 2 2  2=diminished   Popliteal    1=barely palpable   Dorsalis pedis    0=absent   Posterior tibial 2 2  4=aneurysmal             Other pertinent information:1. The past medical records were reviewed. Assessment:    1. Carotid stenosis, right    2. Bilateral carotid bruits              Plan:       Discussed the patient, recommending follow-up carotid ultrasound, if stable, may see me every few years in the future but call immediately and come to hospital any strokelike symptoms, explained              Patient was instructed to continue walking program and to call if any worsening of symptoms and to call if any focal lateralizing neurological symptoms like loss of speech, vision or loss of function of extremity. All the questions were answered. Orders Placed This Encounter   Procedures    US CAROTID ARTERY BILATERAL             Indicated follow-up: Return in about 1 year (around 8/11/2023), or if symptoms worsen or fail to improve.

## 2022-08-15 RX ORDER — EVOLOCUMAB 140 MG/ML
INJECTION, SOLUTION SUBCUTANEOUS
Qty: 6 ML | Refills: 1 | Status: SHIPPED
Start: 2022-08-15 | End: 2022-09-06 | Stop reason: SDUPTHER

## 2022-08-31 ENCOUNTER — HOSPITAL ENCOUNTER (OUTPATIENT)
Dept: CARDIOLOGY | Age: 77
Discharge: HOME | End: 2022-08-31
Payer: MEDICARE

## 2022-08-31 DIAGNOSIS — I65.21 CAROTID STENOSIS, RIGHT: ICD-10-CM

## 2022-08-31 PROCEDURE — 93880 EXTRACRANIAL BILAT STUDY: CPT

## 2022-08-31 NOTE — PROGRESS NOTES
[facility] - [facility]    This is a pre read worksheet - prior to official physician interpretation    José Miguel Alford  1945  Date of study: 8/31/22    Indication for study:  Carotid artery stenosis  Study : Bilateral Carotid Artery Duplex Examination    Duplex examination of the RIGHT carotid artery system identifies atherosclerotic plaque. The peak systolic velocity in internal carotid artery was 124 centimeters / second. The maximum end diastolic velocity was 29 centimeters / second. The ICA/CCA ratio is 1.5. The right vertebral artery has antegrade flow. Duplex examination of the LEFT carotid artery system identifies atherosclerotic plaque. The peak systolic velocity in internal carotid artery was 92 centimeters / second. The maximum end diastolic velocity was 20 centimeters / second. The ICA/CCA ratio is 0.8. The left vertebral artery has antegrade flow.     RIGHT 40%  LEFT 20%        LAST STUDY  8/9/2021 @ [facility]  Rt 0-49  Lt 0-49

## 2022-09-02 ENCOUNTER — TELEPHONE (OUTPATIENT)
Dept: VASCULAR SURGERY | Age: 77
End: 2022-09-02

## 2022-09-02 NOTE — PROCEDURES
[address]                  [address] [facility],  [facility]                                VASCULAR REPORT    PATIENT NAME: Danny Lane                     :        1945  MED REC NO:   [id number]                            ROOM:  ACCOUNT NO:   [de-identified]                           ADMIT DATE: 2022  PROVIDER:     Vicenta Butts MD    DATE OF PROCEDURE:  2022    CAROTID ULTRASOUND REPORT    INDICATION:  Right carotid stenosis. Duplex scanning of the right carotid artery revealed the patient has  moderate plaque with a peak internal carotid velocity of 211, diastolic  velocity of 30 cm/sec with plaque causing 40% stenosis. Duplex scanning of left carotid revealed moderate intimal thickening  with peak internal carotid velocity of 92, diastolic velocity of 20  cm/sec with 20% stenosis. IMPRESSION:  40% stenosis right carotid artery associated with minimal  stenosis of 20% on the left side, unchanged from last study.         Jenelle Lemons MD    D: 2022 16:05:39       T: 2022 16:07:59     [id number]  Job#: [id number]     Doc#: [id number]    CC:  Geoffrey Vega DO

## 2022-09-04 DIAGNOSIS — I10 ESSENTIAL HYPERTENSION: Chronic | ICD-10-CM

## 2022-09-06 RX ORDER — AMLODIPINE BESYLATE 5 MG/1
TABLET ORAL
Qty: 90 TABLET | Refills: 0 | Status: SHIPPED | OUTPATIENT
Start: 2022-09-06

## 2022-09-06 RX ORDER — EVOLOCUMAB 140 MG/ML
INJECTION, SOLUTION SUBCUTANEOUS
Qty: 6 ML | Refills: 5 | Status: SHIPPED | OUTPATIENT
Start: 2022-09-06

## 2022-09-26 ENCOUNTER — OFFICE VISIT (OUTPATIENT)
Dept: PRIMARY CARE CLINIC | Age: 77
End: 2022-09-26
Payer: MEDICARE

## 2022-09-26 VITALS
WEIGHT: 216 LBS | TEMPERATURE: 97.8 F | DIASTOLIC BLOOD PRESSURE: 72 MMHG | RESPIRATION RATE: 18 BRPM | BODY MASS INDEX: 31.99 KG/M2 | OXYGEN SATURATION: 96 % | HEART RATE: 76 BPM | HEIGHT: 69 IN | SYSTOLIC BLOOD PRESSURE: 120 MMHG

## 2022-09-26 DIAGNOSIS — M16.11 PRIMARY OSTEOARTHRITIS OF RIGHT HIP: ICD-10-CM

## 2022-09-26 DIAGNOSIS — E78.2 MIXED HYPERLIPIDEMIA: ICD-10-CM

## 2022-09-26 DIAGNOSIS — I10 ESSENTIAL HYPERTENSION: ICD-10-CM

## 2022-09-26 DIAGNOSIS — Z01.818 PREOP EXAMINATION: ICD-10-CM

## 2022-09-26 DIAGNOSIS — I48.0 PAROXYSMAL ATRIAL FIBRILLATION (HCC): ICD-10-CM

## 2022-09-26 DIAGNOSIS — E79.0 HYPERURICEMIA: ICD-10-CM

## 2022-09-26 DIAGNOSIS — Z23 NEED FOR PROPHYLACTIC VACCINATION AND INOCULATION AGAINST INFLUENZA: ICD-10-CM

## 2022-09-26 DIAGNOSIS — I48.0 PAROXYSMAL ATRIAL FIBRILLATION (HCC): Primary | ICD-10-CM

## 2022-09-26 LAB
ALBUMIN SERPL-MCNC: 4.6 G/DL (ref 3.5–5.2)
ALP BLD-CCNC: 60 U/L (ref 40–129)
ALT SERPL-CCNC: 21 U/L (ref 0–40)
ANION GAP SERPL CALCULATED.3IONS-SCNC: 16 MMOL/L (ref 7–16)
AST SERPL-CCNC: 29 U/L (ref 0–39)
BASOPHILS ABSOLUTE: 0.07 E9/L (ref 0–0.2)
BASOPHILS RELATIVE PERCENT: 1.1 % (ref 0–2)
BILIRUB SERPL-MCNC: 0.4 MG/DL (ref 0–1.2)
BUN BLDV-MCNC: 22 MG/DL (ref 6–23)
CALCIUM SERPL-MCNC: 10.2 MG/DL (ref 8.6–10.2)
CHLORIDE BLD-SCNC: 103 MMOL/L (ref 98–107)
CHOLESTEROL, TOTAL: 158 MG/DL (ref 0–199)
CO2: 23 MMOL/L (ref 22–29)
CREAT SERPL-MCNC: 0.9 MG/DL (ref 0.7–1.2)
EOSINOPHILS ABSOLUTE: 0.12 E9/L (ref 0.05–0.5)
EOSINOPHILS RELATIVE PERCENT: 2 % (ref 0–6)
GFR AFRICAN AMERICAN: >60
GFR NON-AFRICAN AMERICAN: >60 ML/MIN/1.73
GLUCOSE BLD-MCNC: 122 MG/DL (ref 74–99)
HBA1C MFR BLD: 6.2 % (ref 4–5.6)
HCT VFR BLD CALC: 39.7 % (ref 37–54)
HDLC SERPL-MCNC: 57 MG/DL
HEMOGLOBIN: 13.5 G/DL (ref 12.5–16.5)
IMMATURE GRANULOCYTES #: 0.02 E9/L
IMMATURE GRANULOCYTES %: 0.3 % (ref 0–5)
LDL CHOLESTEROL CALCULATED: 58 MG/DL (ref 0–99)
LYMPHOCYTES ABSOLUTE: 1.84 E9/L (ref 1.5–4)
LYMPHOCYTES RELATIVE PERCENT: 30 % (ref 20–42)
MAGNESIUM: 2.1 MG/DL (ref 1.6–2.6)
MCH RBC QN AUTO: 33.1 PG (ref 26–35)
MCHC RBC AUTO-ENTMCNC: 34 % (ref 32–34.5)
MCV RBC AUTO: 97.3 FL (ref 80–99.9)
MONOCYTES ABSOLUTE: 0.56 E9/L (ref 0.1–0.95)
MONOCYTES RELATIVE PERCENT: 9.1 % (ref 2–12)
NEUTROPHILS ABSOLUTE: 3.52 E9/L (ref 1.8–7.3)
NEUTROPHILS RELATIVE PERCENT: 57.5 % (ref 43–80)
PDW BLD-RTO: 12.9 FL (ref 11.5–15)
PLATELET # BLD: 178 E9/L (ref 130–450)
PMV BLD AUTO: 10.8 FL (ref 7–12)
POTASSIUM SERPL-SCNC: 4.7 MMOL/L (ref 3.5–5)
RBC # BLD: 4.08 E12/L (ref 3.8–5.8)
SODIUM BLD-SCNC: 142 MMOL/L (ref 132–146)
TOTAL PROTEIN: 7.7 G/DL (ref 6.4–8.3)
TRIGL SERPL-MCNC: 216 MG/DL (ref 0–149)
TSH SERPL DL<=0.05 MIU/L-ACNC: 0.98 UIU/ML (ref 0.27–4.2)
URIC ACID, SERUM: 5 MG/DL (ref 3.4–7)
VLDLC SERPL CALC-MCNC: 43 MG/DL
WBC # BLD: 6.1 E9/L (ref 4.5–11.5)

## 2022-09-26 PROCEDURE — 93000 ELECTROCARDIOGRAM COMPLETE: CPT | Performed by: FAMILY MEDICINE

## 2022-09-26 PROCEDURE — 1123F ACP DISCUSS/DSCN MKR DOCD: CPT | Performed by: FAMILY MEDICINE

## 2022-09-26 PROCEDURE — 90694 VACC AIIV4 NO PRSRV 0.5ML IM: CPT | Performed by: FAMILY MEDICINE

## 2022-09-26 PROCEDURE — G0008 ADMIN INFLUENZA VIRUS VAC: HCPCS | Performed by: FAMILY MEDICINE

## 2022-09-26 PROCEDURE — 99214 OFFICE O/P EST MOD 30 MIN: CPT | Performed by: FAMILY MEDICINE

## 2022-09-26 ASSESSMENT — ENCOUNTER SYMPTOMS
SINUS PRESSURE: 0
CHEST TIGHTNESS: 0
BACK PAIN: 0
FACIAL SWELLING: 0
BLOOD IN STOOL: 0
WHEEZING: 0
COUGH: 0
COLOR CHANGE: 0
PHOTOPHOBIA: 0
VOMITING: 0
NAUSEA: 0
APNEA: 0
ABDOMINAL PAIN: 0
SHORTNESS OF BREATH: 0
DIARRHEA: 0
SORE THROAT: 0
ALLERGIC/IMMUNOLOGIC NEGATIVE: 1

## 2022-09-26 NOTE — PROGRESS NOTES
Chief Complaint:   Chief Complaint   Patient presents with    Blood Work    EKG Only    Pre-op Exam     Hip replacement        Hip Pain   The incident occurred more than 1 week ago. There was no injury mechanism. The pain is present in the right hip. The quality of the pain is described as aching and burning. The pain is at a severity of 7/10. The pain is severe. The pain has been Worsening since onset. He has tried acetaminophen for the symptoms. The treatment provided mild relief. Coronary Artery Disease  Presents for follow-up visit. Pertinent negatives include no chest pain, chest tightness, leg swelling, palpitations or shortness of breath. The symptoms have been resolved. Compliance with diet is good. Compliance with exercise is good. Compliance with medications is good. Diabetes  He presents for his follow-up diabetic visit. He has type 2 diabetes mellitus. His disease course has been stable. Pertinent negatives for hypoglycemia include no confusion, headaches or nervousness/anxiousness. Pertinent negatives for diabetes include no chest pain and no weakness. Symptoms are stable. Current diabetic treatment includes diet. He is compliant with treatment all of the time. Hypertension  This is a chronic problem. The current episode started more than 1 year ago. The problem is unchanged. Pertinent negatives include no chest pain, headaches, palpitations or shortness of breath. Past treatments include beta blockers, calcium channel blockers and direct vasodilators. The current treatment provides significant improvement.      Patient Active Problem List   Diagnosis    Coronary atherosclerosis of native coronary artery    Hyperlipidemia    Aortic stenosis    Essential hypertension    DEBORAH (obstructive sleep apnea)    DDD (degenerative disc disease), cervical    Nocturnal hypoxemia due to asthma    Neuroforaminal stenosis of lumbar spine    Valvular heart disease    Mild intermittent asthma without complication New onset a-fib (HCC)    S/P AVR (aortic valve replacement)    S/P CABG (coronary artery bypass graft)    Atrial fibrillation with rapid ventricular response (HCC)    Atrial fibrillation (HCC)    Type 2 diabetes mellitus with diabetic neuropathy, without long-term current use of insulin (HCC)    PVD (peripheral vascular disease) (HCC)    Bilateral carotid bruits    Carotid stenosis, right    Diplopia    Congestive heart failure of unknown etiology (HCC)    Acute diastolic CHF (congestive heart failure) ([address]    Obesity    Chronic obstructive pulmonary disease, unspecified COPD type ([address]    Hyperuricemia       Past Medical History:   Diagnosis Date    Arthritis     Atrial fibrillation ([address]     day of surgery on 06/12/13- resolved at this time 6/23/2013    Bilateral carotid bruits 10/15/2020    CAD (coronary artery disease)     heart cath with stent 2001    [facility])     Basal Cell on his back    Carotid stenosis, right 10/15/2020    Congestive heart failure of unknown etiology ([address] 12/11/2021    COPD (chronic obstructive pulmonary disease) ([address]     Diplopia 4/21/2021    History of blood transfusion     Hyperlipidemia     Hypertension     Kidney stones     Sleep apnea     Type 2 diabetes mellitus with diabetic neuropathy, without long-term current use of insulin ([address] 9/2/2020    Vitamin D deficiency        Past Surgical History:   Procedure Laterality Date    ABDOMEN SURGERY      Bilateral groin hernias    AORTIC VALVE REPLACEMENT  6/12/2013    BACK SURGERY  2006    cervicle fusion    BACK SURGERY  1974, 1984    lumbar laminectomy    BACK SURGERY  [id number]    360 FUSION L4-L5    BACK SURGERY      CARDIAC SURGERY      CARPAL TUNNEL RELEASE  1-20013    both hands    COLONOSCOPY      CORONARY ARTERY BYPASS GRAFT      DIAGNOSTIC CARDIAC CATH LAB PROCEDURE      ENDOSCOPY, COLON, DIAGNOSTIC      FRACTURE SURGERY      Right arm Fx    FRACTURE SURGERY      Left finger Fx    HERNIA REPAIR      Bilateral    KIDNEY STONE SURGERY      open    NECK SURGERY      SHOULDER ARTHROSCOPY      right and left shoulders    SKIN BIOPSY         Current Outpatient Medications   Medication Sig Dispense Refill    amLODIPine (NORVASC) 5 MG tablet TAKE ONE TABLET BY MOUTH DAILY 90 tablet 0    Evolocumab (REPATHA) 140 MG/ML SOSY Inject 2 times a month 6 mL 5    dofetilide (TIKOSYN) 250 MCG capsule       omeprazole (PRILOSEC) 20 MG delayed release capsule TAKE ONE CAPSULE BY MOUTH DAILY 90 capsule 3    metoprolol succinate (TOPROL XL) 25 MG extended release tablet 1 po bid 180 tablet 3    torsemide (DEMADEX) 10 MG tablet TAKE TWO TABLETS BY MOUTH EVERY DAY 60 tablet 3    apixaban (ELIQUIS) 5 MG TABS tablet Take 1 tablet by mouth 2 times daily 180 tablet 1    allopurinol (ZYLOPRIM) 300 MG tablet TAKE ONE TABLET BY MOUTH DAILY 90 tablet 1    thiamine 100 MG tablet       olmesartan (BENICAR) 20 MG tablet Take 1 tablet by mouth daily Indications: taking 40 mg 30 tablet 2    hydrALAZINE (APRESOLINE) 10 MG tablet Take 20 mg by mouth 3 times daily      metoprolol succinate (TOPROL XL) 50 MG extended release tablet Take 1 tablet by mouth 2 times daily 180 tablet 1    b complex vitamins capsule Take 1 capsule by mouth daily      Alpha-Lipoic Acid 100 MG CAPS Take 200 mg by mouth nightly       Coenzyme Q10 (CO Q 10) 100 MG CAPS Take 2 tablets by mouth daily      Cholecalciferol (VITAMIN D3) 2000 UNITS TABS Take 1 tablet by mouth daily      Multiple Vitamins-Minerals (THERAPEUTIC MULTIVITAMIN-MINERALS) tablet Take 1 tablet by mouth daily Indications: Centrum       aspirin 81 MG EC tablet Take 81 mg by mouth daily        No current facility-administered medications for this visit.        Allergies   Allergen Reactions    Iodine Swelling and Other (See Comments)     Reddened face and swelling of tongue       Social History     Socioeconomic History    Marital status:      Spouse name: Tianna Garcias    Number of children: 3    Years of education: 9    Highest education level: None   Occupational History    Occupation: retired- assembly line/ [employer]     Employer: [employer]     Comment: Retired   Tobacco Use    Smoking status: Former     Packs/day: 2.00     Years: 55.00     Pack years: 110.00     Types: Cigarettes     Start date:      Quit date: 2013     Years since quittin.4    Smokeless tobacco: Never    Tobacco comments:     [employer]   Vaping Use    Vaping Use: Never used    Passive vaping exposure: Yes   Substance and Sexual Activity    Alcohol use: Yes     Comment: drinks 6 cans of beer /day    Drug use: No    Sexual activity: Yes     Partners: Female     Social Determinants of Health     Physical Activity: Insufficiently Active    Days of Exercise per Week: 2 days    Minutes of Exercise per Session: 60 min       Family History   Problem Relation Age of Onset    Stroke Father     Cancer Sister     Heart Disease Brother          Review of Systems   Constitutional: Negative. HENT:  Negative for congestion, facial swelling, hearing loss, nosebleeds, sinus pressure and sore throat. Eyes:  Negative for photophobia and visual disturbance. Respiratory:  Negative for apnea, cough, chest tightness, shortness of breath and wheezing. Cardiovascular:  Negative for chest pain, palpitations and leg swelling. Gastrointestinal:  Negative for abdominal pain, blood in stool, diarrhea, nausea and vomiting. Genitourinary:  Negative for difficulty urinating, frequency and urgency. Musculoskeletal:  Positive for arthralgias. Negative for back pain, joint swelling and myalgias. Skin:  Negative for color change and rash. Allergic/Immunologic: Negative. Neurological:  Negative for syncope, weakness, light-headedness and headaches. Hematological: Negative. Psychiatric/Behavioral:  Negative for agitation, behavioral problems, confusion and self-injury. The patient is not nervous/anxious.     All other systems reviewed and are negative. Physical Exam  Vitals and nursing note reviewed. Constitutional:       General: He is not in acute distress. Appearance: He is well-developed. HENT:      Head: Normocephalic and atraumatic. Nose: Nose normal.   Eyes:      Conjunctiva/sclera: Conjunctivae normal.      Pupils: Pupils are equal, round, and reactive to light. Neck:      Thyroid: No thyromegaly. Vascular: No JVD. Cardiovascular:      Rate and Rhythm: Normal rate and regular rhythm. Heart sounds: Normal heart sounds. No murmur heard. No friction rub. No gallop. Pulmonary:      Effort: Pulmonary effort is normal. No respiratory distress. Breath sounds: Normal breath sounds. No wheezing. Abdominal:      General: Bowel sounds are normal. There is no distension. Palpations: Abdomen is soft. Tenderness: There is no abdominal tenderness. There is no guarding or rebound. Musculoskeletal:      Cervical back: Normal range of motion and neck supple. Right hip: Tenderness and bony tenderness present. Decreased range of motion. Lymphadenopathy:      Cervical: No cervical adenopathy. Skin:     General: Skin is warm and dry. Findings: No erythema or rash. Neurological:      Mental Status: He is alert and oriented to person, place, and time. Cranial Nerves: No cranial nerve deficit. Motor: No abnormal muscle tone. Coordination: Coordination normal.      Deep Tendon Reflexes: Reflexes are normal and symmetric. Psychiatric:         Behavior: Behavior normal.         Judgment: Judgment normal.                             ASSESSMENT/PLAN:    Beverley Garcia was seen today for blood work, ekg only and pre-op exam.    Diagnoses and all orders for this visit:    Paroxysmal atrial fibrillation (HCC)  -     EKG 12 Lead  -     CBC with Auto Differential; Future  -     Comprehensive Metabolic Panel; Future  -     Lipid Panel; Future  -     TSH;  Future  -     Hemoglobin A1C; Future  - Magnesium; Future    Essential hypertension  -     CBC with Auto Differential; Future  -     Comprehensive Metabolic Panel; Future  -     Lipid Panel; Future  -     TSH; Future  -     Hemoglobin A1C; Future  -     Magnesium; Future    Mixed hyperlipidemia  -     CBC with Auto Differential; Future  -     Comprehensive Metabolic Panel; Future  -     Lipid Panel; Future  -     TSH; Future  -     Hemoglobin A1C; Future  -     Magnesium; Future    Hyperuricemia  -     CBC with Auto Differential; Future  -     Comprehensive Metabolic Panel; Future  -     Lipid Panel; Future  -     TSH; Future  -     Hemoglobin A1C; Future  -     Magnesium; Future  -     Uric Acid; Future    Primary osteoarthritis of right hip  -     CBC with Auto Differential; Future  -     Comprehensive Metabolic Panel; Future  -     Lipid Panel; Future  -     TSH; Future  -     Hemoglobin A1C; Future  -     Magnesium;  Future    Preop examination    Need for prophylactic vaccination and inoculation against influenza  -     Influenza, FLUAD, (age 72 y+), IM, PF, 0.5 mL          Nomi Duarte DO    9/26/2022  10:04 AM

## 2022-10-07 DIAGNOSIS — E79.0 HYPERURICEMIA: ICD-10-CM

## 2022-10-07 RX ORDER — ALLOPURINOL 300 MG/1
TABLET ORAL
Qty: 90 TABLET | Refills: 1 | Status: SHIPPED | OUTPATIENT
Start: 2022-10-07

## 2022-10-08 DIAGNOSIS — I48.0 PAROXYSMAL ATRIAL FIBRILLATION (HCC): ICD-10-CM

## 2022-10-10 RX ORDER — APIXABAN 5 MG/1
TABLET, FILM COATED ORAL
Qty: 180 TABLET | Refills: 0 | Status: SHIPPED | OUTPATIENT
Start: 2022-10-10

## 2022-10-20 ENCOUNTER — HOSPITAL ENCOUNTER (OUTPATIENT)
Age: 77
Discharge: HOME | End: 2022-10-22

## 2022-10-20 LAB
ABO/RH: NORMAL
ANTIBODY SCREEN: NORMAL

## 2022-10-20 PROCEDURE — 86901 BLOOD TYPING SEROLOGIC RH(D): CPT

## 2022-10-20 PROCEDURE — 86850 RBC ANTIBODY SCREEN: CPT

## 2022-10-20 PROCEDURE — 86900 BLOOD TYPING SEROLOGIC ABO: CPT

## 2022-10-20 PROCEDURE — 87081 CULTURE SCREEN ONLY: CPT

## 2022-10-22 LAB — MRSA CULTURE ONLY: NORMAL

## 2022-10-28 ENCOUNTER — HOSPITAL ENCOUNTER (OUTPATIENT)
Age: 77
Discharge: HOME | End: 2022-10-30

## 2022-10-28 LAB
ANION GAP SERPL CALCULATED.3IONS-SCNC: 12 MMOL/L (ref 7–16)
BUN BLDV-MCNC: 20 MG/DL (ref 6–23)
CALCIUM SERPL-MCNC: 8.1 MG/DL (ref 8.6–10.2)
CHLORIDE BLD-SCNC: 104 MMOL/L (ref 98–107)
CO2: 20 MMOL/L (ref 22–29)
CREAT SERPL-MCNC: 0.9 MG/DL (ref 0.7–1.2)
GFR SERPL CREATININE-BSD FRML MDRD: >60 ML/MIN/1.73
GLUCOSE BLD-MCNC: 150 MG/DL (ref 74–99)
HCT VFR BLD CALC: 28.6 % (ref 37–54)
HEMOGLOBIN: 9.3 G/DL (ref 12.5–16.5)
MCH RBC QN AUTO: 31.8 PG (ref 26–35)
MCHC RBC AUTO-ENTMCNC: 32.5 % (ref 32–34.5)
MCV RBC AUTO: 97.9 FL (ref 80–99.9)
PDW BLD-RTO: 12.9 FL (ref 11.5–15)
PLATELET # BLD: 149 E9/L (ref 130–450)
PMV BLD AUTO: 11.6 FL (ref 7–12)
POTASSIUM SERPL-SCNC: 4.4 MMOL/L (ref 3.5–5)
RBC # BLD: 2.92 E12/L (ref 3.8–5.8)
SODIUM BLD-SCNC: 136 MMOL/L (ref 132–146)
WBC # BLD: 13 E9/L (ref 4.5–11.5)

## 2022-10-28 PROCEDURE — 85027 COMPLETE CBC AUTOMATED: CPT

## 2022-10-28 PROCEDURE — 80048 BASIC METABOLIC PNL TOTAL CA: CPT

## 2022-10-29 ENCOUNTER — HOSPITAL ENCOUNTER (OUTPATIENT)
Age: 77
Discharge: HOME | End: 2022-10-31

## 2022-10-29 LAB
ANION GAP SERPL CALCULATED.3IONS-SCNC: 10 MMOL/L (ref 7–16)
BUN BLDV-MCNC: 14 MG/DL (ref 6–23)
CALCIUM SERPL-MCNC: 9 MG/DL (ref 8.6–10.2)
CHLORIDE BLD-SCNC: 107 MMOL/L (ref 98–107)
CO2: 23 MMOL/L (ref 22–29)
CREAT SERPL-MCNC: 0.8 MG/DL (ref 0.7–1.2)
GFR SERPL CREATININE-BSD FRML MDRD: >60 ML/MIN/1.73
GLUCOSE BLD-MCNC: 90 MG/DL (ref 74–99)
HCT VFR BLD CALC: 28.2 % (ref 37–54)
HEMOGLOBIN: 9.1 G/DL (ref 12.5–16.5)
MCH RBC QN AUTO: 31.7 PG (ref 26–35)
MCHC RBC AUTO-ENTMCNC: 32.3 % (ref 32–34.5)
MCV RBC AUTO: 98.3 FL (ref 80–99.9)
PDW BLD-RTO: 13.2 FL (ref 11.5–15)
PLATELET # BLD: 155 E9/L (ref 130–450)
PMV BLD AUTO: 11 FL (ref 7–12)
POTASSIUM SERPL-SCNC: 4.3 MMOL/L (ref 3.5–5)
RBC # BLD: 2.87 E12/L (ref 3.8–5.8)
SODIUM BLD-SCNC: 140 MMOL/L (ref 132–146)
WBC # BLD: 8.6 E9/L (ref 4.5–11.5)

## 2022-10-29 PROCEDURE — 85027 COMPLETE CBC AUTOMATED: CPT

## 2022-10-29 PROCEDURE — 80048 BASIC METABOLIC PNL TOTAL CA: CPT

## 2022-11-09 ENCOUNTER — TELEPHONE (OUTPATIENT)
Dept: PRIMARY CARE CLINIC | Age: 77
End: 2022-11-09

## 2022-11-09 NOTE — TELEPHONE ENCOUNTER
Home health nurse calling stating pt hip surgery 2 weeks ago and today seems to be retaining fluid in bilat leg. He is wearing compression stockings. Vitals are good. Lungs clear. He is on Demedex 10mg qd  Weight gain is approx 6-8 lbs and abdomen seems a little distended.

## 2022-11-15 ENCOUNTER — OFFICE VISIT (OUTPATIENT)
Dept: PRIMARY CARE CLINIC | Age: 77
End: 2022-11-15
Payer: MEDICARE

## 2022-11-15 VITALS
DIASTOLIC BLOOD PRESSURE: 76 MMHG | SYSTOLIC BLOOD PRESSURE: 128 MMHG | OXYGEN SATURATION: 96 % | TEMPERATURE: 97.8 F | RESPIRATION RATE: 17 BRPM | HEIGHT: 69 IN | WEIGHT: 215 LBS | HEART RATE: 67 BPM | BODY MASS INDEX: 31.84 KG/M2

## 2022-11-15 DIAGNOSIS — I50.9 CONGESTIVE HEART FAILURE OF UNKNOWN ETIOLOGY (HCC): ICD-10-CM

## 2022-11-15 DIAGNOSIS — I10 ESSENTIAL HYPERTENSION: ICD-10-CM

## 2022-11-15 DIAGNOSIS — M79.89 LEG SWELLING: Primary | ICD-10-CM

## 2022-11-15 DIAGNOSIS — Z96.641 STATUS POST TOTAL REPLACEMENT OF RIGHT HIP: ICD-10-CM

## 2022-11-15 PROCEDURE — 3078F DIAST BP <80 MM HG: CPT | Performed by: FAMILY MEDICINE

## 2022-11-15 PROCEDURE — 99214 OFFICE O/P EST MOD 30 MIN: CPT | Performed by: FAMILY MEDICINE

## 2022-11-15 PROCEDURE — 3074F SYST BP LT 130 MM HG: CPT | Performed by: FAMILY MEDICINE

## 2022-11-15 PROCEDURE — 1123F ACP DISCUSS/DSCN MKR DOCD: CPT | Performed by: FAMILY MEDICINE

## 2022-11-15 RX ORDER — OXYCODONE HYDROCHLORIDE 10 MG/1
TABLET ORAL
COMMUNITY
Start: 2022-11-14

## 2022-11-15 ASSESSMENT — ENCOUNTER SYMPTOMS
FACIAL SWELLING: 0
ABDOMINAL PAIN: 0
COLOR CHANGE: 0
BLOOD IN STOOL: 0
DIARRHEA: 0
VOMITING: 0
APNEA: 0
BACK PAIN: 0
SORE THROAT: 0
CHEST TIGHTNESS: 0
PHOTOPHOBIA: 0
ALLERGIC/IMMUNOLOGIC NEGATIVE: 1
COUGH: 0
WHEEZING: 0
SINUS PRESSURE: 0
NAUSEA: 0
SHORTNESS OF BREATH: 0

## 2022-11-15 NOTE — PROGRESS NOTES
Chief Complaint:   Chief Complaint   Patient presents with    Swelling     Both feet increased swelling. Took an extra water pill and hasn't helped. Swelling  This is a new problem. The current episode started 1 to 4 weeks ago. The problem occurs daily. The problem has been gradually improving. Pertinent negatives include no abdominal pain, arthralgias, chest pain, congestion, coughing, headaches, joint swelling, myalgias, nausea, rash, sore throat, vomiting or weakness. Treatments tried: extra demedex.      Patient Active Problem List   Diagnosis    Coronary atherosclerosis of native coronary artery    Hyperlipidemia    Aortic stenosis    Essential hypertension    DEBORAH (obstructive sleep apnea)    DDD (degenerative disc disease), cervical    Nocturnal hypoxemia due to asthma    Neuroforaminal stenosis of lumbar spine    Valvular heart disease    Mild intermittent asthma without complication    New onset a-fib ([facility])    S/P AVR (aortic valve replacement)    S/P CABG (coronary artery bypass graft)    Atrial fibrillation with rapid ventricular response ([facility])    Atrial fibrillation ([address]    Type 2 diabetes mellitus with diabetic neuropathy, without long-term current use of insulin ([facility])    PVD (peripheral vascular disease) ([facility])    Bilateral carotid bruits    Carotid stenosis, right    Diplopia    Congestive heart failure of unknown etiology ([facility])    Acute diastolic CHF (congestive heart failure) ([address]    Obesity    Chronic obstructive pulmonary disease, unspecified COPD type ([address]    Hyperuricemia       Past Medical History:   Diagnosis Date    Arthritis     Atrial fibrillation ([address]     day of surgery on 06/12/13- resolved at this time 6/23/2013    Bilateral carotid bruits 10/15/2020    CAD (coronary artery disease)     heart cath with stent 2001    Cancer [facility])     Basal Cell on his back    Carotid stenosis, right 10/15/2020    Congestive heart failure of unknown etiology ([address] 12/11/2021    COPD (chronic obstructive pulmonary disease) ([facility]     Diplopia 4/21/2021    History of blood transfusion     Hyperlipidemia     Hypertension     Kidney stones     Sleep apnea     Type 2 diabetes mellitus with diabetic neuropathy, without long-term current use of insulin ([facility] 9/2/2020    Vitamin D deficiency        Past Surgical History:   Procedure Laterality Date    ABDOMEN SURGERY      Bilateral groin hernias    AORTIC VALVE REPLACEMENT  6/12/2013    BACK SURGERY  2006    cervicle fusion    BACK SURGERY  1974, 1984    lumbar laminectomy    BACK SURGERY  [id number]    360 FUSION L4-L5    BACK SURGERY      CARDIAC SURGERY      CARPAL TUNNEL RELEASE  1-20013    both hands    COLONOSCOPY      CORONARY ARTERY BYPASS GRAFT      DIAGNOSTIC CARDIAC CATH LAB PROCEDURE      ENDOSCOPY, COLON, DIAGNOSTIC      FRACTURE SURGERY      Right arm Fx    FRACTURE SURGERY      Left finger Fx    HERNIA REPAIR      Bilateral    KIDNEY STONE SURGERY      open    NECK SURGERY      SHOULDER ARTHROSCOPY      right and left shoulders    SKIN BIOPSY         Current Outpatient Medications   Medication Sig Dispense Refill    oxyCODONE HCl (OXY-IR) 10 MG immediate release tablet       ELIQUIS 5 MG TABS tablet TAKE ONE TABLET BY MOUTH TWO TIMES A  tablet 0    allopurinol (ZYLOPRIM) 300 MG tablet TAKE ONE TABLET BY MOUTH DAILY 90 tablet 1    amLODIPine (NORVASC) 5 MG tablet TAKE ONE TABLET BY MOUTH DAILY 90 tablet 0    Evolocumab (REPATHA) 140 MG/ML SOSY Inject 2 times a month 6 mL 5    dofetilide (TIKOSYN) 250 MCG capsule       omeprazole (PRILOSEC) 20 MG delayed release capsule TAKE ONE CAPSULE BY MOUTH DAILY 90 capsule 3    metoprolol succinate (TOPROL XL) 25 MG extended release tablet 1 po bid 180 tablet 3    torsemide (DEMADEX) 10 MG tablet TAKE TWO TABLETS BY MOUTH EVERY DAY 60 tablet 3    thiamine 100 MG tablet       olmesartan (BENICAR) 20 MG tablet Take 1 tablet by mouth daily Indications: taking 40 mg 30 tablet 2    hydrALAZINE (APRESOLINE) 10 MG tablet Take 20 mg by mouth 3 times daily      metoprolol succinate (TOPROL XL) 50 MG extended release tablet Take 1 tablet by mouth 2 times daily 180 tablet 1    b complex vitamins capsule Take 1 capsule by mouth daily      Alpha-Lipoic Acid 100 MG CAPS Take 200 mg by mouth nightly       Coenzyme Q10 (CO Q 10) 100 MG CAPS Take 2 tablets by mouth daily      Cholecalciferol (VITAMIN D3) 2000 UNITS TABS Take 1 tablet by mouth daily      Multiple Vitamins-Minerals (THERAPEUTIC MULTIVITAMIN-MINERALS) tablet Take 1 tablet by mouth daily Indications: Centrum       aspirin 81 MG EC tablet Take 81 mg by mouth daily        No current facility-administered medications for this visit. Allergies   Allergen Reactions    Iodine Swelling and Other (See Comments)     Reddened face and swelling of tongue       Social History     Socioeconomic History    Marital status:      Spouse name: Russel Peralta    Number of children: 3    Years of education: 9   Occupational History    Occupation: retired- assembly line/ [employer]     Employer: [employer]: Retired   Tobacco Use    Smoking status: Former     Packs/day: 2.00     Years: 55.00     Pack years: 110.00     Types: Cigarettes     Start date:      Quit date: 2013     Years since quittin.5    Smokeless tobacco: Never    Tobacco comments:     [employer]   Vaping Use    Vaping Use: Never used    Passive vaping exposure: Yes   Substance and Sexual Activity    Alcohol use: Yes     Comment: drinks 6 cans of beer /day    Drug use: No    Sexual activity: Yes     Partners: Female     Social Determinants of Health     Physical Activity: Insufficiently Active    Days of Exercise per Week: 2 days    Minutes of Exercise per Session: 60 min       Family History   Problem Relation Age of Onset    Stroke Father     Cancer Sister     Heart Disease Brother          Review of Systems   Constitutional: Negative.     HENT:  Negative for congestion, facial swelling, and neck supple. Right lower leg: 3+ Edema present. Left lower leg: 3+ Edema present. Lymphadenopathy:      Cervical: No cervical adenopathy. Skin:     General: Skin is warm and dry. Findings: No erythema or rash. Neurological:      Mental Status: He is alert and oriented to person, place, and time. Cranial Nerves: No cranial nerve deficit. Motor: No abnormal muscle tone. Coordination: Coordination normal.      Deep Tendon Reflexes: Reflexes are normal and symmetric. Psychiatric:         Behavior: Behavior normal.         Judgment: Judgment normal.                             ASSESSMENT/PLAN:    Major  was seen today for swelling. Diagnoses and all orders for this visit:    Leg swelling  -     US DUP LOWER EXTREMITIES BILATERAL VENOUS; Future  -     Basic Metabolic Panel; Future    Status post total replacement of right hip    Essential hypertension    Congestive heart failure of unknown etiology ([facility]  -     Basic Metabolic Panel;  Future    Take extra  demedex every other day      Jose Barton DO    11/15/2022  11:17 AM

## 2022-11-22 ENCOUNTER — HOSPITAL ENCOUNTER (OUTPATIENT)
Age: 77
Discharge: HOME | End: 2022-11-22
Payer: MEDICARE

## 2022-11-22 DIAGNOSIS — I50.9 CONGESTIVE HEART FAILURE OF UNKNOWN ETIOLOGY (HCC): ICD-10-CM

## 2022-11-22 DIAGNOSIS — M79.89 LEG SWELLING: ICD-10-CM

## 2022-11-22 LAB
ANION GAP SERPL CALCULATED.3IONS-SCNC: 10 MMOL/L (ref 7–16)
BUN BLDV-MCNC: 23 MG/DL (ref 6–23)
CALCIUM SERPL-MCNC: 10 MG/DL (ref 8.6–10.2)
CHLORIDE BLD-SCNC: 102 MMOL/L (ref 98–107)
CO2: 28 MMOL/L (ref 22–29)
CREAT SERPL-MCNC: 1 MG/DL (ref 0.7–1.2)
GFR SERPL CREATININE-BSD FRML MDRD: >60 ML/MIN/1.73
GLUCOSE BLD-MCNC: 120 MG/DL (ref 74–99)
POTASSIUM SERPL-SCNC: 4.6 MMOL/L (ref 3.5–5)
SODIUM BLD-SCNC: 140 MMOL/L (ref 132–146)

## 2022-11-22 PROCEDURE — 80048 BASIC METABOLIC PNL TOTAL CA: CPT

## 2022-11-22 PROCEDURE — 36415 COLL VENOUS BLD VENIPUNCTURE: CPT

## 2022-12-07 DIAGNOSIS — I10 ESSENTIAL HYPERTENSION: Chronic | ICD-10-CM

## 2022-12-07 RX ORDER — AMLODIPINE BESYLATE 5 MG/1
5 TABLET ORAL DAILY
Qty: 90 TABLET | Refills: 0 | Status: SHIPPED | OUTPATIENT
Start: 2022-12-07

## 2022-12-13 ENCOUNTER — OFFICE VISIT (OUTPATIENT)
Dept: PRIMARY CARE CLINIC | Age: 77
End: 2022-12-13
Payer: MEDICARE

## 2022-12-13 VITALS
HEIGHT: 69 IN | HEART RATE: 78 BPM | OXYGEN SATURATION: 97 % | WEIGHT: 207 LBS | TEMPERATURE: 97.4 F | BODY MASS INDEX: 30.66 KG/M2 | SYSTOLIC BLOOD PRESSURE: 102 MMHG | DIASTOLIC BLOOD PRESSURE: 60 MMHG

## 2022-12-13 DIAGNOSIS — I48.0 PAROXYSMAL ATRIAL FIBRILLATION (HCC): ICD-10-CM

## 2022-12-13 DIAGNOSIS — I50.33 ACUTE ON CHRONIC DIASTOLIC CONGESTIVE HEART FAILURE (HCC): ICD-10-CM

## 2022-12-13 DIAGNOSIS — I10 ESSENTIAL HYPERTENSION: ICD-10-CM

## 2022-12-13 DIAGNOSIS — E78.2 MIXED HYPERLIPIDEMIA: ICD-10-CM

## 2022-12-13 DIAGNOSIS — E11.40 TYPE 2 DIABETES MELLITUS WITH DIABETIC NEUROPATHY, WITHOUT LONG-TERM CURRENT USE OF INSULIN (HCC): ICD-10-CM

## 2022-12-13 DIAGNOSIS — M51.36 DDD (DEGENERATIVE DISC DISEASE), LUMBAR: ICD-10-CM

## 2022-12-13 DIAGNOSIS — M51.36 DDD (DEGENERATIVE DISC DISEASE), LUMBAR: Primary | ICD-10-CM

## 2022-12-13 LAB
ALBUMIN SERPL-MCNC: 4.4 G/DL (ref 3.5–5.2)
ALP BLD-CCNC: 97 U/L (ref 40–129)
ALT SERPL-CCNC: 21 U/L (ref 0–40)
ANION GAP SERPL CALCULATED.3IONS-SCNC: 18 MMOL/L (ref 7–16)
AST SERPL-CCNC: 33 U/L (ref 0–39)
BASOPHILS ABSOLUTE: 0.08 E9/L (ref 0–0.2)
BASOPHILS RELATIVE PERCENT: 1 % (ref 0–2)
BILIRUB SERPL-MCNC: 0.4 MG/DL (ref 0–1.2)
BUN BLDV-MCNC: 17 MG/DL (ref 6–23)
CALCIUM SERPL-MCNC: 10.5 MG/DL (ref 8.6–10.2)
CHLORIDE BLD-SCNC: 101 MMOL/L (ref 98–107)
CHOLESTEROL, TOTAL: 149 MG/DL (ref 0–199)
CO2: 23 MMOL/L (ref 22–29)
CREAT SERPL-MCNC: 0.8 MG/DL (ref 0.7–1.2)
EOSINOPHILS ABSOLUTE: 0.22 E9/L (ref 0.05–0.5)
EOSINOPHILS RELATIVE PERCENT: 2.8 % (ref 0–6)
GFR SERPL CREATININE-BSD FRML MDRD: >60 ML/MIN/1.73
GLUCOSE BLD-MCNC: 118 MG/DL (ref 74–99)
HBA1C MFR BLD: 5.9 % (ref 4–5.6)
HCT VFR BLD CALC: 36.1 % (ref 37–54)
HDLC SERPL-MCNC: 65 MG/DL
HEMOGLOBIN: 11.5 G/DL (ref 12.5–16.5)
IMMATURE GRANULOCYTES #: 0.03 E9/L
IMMATURE GRANULOCYTES %: 0.4 % (ref 0–5)
LDL CHOLESTEROL CALCULATED: 41 MG/DL (ref 0–99)
LYMPHOCYTES ABSOLUTE: 1.71 E9/L (ref 1.5–4)
LYMPHOCYTES RELATIVE PERCENT: 21.8 % (ref 20–42)
MCH RBC QN AUTO: 30.1 PG (ref 26–35)
MCHC RBC AUTO-ENTMCNC: 31.9 % (ref 32–34.5)
MCV RBC AUTO: 94.5 FL (ref 80–99.9)
MONOCYTES ABSOLUTE: 0.54 E9/L (ref 0.1–0.95)
MONOCYTES RELATIVE PERCENT: 6.9 % (ref 2–12)
NEUTROPHILS ABSOLUTE: 5.26 E9/L (ref 1.8–7.3)
NEUTROPHILS RELATIVE PERCENT: 67.1 % (ref 43–80)
PDW BLD-RTO: 14.9 FL (ref 11.5–15)
PLATELET # BLD: 208 E9/L (ref 130–450)
PMV BLD AUTO: 10.8 FL (ref 7–12)
POTASSIUM SERPL-SCNC: 4.5 MMOL/L (ref 3.5–5)
PRO-BNP: 154 PG/ML (ref 0–450)
RBC # BLD: 3.82 E12/L (ref 3.8–5.8)
SODIUM BLD-SCNC: 142 MMOL/L (ref 132–146)
TOTAL PROTEIN: 7.4 G/DL (ref 6.4–8.3)
TRIGL SERPL-MCNC: 213 MG/DL (ref 0–149)
TSH SERPL DL<=0.05 MIU/L-ACNC: 0.58 UIU/ML (ref 0.27–4.2)
VLDLC SERPL CALC-MCNC: 43 MG/DL
WBC # BLD: 7.8 E9/L (ref 4.5–11.5)

## 2022-12-13 PROCEDURE — 3074F SYST BP LT 130 MM HG: CPT | Performed by: FAMILY MEDICINE

## 2022-12-13 PROCEDURE — 1123F ACP DISCUSS/DSCN MKR DOCD: CPT | Performed by: FAMILY MEDICINE

## 2022-12-13 PROCEDURE — 3078F DIAST BP <80 MM HG: CPT | Performed by: FAMILY MEDICINE

## 2022-12-13 PROCEDURE — 99214 OFFICE O/P EST MOD 30 MIN: CPT | Performed by: FAMILY MEDICINE

## 2022-12-13 PROCEDURE — 96372 THER/PROPH/DIAG INJ SC/IM: CPT | Performed by: FAMILY MEDICINE

## 2022-12-13 PROCEDURE — 3044F HG A1C LEVEL LT 7.0%: CPT | Performed by: FAMILY MEDICINE

## 2022-12-13 RX ORDER — METHYLPREDNISOLONE 4 MG/1
TABLET ORAL
Qty: 1 KIT | Refills: 0 | Status: SHIPPED | OUTPATIENT
Start: 2022-12-13

## 2022-12-13 RX ORDER — METHYLPREDNISOLONE ACETATE 40 MG/ML
40 INJECTION, SUSPENSION INTRA-ARTICULAR; INTRALESIONAL; INTRAMUSCULAR; SOFT TISSUE ONCE
Status: COMPLETED | OUTPATIENT
Start: 2022-12-13 | End: 2022-12-13

## 2022-12-13 RX ORDER — BACLOFEN 10 MG/1
10 TABLET ORAL 3 TIMES DAILY
Qty: 60 TABLET | Refills: 1 | Status: SHIPPED | OUTPATIENT
Start: 2022-12-13

## 2022-12-13 RX ADMIN — METHYLPREDNISOLONE ACETATE 40 MG: 40 INJECTION, SUSPENSION INTRA-ARTICULAR; INTRALESIONAL; INTRAMUSCULAR; SOFT TISSUE at 11:50

## 2022-12-13 ASSESSMENT — ENCOUNTER SYMPTOMS
APNEA: 0
COLOR CHANGE: 0
ALLERGIC/IMMUNOLOGIC NEGATIVE: 1
DIARRHEA: 0
SINUS PRESSURE: 0
CHEST TIGHTNESS: 0
WHEEZING: 0
COUGH: 0
BACK PAIN: 1
ABDOMINAL PAIN: 0
NAUSEA: 0
SORE THROAT: 0
BLOOD IN STOOL: 0
SHORTNESS OF BREATH: 0
FACIAL SWELLING: 0
PHOTOPHOBIA: 0
VOMITING: 0

## 2022-12-13 NOTE — PROGRESS NOTES
Chief Complaint:   Chief Complaint   Patient presents with    1 Month Follow-Up    Discuss Medications     Torsemide        Back Pain  This is a recurrent problem. The current episode started in the past 7 days. The problem occurs daily. The problem has been gradually worsening since onset. The pain is present in the lumbar spine. The quality of the pain is described as aching. The pain is at a severity of 7/10. The pain is moderate. Pertinent negatives include no abdominal pain, chest pain, headaches or weakness. He has tried analgesics for the symptoms. The treatment provided mild relief. Diabetes  He presents for his follow-up diabetic visit. He has type 2 diabetes mellitus. His disease course has been stable. Pertinent negatives for hypoglycemia include no confusion, headaches or nervousness/anxiousness. Pertinent negatives for diabetes include no chest pain and no weakness. Symptoms are stable. Current diabetic treatment includes diet. He is compliant with treatment all of the time. Congestive Heart Failure  Presents for follow-up visit. Pertinent negatives include no abdominal pain, chest pain, palpitations or shortness of breath. The symptoms have been resolved. Compliance with total regimen is %.      Patient Active Problem List   Diagnosis    Coronary atherosclerosis of native coronary artery    Hyperlipidemia    Aortic stenosis    Essential hypertension    DEBORAH (obstructive sleep apnea)    DDD (degenerative disc disease), cervical    Nocturnal hypoxemia due to asthma    Neuroforaminal stenosis of lumbar spine    Valvular heart disease    Mild intermittent asthma without complication    New onset a-fib ([facility])    S/P AVR (aortic valve replacement)    S/P CABG (coronary artery bypass graft)    Atrial fibrillation with rapid ventricular response ([facility])    Atrial fibrillation ([facility]    Type 2 diabetes mellitus with diabetic neuropathy, without long-term current use of insulin ([facility])    PVD (peripheral vascular disease) ([address]    Bilateral carotid bruits    Carotid stenosis, right    Diplopia    Congestive heart failure of unknown etiology ([address]    Acute diastolic CHF (congestive heart failure) ([address]    Obesity    Chronic obstructive pulmonary disease, unspecified COPD type ([address]    Hyperuricemia       Past Medical History:   Diagnosis Date    Arthritis     Atrial fibrillation ([address]     day of surgery on 06/12/13- resolved at this time 6/23/2013    Bilateral carotid bruits 10/15/2020    CAD (coronary artery disease)     heart cath with stent 2001    Cancer [facility])     Basal Cell on his back    Carotid stenosis, right 10/15/2020    Congestive heart failure of unknown etiology ([address] 12/11/2021    COPD (chronic obstructive pulmonary disease) ([address]     Diplopia 4/21/2021    History of blood transfusion     Hyperlipidemia     Hypertension     Kidney stones     Sleep apnea     Type 2 diabetes mellitus with diabetic neuropathy, without long-term current use of insulin ([address] 9/2/2020    Vitamin D deficiency        Past Surgical History:   Procedure Laterality Date    ABDOMEN SURGERY      Bilateral groin hernias    AORTIC VALVE REPLACEMENT  6/12/2013    BACK SURGERY  2006    cervicle fusion    BACK SURGERY  1974, 1984    lumbar laminectomy    BACK SURGERY  [id number]    360 FUSION L4-L5    BACK SURGERY      CARDIAC SURGERY      CARPAL TUNNEL RELEASE  1-20013    both hands    COLONOSCOPY      CORONARY ARTERY BYPASS GRAFT      DIAGNOSTIC CARDIAC CATH LAB PROCEDURE      ENDOSCOPY, COLON, DIAGNOSTIC      FRACTURE SURGERY      Right arm Fx    FRACTURE SURGERY      Left finger Fx    HERNIA REPAIR      Bilateral    KIDNEY STONE SURGERY      open    NECK SURGERY      SHOULDER ARTHROSCOPY      right and left shoulders    SKIN BIOPSY         Current Outpatient Medications   Medication Sig Dispense Refill    baclofen (LIORESAL) 10 MG tablet Take 1 tablet by mouth 3 times daily 60 tablet 1    methylPREDNISolone (MEDROL, LUI,) 4 MG tablet Take by mouth.  1 kit 0    amLODIPine (NORVASC) 5 MG tablet Take 1 tablet by mouth daily 90 tablet 0    oxyCODONE HCl (OXY-IR) 10 MG immediate release tablet       ELIQUIS 5 MG TABS tablet TAKE ONE TABLET BY MOUTH TWO TIMES A  tablet 0    allopurinol (ZYLOPRIM) 300 MG tablet TAKE ONE TABLET BY MOUTH DAILY 90 tablet 1    Evolocumab (REPATHA) 140 MG/ML SOSY Inject 2 times a month 6 mL 5    dofetilide (TIKOSYN) 250 MCG capsule       omeprazole (PRILOSEC) 20 MG delayed release capsule TAKE ONE CAPSULE BY MOUTH DAILY 90 capsule 3    metoprolol succinate (TOPROL XL) 25 MG extended release tablet 1 po bid 180 tablet 3    torsemide (DEMADEX) 10 MG tablet TAKE TWO TABLETS BY MOUTH EVERY DAY 60 tablet 3    thiamine 100 MG tablet       olmesartan (BENICAR) 20 MG tablet Take 1 tablet by mouth daily Indications: taking 40 mg 30 tablet 2    hydrALAZINE (APRESOLINE) 10 MG tablet Take 20 mg by mouth 3 times daily      metoprolol succinate (TOPROL XL) 50 MG extended release tablet Take 1 tablet by mouth 2 times daily 180 tablet 1    b complex vitamins capsule Take 1 capsule by mouth daily      Alpha-Lipoic Acid 100 MG CAPS Take 200 mg by mouth nightly       Coenzyme Q10 (CO Q 10) 100 MG CAPS Take 2 tablets by mouth daily      Cholecalciferol (VITAMIN D3) 2000 UNITS TABS Take 1 tablet by mouth daily      Multiple Vitamins-Minerals (THERAPEUTIC MULTIVITAMIN-MINERALS) tablet Take 1 tablet by mouth daily Indications: Centrum       aspirin 81 MG EC tablet Take 81 mg by mouth daily        Current Facility-Administered Medications   Medication Dose Route Frequency Provider Last Rate Last Admin    methylPREDNISolone acetate (DEPO-MEDROL) injection 40 mg  40 mg IntraMUSCular Once Eden Chilo, DO           Allergies   Allergen Reactions    Iodine Swelling and Other (See Comments)     Reddened face and swelling of tongue       Social History     Socioeconomic History    Marital status:      Spouse name: Lore Efrem    Number of children: 3    Years of education: 9    Highest education level: None   Occupational History    Occupation: retired- assembly line/ [employer]     Employer: [employer]     Comment: Retired   Tobacco Use    Smoking status: Former     Packs/day: 2.00     Years: 55.00     Pack years: 110.00     Types: Cigarettes     Start date:      Quit date: 2013     Years since quittin.6    Smokeless tobacco: Never    Tobacco comments:     [employer]   Vaping Use    Vaping Use: Never used    Passive vaping exposure: Yes   Substance and Sexual Activity    Alcohol use: Yes     Comment: drinks 6 cans of beer /day    Drug use: No    Sexual activity: Yes     Partners: Female     Social Determinants of Health     Physical Activity: Insufficiently Active    Days of Exercise per Week: 2 days    Minutes of Exercise per Session: 60 min       Family History   Problem Relation Age of Onset    Stroke Father     Cancer Sister     Heart Disease Brother          Review of Systems   Constitutional: Negative. HENT:  Negative for congestion, facial swelling, hearing loss, nosebleeds, sinus pressure and sore throat. Eyes:  Negative for photophobia and visual disturbance. Respiratory:  Negative for apnea, cough, chest tightness, shortness of breath and wheezing. Cardiovascular:  Negative for chest pain, palpitations and leg swelling. Gastrointestinal:  Negative for abdominal pain, blood in stool, diarrhea, nausea and vomiting. Genitourinary:  Negative for difficulty urinating, frequency and urgency. Musculoskeletal:  Positive for back pain. Negative for arthralgias, joint swelling and myalgias. Skin:  Negative for color change and rash. Allergic/Immunologic: Negative. Neurological:  Negative for syncope, weakness, light-headedness and headaches. Hematological: Negative. Psychiatric/Behavioral:  Negative for agitation, behavioral problems, confusion and self-injury. The patient is not nervous/anxious.     All other systems reviewed and are negative. Physical Exam  Vitals and nursing note reviewed. Constitutional:       General: He is not in acute distress. Appearance: He is well-developed. HENT:      Head: Normocephalic and atraumatic. Nose: Nose normal.   Eyes:      Conjunctiva/sclera: Conjunctivae normal.      Pupils: Pupils are equal, round, and reactive to light. Neck:      Thyroid: No thyromegaly. Vascular: No JVD. Cardiovascular:      Rate and Rhythm: Normal rate and regular rhythm. Heart sounds: Normal heart sounds. No murmur heard. No friction rub. No gallop. Pulmonary:      Effort: Pulmonary effort is normal. No respiratory distress. Breath sounds: Normal breath sounds. No wheezing. Abdominal:      General: Bowel sounds are normal. There is no distension. Palpations: Abdomen is soft. Tenderness: There is no abdominal tenderness. There is no guarding or rebound. Musculoskeletal:         General: Normal range of motion. Cervical back: Normal range of motion and neck supple. Lumbar back: Spasms, tenderness and bony tenderness present. Lymphadenopathy:      Cervical: No cervical adenopathy. Skin:     General: Skin is warm and dry. Findings: No erythema or rash. Neurological:      Mental Status: He is alert and oriented to person, place, and time. Cranial Nerves: No cranial nerve deficit. Motor: No abnormal muscle tone. Coordination: Coordination normal.      Deep Tendon Reflexes: Reflexes are normal and symmetric. Psychiatric:         Behavior: Behavior normal.         Judgment: Judgment normal.                             ASSESSMENT/PLAN:    Alonso Mondragon was seen today for 1 month follow-up and discuss medications. Diagnoses and all orders for this visit:    DDD (degenerative disc disease), lumbar  -     baclofen (LIORESAL) 10 MG tablet;  Take 1 tablet by mouth 3 times daily  -     CBC with Auto Differential; Future  - Comprehensive Metabolic Panel; Future  -     Lipid Panel; Future  -     Hemoglobin A1C; Future  -     TSH; Future  -     Brain Natriuretic Peptide; Future  -     methylPREDNISolone (MEDROL, LUI,) 4 MG tablet; Take by mouth. -     methylPREDNISolone acetate (DEPO-MEDROL) injection 40 mg    Paroxysmal atrial fibrillation (HCC)  -     CBC with Auto Differential; Future  -     Comprehensive Metabolic Panel; Future  -     Lipid Panel; Future  -     Hemoglobin A1C; Future  -     TSH; Future  -     Brain Natriuretic Peptide; Future    Essential hypertension  -     CBC with Auto Differential; Future  -     Comprehensive Metabolic Panel; Future  -     Lipid Panel; Future  -     Hemoglobin A1C; Future  -     TSH; Future  -     Brain Natriuretic Peptide; Future    Mixed hyperlipidemia  -     CBC with Auto Differential; Future  -     Comprehensive Metabolic Panel; Future  -     Lipid Panel; Future  -     Hemoglobin A1C; Future  -     TSH; Future  -     Brain Natriuretic Peptide; Future    Type 2 diabetes mellitus with diabetic neuropathy, without long-term current use of insulin (HCC)  -     CBC with Auto Differential; Future  -     Comprehensive Metabolic Panel; Future  -     Lipid Panel; Future  -     Hemoglobin A1C; Future  -     TSH; Future  -     Brain Natriuretic Peptide; Future    Acute on chronic diastolic congestive heart failure (HCC)  -     CBC with Auto Differential; Future  -     Comprehensive Metabolic Panel; Future  -     Lipid Panel; Future  -     Hemoglobin A1C; Future  -     TSH; Future  -     Brain Natriuretic Peptide;  Future          Chanell Samuels DO    12/13/2022  11:47 AM

## 2022-12-27 RX ORDER — TORSEMIDE 10 MG/1
10 TABLET ORAL DAILY
Qty: 30 TABLET | Refills: 3 | Status: SHIPPED | OUTPATIENT
Start: 2022-12-27

## 2022-12-28 ENCOUNTER — HOSPITAL ENCOUNTER (OUTPATIENT)
Dept: NON INVASIVE DIAGNOSTICS | Age: 77
Discharge: HOME | End: 2022-12-28
Payer: MEDICARE

## 2022-12-28 ENCOUNTER — HOSPITAL ENCOUNTER (OUTPATIENT)
Age: 77
Discharge: HOME | End: 2022-12-28
Payer: MEDICARE

## 2022-12-28 LAB
ANION GAP SERPL CALCULATED.3IONS-SCNC: 10 MMOL/L (ref 7–16)
BUN BLDV-MCNC: 20 MG/DL (ref 6–23)
CALCIUM SERPL-MCNC: 9.8 MG/DL (ref 8.6–10.2)
CHLORIDE BLD-SCNC: 104 MMOL/L (ref 98–107)
CO2: 25 MMOL/L (ref 22–29)
CREAT SERPL-MCNC: 0.8 MG/DL (ref 0.7–1.2)
EKG ATRIAL RATE: 72 BPM
EKG P AXIS: 49 DEGREES
EKG P-R INTERVAL: 208 MS
EKG Q-T INTERVAL: 408 MS
EKG QRS DURATION: 102 MS
EKG QTC CALCULATION (BAZETT): 446 MS
EKG R AXIS: 89 DEGREES
EKG T AXIS: 76 DEGREES
EKG VENTRICULAR RATE: 72 BPM
GFR SERPL CREATININE-BSD FRML MDRD: >60 ML/MIN/1.73
GLUCOSE BLD-MCNC: 127 MG/DL (ref 74–99)
MAGNESIUM: 2 MG/DL (ref 1.6–2.6)
POTASSIUM SERPL-SCNC: 4.3 MMOL/L (ref 3.5–5)
SODIUM BLD-SCNC: 139 MMOL/L (ref 132–146)

## 2022-12-28 PROCEDURE — 83735 ASSAY OF MAGNESIUM: CPT

## 2022-12-28 PROCEDURE — 80048 BASIC METABOLIC PNL TOTAL CA: CPT

## 2022-12-28 PROCEDURE — 93005 ELECTROCARDIOGRAM TRACING: CPT | Performed by: INTERNAL MEDICINE

## 2022-12-28 PROCEDURE — 93010 ELECTROCARDIOGRAM REPORT: CPT | Performed by: INTERNAL MEDICINE

## 2022-12-28 PROCEDURE — 36415 COLL VENOUS BLD VENIPUNCTURE: CPT

## 2023-01-10 ENCOUNTER — OFFICE VISIT (OUTPATIENT)
Dept: PRIMARY CARE CLINIC | Age: 78
End: 2023-01-10
Payer: MEDICARE

## 2023-01-10 VITALS
HEIGHT: 69 IN | OXYGEN SATURATION: 96 % | RESPIRATION RATE: 18 BRPM | DIASTOLIC BLOOD PRESSURE: 76 MMHG | SYSTOLIC BLOOD PRESSURE: 122 MMHG | BODY MASS INDEX: 30.66 KG/M2 | WEIGHT: 207 LBS | HEART RATE: 52 BPM | TEMPERATURE: 98.5 F

## 2023-01-10 DIAGNOSIS — I48.0 PAROXYSMAL ATRIAL FIBRILLATION (HCC): ICD-10-CM

## 2023-01-10 DIAGNOSIS — E78.2 MIXED HYPERLIPIDEMIA: Primary | ICD-10-CM

## 2023-01-10 DIAGNOSIS — E79.0 HYPERURICEMIA: ICD-10-CM

## 2023-01-10 DIAGNOSIS — M51.36 DDD (DEGENERATIVE DISC DISEASE), LUMBAR: ICD-10-CM

## 2023-01-10 DIAGNOSIS — I73.9 PVD (PERIPHERAL VASCULAR DISEASE) (HCC): ICD-10-CM

## 2023-01-10 DIAGNOSIS — E11.40 TYPE 2 DIABETES MELLITUS WITH DIABETIC NEUROPATHY, WITHOUT LONG-TERM CURRENT USE OF INSULIN (HCC): ICD-10-CM

## 2023-01-10 DIAGNOSIS — J44.9 CHRONIC OBSTRUCTIVE PULMONARY DISEASE, UNSPECIFIED COPD TYPE (HCC): ICD-10-CM

## 2023-01-10 PROBLEM — I50.9 CONGESTIVE HEART FAILURE OF UNKNOWN ETIOLOGY (HCC): Status: RESOLVED | Noted: 2021-12-11 | Resolved: 2023-01-10

## 2023-01-10 PROBLEM — I50.31 ACUTE DIASTOLIC CHF (CONGESTIVE HEART FAILURE) (HCC): Status: RESOLVED | Noted: 2021-12-14 | Resolved: 2023-01-10

## 2023-01-10 PROCEDURE — 1123F ACP DISCUSS/DSCN MKR DOCD: CPT | Performed by: FAMILY MEDICINE

## 2023-01-10 PROCEDURE — 3074F SYST BP LT 130 MM HG: CPT | Performed by: FAMILY MEDICINE

## 2023-01-10 PROCEDURE — 3078F DIAST BP <80 MM HG: CPT | Performed by: FAMILY MEDICINE

## 2023-01-10 PROCEDURE — 99214 OFFICE O/P EST MOD 30 MIN: CPT | Performed by: FAMILY MEDICINE

## 2023-01-10 ASSESSMENT — PATIENT HEALTH QUESTIONNAIRE - PHQ9
SUM OF ALL RESPONSES TO PHQ QUESTIONS 1-9: 0
SUM OF ALL RESPONSES TO PHQ9 QUESTIONS 1 & 2: 0
2. FEELING DOWN, DEPRESSED OR HOPELESS: 0
1. LITTLE INTEREST OR PLEASURE IN DOING THINGS: 0
SUM OF ALL RESPONSES TO PHQ QUESTIONS 1-9: 0

## 2023-01-10 ASSESSMENT — ENCOUNTER SYMPTOMS
APNEA: 0
SORE THROAT: 0
ABDOMINAL PAIN: 0
SHORTNESS OF BREATH: 0
DIARRHEA: 0
BLOOD IN STOOL: 0
SINUS PRESSURE: 0
PHOTOPHOBIA: 0
COUGH: 0
NAUSEA: 0
VOMITING: 0
BACK PAIN: 1
CHEST TIGHTNESS: 0
WHEEZING: 0
COLOR CHANGE: 0
FACIAL SWELLING: 0
ALLERGIC/IMMUNOLOGIC NEGATIVE: 1

## 2023-01-10 NOTE — PROGRESS NOTES
Chief Complaint:   Chief Complaint   Patient presents with    1 Month Follow-Up    Back Pain     Doing better. Back Pain  This is a chronic problem. The current episode started 1 to 4 weeks ago. The problem occurs constantly. The pain is present in the lumbar spine. The quality of the pain is described as aching. The pain is at a severity of 6/10. The pain is moderate. Pertinent negatives include no abdominal pain, chest pain, headaches or weakness. He has tried analgesics for the symptoms. The treatment provided significant relief. Diabetes  He presents for his follow-up diabetic visit. He has type 2 diabetes mellitus. His disease course has been stable. Pertinent negatives for hypoglycemia include no confusion, headaches or nervousness/anxiousness. Pertinent negatives for diabetes include no chest pain and no weakness. Symptoms are stable. Current diabetic treatment includes diet. Coronary Artery Disease  Presents for follow-up visit. Pertinent negatives include no chest pain, chest tightness, leg swelling, palpitations or shortness of breath. The symptoms have been resolved. Compliance with diet is good. Compliance with exercise is good. Compliance with medications is good.      Patient Active Problem List   Diagnosis    Coronary atherosclerosis of native coronary artery    Hyperlipidemia    Aortic stenosis    Essential hypertension    DEBORAH (obstructive sleep apnea)    DDD (degenerative disc disease), cervical    Nocturnal hypoxemia due to asthma    Neuroforaminal stenosis of lumbar spine    Valvular heart disease    Mild intermittent asthma without complication    New onset a-fib (HCC)    S/P AVR (aortic valve replacement)    S/P CABG (coronary artery bypass graft)    Atrial fibrillation with rapid ventricular response (HCC)    Atrial fibrillation ([address]    Type 2 diabetes mellitus with diabetic neuropathy, without long-term current use of insulin ([address]    PVD (peripheral vascular disease) ([address] Bilateral carotid bruits    Carotid stenosis, right    Diplopia    Obesity    Chronic obstructive pulmonary disease, unspecified COPD type ([address]    Hyperuricemia       Past Medical History:   Diagnosis Date    Arthritis     Atrial fibrillation ([address]     day of surgery on 06/12/13- resolved at this time 6/23/2013    Bilateral carotid bruits 10/15/2020    CAD (coronary artery disease)     heart cath with stent 2001    Cancer [facility])     Basal Cell on his back    Carotid stenosis, right 10/15/2020    Congestive heart failure of unknown etiology ([address] 12/11/2021    COPD (chronic obstructive pulmonary disease) ([address]     Diplopia 4/21/2021    History of blood transfusion     Hyperlipidemia     Hypertension     Kidney stones     Sleep apnea     Type 2 diabetes mellitus with diabetic neuropathy, without long-term current use of insulin ([address] 9/2/2020    Vitamin D deficiency        Past Surgical History:   Procedure Laterality Date    ABDOMEN SURGERY      Bilateral groin hernias    AORTIC VALVE REPLACEMENT  6/12/2013    BACK SURGERY  2006    cervicle fusion    BACK SURGERY  1974, 1984    lumbar laminectomy    BACK SURGERY  [id number]    360 FUSION L4-L5    BACK SURGERY      CARDIAC SURGERY      CARPAL TUNNEL RELEASE  1-20013    both hands    COLONOSCOPY      CORONARY ARTERY BYPASS GRAFT      DIAGNOSTIC CARDIAC CATH LAB PROCEDURE      ENDOSCOPY, COLON, DIAGNOSTIC      FRACTURE SURGERY      Right arm Fx    FRACTURE SURGERY      Left finger Fx    HERNIA REPAIR      Bilateral    KIDNEY STONE SURGERY      open    NECK SURGERY      SHOULDER ARTHROSCOPY      right and left shoulders    SKIN BIOPSY         Current Outpatient Medications   Medication Sig Dispense Refill    torsemide (DEMADEX) 10 MG tablet Take 1 tablet by mouth daily 30 tablet 3    baclofen (LIORESAL) 10 MG tablet Take 1 tablet by mouth 3 times daily 60 tablet 1    methylPREDNISolone (MEDROL, LUI,) 4 MG tablet Take by mouth.  1 kit 0    amLODIPine (NORVASC) 5 MG tablet Take 1 tablet by mouth daily 90 tablet 0    oxyCODONE HCl (OXY-IR) 10 MG immediate release tablet       ELIQUIS 5 MG TABS tablet TAKE ONE TABLET BY MOUTH TWO TIMES A  tablet 0    allopurinol (ZYLOPRIM) 300 MG tablet TAKE ONE TABLET BY MOUTH DAILY 90 tablet 1    Evolocumab (REPATHA) 140 MG/ML SOSY Inject 2 times a month 6 mL 5    dofetilide (TIKOSYN) 250 MCG capsule       omeprazole (PRILOSEC) 20 MG delayed release capsule TAKE ONE CAPSULE BY MOUTH DAILY 90 capsule 3    metoprolol succinate (TOPROL XL) 25 MG extended release tablet 1 po bid 180 tablet 3    thiamine 100 MG tablet       olmesartan (BENICAR) 20 MG tablet Take 1 tablet by mouth daily Indications: taking 40 mg 30 tablet 2    hydrALAZINE (APRESOLINE) 10 MG tablet Take 20 mg by mouth 3 times daily      metoprolol succinate (TOPROL XL) 50 MG extended release tablet Take 1 tablet by mouth 2 times daily 180 tablet 1    b complex vitamins capsule Take 1 capsule by mouth daily      Alpha-Lipoic Acid 100 MG CAPS Take 200 mg by mouth nightly       Coenzyme Q10 (CO Q 10) 100 MG CAPS Take 2 tablets by mouth daily      Cholecalciferol (VITAMIN D3) 2000 UNITS TABS Take 1 tablet by mouth daily      Multiple Vitamins-Minerals (THERAPEUTIC MULTIVITAMIN-MINERALS) tablet Take 1 tablet by mouth daily Indications: Centrum       aspirin 81 MG EC tablet Take 81 mg by mouth daily        No current facility-administered medications for this visit.        Allergies   Allergen Reactions    Iodine Swelling and Other (See Comments)     Reddened face and swelling of tongue       Social History     Socioeconomic History    Marital status:      Spouse name: Renetta Ballard    Number of children: 3    Years of education: 9    Highest education level: None   Occupational History    Occupation: retired- [employer] line/ [employer]     Employer: [id number]: Retired   Tobacco Use    Smoking status: Former     Packs/day: 2.00     Years: 55.00     Pack years: 110.00 Types: Cigarettes     Start date: 80     Quit date: 2013     Years since quittin.7    Smokeless tobacco: Never    Tobacco comments:     [employer]   Vaping Use    Vaping Use: Never used    Passive vaping exposure: Yes   Substance and Sexual Activity    Alcohol use: Yes     Comment: drinks 6 cans of beer /day    Drug use: No    Sexual activity: Yes     Partners: Female     Social Determinants of Health     Physical Activity: Insufficiently Active    Days of Exercise per Week: 2 days    Minutes of Exercise per Session: 60 min       Family History   Problem Relation Age of Onset    Stroke Father     Cancer Sister     Heart Disease Brother          Review of Systems   Constitutional: Negative. HENT:  Negative for congestion, facial swelling, hearing loss, nosebleeds, sinus pressure and sore throat. Eyes:  Negative for photophobia and visual disturbance. Respiratory:  Negative for apnea, cough, chest tightness, shortness of breath and wheezing. Cardiovascular:  Negative for chest pain, palpitations and leg swelling. Gastrointestinal:  Negative for abdominal pain, blood in stool, diarrhea, nausea and vomiting. Genitourinary:  Negative for difficulty urinating, frequency and urgency. Musculoskeletal:  Positive for back pain. Negative for arthralgias, joint swelling and myalgias. Skin:  Negative for color change and rash. Allergic/Immunologic: Negative. Neurological:  Negative for syncope, weakness, light-headedness and headaches. Hematological: Negative. Psychiatric/Behavioral:  Negative for agitation, behavioral problems, confusion and self-injury. The patient is not nervous/anxious. All other systems reviewed and are negative. Physical Exam  Vitals and nursing note reviewed. Constitutional:       General: He is not in acute distress. Appearance: He is well-developed. HENT:      Head: Normocephalic and atraumatic.       Nose: Nose normal.   Eyes: Conjunctiva/sclera: Conjunctivae normal.      Pupils: Pupils are equal, round, and reactive to light. Neck:      Thyroid: No thyromegaly. Vascular: No JVD. Cardiovascular:      Rate and Rhythm: Normal rate and regular rhythm. Heart sounds: Normal heart sounds. No murmur heard. No friction rub. No gallop. Pulmonary:      Effort: Pulmonary effort is normal. No respiratory distress. Breath sounds: Normal breath sounds. No wheezing. Abdominal:      General: Bowel sounds are normal. There is no distension. Palpations: Abdomen is soft. Tenderness: There is no abdominal tenderness. There is no guarding or rebound. Musculoskeletal:         General: Normal range of motion. Cervical back: Normal range of motion and neck supple. Lumbar back: Tenderness present. Lymphadenopathy:      Cervical: No cervical adenopathy. Skin:     General: Skin is warm and dry. Findings: No erythema or rash. Neurological:      Mental Status: He is alert and oriented to person, place, and time. Cranial Nerves: No cranial nerve deficit. Motor: No abnormal muscle tone. Coordination: Coordination normal.      Deep Tendon Reflexes: Reflexes are normal and symmetric. Psychiatric:         Behavior: Behavior normal.         Judgment: Judgment normal.                             ASSESSMENT/PLAN:    Annie Rolle was seen today for 1 month follow-up and back pain. Diagnoses and all orders for this visit:    Mixed hyperlipidemia    Chronic obstructive pulmonary disease, unspecified COPD type ([facility]    Type 2 diabetes mellitus with diabetic neuropathy, without long-term current use of insulin (HCC)    PVD (peripheral vascular disease) (HCC)    Paroxysmal atrial fibrillation (HCC)    Hyperuricemia    DDD (degenerative disc disease), lumbar    Labs reviewed  The current medical regimen is effective;  continue present plan and medications.         Bonnie Turpin DO    1/10/2023  10:57 AM

## 2023-01-20 DIAGNOSIS — I48.0 PAROXYSMAL ATRIAL FIBRILLATION (HCC): ICD-10-CM

## 2023-02-12 ENCOUNTER — HOSPITAL ENCOUNTER (EMERGENCY)
Age: 78
Discharge: HOME | End: 2023-02-12
Attending: EMERGENCY MEDICINE
Payer: MEDICARE

## 2023-02-12 VITALS
HEIGHT: 69 IN | BODY MASS INDEX: 31.55 KG/M2 | TEMPERATURE: 97.5 F | OXYGEN SATURATION: 95 % | DIASTOLIC BLOOD PRESSURE: 70 MMHG | RESPIRATION RATE: 16 BRPM | HEART RATE: 63 BPM | WEIGHT: 213 LBS | SYSTOLIC BLOOD PRESSURE: 140 MMHG

## 2023-02-12 DIAGNOSIS — R04.0 EPISTAXIS: Primary | ICD-10-CM

## 2023-02-12 PROCEDURE — 6370000000 HC RX 637 (ALT 250 FOR IP): Performed by: EMERGENCY MEDICINE

## 2023-02-12 PROCEDURE — 99282 EMERGENCY DEPT VISIT SF MDM: CPT

## 2023-02-12 RX ORDER — OXYMETAZOLINE HYDROCHLORIDE 0.05 G/100ML
2 SPRAY NASAL ONCE
Status: COMPLETED | OUTPATIENT
Start: 2023-02-12 | End: 2023-02-12

## 2023-02-12 RX ADMIN — Medication 2 SPRAY: at 13:03

## 2023-02-12 ASSESSMENT — PAIN - FUNCTIONAL ASSESSMENT: PAIN_FUNCTIONAL_ASSESSMENT: NONE - DENIES PAIN

## 2023-02-12 NOTE — DISCHARGE INSTRUCTIONS
Avoid nose picking and nose blowing. Use Ocean Spray 2-3 times daily. Use a humidifier. Return to the emergency department if worsening or concerning symptoms.

## 2023-02-12 NOTE — ED PROVIDER NOTES
HPI:  2/12/23, Time: 1:05 PM POONAM Landeros is a 66 y.o. male presenting to the ED for epistaxis beginning this morning. Patient states the episode began after he blew his nose. He is on Eliquis and aspirin. He states he was unable to get it to stop, so he came to the emergency department. On my evaluation, the patient had no active bleeding. Denying any shortness of breath, fevers, or dizziness. No trauma to his face. --------------------------------------------- PAST HISTORY ---------------------------------------------  Past Medical History:  has a past medical history of Arthritis, Atrial fibrillation ([facility] Bilateral carotid bruits, CAD (coronary artery disease), Cancer ([facility] Carotid stenosis, right, Congestive heart failure of unknown etiology ([address] COPD (chronic obstructive pulmonary disease) ([facility] Diplopia, History of blood transfusion, Hyperlipidemia, Hypertension, Kidney stones, Sleep apnea, Type 2 diabetes mellitus with diabetic neuropathy, without long-term current use of insulin ([facility] and Vitamin D deficiency. Past Surgical History:  has a past surgical history that includes Kidney stone surgery; Shoulder arthroscopy; Carpal tunnel release (3-31703); back surgery (2006); back surgery (1974, 1984); back surgery (02/09/2012); Aortic valve replacement (06/12/2013); Neck surgery; Diagnostic Cardiac Cath Lab Procedure; Coronary artery bypass graft; Cardiac surgery; back surgery; Abdomen surgery; Colonoscopy; Endoscopy, colon, diagnostic; fracture surgery; fracture surgery; hernia repair; skin biopsy; and joint replacement (Right). Social History:  reports that he quit smoking about 9 years ago. His smoking use included cigarettes. He started smoking about 65 years ago. He has a 110.00 pack-year smoking history. He has never used smokeless tobacco. He reports current alcohol use. He reports that he does not use drugs.     Family History: family history includes Cancer in his sister; Heart Disease in his brother; Stroke in his father. The patients home medications have been reviewed. Allergies: Iodine    -------------------------------------------------- RESULTS -------------------------------------------------  All laboratory and radiology results have been personally reviewed by myself   LABS:  No results found for this visit on 02/12/23. RADIOLOGY:  Interpreted by Radiologist.  No orders to display       ------------------------- NURSING NOTES AND VITALS REVIEWED ---------------------------   The nursing notes within the ED encounter and vital signs as below have been reviewed. BP (!) 150/69   Pulse 63   Temp 97.5 °F (36.4 °C) (Oral)   Resp 16   Ht 5' 9\" (1.753 m)   Wt 213 lb (96.6 kg)   SpO2 95%   BMI 31.45 kg/m²   Oxygen Saturation Interpretation: Normal      ---------------------------------------------------PHYSICAL EXAM--------------------------------------      Constitutional/General: Alert and oriented x3, well appearing, non toxic in NAD  Head: Normocephalic and atraumatic  Eyes: EOMI  Nares: Right nares normal with no blood. Small amount of dried blood in left nares, no active bleeding, no hematoma  Mouth: Oropharynx clear, handling secretions, no trismus, normal pharynx, no blood in pharynx  Neck: Supple, full ROM,   Pulmonary: Not in respiratory distress  Extremities: Moves all extremities x 4. Warm and well perfused  Skin: warm and dry without rash  Neurologic: GCS 15, no focal motor or sensory deficits   Psych: Normal Affect. Behavior normal.      ------------------------------ ED COURSE/MEDICAL DECISION MAKING----------------------  Medications   oxymetazoline (AFRIN) 0.05 % nasal spray 2 spray (2 sprays Left Nostril Given 2/12/23 7473)       Medical Decision Making/ED COURSE:    History From: patient     Patient is a 66 y.o. male presenting to the ED for acute onset epistaxis, resolved on my evaluation.   In the emergency department, the patient was hemodynamically stable and afebrile. He had some dried blood in his left nares but no active bleeding. Afrin applies, and patient monitored in the ED. He had no further bleeding. He is stable for discharge home. CONSULTS: (Who and What was discussed)  None      Social Determinants of Health : None    Chronic Conditions affecting care:    has a past medical history of Arthritis, Atrial fibrillation ([facility] Bilateral carotid bruits (10/15/2020), CAD (coronary artery disease), Cancer ([facility] Carotid stenosis, right (10/15/2020), Congestive heart failure of unknown etiology ([facility] (12/11/2021), COPD (chronic obstructive pulmonary disease) ([facility] Diplopia (4/21/2021), History of blood transfusion, Hyperlipidemia, Hypertension, Kidney stones, Sleep apnea, Type 2 diabetes mellitus with diabetic neuropathy, without long-term current use of insulin ([facility] (9/2/2020), and Vitamin D deficiency. ED Course as of 02/12/23 1333   Sun Feb 12, 2023   1329 Patient observed in the ED and had no further bleeding. He is stable for discharge home. [JA]      ED Course User Index  [JA] Kishan Dutta MD       Patient remained hemodynamically stable throughout ED course. New Prescriptions    No medications on file         Counseling: The emergency provider has spoken with the patient and discussed todays results, in addition to providing specific details for the plan of care and counseling regarding the diagnosis and prognosis. Questions are answered at this time and they are agreeable with the plan.      --------------------------------- IMPRESSION AND DISPOSITION ---------------------------------    IMPRESSION  1. Epistaxis        DISPOSITION  Disposition: Discharge to home  Patient condition is stable      NOTE: This report was transcribed using voice recognition software.  Every effort was made to ensure accuracy; however, inadvertent computerized transcription errors may be present    I, Kishan Dutta MD, am the primary provider of this record        Diana Segundo MD  02/12/23 9133

## 2023-02-27 DIAGNOSIS — I10 ESSENTIAL HYPERTENSION: Chronic | ICD-10-CM

## 2023-02-27 RX ORDER — AMLODIPINE BESYLATE 5 MG/1
5 TABLET ORAL DAILY
Qty: 90 TABLET | Refills: 3 | Status: SHIPPED | OUTPATIENT
Start: 2023-02-27

## 2023-02-27 RX ORDER — OMEPRAZOLE 20 MG/1
CAPSULE, DELAYED RELEASE ORAL
Qty: 90 CAPSULE | Refills: 3 | Status: SHIPPED | OUTPATIENT
Start: 2023-02-27

## 2023-03-21 ENCOUNTER — HOSPITAL ENCOUNTER (OUTPATIENT)
Dept: NON INVASIVE DIAGNOSTICS | Age: 78
Discharge: HOME | End: 2023-03-21
Payer: MEDICARE

## 2023-03-21 ENCOUNTER — HOSPITAL ENCOUNTER (OUTPATIENT)
Age: 78
Discharge: HOME | End: 2023-03-21
Payer: MEDICARE

## 2023-03-21 LAB
ANION GAP SERPL CALCULATED.3IONS-SCNC: 11 MMOL/L (ref 7–16)
BUN SERPL-MCNC: 17 MG/DL (ref 6–23)
CALCIUM SERPL-MCNC: 9.7 MG/DL (ref 8.6–10.2)
CHLORIDE SERPL-SCNC: 104 MMOL/L (ref 98–107)
CO2 SERPL-SCNC: 26 MMOL/L (ref 22–29)
CREAT SERPL-MCNC: 0.8 MG/DL (ref 0.7–1.2)
EKG ATRIAL RATE: 60 BPM
EKG P AXIS: 57 DEGREES
EKG P-R INTERVAL: 222 MS
EKG Q-T INTERVAL: 444 MS
EKG QRS DURATION: 110 MS
EKG QTC CALCULATION (BAZETT): 444 MS
EKG R AXIS: 86 DEGREES
EKG T AXIS: 66 DEGREES
EKG VENTRICULAR RATE: 60 BPM
GLUCOSE SERPL-MCNC: 112 MG/DL (ref 74–99)
MAGNESIUM SERPL-MCNC: 2.1 MG/DL (ref 1.6–2.6)
POTASSIUM SERPL-SCNC: 4.5 MMOL/L (ref 3.5–5)
SODIUM SERPL-SCNC: 141 MMOL/L (ref 132–146)

## 2023-03-21 PROCEDURE — 93005 ELECTROCARDIOGRAM TRACING: CPT | Performed by: INTERNAL MEDICINE

## 2023-03-21 PROCEDURE — 83735 ASSAY OF MAGNESIUM: CPT

## 2023-03-21 PROCEDURE — 80048 BASIC METABOLIC PNL TOTAL CA: CPT

## 2023-03-21 PROCEDURE — 93010 ELECTROCARDIOGRAM REPORT: CPT | Performed by: INTERNAL MEDICINE

## 2023-03-21 PROCEDURE — 36415 COLL VENOUS BLD VENIPUNCTURE: CPT

## 2023-04-07 DIAGNOSIS — E79.0 HYPERURICEMIA: ICD-10-CM

## 2023-04-07 RX ORDER — ALLOPURINOL 300 MG/1
TABLET ORAL
Qty: 90 TABLET | Refills: 1 | Status: SHIPPED | OUTPATIENT
Start: 2023-04-07

## 2023-04-11 DIAGNOSIS — I48.0 PAROXYSMAL ATRIAL FIBRILLATION (HCC): ICD-10-CM

## 2023-04-11 DIAGNOSIS — E79.0 HYPERURICEMIA: ICD-10-CM

## 2023-04-11 DIAGNOSIS — E78.2 MIXED HYPERLIPIDEMIA: ICD-10-CM

## 2023-04-11 DIAGNOSIS — I10 ESSENTIAL HYPERTENSION: ICD-10-CM

## 2023-04-11 DIAGNOSIS — E11.40 TYPE 2 DIABETES MELLITUS WITH DIABETIC NEUROPATHY, WITHOUT LONG-TERM CURRENT USE OF INSULIN (HCC): ICD-10-CM

## 2023-04-11 LAB
ALBUMIN SERPL-MCNC: 4.4 G/DL (ref 3.5–5.2)
ALP SERPL-CCNC: 75 U/L (ref 40–129)
ALT SERPL-CCNC: 22 U/L (ref 0–40)
ANION GAP SERPL CALCULATED.3IONS-SCNC: 14 MMOL/L (ref 7–16)
AST SERPL-CCNC: 33 U/L (ref 0–39)
BASOPHILS # BLD: 0.09 E9/L (ref 0–0.2)
BASOPHILS NFR BLD: 1.3 % (ref 0–2)
BILIRUB SERPL-MCNC: 0.5 MG/DL (ref 0–1.2)
BUN SERPL-MCNC: 18 MG/DL (ref 6–23)
CALCIUM SERPL-MCNC: 9.9 MG/DL (ref 8.6–10.2)
CHLORIDE SERPL-SCNC: 105 MMOL/L (ref 98–107)
CHOLESTEROL, TOTAL: 153 MG/DL (ref 0–199)
CO2 SERPL-SCNC: 24 MMOL/L (ref 22–29)
CREAT SERPL-MCNC: 0.8 MG/DL (ref 0.7–1.2)
EOSINOPHIL # BLD: 0.16 E9/L (ref 0.05–0.5)
EOSINOPHIL NFR BLD: 2.3 % (ref 0–6)
ERYTHROCYTE [DISTWIDTH] IN BLOOD BY AUTOMATED COUNT: 14.4 FL (ref 11.5–15)
GLUCOSE SERPL-MCNC: 128 MG/DL (ref 74–99)
HBA1C MFR BLD: 6 % (ref 4–5.6)
HCT VFR BLD AUTO: 40.4 % (ref 37–54)
HDLC SERPL-MCNC: 63 MG/DL
HGB BLD-MCNC: 12.7 G/DL (ref 12.5–16.5)
IMM GRANULOCYTES # BLD: 0.01 E9/L
IMM GRANULOCYTES NFR BLD: 0.1 % (ref 0–5)
LDLC SERPL CALC-MCNC: 54 MG/DL (ref 0–99)
LYMPHOCYTES # BLD: 2.04 E9/L (ref 1.5–4)
LYMPHOCYTES NFR BLD: 29.8 % (ref 20–42)
MCH RBC QN AUTO: 30 PG (ref 26–35)
MCHC RBC AUTO-ENTMCNC: 31.4 % (ref 32–34.5)
MCV RBC AUTO: 95.5 FL (ref 80–99.9)
MONOCYTES # BLD: 0.64 E9/L (ref 0.1–0.95)
MONOCYTES NFR BLD: 9.3 % (ref 2–12)
NEUTROPHILS # BLD: 3.91 E9/L (ref 1.8–7.3)
NEUTS SEG NFR BLD: 57.2 % (ref 43–80)
PLATELET # BLD AUTO: 186 E9/L (ref 130–450)
PMV BLD AUTO: 10.8 FL (ref 7–12)
POTASSIUM SERPL-SCNC: 4.6 MMOL/L (ref 3.5–5)
PROT SERPL-MCNC: 7.4 G/DL (ref 6.4–8.3)
RBC # BLD AUTO: 4.23 E12/L (ref 3.8–5.8)
SODIUM SERPL-SCNC: 143 MMOL/L (ref 132–146)
TRIGL SERPL-MCNC: 181 MG/DL (ref 0–149)
TSH SERPL-MCNC: 1.33 UIU/ML (ref 0.27–4.2)
URATE SERPL-MCNC: 5.1 MG/DL (ref 3.4–7)
VLDLC SERPL CALC-MCNC: 36 MG/DL
WBC # BLD: 6.9 E9/L (ref 4.5–11.5)

## 2023-04-25 RX ORDER — TORSEMIDE 10 MG/1
TABLET ORAL
Qty: 30 TABLET | Refills: 0 | Status: SHIPPED | OUTPATIENT
Start: 2023-04-25

## 2023-04-28 ENCOUNTER — CARE COORDINATION (OUTPATIENT)
Dept: CARE COORDINATION | Age: 78
End: 2023-04-28

## 2023-04-28 DIAGNOSIS — I10 ESSENTIAL HYPERTENSION: Primary | ICD-10-CM

## 2023-04-28 SDOH — ECONOMIC STABILITY: FOOD INSECURITY: WITHIN THE PAST 12 MONTHS, YOU WORRIED THAT YOUR FOOD WOULD RUN OUT BEFORE YOU GOT MONEY TO BUY MORE.: NEVER TRUE

## 2023-04-28 SDOH — ECONOMIC STABILITY: TRANSPORTATION INSECURITY
IN THE PAST 12 MONTHS, HAS LACK OF TRANSPORTATION KEPT YOU FROM MEETINGS, WORK, OR FROM GETTING THINGS NEEDED FOR DAILY LIVING?: NO

## 2023-04-28 SDOH — ECONOMIC STABILITY: TRANSPORTATION INSECURITY
IN THE PAST 12 MONTHS, HAS THE LACK OF TRANSPORTATION KEPT YOU FROM MEDICAL APPOINTMENTS OR FROM GETTING MEDICATIONS?: NO

## 2023-04-28 SDOH — HEALTH STABILITY: PHYSICAL HEALTH: ON AVERAGE, HOW MANY MINUTES DO YOU ENGAGE IN EXERCISE AT THIS LEVEL?: 0 MIN

## 2023-04-28 SDOH — HEALTH STABILITY: PHYSICAL HEALTH: ON AVERAGE, HOW MANY DAYS PER WEEK DO YOU ENGAGE IN MODERATE TO STRENUOUS EXERCISE (LIKE A BRISK WALK)?: 0 DAYS

## 2023-04-28 SDOH — ECONOMIC STABILITY: FOOD INSECURITY: WITHIN THE PAST 12 MONTHS, THE FOOD YOU BOUGHT JUST DIDN'T LAST AND YOU DIDN'T HAVE MONEY TO GET MORE.: NEVER TRUE

## 2023-04-28 ASSESSMENT — SOCIAL DETERMINANTS OF HEALTH (SDOH)
HOW OFTEN DO YOU GET TOGETHER WITH FRIENDS OR RELATIVES?: ONCE A WEEK
IN A TYPICAL WEEK, HOW MANY TIMES DO YOU TALK ON THE PHONE WITH FAMILY, FRIENDS, OR NEIGHBORS?: ONCE A WEEK
HOW HARD IS IT FOR YOU TO PAY FOR THE VERY BASICS LIKE FOOD, HOUSING, MEDICAL CARE, AND HEATING?: NOT HARD AT ALL
DO YOU BELONG TO ANY CLUBS OR ORGANIZATIONS SUCH AS CHURCH GROUPS UNIONS, FRATERNAL OR ATHLETIC GROUPS, OR SCHOOL GROUPS?: YES
HOW OFTEN DO YOU ATTEND CHURCH OR RELIGIOUS SERVICES?: NEVER
HOW OFTEN DO YOU ATTENT MEETINGS OF THE CLUB OR ORGANIZATION YOU BELONG TO?: 1 TO 4 TIMES PER YEAR

## 2023-04-28 NOTE — CARE COORDINATION
Remote Patient Kit Ordering Note      Date/Time:  4/28/2023 3:05 PM      [x] CCSS confirmed patient shipping address  [x] Patient will receive package over the next 2-4 business days. Someone 21 years or older must be present to sign for UPS delivery. [x] Patient to contact virtual installation-specific phone number listed in the patient instructions. [x] If the patient does not contact HRS within 24 hours, an [employer] [facility] [employer] will call the patient directly: If the patient does not answer, HRS will follow up with the clinical team notifying them about the unsuccessful attempt to contact the patient. HRS will make three call attempts to the patient. [x] LPN will contact patient once equipment is active to welcome them to the program.                                                         [x] Hours of RPM monitoring - Monday-Friday [id number]                     All questions answered at this time. ACM made aware the RPM kit has been ordered. CCSS notified patient of RPM equipment order.

## 2023-04-28 NOTE — PROGRESS NOTES
4/28/23 6:07 PM       Remote Patient Monitoring Treatment Plan    Received request from ACM/SHARI Magdaelno RN  to order remote patient monitoring for in home monitoring of HTN and order completed. Patient will be monitoring blood pressure   pulse ox   weight. Pt has documented hx of \"Congestive heart failure of unknown etiology\" from 12/11/2021 in Medical History section of EMR. Please provide RPM weight monitoring as per our protocol for all patients with HF. Patient will engage in Remote Patient Monitoring each day to develop the skills necessary for self management. RPM Care Team Responsibilities:   Alerts will be reviewed daily and addressed within 2-4 hours during operational hours (Monday -Friday 9 am-4 pm)  Alert response and intervention documented in patient medical record  Alert response escalated to PCP per protocol and documented in patient medical record  Patient monitored over approximately  days  Discharge from program based on self-management readiness    See care coordination encounters for additional details.       Estela Ruelas, DIONE, FNP-C, Remote Patient Monitoring NP, () [phone]

## 2023-05-01 ENCOUNTER — OFFICE VISIT (OUTPATIENT)
Dept: FAMILY MEDICINE CLINIC | Age: 78
End: 2023-05-01
Payer: MEDICARE

## 2023-05-01 ENCOUNTER — CARE COORDINATION (OUTPATIENT)
Dept: CARE COORDINATION | Age: 78
End: 2023-05-01

## 2023-05-01 VITALS
TEMPERATURE: 97.8 F | DIASTOLIC BLOOD PRESSURE: 82 MMHG | HEART RATE: 77 BPM | HEIGHT: 69 IN | SYSTOLIC BLOOD PRESSURE: 124 MMHG | RESPIRATION RATE: 18 BRPM | OXYGEN SATURATION: 98 % | WEIGHT: 218 LBS | BODY MASS INDEX: 32.29 KG/M2

## 2023-05-01 DIAGNOSIS — B96.89 SINUSITIS, BACTERIAL: Primary | ICD-10-CM

## 2023-05-01 DIAGNOSIS — J32.9 SINUSITIS, BACTERIAL: Primary | ICD-10-CM

## 2023-05-01 PROCEDURE — 3074F SYST BP LT 130 MM HG: CPT | Performed by: FAMILY MEDICINE

## 2023-05-01 PROCEDURE — 99213 OFFICE O/P EST LOW 20 MIN: CPT | Performed by: FAMILY MEDICINE

## 2023-05-01 PROCEDURE — 3079F DIAST BP 80-89 MM HG: CPT | Performed by: FAMILY MEDICINE

## 2023-05-01 PROCEDURE — 1123F ACP DISCUSS/DSCN MKR DOCD: CPT | Performed by: FAMILY MEDICINE

## 2023-05-01 RX ORDER — TORSEMIDE 10 MG/1
10 TABLET ORAL DAILY
Qty: 90 TABLET | Refills: 3 | Status: SHIPPED | OUTPATIENT
Start: 2023-05-01

## 2023-05-01 RX ORDER — PREDNISONE 20 MG/1
TABLET ORAL
Qty: 11 TABLET | Refills: 0 | Status: SHIPPED | OUTPATIENT
Start: 2023-05-01

## 2023-05-01 RX ORDER — DOXYCYCLINE HYCLATE 100 MG
100 TABLET ORAL 2 TIMES DAILY
Qty: 20 TABLET | Refills: 0 | Status: SHIPPED | OUTPATIENT
Start: 2023-05-01 | End: 2023-05-11

## 2023-05-01 SDOH — ECONOMIC STABILITY: INCOME INSECURITY: HOW HARD IS IT FOR YOU TO PAY FOR THE VERY BASICS LIKE FOOD, HOUSING, MEDICAL CARE, AND HEATING?: NOT HARD AT ALL

## 2023-05-01 SDOH — ECONOMIC STABILITY: FOOD INSECURITY: WITHIN THE PAST 12 MONTHS, YOU WORRIED THAT YOUR FOOD WOULD RUN OUT BEFORE YOU GOT MONEY TO BUY MORE.: NEVER TRUE

## 2023-05-01 SDOH — ECONOMIC STABILITY: HOUSING INSECURITY
IN THE LAST 12 MONTHS, WAS THERE A TIME WHEN YOU DID NOT HAVE A STEADY PLACE TO SLEEP OR SLEPT IN A SHELTER (INCLUDING NOW)?: NO

## 2023-05-01 SDOH — ECONOMIC STABILITY: FOOD INSECURITY: WITHIN THE PAST 12 MONTHS, THE FOOD YOU BOUGHT JUST DIDN'T LAST AND YOU DIDN'T HAVE MONEY TO GET MORE.: NEVER TRUE

## 2023-05-01 ASSESSMENT — ENCOUNTER SYMPTOMS
RHINORRHEA: 1
SHORTNESS OF BREATH: 0
APNEA: 0
BACK PAIN: 0
NAUSEA: 0
SORE THROAT: 0
COUGH: 1
ALLERGIC/IMMUNOLOGIC NEGATIVE: 1
DIARRHEA: 0
COLOR CHANGE: 0
CHEST TIGHTNESS: 0
BLOOD IN STOOL: 0
VOMITING: 0
SINUS PRESSURE: 0
FACIAL SWELLING: 0
ABDOMINAL PAIN: 0
WHEEZING: 1
PHOTOPHOBIA: 0

## 2023-05-01 NOTE — CARE COORDINATION
[facility]  Pt interested in low sodium diet, weight loss, healthy food choices and portion control. Pt has HTN but he says he does not. Pt adds salt before tasting. He had CABG in 2020. His A1C was up a little but he is not on a medication. I did not talk to him about monitoring a DM diet. I will next time. RD contacted patient and left voicemail regarding Dietitian referral. Left call back number and will follow up as appropriate.      [address] [place] [address],   [phone]

## 2023-05-01 NOTE — PROGRESS NOTES
Chief Complaint:   Chief Complaint   Patient presents with    Cough     For last few days. Took COVID test, Neg     Congestion       Cough  This is a new problem. The current episode started in the past 7 days. The cough is Productive of sputum. Associated symptoms include rhinorrhea and wheezing. Pertinent negatives include no chest pain, headaches, myalgias, rash, sore throat or shortness of breath. The treatment provided no relief.      Patient Active Problem List   Diagnosis    Coronary atherosclerosis of native coronary artery    Hyperlipidemia    Aortic stenosis    Essential hypertension    DEBORAH (obstructive sleep apnea)    DDD (degenerative disc disease), cervical    Nocturnal hypoxemia due to asthma    Neuroforaminal stenosis of lumbar spine    Valvular heart disease    Mild intermittent asthma without complication    New onset a-fib ([facility])    S/P AVR (aortic valve replacement)    S/P CABG (coronary artery bypass graft)    Atrial fibrillation with rapid ventricular response ([facility])    Atrial fibrillation ([address]    Type 2 diabetes mellitus with diabetic neuropathy, without long-term current use of insulin ([facility])    PVD (peripheral vascular disease) ([facility])    Bilateral carotid bruits    Carotid stenosis, right    Diplopia    Obesity    Chronic obstructive pulmonary disease, unspecified COPD type ([address]    Hyperuricemia       Past Medical History:   Diagnosis Date    Arthritis     Atrial fibrillation ([address]     day of surgery on 06/12/13- resolved at this time 6/23/2013    Bilateral carotid bruits 10/15/2020    CAD (coronary artery disease)     heart cath with stent 2001    Cancer [facility])     Basal Cell on his back    Carotid stenosis, right 10/15/2020    Congestive heart failure of unknown etiology ([address] 12/11/2021    COPD (chronic obstructive pulmonary disease) ([address]     Diplopia 4/21/2021    History of blood transfusion     Hyperlipidemia     Hypertension     Kidney stones     Sleep apnea     Type 2 diabetes mellitus

## 2023-05-02 NOTE — CARE COORDINATION
Patient returned RD call and left VM on 5/1/23. RD contacted patient today 5/2/23 regarding Dietitian referral. RD left VM with call back number. RD will follow up as appropriate.      [address] [place] [address]  [phone]

## 2023-05-04 ENCOUNTER — CARE COORDINATION (OUTPATIENT)
Dept: CARE COORDINATION | Age: 78
End: 2023-05-04

## 2023-05-04 NOTE — CARE COORDINATION
Contacted Alfredito Lovett and left voicemail regarding Dietitian referral. Left call back number. RD outreached 5/1/23, 5/2/23 and today 5/4/23- left VM all three outreaches. RD will notify ACM. RD will continue to follow/assist with patient return call.        [address] [place] [address],   [phone]

## 2023-05-05 ENCOUNTER — CARE COORDINATION (OUTPATIENT)
Dept: CARE COORDINATION | Age: 78
End: 2023-05-05

## 2023-05-05 NOTE — CARE COORDINATION
Welcome Call Attempt #1    Date/Time:  5/5/2023 9:32 AM    LPN attempted to reach patient by telephone. Left HIPPA compliant message requesting a return call. Will attempt to reach patient again.        Edita Mann LPN  [address] [facility]/ Care Transition Nurse/[facility]  [phone]

## 2023-05-05 NOTE — CARE COORDINATION
ACM attempted to reach patient for assigned ACM to follow up for Care Coordination with no answer. Unable to leave voicemail message due to phone continued to ring and no answer.      -Plan:   If no return call, ACM will attempt outreach again at a later time.

## 2023-05-08 ENCOUNTER — CARE COORDINATION (OUTPATIENT)
Dept: CARE COORDINATION | Age: 78
End: 2023-05-08

## 2023-05-08 NOTE — CARE COORDINATION
Ambulatory Care Coordination Note  2023    Patient Current Location:  Home: [address] [place] [address]     ACM contacted the patient by telephone. Verified name and  with patient as identifiers. Provided introduction to self, and explanation of the ACM role. Challenges to be reviewed by the provider   Additional needs identified to be addressed with provider: No  none               Method of communication with provider: none. ACM: Lola Montes De Oca RN    Offered patient enrollment in the Remote Patient Monitoring (RPM) program for in-home monitoring: Yes, patient already enrolled. Heart Failure Education outreach Date/Time: 2023 1:28 PM    Ambulatory Care Manager (ACM) contacted the parent by telephone to perform Ambulatory Care Coordination. Verified name and  with patient as identifiers. Provided introduction to self, and explanation of the Ambulatory Care Manager's role. ACM reviewed that a Health Healthy tips for the Summer packet has been mailed to the them. ACM reviewed CHF zones, daily weights, the importance of low sodium diet, and healthy tips packet with the patient. Instructed patient to call their PCP if they have a weight gain of 3 lbs in 2 days or 5 lbs in a week. Patient reminded that there is a physician on call 24 hours a day / 7 days a week should the patient have questions or concerns. The patient verbalized understanding. ACM contacted patient to follow up on his status regarding RPM, HTN, weight loss for Care Coordination.  -Pt reports complete medication compliance.   -Pt reports he has not received the education mailed yet. -Pt reports he has not looked into [employer] or [employer] for water therapy yet but he plans to.   -Right hip replacement 2022. Intermittent right hip soreness unchanged. Pt has not been practicing hip exercises.      HTN  -RPM continues.   -Pt has a personal BP monitor, pulse ox, weight

## 2023-05-08 NOTE — CARE COORDINATION
Remote Patient Monitoring Welcome Note    Date/Time:  2023 9:27 AM  Patient Current Location: Home: [address] [place] [address]    Verified patients name and  as identifiers. Completed and confirmed the following:     Emergency Contact: Celina Esquivel  # [phone]. [x] Patient received all RPM equipment (tablet, scale, blood pressure device and cuff, and pulse oximeter)  Cuff Size: regular (9.05\"-15.74\")    Weight Scale:  regular (<330lbs)                    [x] Instructed patient keep box for use when returning equipment                                                          [x] Reviewed Patient Welcome Letter with patient                         [x] Reviewed expectations for patient and care team  Monitoring hours M-F 9-4pm  Completing monitoring by 12pm on  so that alerts can be responded to in the same day  Patient weighs self at same time every day (or after urinating and waking up)  Take blood pressure 1-2 hrs after medications   RPM team may have different phone area code (including [place], [place], [facility] or [address] [facility] [address])                              [x] Instructed patient to keep scale on flat surface                                                         [x] Instructed patient to keep tablet plugged in at all times                         [x] Instructed how to contact IT support ([phone])  [x] Provided Remote Patient Monitoring care  information                 All questions answered at this time.       Ary Deleon LPN  [facility]/ CTN/ Remote Patient monitoring  [phone]

## 2023-06-01 RX ORDER — METOPROLOL SUCCINATE 25 MG/1
TABLET, EXTENDED RELEASE ORAL
Qty: 180 TABLET | Refills: 1 | Status: SHIPPED | OUTPATIENT
Start: 2023-06-01

## 2023-06-06 ENCOUNTER — CARE COORDINATION (OUTPATIENT)
Dept: CARE COORDINATION | Age: 78
End: 2023-06-06

## 2023-06-06 NOTE — CARE COORDINATION
-ACM attempted to reach patient to follow up on his status regarding RPM, HTN, weight loss for Care Coordination, however no answer. -HIPAA compliant VM left introducing self and reason for call.  -Left ACM's contact information, requesting call back.  -Plan:   If no return call, ACM will attempt outreach again.

## 2023-06-08 ENCOUNTER — CARE COORDINATION (OUTPATIENT)
Dept: CARE COORDINATION | Age: 78
End: 2023-06-08

## 2023-06-08 NOTE — CARE COORDINATION
-Patient returned call to [facility]. Ambulatory Care Coordination Note  2023    Patient Current Location:  Home: [address] [place] [address]     ACM contacted the patient by telephone. Verified name and  with patient as identifiers. Provided introduction to self, and explanation of the ACM role. Challenges to be reviewed by the provider   Additional needs identified to be addressed with provider: No  none               Method of communication with provider: none. ACM: Janay Jimenez RN    Offered patient enrollment in the Remote Patient Monitoring (RPM) program for in-home monitoring: Yes, patient already enrolled. ACM contacted patient to follow up on his status regarding RPM, HTN, weight loss for Care Coordination.  -Pt reports complete medication compliance.   -Pt reports he did received the education mailed.   -[facility], however they need another instructor for the morning class. Pt does not want to go in the afternoon. Pt has not looked into [employer].   -Right hip replacement 2022. Intermittent right hip soreness occurs when Pt does too much activity like cutting grass. HTN  -RPM continues.   -Pt has a personal BP monitor, pulse ox, weight scale.  -Today's RPM weight 210.4 lbs.  -Yesterday's RPM vital signs: /70, HR 71; pulse ox 97%, HR 65; weight 212 lbs. Pt has been taking BP medication first, then waiting an hour before taking BP.   -2023 PCP office visit: /82, HR 77, pulse ox 98%, weight 218 lbs. -Pt reports he has been monitoring sodium.   -Pt reports he decreased salt shaker use and is adding very little salt to his food.   -Pt reports he has not spoken with dietitian but wants to. Gave Pt dietitian phone number. Pt said he plans to call.   -Pt reports he drinks lots of water daily.   -Pt reports no ankle/feet edema today.  Intermittent leg cramps continue at night on days he is

## 2023-06-21 ENCOUNTER — CARE COORDINATION (OUTPATIENT)
Dept: CARE COORDINATION | Age: 78
End: 2023-06-21

## 2023-06-21 NOTE — CARE COORDINATION
Ambulatory Care Coordination Note  2023    Patient Current Location:  Home: [address] [place] [address]     ACM contacted the patient by telephone. Verified name and  with patient as identifiers. Provided introduction to self, and explanation of the ACM role. Challenges to be reviewed by the provider   Additional needs identified to be addressed with provider: No  none               Method of communication with provider: none. ACM: Thomas Porter RN    Offered patient enrollment in the Remote Patient Monitoring (RPM) program for in-home monitoring: Yes, patient already enrolled. ACM contacted patient to follow up on his status regarding RPM, HTN, weight loss for Care Coordination.  -Pt reports complete medication compliance.   -Pt reports he has not heard form [employer] that a new water therapy instructor started. Pt has not looked into [employer]. HTN  -RPM continues.   -Pt has a personal BP monitor, pulse ox, weight scale.  -Today's RPM vitals: /87, HR 69; pulse ox 94%, HR 69; weight 212 lbs.  -2023 PCP office visit: /82, HR 77, pulse ox 98%, weight 218 lbs. -Pt reports he continues to monitoring sodium and has decreased his salt shaker use. -Pt has dietitian phone number if he plans to call.   -Pt reports he continues to drink lots of water daily.   -Pt reports no ankle/feet edema. Intermittent leg cramps continue at night on days he is more active.   -Intermittent right toes neuropathy unchanged. Plans to talk with Vascular. [facility] appt 2023.   -Pt reports he has met with the Dr Zita Chong, Dentist that makes sleep oral night guards. Pt said he has an appt with Dr Nicole Santana tomorrow, 23 to start making Pt's oral . Pt said he is not using CPAP. -Pt reports intermittent mild SOB when doing yard work is unchanged, baseline. No cough or wheezing.      Resources  -Pt reports potential neck surgery at [facility] in September

## 2023-07-06 RX ORDER — OMEPRAZOLE 20 MG/1
20 CAPSULE, DELAYED RELEASE ORAL DAILY
Qty: 90 CAPSULE | Refills: 1 | Status: SHIPPED | OUTPATIENT
Start: 2023-07-06

## 2023-07-07 VITALS
OXYGEN SATURATION: 94 % | DIASTOLIC BLOOD PRESSURE: 98 MMHG | BODY MASS INDEX: 31.25 KG/M2 | HEART RATE: 61 BPM | WEIGHT: 211.6 LBS | SYSTOLIC BLOOD PRESSURE: 124 MMHG

## 2023-07-11 ENCOUNTER — OFFICE VISIT (OUTPATIENT)
Dept: PRIMARY CARE CLINIC | Age: 78
End: 2023-07-11
Payer: MEDICARE

## 2023-07-11 VITALS
DIASTOLIC BLOOD PRESSURE: 70 MMHG | TEMPERATURE: 97.8 F | HEIGHT: 69 IN | OXYGEN SATURATION: 97 % | BODY MASS INDEX: 31.84 KG/M2 | HEART RATE: 60 BPM | SYSTOLIC BLOOD PRESSURE: 138 MMHG | WEIGHT: 215 LBS

## 2023-07-11 DIAGNOSIS — I10 ESSENTIAL HYPERTENSION: ICD-10-CM

## 2023-07-11 DIAGNOSIS — J44.9 CHRONIC OBSTRUCTIVE PULMONARY DISEASE, UNSPECIFIED COPD TYPE (HCC): ICD-10-CM

## 2023-07-11 DIAGNOSIS — E11.40 TYPE 2 DIABETES MELLITUS WITH DIABETIC NEUROPATHY, WITHOUT LONG-TERM CURRENT USE OF INSULIN (HCC): ICD-10-CM

## 2023-07-11 DIAGNOSIS — I48.0 PAROXYSMAL ATRIAL FIBRILLATION (HCC): ICD-10-CM

## 2023-07-11 DIAGNOSIS — E79.0 HYPERURICEMIA: ICD-10-CM

## 2023-07-11 DIAGNOSIS — E79.0 HYPERURICEMIA: Primary | ICD-10-CM

## 2023-07-11 DIAGNOSIS — Z12.5 PROSTATE CANCER SCREENING: ICD-10-CM

## 2023-07-11 DIAGNOSIS — E78.2 MIXED HYPERLIPIDEMIA: ICD-10-CM

## 2023-07-11 DIAGNOSIS — M51.36 DDD (DEGENERATIVE DISC DISEASE), LUMBAR: ICD-10-CM

## 2023-07-11 LAB
ALBUMIN SERPL-MCNC: 4.7 G/DL (ref 3.5–5.2)
ALP SERPL-CCNC: 63 U/L (ref 40–129)
ALT SERPL-CCNC: 21 U/L (ref 0–40)
ANION GAP SERPL CALCULATED.3IONS-SCNC: 18 MMOL/L (ref 7–16)
AST SERPL-CCNC: 28 U/L (ref 0–39)
BASOPHILS # BLD: 0.09 E9/L (ref 0–0.2)
BASOPHILS NFR BLD: 1.4 % (ref 0–2)
BILIRUB SERPL-MCNC: 0.4 MG/DL (ref 0–1.2)
BUN SERPL-MCNC: 21 MG/DL (ref 6–23)
CALCIUM SERPL-MCNC: 10.1 MG/DL (ref 8.6–10.2)
CHLORIDE SERPL-SCNC: 106 MMOL/L (ref 98–107)
CHOLESTEROL, TOTAL: 144 MG/DL (ref 0–199)
CO2 SERPL-SCNC: 20 MMOL/L (ref 22–29)
CREAT SERPL-MCNC: 0.9 MG/DL (ref 0.7–1.2)
EOSINOPHIL # BLD: 0.15 E9/L (ref 0.05–0.5)
EOSINOPHIL NFR BLD: 2.4 % (ref 0–6)
ERYTHROCYTE [DISTWIDTH] IN BLOOD BY AUTOMATED COUNT: 13.5 FL (ref 11.5–15)
GLUCOSE SERPL-MCNC: 114 MG/DL (ref 74–99)
HBA1C MFR BLD: 6.5 % (ref 4–5.6)
HCT VFR BLD AUTO: 41.2 % (ref 37–54)
HDLC SERPL-MCNC: 60 MG/DL
HGB BLD-MCNC: 13 G/DL (ref 12.5–16.5)
IMM GRANULOCYTES # BLD: 0.02 E9/L
IMM GRANULOCYTES NFR BLD: 0.3 % (ref 0–5)
LDLC SERPL CALC-MCNC: 45 MG/DL (ref 0–99)
LYMPHOCYTES # BLD: 1.75 E9/L (ref 1.5–4)
LYMPHOCYTES NFR BLD: 27.8 % (ref 20–42)
MCH RBC QN AUTO: 31 PG (ref 26–35)
MCHC RBC AUTO-ENTMCNC: 31.6 % (ref 32–34.5)
MCV RBC AUTO: 98.3 FL (ref 80–99.9)
MONOCYTES # BLD: 0.55 E9/L (ref 0.1–0.95)
MONOCYTES NFR BLD: 8.7 % (ref 2–12)
NEUTROPHILS # BLD: 3.74 E9/L (ref 1.8–7.3)
NEUTS SEG NFR BLD: 59.4 % (ref 43–80)
PLATELET # BLD AUTO: 158 E9/L (ref 130–450)
PMV BLD AUTO: 10.8 FL (ref 7–12)
POTASSIUM SERPL-SCNC: 4.7 MMOL/L (ref 3.5–5)
PROT SERPL-MCNC: 7.2 G/DL (ref 6.4–8.3)
PSA SERPL-MCNC: 1.12 NG/ML (ref 0–4)
RBC # BLD AUTO: 4.19 E12/L (ref 3.8–5.8)
SODIUM SERPL-SCNC: 144 MMOL/L (ref 132–146)
TRIGL SERPL-MCNC: 195 MG/DL (ref 0–149)
TSH SERPL-MCNC: 1 UIU/ML (ref 0.27–4.2)
URATE SERPL-MCNC: 4.8 MG/DL (ref 3.4–7)
VLDLC SERPL CALC-MCNC: 39 MG/DL
WBC # BLD: 6.3 E9/L (ref 4.5–11.5)

## 2023-07-11 PROCEDURE — 3075F SYST BP GE 130 - 139MM HG: CPT | Performed by: FAMILY MEDICINE

## 2023-07-11 PROCEDURE — 1123F ACP DISCUSS/DSCN MKR DOCD: CPT | Performed by: FAMILY MEDICINE

## 2023-07-11 PROCEDURE — 99214 OFFICE O/P EST MOD 30 MIN: CPT | Performed by: FAMILY MEDICINE

## 2023-07-11 PROCEDURE — 3044F HG A1C LEVEL LT 7.0%: CPT | Performed by: FAMILY MEDICINE

## 2023-07-11 PROCEDURE — 3078F DIAST BP <80 MM HG: CPT | Performed by: FAMILY MEDICINE

## 2023-07-11 ASSESSMENT — ENCOUNTER SYMPTOMS
APNEA: 0
PHOTOPHOBIA: 0
ABDOMINAL PAIN: 0
SINUS PRESSURE: 0
WHEEZING: 0
BLOOD IN STOOL: 0
CHEST TIGHTNESS: 0
SHORTNESS OF BREATH: 0
DIARRHEA: 0
COUGH: 0
VOMITING: 0
FACIAL SWELLING: 0
NAUSEA: 0
SORE THROAT: 0
BACK PAIN: 1
ALLERGIC/IMMUNOLOGIC NEGATIVE: 1
COLOR CHANGE: 0

## 2023-07-11 NOTE — PROGRESS NOTES
Chief Complaint:   Chief Complaint   Patient presents with    Back Pain     Ongoing back and right hip pain    Neck Pain     Will be scheduled for cervical spine surgery with Dr. Sandra Martinez at [facility]. Back Pain  This is a chronic problem. The current episode started more than 1 year ago. The problem occurs daily. The problem is unchanged. The quality of the pain is described as aching. The pain is at a severity of 5/10. The pain is moderate. The pain is Worse during the day. Pertinent negatives include no abdominal pain, chest pain, headaches or weakness. Neck Pain   This is a chronic problem. The current episode started more than 1 month ago. The problem occurs constantly. The pain is present in the anterior neck. The quality of the pain is described as aching and burning. The pain is at a severity of 4/10. The pain is moderate. Pertinent negatives include no chest pain, headaches, photophobia or weakness. Diabetes  He presents for his follow-up diabetic visit. He has type 2 diabetes mellitus. His disease course has been stable. Pertinent negatives for hypoglycemia include no confusion, headaches or nervousness/anxiousness. Pertinent negatives for diabetes include no chest pain and no weakness. Symptoms are stable. Current diabetic treatment includes diet. He is compliant with treatment all of the time. Coronary Artery Disease  Presents for follow-up visit. Pertinent negatives include no chest pain, chest tightness, leg swelling, palpitations or shortness of breath. The symptoms have been resolved. Compliance with diet is good. Compliance with exercise is good. Compliance with medications is good. Hypertension  This is a chronic problem. The current episode started more than 1 year ago. The problem has been resolved since onset. Associated symptoms include neck pain. Pertinent negatives include no chest pain, headaches, palpitations or shortness of breath.  Past treatments include calcium channel

## 2023-07-19 DIAGNOSIS — I48.0 PAROXYSMAL ATRIAL FIBRILLATION (HCC): ICD-10-CM

## 2023-07-31 ENCOUNTER — CARE COORDINATION (OUTPATIENT)
Dept: CASE MANAGEMENT | Age: 78
End: 2023-07-31

## 2023-08-09 ENCOUNTER — CARE COORDINATION (OUTPATIENT)
Dept: CARE COORDINATION | Age: 78
End: 2023-08-09

## 2023-08-09 NOTE — CARE COORDINATION
Remote Alert Monitoring Note  Rpm alert to be reviewed by the provider   red alert   blood pressure heart rate (157)   Additional needs to be addressed by N/A: No                Date/Time:  2023 11:08 AM  Patient Current Location: [address] [place] [address]  LPN contacted patient by telephone. Verified patients name and  as identifiers. Background: HTN  Refer to 911 immediately if:  Patient unresponsive or unable to provide history  Change in cognition or sudden confusion  Patient unable to respond in complete sentences  Intense chest pain/tightness  Any concern for any clinical emergency  Red Alert: Provider response time of 1 hr required for any red alert requiring intervention  Yellow Alert: Provider response time of 3hr required for any escalated yellow alert  HR Triage  Are you having any Chest Pain? no   Are you having any Shortness of Breath? no   Are you having any dizziness? no   Are you feeling more fatigued or tired than normal? no   Are you having any other health concerns or issues? no    Clinical Interventions: Reviewed and followed up on alerts and treatments-Pt asymptomatic, denies CO, SOB, lightheadedness, fatigue. Pt compliant with medications. Upon recheck /60, HR 76.  Patient instructed to call PCP, or present to ED if symptoms occur, or become acutely worse. Pt v/u. Plan/Follow Up: Will continue to review, monitor and address alerts with follow up based on severity of symptoms and risk factors.

## 2023-08-16 ENCOUNTER — CARE COORDINATION (OUTPATIENT)
Dept: CARE COORDINATION | Age: 78
End: 2023-08-16

## 2023-08-16 NOTE — CARE COORDINATION
-ACM spoke to patient to follow up on his status regarding RPM, HTN & weight loss for Care Coordination, however Pt said he is unable to talk right now. Pt is doing yard work. Pt requests a return call, ACM agreed.   -Plan:   ACM will attempt outreach again.

## 2023-08-17 ENCOUNTER — CARE COORDINATION (OUTPATIENT)
Dept: CARE COORDINATION | Age: 78
End: 2023-08-17

## 2023-08-17 DIAGNOSIS — I10 ESSENTIAL HYPERTENSION: Primary | ICD-10-CM

## 2023-08-17 NOTE — CARE COORDINATION
CCSS placed call to patient to arrange RPM kit  through [facility]. Reviewed with patient how to pack equipment in original packing. Verified patient's availability to schedule UPS  time. UPS  time requested. Anticipated  date range 2 to 4 business days.

## 2023-08-17 NOTE — CARE COORDINATION
Services or Interventions: Will mail education for HTN, monitoring sodium, reading food labels and monitoring logs for BP, pulse ox and weight. Goals Addressed                   This Visit's Progress     Nutrition Plan   No change     I will talk with dietitian regarding low sodium diet, weight loss, healthy food choices, portion control. Barriers: lack of education  Plan for overcoming my barriers: Care Coordination, dietitian. Confidence: 7/10  Anticipated Goal Completion Date: 8/28/23 4/28/23 Referral made. 5/8/23 Dietitian called & left VM x3. Pt not familiar with dietitian calling. Gave Pt dietitian phone number. Pt said he will call today. 6/8/23 Pt does want to talk with dietitian. Pt did not recognize the dietitian phone number so he didn't answer. Gave Pt dietitian phone number and he plans to call her.   6/21/23 Pt has dietitian phone number if he chooses to call. 8/17/23 Unchanged       Self Monitoring   On track     Daily Weights - I will weight myself as directed - Daily and write down weights  Blood Pressure - I will take my blood pressure as directed - Daily  I will notify my provider of any changes in blood pressure associated with symptoms of dizziness, falls, passing out, headache, confusion/change in mental status. Other Self-Monitoring - I will monitor my pulse ox  - Daily    None Recently Recorded    Barriers: none  Plan for overcoming my barriers: Care coordination & RPM Team.   Confidence: 8/10  Anticipated Goal Completion Date: 8/28/23 4/28/23 RPM initiated. 5/8/23 RPM activated and in progress. 6/8/23 RPM continues. 6/21/23 RPM continues. 8/17/23 Pt agrees to RPM graduation and he will continue to monitor his vital signs with his own equipment.  Will inform RPM Team.               Future Appointments   Date Time Provider [address]   9/7/2023 10:00 AM Venecia Prater MD [facility]   10/11/2023 10:15 AM DO FRANCES Mai [facility]

## 2023-08-18 NOTE — PROGRESS NOTES
Remote Patient Order Discontinued    Received request from Yonis Roblero RN  to discontinue order for remote patient monitoring of HTN and order completed.

## 2023-08-30 ENCOUNTER — CARE COORDINATION (OUTPATIENT)
Dept: CARE COORDINATION | Age: 78
End: 2023-08-30

## 2023-08-31 ENCOUNTER — CARE COORDINATION (OUTPATIENT)
Dept: CARE COORDINATION | Age: 78
End: 2023-08-31

## 2023-08-31 NOTE — CARE COORDINATION
-ACM attempted to reach patient to follow up on his status regarding HTN, weight loss for Care Coordination, however no answer. -HIPAA compliant VM left introducing self and reason for call.  -Left ACM's contact information, requesting call back.  -Plan:   If no return call, ACM will attempt outreach again.

## 2023-09-07 ENCOUNTER — OFFICE VISIT (OUTPATIENT)
Dept: VASCULAR SURGERY | Age: 78
End: 2023-09-07
Payer: MEDICARE

## 2023-09-07 DIAGNOSIS — I65.23 BILATERAL CAROTID ARTERY STENOSIS: ICD-10-CM

## 2023-09-07 DIAGNOSIS — R09.89 BILATERAL CAROTID BRUITS: Primary | ICD-10-CM

## 2023-09-07 PROCEDURE — 1123F ACP DISCUSS/DSCN MKR DOCD: CPT | Performed by: SURGERY

## 2023-09-07 PROCEDURE — 99214 OFFICE O/P EST MOD 30 MIN: CPT | Performed by: SURGERY

## 2023-09-07 NOTE — PROGRESS NOTES
No       Review of Systems:    Eyes:  No blurring, diplopia or vision loss. Respiratory:  No cough, pleuritic chest pain, dyspnea, or wheezing. Obstructive lung disease obstructive sleep apnea    Cardiovascular: No angina, palpitations . Coronary artery disease, history of atrial fibrillation, coronary artery angioplasty stent placement, status postcardiac bypass surgery status post aortic valve replacement    Musculoskeletal:  No arthritis or weakness. All osteoarthritis right hip, cervical spine disc disease    Neurologic:  No paralysis, paresis, paresthesia, seizures or headache. Endocrinology: Diabetes mellitus            Physical Exam:  General appearance:  Alert, awake, oriented x 3. No distress. Eyes:  Conjunctivae appear normal; PERRL  Neck:  No jugular venous distention, lymphadenopathy or thyromegaly. Carotid bruit noted  Lungs:  Clear to ausculation bilaterally. No rhonchi, crackles, wheezes  Heart:  Regular rate and rhythm. No rub or murmur  Abdomen:  Soft, non-tender. No masses, organomegaly. Musculoskeletal : No joint effusions, tenderness swelling    Neuro: Speech is intact. Moving all extremities. No focal motor or sensory deficits      Extremities:  Both feet are warm to touch. The color of both feet is normal.        Pulses Right  Left    Brachial 3 3    Radial    3=normal   Femoral 2 2  2=diminished   Popliteal    1=barely palpable   Dorsalis pedis    0=absent   Posterior tibial 2 2  4=aneurysmal             Other pertinent information:1. The past medical records were reviewed. Assessment:    1. Bilateral carotid bruits    2.  Bilateral carotid artery stenosis              Plan:       Ayan the patient, options, risks benefits  explained, based upon the last testing that was done, patient has moderate carotid disease, 40% stenosis the natural history slow gradual progression over many months to years was explained, decided to follow the him in 2 years but call and come to

## 2023-09-21 ENCOUNTER — CARE COORDINATION (OUTPATIENT)
Dept: CARE COORDINATION | Age: 78
End: 2023-09-21

## 2023-09-21 NOTE — CARE COORDINATION
had the guard adjusted. Pt said he sleeps well. Pt is using the  instead of CPAP. -Pt reports intermittent mild SOB when doing yard work is baseline. No cough or wheezing. Resources  -Pt reports he is scheduled for neck surgery 10-9-2023 at [facility] with Dr Chaparro Telles. From [facility] notes on 9-13-23: Planned procedure: c6-7 posterior decompression with C5-T1 or T2 instrumented fusion with screws and rods    PLAN:  -Patient graduating from Care Coordination completed  -Patient has met goals and education has been completed  -ACM provided contact information and encouraged patient to reach out if any needs arise. Patient verbalized understanding. -PCP notified of graduation       Lab Results       None                 Goals Addressed                   This Visit's Progress     Nutrition Plan        I will talk with dietitian regarding low sodium diet, weight loss, healthy food choices, portion control. Barriers: lack of education  Plan for overcoming my barriers: Care Coordination, dietitian. Confidence: 7/10  Anticipated Goal Completion Date: 8/28/23 4/28/23 Referral made. 5/8/23 Dietitian called & left VM x3. Pt not familiar with dietitian calling. Gave Pt dietitian phone number. Pt said he will call today. 6/8/23 Pt does want to talk with dietitian. Pt did not recognize the dietitian phone number so he didn't answer. Gave Pt dietitian phone number and he plans to call her.   6/21/23 Pt has dietitian phone number if he chooses to call. 8/17/23 Unchanged    Updated Anticipated Goal Completion Date: 9/28/23 9-20-23 Unchanged       Self Monitoring        Daily Weights - I will weight myself as directed - Daily and write down weights  Blood Pressure - I will take my blood pressure as directed - Daily  I will notify my provider of any changes in blood pressure associated with symptoms of dizziness, falls, passing out, headache, confusion/change in mental status.   Other Self-Monitoring - I will

## 2023-09-26 ENCOUNTER — HOSPITAL ENCOUNTER (OUTPATIENT)
Dept: CARDIOLOGY | Age: 78
Discharge: HOME | End: 2023-09-26
Payer: MEDICARE

## 2023-09-26 DIAGNOSIS — R09.89 BILATERAL CAROTID BRUITS: ICD-10-CM

## 2023-09-26 DIAGNOSIS — I65.23 BILATERAL CAROTID ARTERY STENOSIS: ICD-10-CM

## 2023-09-26 PROCEDURE — 93880 EXTRACRANIAL BILAT STUDY: CPT

## 2023-09-26 NOTE — PROGRESS NOTES
[facility] - [facility]    This is a pre read worksheet - prior to official physician interpretation    Langlade Radha  1945  Date of study: 9/26/23    Indication for study:  Carotid artery stenosis  Study : Bilateral Carotid Artery Duplex Examination    Duplex examination of the RIGHT carotid artery system identifies atherosclerotic plaque. The peak systolic velocity in internal carotid artery was 147 centimeters / second. The maximum end diastolic velocity was 27 centimeters / second. The ICA/CCA ratio is 1.5. The right vertebral artery has antegrade flow. Duplex examination of the LEFT carotid artery system identifies atherosclerotic plaque. The peak systolic velocity in internal carotid artery was 98 centimeters / second. The maximum end diastolic velocity was 23 centimeters / second. The ICA/CCA ratio is 0.8. The left vertebral artery has antegrade flow.     RIGHT 40%  LEFT 20%        LAST STUDY  8/32/2023  Rt 40  Lt 20

## 2023-09-28 NOTE — PROCEDURES
[address], [address] [place], [place] [address]                                VASCULAR REPORT    PATIENT NAME: Martinez Moya                     :        1945  MED REC NO:   [id number]                            ROOM:  ACCOUNT NO:   [de-identified]                           ADMIT DATE: 2023  PROVIDER:     Aristides Pearson MD    DATE OF PROCEDURE:  2023    CAROTID ULTRASOUND REPORT    INDICATION:  Right carotid artery stenosis. FINDINGS:  Duplex scanning of the right carotid artery revealed the  patient has moderate plaque with a peak internal carotid velocity of  694, diastolic velocity 30 cm/sec with plaque causing approximately 40%  stenosis. Duplex scanning of the left carotid artery revealed mild intimal  thickening with peak internal carotid velocity of 90, diastolic velocity  of 23 cm/sec with essentially normal stenosis of 20%. IMPRESSION:  40% stenosis of the right carotid artery associated with  normal 20% stenosis of the left carotid artery, unchanged from the study  done two years ago. Aristides Pearson MD    D: 2023 20:20:51       T: 2023 20:23:15     [id number]  Job#: [id number]     Doc#: [id number]    CC:   Neel Larios DO

## 2023-10-02 ENCOUNTER — TELEPHONE (OUTPATIENT)
Dept: VASCULAR SURGERY | Age: 78
End: 2023-10-02

## 2023-10-02 DIAGNOSIS — E79.0 HYPERURICEMIA: ICD-10-CM

## 2023-10-02 RX ORDER — ALLOPURINOL 300 MG/1
TABLET ORAL
Qty: 90 TABLET | Refills: 0 | Status: SHIPPED | OUTPATIENT
Start: 2023-10-02

## 2023-10-16 RX ORDER — EVOLOCUMAB 140 MG/ML
INJECTION, SOLUTION SUBCUTANEOUS
Qty: 6 ML | Refills: 5 | Status: SHIPPED | OUTPATIENT
Start: 2023-10-16

## 2023-10-19 DIAGNOSIS — I48.0 PAROXYSMAL ATRIAL FIBRILLATION (HCC): ICD-10-CM

## 2023-11-13 ENCOUNTER — OFFICE VISIT (OUTPATIENT)
Dept: PRIMARY CARE CLINIC | Age: 78
End: 2023-11-13

## 2023-11-13 VITALS
SYSTOLIC BLOOD PRESSURE: 112 MMHG | HEART RATE: 68 BPM | DIASTOLIC BLOOD PRESSURE: 62 MMHG | HEIGHT: 69 IN | TEMPERATURE: 97.1 F | WEIGHT: 213 LBS | BODY MASS INDEX: 31.55 KG/M2 | OXYGEN SATURATION: 97 %

## 2023-11-13 DIAGNOSIS — I48.0 PAROXYSMAL ATRIAL FIBRILLATION (HCC): ICD-10-CM

## 2023-11-13 DIAGNOSIS — Z00.00 MEDICARE ANNUAL WELLNESS VISIT, SUBSEQUENT: ICD-10-CM

## 2023-11-13 DIAGNOSIS — I10 ESSENTIAL HYPERTENSION: ICD-10-CM

## 2023-11-13 DIAGNOSIS — Z23 NEED FOR PROPHYLACTIC VACCINATION AND INOCULATION AGAINST INFLUENZA: Primary | ICD-10-CM

## 2023-11-13 DIAGNOSIS — E79.0 HYPERURICEMIA: ICD-10-CM

## 2023-11-13 DIAGNOSIS — Z87.891 PERSONAL HISTORY OF TOBACCO USE: ICD-10-CM

## 2023-11-13 DIAGNOSIS — E11.40 TYPE 2 DIABETES MELLITUS WITH DIABETIC NEUROPATHY, WITHOUT LONG-TERM CURRENT USE OF INSULIN (HCC): ICD-10-CM

## 2023-11-13 DIAGNOSIS — E78.2 MIXED HYPERLIPIDEMIA: ICD-10-CM

## 2023-11-13 LAB
ABSOLUTE IMMATURE GRANULOCYTE: <0.03 K/UL (ref 0–0.58)
BASOPHILS ABSOLUTE: 0.05 K/UL (ref 0–0.2)
BASOPHILS RELATIVE PERCENT: 1 % (ref 0–2)
EOSINOPHILS ABSOLUTE: 0.13 K/UL (ref 0.05–0.5)
EOSINOPHILS RELATIVE PERCENT: 2 % (ref 0–6)
HBA1C MFR BLD: 6.5 % (ref 4–5.6)
HCT VFR BLD CALC: 37.4 % (ref 37–54)
HEMOGLOBIN: 11.6 G/DL (ref 12.5–16.5)
IMMATURE GRANULOCYTES: 0 % (ref 0–5)
LYMPHOCYTES ABSOLUTE: 1.61 K/UL (ref 1.5–4)
LYMPHOCYTES RELATIVE PERCENT: 26 % (ref 20–42)
MCH RBC QN AUTO: 30 PG (ref 26–35)
MCHC RBC AUTO-ENTMCNC: 31 G/DL (ref 32–34.5)
MCV RBC AUTO: 96.6 FL (ref 80–99.9)
MONOCYTES ABSOLUTE: 0.59 K/UL (ref 0.1–0.95)
MONOCYTES RELATIVE PERCENT: 10 % (ref 2–12)
NEUTROPHILS ABSOLUTE: 3.82 K/UL (ref 1.8–7.3)
NEUTROPHILS RELATIVE PERCENT: 62 % (ref 43–80)
PDW BLD-RTO: 13.9 % (ref 11.5–15)
PLATELET # BLD: 74 K/UL (ref 130–450)
PMV BLD AUTO: 11 FL (ref 7–12)
RBC # BLD: 3.87 M/UL (ref 3.8–5.8)
TSH SERPL DL<=0.05 MIU/L-ACNC: 1.09 UIU/ML (ref 0.27–4.2)
URIC ACID: 5.2 MG/DL (ref 3.4–7)
WBC # BLD: 6.2 K/UL (ref 4.5–11.5)

## 2023-11-13 RX ORDER — METHOCARBAMOL 500 MG/1
TABLET, FILM COATED ORAL
COMMUNITY
Start: 2023-10-13

## 2023-11-13 RX ORDER — OXYCODONE HYDROCHLORIDE 5 MG/1
TABLET ORAL
COMMUNITY
Start: 2023-10-20

## 2023-11-13 ASSESSMENT — LIFESTYLE VARIABLES
HOW MANY STANDARD DRINKS CONTAINING ALCOHOL DO YOU HAVE ON A TYPICAL DAY: PATIENT DOES NOT DRINK
HOW OFTEN DO YOU HAVE A DRINK CONTAINING ALCOHOL: NEVER

## 2023-11-13 ASSESSMENT — PATIENT HEALTH QUESTIONNAIRE - PHQ9
SUM OF ALL RESPONSES TO PHQ9 QUESTIONS 1 & 2: 0
SUM OF ALL RESPONSES TO PHQ QUESTIONS 1-9: 0
SUM OF ALL RESPONSES TO PHQ QUESTIONS 1-9: 0
2. FEELING DOWN, DEPRESSED OR HOPELESS: 0
SUM OF ALL RESPONSES TO PHQ QUESTIONS 1-9: 0
1. LITTLE INTEREST OR PLEASURE IN DOING THINGS: 0
SUM OF ALL RESPONSES TO PHQ QUESTIONS 1-9: 0

## 2023-11-13 NOTE — PROGRESS NOTES
nightly  Yes ProviderAlyssia MD   Coenzyme Q10 (CO Q 10) 100 MG CAPS Take 2 tablets by mouth daily Yes Provider, MD Alyssia   Cholecalciferol (VITAMIN D3) 2000 UNITS TABS Take 1 tablet by mouth daily Yes ProviderAlyssia MD   Multiple Vitamins-Minerals (THERAPEUTIC MULTIVITAMIN-MINERALS) tablet Take 1 tablet by mouth daily Indications: Centrum Yes ProviderAlyssia MD   aspirin 81 MG EC tablet Take 1 tablet by mouth daily Yes ProviderAlyssia MD       CareTeam (Including outside providers/suppliers regularly involved in providing care):   Patient Care Team:  Samuel Sanchez DO as PCP - General (Family Medicine)  Samuel Sanchez DO as PCP - Empaneled Provider  Shirley Joseph MD as Consulting Physician (Cardiology)  Cristina Mckinney as Imaging Navigator     Reviewed and updated this visit:  Tobacco  Allergies  Meds  Med Hx  Surg Hx  Soc Hx  Fam Hx

## 2023-11-16 LAB
ALBUMIN SERPL-MCNC: 4.5 G/DL (ref 3.5–5.2)
ALP BLD-CCNC: 75 U/L (ref 40–129)
ALT SERPL-CCNC: 19 U/L (ref 0–40)
ANION GAP SERPL CALCULATED.3IONS-SCNC: 24 MMOL/L (ref 7–16)
AST SERPL-CCNC: 27 U/L (ref 0–39)
BILIRUB SERPL-MCNC: 0.3 MG/DL (ref 0–1.2)
BUN BLDV-MCNC: 21 MG/DL (ref 6–23)
CALCIUM SERPL-MCNC: 10.2 MG/DL (ref 8.6–10.2)
CHLORIDE BLD-SCNC: 99 MMOL/L (ref 98–107)
CHOLESTEROL: 149 MG/DL
CO2: 19 MMOL/L (ref 22–29)
CREAT SERPL-MCNC: 0.9 MG/DL (ref 0.7–1.2)
GFR SERPL CREATININE-BSD FRML MDRD: >60 ML/MIN/1.73M2
GLUCOSE BLD-MCNC: 120 MG/DL (ref 74–99)
HDLC SERPL-MCNC: 60 MG/DL
LDL CHOLESTEROL: 49 MG/DL
POTASSIUM SERPL-SCNC: 4.8 MMOL/L (ref 3.5–5)
SODIUM BLD-SCNC: 142 MMOL/L (ref 132–146)
TOTAL PROTEIN: 7.6 G/DL (ref 6.4–8.3)
TRIGL SERPL-MCNC: 200 MG/DL
VLDLC SERPL CALC-MCNC: 40 MG/DL

## 2023-11-22 ENCOUNTER — TELEPHONE (OUTPATIENT)
Dept: CASE MANAGEMENT | Age: 78
End: 2023-11-22

## 2023-11-22 NOTE — TELEPHONE ENCOUNTER
I called the patient and he confirmed his CT lung screening at [address] [place] [address] on 11/25/2023 at 9:00 am.  I reminded the patient to arrive at 8:30 am, enter through the main entrance, and register. Patient confirmed.             Electronically signed by Una Fraser on 11/22/23 at 2:39 PM EST

## 2023-11-25 ENCOUNTER — HOSPITAL ENCOUNTER (OUTPATIENT)
Dept: CT IMAGING | Age: 78
End: 2023-11-25
Attending: FAMILY MEDICINE
Payer: MEDICARE

## 2023-11-25 DIAGNOSIS — Z87.891 PERSONAL HISTORY OF TOBACCO USE: ICD-10-CM

## 2023-11-25 PROCEDURE — 71271 CT THORAX LUNG CANCER SCR C-: CPT

## 2023-11-27 RX ORDER — METOPROLOL SUCCINATE 25 MG/1
TABLET, EXTENDED RELEASE ORAL
Qty: 180 TABLET | Refills: 0 | Status: SHIPPED | OUTPATIENT
Start: 2023-11-27

## 2023-11-28 ENCOUNTER — TELEPHONE (OUTPATIENT)
Dept: CASE MANAGEMENT | Age: 78
End: 2023-11-28

## 2023-11-28 NOTE — TELEPHONE ENCOUNTER
No call, encounter opened to process CT Lung Screening. CT Lung Screen: 11/25/2023    IMPRESSION:  1. There is no pulmonary infiltrate or pulmonary nodule  2. Emphysematous change  3. Pleural base soft tissue smooth bordered mass seen within the right lower  lobe posteriorly and medially. This finding is unchanged compared to the  prior study and measures 4.6 cm x 2.0 cm. This finding has been present  since the prior study of 05/15/2014 and represents a benign etiology. LUNG RADS:  Lung-RADS 2 - Benign ()     Management:  12 month screening LDCT     RECOMMENDATIONS:  If you would like to register your patient with the [facility], please contact the Nurse Navigator at  [phone]. Pack years: 80    Social History     Tobacco Use  Smoking Status: Former Smoker    Start Date: 1958   Quit Date: 04/29/2013   Types: Cigarettes   Packs/Day: 2   Years: 54   Pack Years: 110   Smokeless Tobacco: Never        Results letter sent to patient via my chart or mailed.      [address] [facility]

## 2024-01-04 NOTE — PROGRESS NOTES
Chief Complaint:   Chief Complaint   Patient presents with    3 Month Follow-Up    Hypertension    Discuss Medications       Hypertension  This is a chronic problem. The current episode started more than 1 year ago. The problem has been resolved since onset. The problem is controlled. Pertinent negatives include no chest pain, headaches, palpitations or shortness of breath. Past treatments include beta blockers, calcium channel blockers, diuretics, angiotensin blockers and direct vasodilators. The current treatment provides significant improvement. There are no compliance problems.         Patient Active Problem List   Diagnosis    Coronary atherosclerosis of native coronary artery    Hyperlipidemia    Aortic stenosis    Hypertension    DEBORAH (obstructive sleep apnea)    DDD (degenerative disc disease), cervical    Nocturnal hypoxemia due to asthma    Neuroforaminal stenosis of lumbar spine    Valvular heart disease    Mild intermittent asthma without complication    New onset a-fib ([address]    S/P AVR (aortic valve replacement)    S/P CABG (coronary artery bypass graft)    Atrial fibrillation with rapid ventricular response (HCC)    [facility]    Type 2 diabetes mellitus with diabetic neuropathy, without long-term current use of insulin (HCC)    PVD (peripheral vascular disease) ([address]    Bilateral carotid bruits    Carotid stenosis, right    Diplopia    Congestive heart failure of unknown etiology ([address]    Acute diastolic CHF (congestive heart failure) (HCC)    Obesity    Chronic obstructive pulmonary disease, unspecified COPD type ([address]       Past Medical History:   Diagnosis Date    Arthritis     Atrial fibrillation ([address]     day of surgery on 06/12/13- resolved at this time 6/23/2013    Bilateral carotid bruits 10/15/2020    CAD (coronary artery disease)     heart cath with stent 2001    Cancer [facility])     Basal Cell on his back    Carotid stenosis, right 10/15/2020    Patient to be admitted to L&D for labor per Dr. Norton.    Report given to Dee ULRICH RN   Congestive heart failure of unknown etiology ([facility] 12/11/2021    COPD (chronic obstructive pulmonary disease) ([facility]     Diplopia 4/21/2021    History of blood transfusion     Hyperlipidemia     Hypertension     Kidney stones     Sleep apnea     Type 2 diabetes mellitus with diabetic neuropathy, without long-term current use of insulin ([facility] 9/2/2020    Vitamin D deficiency        Past Surgical History:   Procedure Laterality Date    ABDOMEN SURGERY      Bilateral groin hernias    AORTIC VALVE REPLACEMENT  6/12/2013    BACK SURGERY  2006    cervicle fusion   [facility] BACK SURGERY  1974, 1984    lumbar laminectomy    BACK SURGERY  [id number]    360 FUSION L4-L5    BACK SURGERY      CARDIAC SURGERY      CARPAL TUNNEL RELEASE  [id number]    both hands    COLONOSCOPY      CORONARY ARTERY BYPASS GRAFT      DIAGNOSTIC CARDIAC CATH LAB PROCEDURE      ENDOSCOPY, COLON, DIAGNOSTIC      FRACTURE SURGERY      Right arm Fx    FRACTURE SURGERY      Left finger Fx    HERNIA REPAIR      Bilateral    KIDNEY STONE SURGERY      open    NECK SURGERY      SHOULDER ARTHROSCOPY      right and left shoulders    SKIN BIOPSY         Current Outpatient Medications   Medication Sig Dispense Refill    olmesartan (BENICAR) 20 MG tablet Take 1 tablet by mouth daily Indications: taking 40 mg 30 tablet 2    amLODIPine (NORVASC) 5 MG tablet Take 1 tablet by mouth daily 90 tablet 1    thiamine 100 MG tablet       Evolocumab (REPATHA) 140 MG/ML SOSY Inject 2 times a month 2 mL 5    hydrALAZINE (APRESOLINE) 10 MG tablet Take 20 mg by mouth 3 times daily      metoprolol succinate (TOPROL XL) 50 MG extended release tablet Take 1 tablet by mouth 2 times daily 180 tablet 1    b complex vitamins capsule Take 1 capsule by mouth daily      torsemide (DEMADEX) 10 MG tablet Take 2 tablets by mouth daily 60 tablet 3    allopurinol (ZYLOPRIM) 300 MG tablet TAKE ONE TABLET BY MOUTH DAILY 90 tablet 0    apixaban (ELIQUIS) 5 MG TABS tablet Take 1 tablet by mouth 2 times daily 180 tablet 1    omeprazole (PRILOSEC) 20 MG delayed release capsule TAKE ONE CAPSULE BY MOUTH DAILY 90 capsule 3    Alpha-Lipoic Acid 100 MG CAPS Take 200 mg by mouth nightly       Coenzyme Q10 (CO Q 10) 100 MG CAPS Take 2 tablets by mouth daily      Cholecalciferol (VITAMIN D3) 2000 UNITS TABS Take 1 tablet by mouth daily      Multiple Vitamins-Minerals (THERAPEUTIC MULTIVITAMIN-MINERALS) tablet Take 1 tablet by mouth daily Indications: Centrum       aspirin 81 MG EC tablet Take 81 mg by mouth daily        No current facility-administered medications for this visit. Allergies   Allergen Reactions    Iodine Swelling and Other (See Comments)     Reddened face and swelling of tongue       Social History     Socioeconomic History    Marital status:      Spouse name: Karrie Bueno    Number of children: 3    Years of education: 9    Highest education level: None   Occupational History    Occupation: retired- [employer] line/ [employer]     Employer: [employer] [address]: Retired   Tobacco Use    Smoking status: Former Smoker     Packs/day: 2.00     Years: 55.00     Pack years: 110.00     Types: Cigarettes     Start date:      Quit date: 2013     Years since quittin.8    Smokeless tobacco: Never Used    Tobacco comment: [employer]   Vaping Use    Vaping Use: Never used    Passive vaping exposure: Yes   Substance and Sexual Activity    Alcohol use: Yes     Comment: drinks 6 cans of beer /day    Drug use: No    Sexual activity: Yes     Partners: Female   Other Topics Concern    None   Social History Narrative    None     Social Determinants of Health     Financial Resource Strain:     Difficulty of Paying Living Expenses: Not on file   Food Insecurity:     Worried About Running Out of Food in the Last Year: Not on file    Gwen of Food in the Last Year: Not on file   Transportation Needs:     Lack of Transportation (Medical):  Not on file    Lack of Transportation (Non-Medical): Not on file   Physical Activity:     Days of Exercise per Week: Not on file    Minutes of Exercise per Session: Not on file   Stress:     Feeling of Stress : Not on file   Social Connections:     Frequency of Communication with Friends and Family: Not on file    Frequency of Social Gatherings with Friends and Family: Not on file    Attends Judaism Services: Not on file    Active Member of [address] [place] [address] or Organizations: Not on file    Attends Club or Organization Meetings: Not on file    Marital Status: Not on file   Intimate Partner Violence:     Fear of Current or Ex-Partner: Not on file    Emotionally Abused: Not on file    Physically Abused: Not on file    Sexually Abused: Not on file   Housing Stability:     Unable to Pay for Housing in the Last Year: Not on file    Number of [place] in the Last Year: Not on file    Unstable Housing in the Last Year: Not on file       Family History   Problem Relation Age of Onset    Stroke Father     Cancer Sister     Heart Disease Brother          Review of Systems   Constitutional: Negative. HENT: Negative for congestion, facial swelling, hearing loss, nosebleeds, sinus pressure and sore throat. Eyes: Negative for photophobia and visual disturbance. Respiratory: Negative for apnea, cough, chest tightness, shortness of breath and wheezing. Cardiovascular: Positive for leg swelling. Negative for chest pain and palpitations. Gastrointestinal: Negative for abdominal pain, blood in stool, diarrhea, nausea and vomiting. Genitourinary: Negative for difficulty urinating, frequency and urgency. Musculoskeletal: Negative for arthralgias, back pain, joint swelling and myalgias. Skin: Negative for color change and rash. Allergic/Immunologic: Negative. Neurological: Negative for syncope, weakness, light-headedness and headaches. Hematological: Negative.     Psychiatric/Behavioral: Negative for agitation, behavioral problems, confusion and self-injury. The patient is not nervous/anxious. All other systems reviewed and are negative. Physical Exam  Vitals and nursing note reviewed. Constitutional:       General: He is not in acute distress. Appearance: He is well-developed. HENT:      Head: Normocephalic and atraumatic. Nose: Nose normal.   Eyes:      Conjunctiva/sclera: Conjunctivae normal.      Pupils: Pupils are equal, round, and reactive to light. Neck:      Thyroid: No thyromegaly. Vascular: No JVD. Cardiovascular:      Rate and Rhythm: Normal rate. Rhythm irregularly irregular. Heart sounds: Murmur heard. Systolic murmur is present with a grade of 1/6. No friction rub. No gallop. Pulmonary:      Effort: Pulmonary effort is normal. No respiratory distress. Breath sounds: Normal breath sounds. No wheezing. Abdominal:      General: Bowel sounds are normal. There is no distension. Palpations: Abdomen is soft. Tenderness: There is no abdominal tenderness. There is no guarding or rebound. Musculoskeletal:         General: Normal range of motion. Cervical back: Normal range of motion and neck supple. Right lower leg: Edema present. Left lower leg: Edema present. Lymphadenopathy:      Cervical: No cervical adenopathy. Skin:     General: Skin is warm and dry. Findings: No erythema or rash. Neurological:      Mental Status: He is alert and oriented to person, place, and time. Cranial Nerves: No cranial nerve deficit. Motor: No abnormal muscle tone. Coordination: Coordination normal.      Deep Tendon Reflexes: Reflexes are normal and symmetric. Psychiatric:         Behavior: Behavior normal.         Judgment: Judgment normal.                               ASSESSMENT/PLAN:    Aida Andujar was seen today for 3 month follow-up, hypertension and discuss medications.     Diagnoses and all orders for this visit:    Mixed hyperlipidemia    Chronic obstructive pulmonary disease, unspecified COPD type ([facility]    Acute on chronic diastolic congestive heart failure (HCC)    Type 2 diabetes mellitus with diabetic neuropathy, without long-term current use of insulin (HCC)    PVD (peripheral vascular disease) (HCC)    Paroxysmal atrial fibrillation (HCC)    Essential hypertension  -     olmesartan (BENICAR) 20 MG tablet; Take 1 tablet by mouth daily Indications: taking 40 mg  -     amLODIPine (NORVASC) 5 MG tablet;  Take 1 tablet by mouth daily    labs reviewed  F/u [facility]      Marquis Lorena DO    3/9/2022  3:20 PM

## 2024-01-11 DIAGNOSIS — E79.0 HYPERURICEMIA: ICD-10-CM

## 2024-01-11 RX ORDER — ALLOPURINOL 300 MG/1
300 TABLET ORAL DAILY
Qty: 90 TABLET | Refills: 0 | Status: SHIPPED | OUTPATIENT
Start: 2024-01-11

## 2024-01-22 DIAGNOSIS — I48.0 PAROXYSMAL ATRIAL FIBRILLATION (HCC): ICD-10-CM

## 2024-01-27 NOTE — TELEPHONE ENCOUNTER
The pt was in the hospital 12/11-12/14 for CHF and on the last page of the AVS it tells the pt to get a BMP this week sometime. His wife is calling to see if you can order it and is asking when he should get it done.  She is also asking if you want to see the pt show

## 2024-02-12 ENCOUNTER — OFFICE VISIT (OUTPATIENT)
Dept: PRIMARY CARE CLINIC | Age: 79
End: 2024-02-12
Payer: MEDICARE

## 2024-02-12 VITALS
DIASTOLIC BLOOD PRESSURE: 86 MMHG | OXYGEN SATURATION: 97 % | WEIGHT: 219 LBS | BODY MASS INDEX: 32.44 KG/M2 | HEART RATE: 63 BPM | RESPIRATION RATE: 18 BRPM | TEMPERATURE: 97.8 F | HEIGHT: 69 IN | SYSTOLIC BLOOD PRESSURE: 128 MMHG

## 2024-02-12 DIAGNOSIS — I10 ESSENTIAL HYPERTENSION: ICD-10-CM

## 2024-02-12 DIAGNOSIS — E78.2 MIXED HYPERLIPIDEMIA: ICD-10-CM

## 2024-02-12 DIAGNOSIS — E79.0 HYPERURICEMIA: ICD-10-CM

## 2024-02-12 DIAGNOSIS — I48.0 PAROXYSMAL ATRIAL FIBRILLATION (HCC): ICD-10-CM

## 2024-02-12 DIAGNOSIS — I25.10 ATHEROSCLEROSIS OF NATIVE CORONARY ARTERY OF NATIVE HEART WITHOUT ANGINA PECTORIS: Chronic | ICD-10-CM

## 2024-02-12 DIAGNOSIS — E11.40 TYPE 2 DIABETES MELLITUS WITH DIABETIC NEUROPATHY, WITHOUT LONG-TERM CURRENT USE OF INSULIN (HCC): ICD-10-CM

## 2024-02-12 DIAGNOSIS — Z00.00 MEDICARE ANNUAL WELLNESS VISIT, SUBSEQUENT: Primary | ICD-10-CM

## 2024-02-12 LAB
ABSOLUTE IMMATURE GRANULOCYTE: <0.03 K/UL (ref 0–0.58)
ALBUMIN SERPL-MCNC: 4.6 G/DL (ref 3.5–5.2)
ALP BLD-CCNC: 68 U/L (ref 40–129)
ALT SERPL-CCNC: 20 U/L (ref 0–40)
ANION GAP SERPL CALCULATED.3IONS-SCNC: 14 MMOL/L (ref 7–16)
AST SERPL-CCNC: 27 U/L (ref 0–39)
BASOPHILS ABSOLUTE: 0.08 K/UL (ref 0–0.2)
BASOPHILS RELATIVE PERCENT: 1 % (ref 0–2)
BILIRUB SERPL-MCNC: 0.3 MG/DL (ref 0–1.2)
BUN BLDV-MCNC: 20 MG/DL (ref 6–23)
CALCIUM SERPL-MCNC: 10 MG/DL (ref 8.6–10.2)
CHLORIDE BLD-SCNC: 102 MMOL/L (ref 98–107)
CHOLESTEROL: 147 MG/DL
CO2: 24 MMOL/L (ref 22–29)
CREAT SERPL-MCNC: 0.8 MG/DL (ref 0.7–1.2)
EOSINOPHILS ABSOLUTE: 0.15 K/UL (ref 0.05–0.5)
EOSINOPHILS RELATIVE PERCENT: 2 % (ref 0–6)
GFR SERPL CREATININE-BSD FRML MDRD: >60 ML/MIN/1.73M2
GLUCOSE BLD-MCNC: 129 MG/DL (ref 74–99)
HBA1C MFR BLD: 6.7 % (ref 4–5.6)
HCT VFR BLD CALC: 40.1 % (ref 37–54)
HDLC SERPL-MCNC: 66 MG/DL
HEMOGLOBIN: 13 G/DL (ref 12.5–16.5)
IMMATURE GRANULOCYTES: 0 % (ref 0–5)
LDL CHOLESTEROL: 45 MG/DL
LYMPHOCYTES ABSOLUTE: 1.85 K/UL (ref 1.5–4)
LYMPHOCYTES RELATIVE PERCENT: 30 % (ref 20–42)
MCH RBC QN AUTO: 30.1 PG (ref 26–35)
MCHC RBC AUTO-ENTMCNC: 32.4 G/DL (ref 32–34.5)
MCV RBC AUTO: 92.8 FL (ref 80–99.9)
MONOCYTES ABSOLUTE: 0.52 K/UL (ref 0.1–0.95)
MONOCYTES RELATIVE PERCENT: 8 % (ref 2–12)
NEUTROPHILS ABSOLUTE: 3.56 K/UL (ref 1.8–7.3)
NEUTROPHILS RELATIVE PERCENT: 58 % (ref 43–80)
PDW BLD-RTO: 13.3 % (ref 11.5–15)
PLATELET # BLD: 160 K/UL (ref 130–450)
PMV BLD AUTO: 11.1 FL (ref 7–12)
POTASSIUM SERPL-SCNC: 4.3 MMOL/L (ref 3.5–5)
RBC # BLD: 4.32 M/UL (ref 3.8–5.8)
SODIUM BLD-SCNC: 140 MMOL/L (ref 132–146)
TOTAL PROTEIN: 7.5 G/DL (ref 6.4–8.3)
TRIGL SERPL-MCNC: 181 MG/DL
TSH SERPL DL<=0.05 MIU/L-ACNC: 1.51 UIU/ML (ref 0.27–4.2)
URIC ACID: 4.5 MG/DL (ref 3.4–7)
VLDLC SERPL CALC-MCNC: 36 MG/DL
WBC # BLD: 6.2 K/UL (ref 4.5–11.5)

## 2024-02-12 PROCEDURE — G0439 PPPS, SUBSEQ VISIT: HCPCS | Performed by: FAMILY MEDICINE

## 2024-02-12 PROCEDURE — 3074F SYST BP LT 130 MM HG: CPT | Performed by: FAMILY MEDICINE

## 2024-02-12 PROCEDURE — 3079F DIAST BP 80-89 MM HG: CPT | Performed by: FAMILY MEDICINE

## 2024-02-12 PROCEDURE — 1123F ACP DISCUSS/DSCN MKR DOCD: CPT | Performed by: FAMILY MEDICINE

## 2024-02-12 NOTE — PROGRESS NOTES
Medicare Annual Wellness Visit    González Palmer is here for 3 Month Follow-Up, Medicare AWV, and Hypertension    Assessment & Plan   Paroxysmal atrial fibrillation (HCC)  -     CBC with Auto Differential; Future  -     Comprehensive Metabolic Panel; Future  -     Lipid Panel; Future  -     TSH; Future  -     Hemoglobin A1C; Future  Hyperuricemia  -     CBC with Auto Differential; Future  -     Comprehensive Metabolic Panel; Future  -     Lipid Panel; Future  -     TSH; Future  -     Hemoglobin A1C; Future  -     Uric Acid; Future  Mixed hyperlipidemia  -     CBC with Auto Differential; Future  -     Comprehensive Metabolic Panel; Future  -     Lipid Panel; Future  -     TSH; Future  -     Hemoglobin A1C; Future  Type 2 diabetes mellitus with diabetic neuropathy, without long-term current use of insulin (HCC)  -     CBC with Auto Differential; Future  -     Comprehensive Metabolic Panel; Future  -     Lipid Panel; Future  -     TSH; Future  -     Hemoglobin A1C; Future  Essential hypertension  -     CBC with Auto Differential; Future  -     Comprehensive Metabolic Panel; Future  -     Lipid Panel; Future  -     TSH; Future  -     Hemoglobin A1C; Future  Atherosclerosis of native coronary artery of native heart without angina pectoris  -     CBC with Auto Differential; Future  -     Comprehensive Metabolic Panel; Future  -     Lipid Panel; Future  -     TSH; Future  -     Hemoglobin A1C; Future    Recommendations for Preventive Services Due: see orders and patient instructions/AVS.  Recommended screening schedule for the next 5-10 years is provided to the patient in written form: see Patient Instructions/AVS.     No follow-ups on file.     Subjective   The following acute and/or chronic problems were also addressed today:  Doing well     Patient's complete Health Risk Assessment and screening values have been reviewed and are found in Flowsheets. The following problems were reviewed today and where indicated follow

## 2024-03-04 DIAGNOSIS — I10 ESSENTIAL HYPERTENSION: Chronic | ICD-10-CM

## 2024-03-04 RX ORDER — METOPROLOL SUCCINATE 25 MG/1
25 TABLET, EXTENDED RELEASE ORAL 2 TIMES DAILY
Qty: 180 TABLET | Refills: 1 | Status: SHIPPED | OUTPATIENT
Start: 2024-03-04

## 2024-03-04 RX ORDER — AMLODIPINE BESYLATE 5 MG/1
5 TABLET ORAL DAILY
Qty: 90 TABLET | Refills: 1 | Status: SHIPPED | OUTPATIENT
Start: 2024-03-04

## 2024-04-02 RX ORDER — OMEPRAZOLE 20 MG/1
20 CAPSULE, DELAYED RELEASE ORAL DAILY
Qty: 90 CAPSULE | Refills: 0 | Status: SHIPPED | OUTPATIENT
Start: 2024-04-02

## 2024-04-08 DIAGNOSIS — E79.0 HYPERURICEMIA: ICD-10-CM

## 2024-04-08 RX ORDER — ALLOPURINOL 300 MG/1
300 TABLET ORAL DAILY
Qty: 90 TABLET | Refills: 1 | Status: SHIPPED | OUTPATIENT
Start: 2024-04-08

## 2024-04-24 DIAGNOSIS — I48.0 PAROXYSMAL ATRIAL FIBRILLATION (HCC): ICD-10-CM

## 2024-05-14 ENCOUNTER — OFFICE VISIT (OUTPATIENT)
Dept: PRIMARY CARE CLINIC | Age: 79
End: 2024-05-14
Payer: MEDICARE

## 2024-05-14 ENCOUNTER — TELEPHONE (OUTPATIENT)
Dept: PRIMARY CARE CLINIC | Age: 79
End: 2024-05-14

## 2024-05-14 VITALS
SYSTOLIC BLOOD PRESSURE: 158 MMHG | HEART RATE: 60 BPM | BODY MASS INDEX: 31.84 KG/M2 | WEIGHT: 215 LBS | HEIGHT: 69 IN | TEMPERATURE: 97.9 F | DIASTOLIC BLOOD PRESSURE: 78 MMHG | OXYGEN SATURATION: 97 %

## 2024-05-14 DIAGNOSIS — E78.2 MIXED HYPERLIPIDEMIA: ICD-10-CM

## 2024-05-14 DIAGNOSIS — I25.10 ATHEROSCLEROSIS OF NATIVE CORONARY ARTERY OF NATIVE HEART WITHOUT ANGINA PECTORIS: Chronic | ICD-10-CM

## 2024-05-14 DIAGNOSIS — E11.40 TYPE 2 DIABETES MELLITUS WITH DIABETIC NEUROPATHY, WITHOUT LONG-TERM CURRENT USE OF INSULIN (HCC): ICD-10-CM

## 2024-05-14 DIAGNOSIS — I10 ESSENTIAL HYPERTENSION: Chronic | ICD-10-CM

## 2024-05-14 DIAGNOSIS — E79.0 HYPERURICEMIA: ICD-10-CM

## 2024-05-14 DIAGNOSIS — I48.0 PAROXYSMAL ATRIAL FIBRILLATION (HCC): ICD-10-CM

## 2024-05-14 DIAGNOSIS — I48.0 PAROXYSMAL ATRIAL FIBRILLATION (HCC): Primary | ICD-10-CM

## 2024-05-14 LAB
ALBUMIN: 4.5 G/DL (ref 3.5–5.2)
ALP BLD-CCNC: 64 U/L (ref 40–129)
ALT SERPL-CCNC: 24 U/L (ref 0–40)
ANION GAP SERPL CALCULATED.3IONS-SCNC: 14 MMOL/L (ref 7–16)
AST SERPL-CCNC: 29 U/L (ref 0–39)
BASOPHILS ABSOLUTE: 0.07 K/UL (ref 0–0.2)
BASOPHILS RELATIVE PERCENT: 1 % (ref 0–2)
BILIRUB SERPL-MCNC: 0.3 MG/DL (ref 0–1.2)
BUN BLDV-MCNC: 24 MG/DL (ref 6–23)
CALCIUM SERPL-MCNC: 9.9 MG/DL (ref 8.6–10.2)
CHLORIDE BLD-SCNC: 104 MMOL/L (ref 98–107)
CHOLESTEROL, TOTAL: 136 MG/DL
CO2: 22 MMOL/L (ref 22–29)
CREAT SERPL-MCNC: 0.8 MG/DL (ref 0.7–1.2)
EOSINOPHILS ABSOLUTE: 0.1 K/UL (ref 0.05–0.5)
EOSINOPHILS RELATIVE PERCENT: 2 % (ref 0–6)
GFR, ESTIMATED: 89 ML/MIN/1.73M2
GLUCOSE BLD-MCNC: 113 MG/DL (ref 74–99)
HBA1C MFR BLD: 6.5 % (ref 4–5.6)
HCT VFR BLD CALC: 38.8 % (ref 37–54)
HDLC SERPL-MCNC: 76 MG/DL
HEMOGLOBIN: 12.7 G/DL (ref 12.5–16.5)
IMMATURE GRANULOCYTES %: 0 % (ref 0–5)
IMMATURE GRANULOCYTES ABSOLUTE: <0.03 K/UL (ref 0–0.58)
LDL CHOLESTEROL: 46 MG/DL
LYMPHOCYTES ABSOLUTE: 1.84 K/UL (ref 1.5–4)
LYMPHOCYTES RELATIVE PERCENT: 29 % (ref 20–42)
MCH RBC QN AUTO: 31.2 PG (ref 26–35)
MCHC RBC AUTO-ENTMCNC: 32.7 G/DL (ref 32–34.5)
MCV RBC AUTO: 95.3 FL (ref 80–99.9)
MONOCYTES ABSOLUTE: 0.53 K/UL (ref 0.1–0.95)
MONOCYTES RELATIVE PERCENT: 8 % (ref 2–12)
NEUTROPHILS ABSOLUTE: 3.83 K/UL (ref 1.8–7.3)
NEUTROPHILS RELATIVE PERCENT: 60 % (ref 43–80)
PDW BLD-RTO: 13.8 % (ref 11.5–15)
PLATELET CONFIRMATION: NORMAL
PLATELET, FLUORESCENCE: 158 K/UL (ref 130–450)
PMV BLD AUTO: 10.6 FL (ref 7–12)
POTASSIUM SERPL-SCNC: 4.3 MMOL/L (ref 3.5–5)
RBC # BLD: 4.07 M/UL (ref 3.8–5.8)
SODIUM BLD-SCNC: 140 MMOL/L (ref 132–146)
TOTAL PROTEIN: 7.2 G/DL (ref 6.4–8.3)
TRIGL SERPL-MCNC: 72 MG/DL
TSH SERPL DL<=0.05 MIU/L-ACNC: 1.12 UIU/ML (ref 0.27–4.2)
URIC ACID: 4.7 MG/DL (ref 3.4–7)
VLDLC SERPL CALC-MCNC: 14 MG/DL
WBC # BLD: 6.4 K/UL (ref 4.5–11.5)

## 2024-05-14 PROCEDURE — 1123F ACP DISCUSS/DSCN MKR DOCD: CPT | Performed by: FAMILY MEDICINE

## 2024-05-14 PROCEDURE — 3078F DIAST BP <80 MM HG: CPT | Performed by: FAMILY MEDICINE

## 2024-05-14 PROCEDURE — 3044F HG A1C LEVEL LT 7.0%: CPT | Performed by: FAMILY MEDICINE

## 2024-05-14 PROCEDURE — 99214 OFFICE O/P EST MOD 30 MIN: CPT | Performed by: FAMILY MEDICINE

## 2024-05-14 PROCEDURE — 3077F SYST BP >= 140 MM HG: CPT | Performed by: FAMILY MEDICINE

## 2024-05-14 RX ORDER — TORSEMIDE 10 MG/1
10 TABLET ORAL DAILY
Qty: 90 TABLET | Refills: 3 | Status: SHIPPED | OUTPATIENT
Start: 2024-05-14

## 2024-05-14 RX ORDER — OLMESARTAN MEDOXOMIL 20 MG/1
20 TABLET ORAL DAILY
Qty: 30 TABLET | Refills: 2 | Status: SHIPPED
Start: 2024-05-14 | End: 2024-05-14

## 2024-05-14 SDOH — ECONOMIC STABILITY: FOOD INSECURITY: WITHIN THE PAST 12 MONTHS, THE FOOD YOU BOUGHT JUST DIDN'T LAST AND YOU DIDN'T HAVE MONEY TO GET MORE.: NEVER TRUE

## 2024-05-14 SDOH — ECONOMIC STABILITY: FOOD INSECURITY: WITHIN THE PAST 12 MONTHS, YOU WORRIED THAT YOUR FOOD WOULD RUN OUT BEFORE YOU GOT MONEY TO BUY MORE.: NEVER TRUE

## 2024-05-14 SDOH — ECONOMIC STABILITY: INCOME INSECURITY: HOW HARD IS IT FOR YOU TO PAY FOR THE VERY BASICS LIKE FOOD, HOUSING, MEDICAL CARE, AND HEATING?: NOT HARD AT ALL

## 2024-05-14 ASSESSMENT — ENCOUNTER SYMPTOMS
BACK PAIN: 1
ABDOMINAL PAIN: 0
WHEEZING: 0
BLOOD IN STOOL: 0
SHORTNESS OF BREATH: 0
SORE THROAT: 0
FACIAL SWELLING: 0
NAUSEA: 0
VOMITING: 0
CHEST TIGHTNESS: 0
SINUS PRESSURE: 0
COLOR CHANGE: 0
APNEA: 0
DIARRHEA: 0
ALLERGIC/IMMUNOLOGIC NEGATIVE: 1
COUGH: 0

## 2024-05-14 NOTE — PROGRESS NOTES
Chief Complaint:   Chief Complaint   Patient presents with    3 Month Follow-Up       Hypertension  This is a chronic problem. The current episode started more than 1 year ago. The problem has been resolved since onset. The problem is controlled. Associated symptoms include neck pain. Pertinent negatives include no chest pain, headaches, palpitations or shortness of breath. Past treatments include calcium channel blockers and beta blockers. The current treatment provides significant improvement. There are no compliance problems.    Atrial Fibrillation  Presents for follow-up visit. Symptoms are negative for chest pain, palpitations, shortness of breath and weakness. The symptoms have been stable. Past medical history includes hyperlipidemia.   Hyperlipidemia  This is a chronic problem. The current episode started more than 1 year ago. The problem is controlled. Recent lipid tests were reviewed and are normal. Pertinent negatives include no chest pain, myalgias or shortness of breath. Treatments tried: repatha. The current treatment provides significant improvement of lipids.       Patient Active Problem List   Diagnosis    Coronary atherosclerosis of native coronary artery    Hyperlipidemia    Aortic stenosis    Essential hypertension    DEBORAH (obstructive sleep apnea)    DDD (degenerative disc disease), cervical    Nocturnal hypoxemia due to asthma    Neuroforaminal stenosis of lumbar spine    Valvular heart disease    Mild intermittent asthma without complication    New onset a-fib ([facility])    S/P AVR (aortic valve replacement)    S/P CABG (coronary artery bypass graft)    Atrial fibrillation with rapid ventricular response ([facility])    Atrial fibrillation ([facility])    Type 2 diabetes mellitus with diabetic neuropathy, without long-term current use of insulin ([facility])    PVD (peripheral vascular disease) ([facility])    Bilateral carotid bruits    Carotid stenosis, right    Diplopia    Obesity    Chronic obstructive pulmonary disease,

## 2024-05-20 RX ORDER — OLMESARTAN MEDOXOMIL 20 MG/1
20 TABLET ORAL DAILY
COMMUNITY
End: 2024-05-20 | Stop reason: SDUPTHER

## 2024-05-20 RX ORDER — OLMESARTAN MEDOXOMIL 20 MG/1
20 TABLET ORAL DAILY
Qty: 90 TABLET | Refills: 1 | Status: SHIPPED | OUTPATIENT
Start: 2024-05-20

## 2024-07-01 RX ORDER — OMEPRAZOLE 20 MG/1
20 CAPSULE, DELAYED RELEASE ORAL DAILY
Qty: 90 CAPSULE | Refills: 0 | Status: SHIPPED | OUTPATIENT
Start: 2024-07-01

## 2024-07-20 DIAGNOSIS — I48.0 PAROXYSMAL ATRIAL FIBRILLATION (HCC): ICD-10-CM

## 2024-07-29 DIAGNOSIS — I48.0 PAROXYSMAL ATRIAL FIBRILLATION (HCC): ICD-10-CM

## 2024-08-14 ENCOUNTER — OFFICE VISIT (OUTPATIENT)
Dept: PRIMARY CARE CLINIC | Age: 79
End: 2024-08-14
Payer: MEDICARE

## 2024-08-14 VITALS
SYSTOLIC BLOOD PRESSURE: 138 MMHG | BODY MASS INDEX: 31.19 KG/M2 | HEIGHT: 69 IN | TEMPERATURE: 97 F | HEART RATE: 79 BPM | DIASTOLIC BLOOD PRESSURE: 88 MMHG | OXYGEN SATURATION: 99 % | WEIGHT: 210.6 LBS

## 2024-08-14 DIAGNOSIS — I25.10 ATHEROSCLEROSIS OF NATIVE CORONARY ARTERY OF NATIVE HEART WITHOUT ANGINA PECTORIS: Chronic | ICD-10-CM

## 2024-08-14 DIAGNOSIS — I48.0 PAROXYSMAL ATRIAL FIBRILLATION (HCC): Primary | ICD-10-CM

## 2024-08-14 DIAGNOSIS — M51.36 DDD (DEGENERATIVE DISC DISEASE), LUMBAR: ICD-10-CM

## 2024-08-14 DIAGNOSIS — G62.9 NEUROPATHY: ICD-10-CM

## 2024-08-14 DIAGNOSIS — I10 ESSENTIAL HYPERTENSION: ICD-10-CM

## 2024-08-14 DIAGNOSIS — E78.2 MIXED HYPERLIPIDEMIA: ICD-10-CM

## 2024-08-14 DIAGNOSIS — E79.0 HYPERURICEMIA: ICD-10-CM

## 2024-08-14 DIAGNOSIS — J44.9 CHRONIC OBSTRUCTIVE PULMONARY DISEASE, UNSPECIFIED COPD TYPE (HCC): ICD-10-CM

## 2024-08-14 DIAGNOSIS — E11.40 TYPE 2 DIABETES MELLITUS WITH DIABETIC NEUROPATHY, WITHOUT LONG-TERM CURRENT USE OF INSULIN (HCC): ICD-10-CM

## 2024-08-14 DIAGNOSIS — I48.0 PAROXYSMAL ATRIAL FIBRILLATION (HCC): ICD-10-CM

## 2024-08-14 LAB
ALBUMIN: 4.7 G/DL (ref 3.5–5.2)
ALP BLD-CCNC: 66 U/L (ref 40–129)
ALT SERPL-CCNC: 20 U/L (ref 0–40)
ANION GAP SERPL CALCULATED.3IONS-SCNC: 13 MMOL/L (ref 7–16)
AST SERPL-CCNC: 26 U/L (ref 0–39)
BASOPHILS ABSOLUTE: 0.07 K/UL (ref 0–0.2)
BASOPHILS RELATIVE PERCENT: 1 % (ref 0–2)
BILIRUB SERPL-MCNC: 0.4 MG/DL (ref 0–1.2)
BUN BLDV-MCNC: 23 MG/DL (ref 6–23)
CALCIUM SERPL-MCNC: 10 MG/DL (ref 8.6–10.2)
CHLORIDE BLD-SCNC: 102 MMOL/L (ref 98–107)
CHOLESTEROL, TOTAL: 150 MG/DL
CO2: 24 MMOL/L (ref 22–29)
CREAT SERPL-MCNC: 0.9 MG/DL (ref 0.7–1.2)
EOSINOPHILS ABSOLUTE: 0.13 K/UL (ref 0.05–0.5)
EOSINOPHILS RELATIVE PERCENT: 2 % (ref 0–6)
GFR, ESTIMATED: 84 ML/MIN/1.73M2
GLUCOSE BLD-MCNC: 125 MG/DL (ref 74–99)
HBA1C MFR BLD: 6.6 % (ref 4–5.6)
HCT VFR BLD CALC: 41.4 % (ref 37–54)
HDLC SERPL-MCNC: 52 MG/DL
HEMOGLOBIN: 13 G/DL (ref 12.5–16.5)
IMMATURE GRANULOCYTES %: 0 % (ref 0–5)
IMMATURE GRANULOCYTES ABSOLUTE: 0.03 K/UL (ref 0–0.58)
LDL CHOLESTEROL: 52 MG/DL
LYMPHOCYTES ABSOLUTE: 1.88 K/UL (ref 1.5–4)
LYMPHOCYTES RELATIVE PERCENT: 25 % (ref 20–42)
MAGNESIUM: 2.3 MG/DL (ref 1.6–2.6)
MCH RBC QN AUTO: 30.6 PG (ref 26–35)
MCHC RBC AUTO-ENTMCNC: 31.4 G/DL (ref 32–34.5)
MCV RBC AUTO: 97.4 FL (ref 80–99.9)
MONOCYTES ABSOLUTE: 0.62 K/UL (ref 0.1–0.95)
MONOCYTES RELATIVE PERCENT: 8 % (ref 2–12)
NEUTROPHILS ABSOLUTE: 4.75 K/UL (ref 1.8–7.3)
NEUTROPHILS RELATIVE PERCENT: 64 % (ref 43–80)
PDW BLD-RTO: 13.6 % (ref 11.5–15)
PLATELET CONFIRMATION: NORMAL
PLATELET, FLUORESCENCE: 112 K/UL (ref 130–450)
PMV BLD AUTO: 10.9 FL (ref 7–12)
POTASSIUM SERPL-SCNC: 4.8 MMOL/L (ref 3.5–5)
RBC # BLD: 4.25 M/UL (ref 3.8–5.8)
SODIUM BLD-SCNC: 139 MMOL/L (ref 132–146)
TOTAL PROTEIN: 7.3 G/DL (ref 6.4–8.3)
TRIGL SERPL-MCNC: 232 MG/DL
TSH SERPL DL<=0.05 MIU/L-ACNC: 1.06 UIU/ML (ref 0.27–4.2)
URIC ACID: 4.8 MG/DL (ref 3.4–7)
VLDLC SERPL CALC-MCNC: 46 MG/DL
WBC # BLD: 7.5 K/UL (ref 4.5–11.5)

## 2024-08-14 PROCEDURE — 1123F ACP DISCUSS/DSCN MKR DOCD: CPT | Performed by: FAMILY MEDICINE

## 2024-08-14 PROCEDURE — 3044F HG A1C LEVEL LT 7.0%: CPT | Performed by: FAMILY MEDICINE

## 2024-08-14 PROCEDURE — 99214 OFFICE O/P EST MOD 30 MIN: CPT | Performed by: FAMILY MEDICINE

## 2024-08-14 PROCEDURE — 3075F SYST BP GE 130 - 139MM HG: CPT | Performed by: FAMILY MEDICINE

## 2024-08-14 PROCEDURE — 3079F DIAST BP 80-89 MM HG: CPT | Performed by: FAMILY MEDICINE

## 2024-08-14 RX ORDER — GABAPENTIN 100 MG/1
100 CAPSULE ORAL 2 TIMES DAILY
Qty: 180 CAPSULE | Refills: 1 | Status: SHIPPED | OUTPATIENT
Start: 2024-08-14 | End: 2025-02-10

## 2024-08-14 ASSESSMENT — ENCOUNTER SYMPTOMS
CHEST TIGHTNESS: 0
BACK PAIN: 1
APNEA: 0
NAUSEA: 0
SINUS PRESSURE: 0
ALLERGIC/IMMUNOLOGIC NEGATIVE: 1
PHOTOPHOBIA: 0
WHEEZING: 0
FACIAL SWELLING: 0
BLOOD IN STOOL: 0
COUGH: 0
SORE THROAT: 0
VOMITING: 0
SHORTNESS OF BREATH: 0
COLOR CHANGE: 0
ABDOMINAL PAIN: 0
DIARRHEA: 0

## 2024-08-14 NOTE — PROGRESS NOTES
range of motion.      Cervical back: Normal range of motion and neck supple.      Lumbar back: Tenderness and bony tenderness present.   Lymphadenopathy:      Cervical: No cervical adenopathy.   Skin:     General: Skin is warm and dry.      Findings: No erythema or rash.   Neurological:      Mental Status: He is alert and oriented to person, place, and time.      Cranial Nerves: No cranial nerve deficit.      Motor: No abnormal muscle tone.      Coordination: Coordination normal.      Deep Tendon Reflexes: Reflexes are normal and symmetric.   Psychiatric:         Behavior: Behavior normal.         Judgment: Judgment normal.                               ASSESSMENT/PLAN:    González was seen today for 3 month follow-up, muscle pain and peripheral neuropathy.    Diagnoses and all orders for this visit:    Paroxysmal atrial fibrillation (HCC)  -     CBC with Auto Differential; Future  -     Comprehensive Metabolic Panel; Future  -     Lipid Panel; Future  -     Hemoglobin A1C; Future  -     TSH; Future  -     Magnesium; Future  -     Uric Acid; Future    Essential hypertension  -     CBC with Auto Differential; Future  -     Comprehensive Metabolic Panel; Future  -     Lipid Panel; Future  -     Hemoglobin A1C; Future  -     TSH; Future  -     Magnesium; Future  -     Uric Acid; Future    Mixed hyperlipidemia  -     CBC with Auto Differential; Future  -     Comprehensive Metabolic Panel; Future  -     Lipid Panel; Future  -     Hemoglobin A1C; Future  -     TSH; Future  -     Magnesium; Future  -     Uric Acid; Future    Type 2 diabetes mellitus with diabetic neuropathy, without long-term current use of insulin (HCC)  -     CBC with Auto Differential; Future  -     Comprehensive Metabolic Panel; Future  -     Lipid Panel; Future  -     Hemoglobin A1C; Future  -     TSH; Future  -     Magnesium; Future  -     Uric Acid; Future    Hyperuricemia  -     CBC with Auto Differential; Future  -     Comprehensive Metabolic Panel;

## 2024-08-27 DIAGNOSIS — I10 ESSENTIAL HYPERTENSION: Chronic | ICD-10-CM

## 2024-08-27 RX ORDER — AMLODIPINE BESYLATE 5 MG/1
5 TABLET ORAL DAILY
Qty: 90 TABLET | Refills: 0 | Status: SHIPPED | OUTPATIENT
Start: 2024-08-27

## 2024-09-03 RX ORDER — METOPROLOL SUCCINATE 25 MG/1
25 TABLET, EXTENDED RELEASE ORAL 2 TIMES DAILY
Qty: 180 TABLET | Refills: 0 | Status: SHIPPED | OUTPATIENT
Start: 2024-09-03

## 2024-09-05 ENCOUNTER — OFFICE VISIT (OUTPATIENT)
Dept: VASCULAR SURGERY | Age: 79
End: 2024-09-05
Payer: MEDICARE

## 2024-09-05 VITALS — BODY MASS INDEX: 30.86 KG/M2 | WEIGHT: 209 LBS

## 2024-09-05 DIAGNOSIS — R09.89 DECREASED DORSALIS PEDIS PULSE: ICD-10-CM

## 2024-09-05 DIAGNOSIS — R26.2 DIFFICULTY WALKING: ICD-10-CM

## 2024-09-05 DIAGNOSIS — I65.21 CAROTID STENOSIS, RIGHT: Primary | ICD-10-CM

## 2024-09-05 DIAGNOSIS — R09.89 BILATERAL CAROTID BRUITS: ICD-10-CM

## 2024-09-05 PROCEDURE — 99214 OFFICE O/P EST MOD 30 MIN: CPT | Performed by: SURGERY

## 2024-09-05 PROCEDURE — 1123F ACP DISCUSS/DSCN MKR DOCD: CPT | Performed by: SURGERY

## 2024-09-05 NOTE — PROGRESS NOTES
Chief Complaint:   Chief Complaint   Patient presents with    Circulatory Problem     Bilateral lower extremities cramps         HPI: Patient came to the office with his wife, for the evaluation of vascular status legs, tells me he has cramps in both legs in the daytime, at nighttime, involving both lower extremities without any history of swelling of the legs, underwent eval of his PCP, event was satisfactory and came in for further evaluation      Patient denies any focal lateralizing neurological symptoms like loss of speech, vision or loss of function of extremity    Patient can walk 1 block at a time slowly, with help of a cane, tells me partly limited because of his arthritis of the hips and the knee joints, and denies any symptoms of rest pain    Patient has multiple comorbid risk factor including coronary artery disease history postcardiac bypass surgery history atrial fibrillation, hypertension, hyperlipidemia, asthma, obstructive sleep apnea etc.    Allergies   Allergen Reactions    Iodine Swelling and Other (See Comments)     Reddened face and swelling of tongue       Current Outpatient Medications   Medication Sig Dispense Refill    metoprolol succinate (TOPROL XL) 25 MG extended release tablet TAKE ONE TABLET BY MOUTH TWO TIMES A  tablet 0    amLODIPine (NORVASC) 5 MG tablet TAKE ONE TABLET BY MOUTH DAILY 90 tablet 0    gabapentin (NEURONTIN) 100 MG capsule Take 1 capsule by mouth 2 times daily for 180 days. Intended supply: 90 days 180 capsule 1    apixaban (ELIQUIS) 5 MG TABS tablet TAKE ONE TABLET BY MOUTH TWO TIMES A  tablet 0    omeprazole (PRILOSEC) 20 MG delayed release capsule Take 1 capsule by mouth daily 90 capsule 0    olmesartan (BENICAR) 20 MG tablet Take 1 tablet by mouth daily 90 tablet 1    torsemide (DEMADEX) 10 MG tablet Take 1 tablet by mouth daily 90 tablet 3    allopurinol (ZYLOPRIM) 300 MG tablet Take 1 tablet by mouth daily 90 tablet 1    NONFORMULARY Multivitamin &

## 2024-09-30 DIAGNOSIS — E79.0 HYPERURICEMIA: ICD-10-CM

## 2024-09-30 RX ORDER — ALLOPURINOL 300 MG/1
300 TABLET ORAL DAILY
Qty: 90 TABLET | Refills: 1 | Status: SHIPPED | OUTPATIENT
Start: 2024-09-30

## 2024-10-15 ENCOUNTER — TELEPHONE (OUTPATIENT)
Dept: VASCULAR SURGERY | Age: 79
End: 2024-10-15

## 2024-10-21 DIAGNOSIS — I48.0 PAROXYSMAL ATRIAL FIBRILLATION (HCC): ICD-10-CM

## 2024-10-29 RX ORDER — EVOLOCUMAB 140 MG/ML
INJECTION, SOLUTION SUBCUTANEOUS
Qty: 6 ML | Refills: 0 | Status: SHIPPED | OUTPATIENT
Start: 2024-10-29

## 2024-11-13 ENCOUNTER — OFFICE VISIT (OUTPATIENT)
Dept: PRIMARY CARE CLINIC | Age: 79
End: 2024-11-13

## 2024-11-13 VITALS
HEART RATE: 68 BPM | DIASTOLIC BLOOD PRESSURE: 62 MMHG | OXYGEN SATURATION: 97 % | SYSTOLIC BLOOD PRESSURE: 122 MMHG | WEIGHT: 211.4 LBS | TEMPERATURE: 97.5 F | BODY MASS INDEX: 31.31 KG/M2 | HEIGHT: 69 IN

## 2024-11-13 DIAGNOSIS — J44.9 CHRONIC OBSTRUCTIVE PULMONARY DISEASE, UNSPECIFIED COPD TYPE (HCC): ICD-10-CM

## 2024-11-13 DIAGNOSIS — E79.0 HYPERURICEMIA: ICD-10-CM

## 2024-11-13 DIAGNOSIS — E11.40 TYPE 2 DIABETES MELLITUS WITH DIABETIC NEUROPATHY, WITHOUT LONG-TERM CURRENT USE OF INSULIN (HCC): ICD-10-CM

## 2024-11-13 DIAGNOSIS — I73.9 PVD (PERIPHERAL VASCULAR DISEASE) (HCC): ICD-10-CM

## 2024-11-13 DIAGNOSIS — Z23 NEED FOR PROPHYLACTIC VACCINATION AND INOCULATION AGAINST INFLUENZA: ICD-10-CM

## 2024-11-13 DIAGNOSIS — I48.0 PAROXYSMAL ATRIAL FIBRILLATION (HCC): Primary | ICD-10-CM

## 2024-11-13 DIAGNOSIS — I50.33 ACUTE ON CHRONIC DIASTOLIC CONGESTIVE HEART FAILURE (HCC): ICD-10-CM

## 2024-11-13 DIAGNOSIS — E78.2 MIXED HYPERLIPIDEMIA: ICD-10-CM

## 2024-11-13 DIAGNOSIS — Z12.5 PROSTATE CANCER SCREENING: ICD-10-CM

## 2024-11-13 DIAGNOSIS — I48.0 PAROXYSMAL ATRIAL FIBRILLATION (HCC): ICD-10-CM

## 2024-11-13 DIAGNOSIS — I10 ESSENTIAL HYPERTENSION: ICD-10-CM

## 2024-11-13 LAB
ALBUMIN: 4.3 G/DL (ref 3.5–5.2)
ALP BLD-CCNC: 59 U/L (ref 40–129)
ALT SERPL-CCNC: 20 U/L (ref 0–40)
ANION GAP SERPL CALCULATED.3IONS-SCNC: 14 MMOL/L (ref 7–16)
AST SERPL-CCNC: 29 U/L (ref 0–39)
BASOPHILS ABSOLUTE: 0.06 K/UL (ref 0–0.2)
BASOPHILS RELATIVE PERCENT: 1 % (ref 0–2)
BILIRUB SERPL-MCNC: 0.4 MG/DL (ref 0–1.2)
BUN BLDV-MCNC: 24 MG/DL (ref 6–23)
CALCIUM SERPL-MCNC: 10 MG/DL (ref 8.6–10.2)
CHLORIDE BLD-SCNC: 102 MMOL/L (ref 98–107)
CHOLESTEROL, TOTAL: 149 MG/DL
CO2: 25 MMOL/L (ref 22–29)
CREAT SERPL-MCNC: 0.8 MG/DL (ref 0.7–1.2)
EOSINOPHILS ABSOLUTE: 0.14 K/UL (ref 0.05–0.5)
EOSINOPHILS RELATIVE PERCENT: 2 % (ref 0–6)
GFR, ESTIMATED: 89 ML/MIN/1.73M2
GLUCOSE BLD-MCNC: 121 MG/DL (ref 74–99)
HBA1C MFR BLD: 6.5 % (ref 4–5.6)
HCT VFR BLD CALC: 40.5 % (ref 37–54)
HDLC SERPL-MCNC: 57 MG/DL
HEMOGLOBIN: 12.7 G/DL (ref 12.5–16.5)
IMMATURE GRANULOCYTES %: 0 % (ref 0–5)
IMMATURE GRANULOCYTES ABSOLUTE: <0.03 K/UL (ref 0–0.58)
LDL CHOLESTEROL: 50 MG/DL
LYMPHOCYTES ABSOLUTE: 1.7 K/UL (ref 1.5–4)
LYMPHOCYTES RELATIVE PERCENT: 29 % (ref 20–42)
MCH RBC QN AUTO: 30.5 PG (ref 26–35)
MCHC RBC AUTO-ENTMCNC: 31.4 G/DL (ref 32–34.5)
MCV RBC AUTO: 97.1 FL (ref 80–99.9)
MONOCYTES ABSOLUTE: 0.57 K/UL (ref 0.1–0.95)
MONOCYTES RELATIVE PERCENT: 10 % (ref 2–12)
NEUTROPHILS ABSOLUTE: 3.48 K/UL (ref 1.8–7.3)
NEUTROPHILS RELATIVE PERCENT: 58 % (ref 43–80)
PDW BLD-RTO: 13.7 % (ref 11.5–15)
PLATELET # BLD: 86 K/UL (ref 130–450)
PLATELET CONFIRMATION: NORMAL
PMV BLD AUTO: 10.1 FL (ref 7–12)
POTASSIUM SERPL-SCNC: 4.5 MMOL/L (ref 3.5–5)
PROSTATE SPECIFIC ANTIGEN: 1.32 NG/ML (ref 0–4)
RBC # BLD: 4.17 M/UL (ref 3.8–5.8)
SODIUM BLD-SCNC: 141 MMOL/L (ref 132–146)
TOTAL PROTEIN: 7 G/DL (ref 6.4–8.3)
TRIGL SERPL-MCNC: 210 MG/DL
TSH SERPL DL<=0.05 MIU/L-ACNC: 0.82 UIU/ML (ref 0.27–4.2)
URIC ACID: 4.7 MG/DL (ref 3.4–7)
VLDLC SERPL CALC-MCNC: 42 MG/DL
WBC # BLD: 6 K/UL (ref 4.5–11.5)

## 2024-11-13 ASSESSMENT — ENCOUNTER SYMPTOMS
DIARRHEA: 0
FACIAL SWELLING: 0
NAUSEA: 0
CHEST TIGHTNESS: 0
ABDOMINAL PAIN: 0
COUGH: 0
PHOTOPHOBIA: 0
BLOOD IN STOOL: 0
SORE THROAT: 0
VOMITING: 0
ALLERGIC/IMMUNOLOGIC NEGATIVE: 1
WHEEZING: 0
APNEA: 0
COLOR CHANGE: 0
SINUS PRESSURE: 0
BACK PAIN: 0
SHORTNESS OF BREATH: 0

## 2024-11-13 NOTE — PROGRESS NOTES
Future  -     Uric Acid; Future    PVD (peripheral vascular disease) (HCC)  -     CBC with Auto Differential; Future  -     Comprehensive Metabolic Panel; Future  -     Lipid Panel; Future  -     Hemoglobin A1C; Future  -     TSH; Future  -     PSA Screening; Future  -     Uric Acid; Future    Acute on chronic diastolic congestive heart failure (HCC)  -     CBC with Auto Differential; Future  -     Comprehensive Metabolic Panel; Future  -     Lipid Panel; Future  -     Hemoglobin A1C; Future  -     TSH; Future  -     PSA Screening; Future  -     Uric Acid; Future    Type 2 diabetes mellitus with diabetic neuropathy, without long-term current use of insulin (HCC)  -     CBC with Auto Differential; Future  -     Comprehensive Metabolic Panel; Future  -     Lipid Panel; Future  -     Hemoglobin A1C; Future  -     TSH; Future  -     PSA Screening; Future  -     Uric Acid; Future  -     Microalbumin, Ur; Future    Hyperuricemia  -     CBC with Auto Differential; Future  -     Comprehensive Metabolic Panel; Future  -     Lipid Panel; Future  -     Hemoglobin A1C; Future  -     TSH; Future  -     PSA Screening; Future  -     Uric Acid; Future    Chronic obstructive pulmonary disease, unspecified COPD type (HCC)  -     CBC with Auto Differential; Future  -     Comprehensive Metabolic Panel; Future  -     Lipid Panel; Future  -     Hemoglobin A1C; Future  -     TSH; Future  -     PSA Screening; Future  -     Uric Acid; Future    Essential hypertension  -     CBC with Auto Differential; Future  -     Comprehensive Metabolic Panel; Future  -     Lipid Panel; Future  -     Hemoglobin A1C; Future  -     TSH; Future  -     PSA Screening; Future  -     Uric Acid; Future    Mixed hyperlipidemia  -     CBC with Auto Differential; Future  -     Comprehensive Metabolic Panel; Future  -     Lipid Panel; Future  -     Hemoglobin A1C; Future  -     TSH; Future  -     PSA Screening; Future  -     Uric Acid; Future    Need for prophylactic

## 2024-11-15 RX ORDER — OLMESARTAN MEDOXOMIL 20 MG/1
20 TABLET ORAL DAILY
Qty: 90 TABLET | Refills: 0 | Status: SHIPPED | OUTPATIENT
Start: 2024-11-15

## 2024-11-27 ENCOUNTER — HOSPITAL ENCOUNTER (OUTPATIENT)
Dept: CARDIOLOGY | Age: 79
Discharge: HOME | End: 2024-11-29
Attending: SURGERY
Payer: MEDICARE

## 2024-11-27 DIAGNOSIS — R09.89 DECREASED DORSALIS PEDIS PULSE: ICD-10-CM

## 2024-11-27 DIAGNOSIS — I10 ESSENTIAL HYPERTENSION: Chronic | ICD-10-CM

## 2024-11-27 LAB
VAS LEFT ABI: 0.98
VAS LEFT ANKLE BP: 140 MMHG
VAS LEFT ARM BP: 133 MMHG
VAS LEFT CALF PRESSURE: 196 MMHG
VAS LEFT DORSALIS PEDIS BP: 135 MMHG
VAS LEFT PTA BP: 140 MMHG
VAS LEFT TBI: 0.87
VAS LEFT THIGH PRESSURE: 200 MMHG
VAS LEFT TOE PRESSURE: 125 MMHG
VAS RIGHT ABI: 1.21
VAS RIGHT ANKLE BP: 173 MMHG
VAS RIGHT ARM BP: 143 MMHG
VAS RIGHT CALF PRESSURE: 177 MMHG
VAS RIGHT DORSALIS PEDIS BP: 166 MMHG
VAS RIGHT PTA BP: 173 MMHG
VAS RIGHT TBI: 1.05
VAS RIGHT THIGH PRESSURE: 200 MMHG
VAS RIGHT TOE PRESSURE: 150 MMHG

## 2024-11-27 PROCEDURE — 93923 UPR/LXTR ART STDY 3+ LVLS: CPT | Performed by: SURGERY

## 2024-11-27 PROCEDURE — 93923 UPR/LXTR ART STDY 3+ LVLS: CPT

## 2024-11-27 RX ORDER — AMLODIPINE BESYLATE 5 MG/1
5 TABLET ORAL DAILY
Qty: 90 TABLET | Refills: 0 | Status: SHIPPED | OUTPATIENT
Start: 2024-11-27

## 2024-11-27 NOTE — TELEPHONE ENCOUNTER
Name of Medication(s) Requested:  Requested Prescriptions     Pending Prescriptions Disp Refills    amLODIPine (NORVASC) 5 MG tablet [Pharmacy Med Name: amLODIPine Besylate Oral Tablet 5 MG] 90 tablet 0     Sig: TAKE ONE TABLET BY MOUTH DAILY       Medication is on current medication list Yes    Dosage and directions were verified? Yes    Quantity verified: 90 day supply     Pharmacy Verified?  Yes    Last Appointment:  11/13/2024    Future appts:  Future Appointments   Date Time Provider Department Center   11/27/2024  1:00 PM SILVIANO GARSIA US 1 SEYZ [facility] Rad/Car   2/12/2025  9:45 AM Elio Green DO [place] [facility] ECC DEP   2/17/2025 10:30 AM Elio Green DO [place] [facility] DEP   9/18/2025 10:00 AM SILVIANO GARSIA US 1 SEYZ [facility] Rad/Car   9/18/2025 10:30 AM Zeferino Russell MD VAS/ZENY [facility]        (If no appt send self scheduling link. .REFILLAPPT)  Scheduling request sent?     [] Yes  [x] No    Does patient need updated?  [] Yes  [x] No

## 2024-11-29 ENCOUNTER — CLINICAL DOCUMENTATION (OUTPATIENT)
Dept: VASCULAR SURGERY | Age: 79
End: 2024-11-29

## 2024-11-29 NOTE — PROGRESS NOTES
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 with biphasic ankle Doppler tracings and on the left side, triphasic ankle Doppler tracings but overall good arterial flow to both feet based upon the pulse volume recordings over the metatarsals     I called the patient at home, no answer    I called his cell phone left message regarding the above, informed him, overall his circulation is good and not to worry, his cramping in the legs, most probably musculoskeletal etiology not from a vascular etiology, if the symptoms persist, instructed him to discuss with his PCP regarding further workup including evaluation of calcium phosphorus magnesium levels etc. and to call the office if any questions or concerns    Also to keep the appointment to see me next year regarding his carotid artery disease

## 2024-12-02 RX ORDER — METOPROLOL SUCCINATE 25 MG/1
25 TABLET, EXTENDED RELEASE ORAL 2 TIMES DAILY
Qty: 180 TABLET | Refills: 0 | Status: SHIPPED | OUTPATIENT
Start: 2024-12-02

## 2024-12-19 ENCOUNTER — TELEPHONE (OUTPATIENT)
Dept: PRIMARY CARE CLINIC | Age: 79
End: 2024-12-19

## 2024-12-19 DIAGNOSIS — Z12.2 SCREENING FOR LUNG CANCER: Primary | ICD-10-CM

## 2024-12-19 NOTE — TELEPHONE ENCOUNTER
Pt asking you to order his yearly lung ca screening CT, he usually goes to [facility] or any [facility]

## 2025-01-15 ENCOUNTER — HOSPITAL ENCOUNTER (OUTPATIENT)
Dept: CT IMAGING | Age: 80
Discharge: HOME | End: 2025-01-17
Attending: FAMILY MEDICINE
Payer: MEDICARE

## 2025-01-15 DIAGNOSIS — Z12.2 SCREENING FOR LUNG CANCER: ICD-10-CM

## 2025-01-15 PROCEDURE — 71271 CT THORAX LUNG CANCER SCR C-: CPT

## 2025-01-20 DIAGNOSIS — I48.0 PAROXYSMAL ATRIAL FIBRILLATION (HCC): ICD-10-CM

## 2025-01-20 NOTE — TELEPHONE ENCOUNTER
Name of Medication(s) Requested:  Requested Prescriptions     Pending Prescriptions Disp Refills    apixaban (ELIQUIS) 5 MG TABS tablet 180 tablet 0     Sig: TAKE ONE TABLET BY MOUTH TWO TIMES A DAY       Medication is on current medication list Yes    Dosage and directions were verified? Yes    Quantity verified: 90 day supply     Pharmacy Verified?  Yes    Last Appointment:  11/13/2024    Future appts:  Future Appointments   Date Time Provider Department Center   2/12/2025  9:45 AM Elio Green DO [place] [facility]   2/17/2025 10:30 AM Elio Green DO [place] [facility]   9/18/2025 10:00 AM SILVIANO YBERTRAM VAS  1 [address] CARDIO SE Rad/Car   9/18/2025 10:30 AM Zeferino Russell MD VAS/MED [facility]        (If no appt send self scheduling link. .REFILLAPPT)  Scheduling request sent?     [] Yes  [x] No    Does patient need updated?  [] Yes  [x] No

## 2025-01-21 DIAGNOSIS — I25.10 ATHEROSCLEROSIS OF NATIVE CORONARY ARTERY OF NATIVE HEART WITHOUT ANGINA PECTORIS: ICD-10-CM

## 2025-01-21 DIAGNOSIS — I73.9 PVD (PERIPHERAL VASCULAR DISEASE) (HCC): Primary | ICD-10-CM

## 2025-01-21 RX ORDER — EVOLOCUMAB 140 MG/ML
INJECTION, SOLUTION SUBCUTANEOUS
Qty: 6 ML | Refills: 0 | Status: SHIPPED | OUTPATIENT
Start: 2025-01-21

## 2025-02-03 DIAGNOSIS — M51.369 DDD (DEGENERATIVE DISC DISEASE), LUMBAR: ICD-10-CM

## 2025-02-03 DIAGNOSIS — G62.9 NEUROPATHY: ICD-10-CM

## 2025-02-03 RX ORDER — GABAPENTIN 100 MG/1
100 CAPSULE ORAL 2 TIMES DAILY
Qty: 180 CAPSULE | Refills: 1 | Status: SHIPPED | OUTPATIENT
Start: 2025-02-03 | End: 2025-08-02

## 2025-02-05 ENCOUNTER — OFFICE VISIT (OUTPATIENT)
Dept: PRIMARY CARE CLINIC | Age: 80
End: 2025-02-05

## 2025-02-05 VITALS
OXYGEN SATURATION: 97 % | RESPIRATION RATE: 18 BRPM | WEIGHT: 213 LBS | BODY MASS INDEX: 31.55 KG/M2 | HEIGHT: 69 IN | SYSTOLIC BLOOD PRESSURE: 134 MMHG | HEART RATE: 76 BPM | TEMPERATURE: 97.1 F | DIASTOLIC BLOOD PRESSURE: 80 MMHG

## 2025-02-05 DIAGNOSIS — J32.9 SINUSITIS, BACTERIAL: ICD-10-CM

## 2025-02-05 DIAGNOSIS — E11.40 TYPE 2 DIABETES MELLITUS WITH DIABETIC NEUROPATHY, WITHOUT LONG-TERM CURRENT USE OF INSULIN (HCC): ICD-10-CM

## 2025-02-05 DIAGNOSIS — R52 BODY ACHES: Primary | ICD-10-CM

## 2025-02-05 DIAGNOSIS — B96.89 SINUSITIS, BACTERIAL: ICD-10-CM

## 2025-02-05 LAB
INFLUENZA A ANTIBODY: NEGATIVE
INFLUENZA B ANTIBODY: NEGATIVE

## 2025-02-05 ASSESSMENT — PATIENT HEALTH QUESTIONNAIRE - PHQ9
SUM OF ALL RESPONSES TO PHQ QUESTIONS 1-9: 0
SUM OF ALL RESPONSES TO PHQ QUESTIONS 1-9: 0
1. LITTLE INTEREST OR PLEASURE IN DOING THINGS: NOT AT ALL
SUM OF ALL RESPONSES TO PHQ9 QUESTIONS 1 & 2: 0
2. FEELING DOWN, DEPRESSED OR HOPELESS: NOT AT ALL
SUM OF ALL RESPONSES TO PHQ QUESTIONS 1-9: 0
SUM OF ALL RESPONSES TO PHQ QUESTIONS 1-9: 0

## 2025-02-05 ASSESSMENT — ENCOUNTER SYMPTOMS
BACK PAIN: 0
SINUS PRESSURE: 0
COLOR CHANGE: 0
PHOTOPHOBIA: 0
SORE THROAT: 0
RHINORRHEA: 1
COUGH: 1
ALLERGIC/IMMUNOLOGIC NEGATIVE: 1
BLOOD IN STOOL: 0
NAUSEA: 0
WHEEZING: 0
APNEA: 0
ABDOMINAL PAIN: 0
VOMITING: 0
SHORTNESS OF BREATH: 0
DIARRHEA: 0
CHEST TIGHTNESS: 0
FACIAL SWELLING: 0

## 2025-02-05 NOTE — PROGRESS NOTES
Chief Complaint:   Chief Complaint   Patient presents with    Nasal Congestion     Started Sunday.    Generalized Body Aches     Skin is sore.       Cold Symptoms   This is a new problem. The current episode started in the past 7 days. The problem has been waxing and waning. The maximum temperature recorded prior to his arrival was 100.4 - 100.9 F. Associated symptoms include congestion, coughing and rhinorrhea. Pertinent negatives include no abdominal pain, chest pain, diarrhea, headaches, nausea, rash, sore throat, vomiting or wheezing. He has tried acetaminophen for the symptoms. The treatment provided mild relief.   Diabetes  He presents for his follow-up diabetic visit. He has type 2 diabetes mellitus. His disease course has been stable. Pertinent negatives for hypoglycemia include no confusion, headaches or nervousness/anxiousness. Pertinent negatives for diabetes include no chest pain and no weakness. He is compliant with treatment all of the time.       Patient Active Problem List   Diagnosis    Coronary atherosclerosis of native coronary artery    Hyperlipidemia    Aortic stenosis    Essential hypertension    DEBORAH (obstructive sleep apnea)    DDD (degenerative disc disease), cervical    Nocturnal hypoxemia due to asthma    Neuroforaminal stenosis of lumbar spine    Valvular heart disease    Mild intermittent asthma without complication    New onset a-fib ([facility])    S/P AVR (aortic valve replacement)    S/P CABG (coronary artery bypass graft)    Atrial fibrillation with rapid ventricular response ([facility])    Atrial fibrillation ([facility])    Type 2 diabetes mellitus with diabetic neuropathy, without long-term current use of insulin ([facility])    PVD (peripheral vascular disease) ([facility])    Bilateral carotid bruits    Carotid stenosis, right    Diplopia    Acute on chronic diastolic congestive heart failure ([facility])    Obesity    Chronic obstructive pulmonary disease, unspecified COPD type ([facility])    Hyperuricemia    Decreased

## 2025-02-12 ENCOUNTER — OFFICE VISIT (OUTPATIENT)
Dept: PRIMARY CARE CLINIC | Age: 80
End: 2025-02-12

## 2025-02-12 VITALS
DIASTOLIC BLOOD PRESSURE: 78 MMHG | WEIGHT: 209 LBS | SYSTOLIC BLOOD PRESSURE: 132 MMHG | OXYGEN SATURATION: 95 % | HEART RATE: 80 BPM | BODY MASS INDEX: 30.96 KG/M2 | HEIGHT: 69 IN | RESPIRATION RATE: 19 BRPM | TEMPERATURE: 97.5 F

## 2025-02-12 DIAGNOSIS — I10 ESSENTIAL HYPERTENSION: ICD-10-CM

## 2025-02-12 DIAGNOSIS — I50.33 ACUTE ON CHRONIC DIASTOLIC CONGESTIVE HEART FAILURE (HCC): ICD-10-CM

## 2025-02-12 DIAGNOSIS — J44.9 CHRONIC OBSTRUCTIVE PULMONARY DISEASE, UNSPECIFIED COPD TYPE (HCC): ICD-10-CM

## 2025-02-12 DIAGNOSIS — I48.0 PAROXYSMAL ATRIAL FIBRILLATION (HCC): ICD-10-CM

## 2025-02-12 DIAGNOSIS — I25.10 ATHEROSCLEROSIS OF NATIVE CORONARY ARTERY OF NATIVE HEART WITHOUT ANGINA PECTORIS: ICD-10-CM

## 2025-02-12 DIAGNOSIS — Z00.00 MEDICARE ANNUAL WELLNESS VISIT, SUBSEQUENT: Primary | ICD-10-CM

## 2025-02-12 DIAGNOSIS — I73.9 PVD (PERIPHERAL VASCULAR DISEASE): ICD-10-CM

## 2025-02-12 DIAGNOSIS — I73.9 PVD (PERIPHERAL VASCULAR DISEASE) (HCC): ICD-10-CM

## 2025-02-12 DIAGNOSIS — E11.40 TYPE 2 DIABETES MELLITUS WITH DIABETIC NEUROPATHY, WITHOUT LONG-TERM CURRENT USE OF INSULIN (HCC): ICD-10-CM

## 2025-02-12 LAB
ALBUMIN: 4.6 G/DL (ref 3.5–5.2)
ALP BLD-CCNC: 64 U/L (ref 40–129)
ALT SERPL-CCNC: 21 U/L (ref 0–40)
ANION GAP SERPL CALCULATED.3IONS-SCNC: 15 MMOL/L (ref 7–16)
AST SERPL-CCNC: 27 U/L (ref 0–39)
BASOPHILS ABSOLUTE: 0.04 K/UL (ref 0–0.2)
BASOPHILS RELATIVE PERCENT: 1 % (ref 0–2)
BILIRUB SERPL-MCNC: 0.5 MG/DL (ref 0–1.2)
BUN BLDV-MCNC: 28 MG/DL (ref 6–23)
CALCIUM SERPL-MCNC: 9.5 MG/DL (ref 8.6–10.2)
CHLORIDE BLD-SCNC: 103 MMOL/L (ref 98–107)
CHOLESTEROL, TOTAL: 129 MG/DL
CO2: 23 MMOL/L (ref 22–29)
CREAT SERPL-MCNC: 0.9 MG/DL (ref 0.7–1.2)
CREATININE URINE: 21 MG/DL (ref 40–278)
EOSINOPHILS ABSOLUTE: 0.11 K/UL (ref 0.05–0.5)
EOSINOPHILS RELATIVE PERCENT: 2 % (ref 0–6)
GFR, ESTIMATED: 86 ML/MIN/1.73M2
GLUCOSE BLD-MCNC: 132 MG/DL (ref 74–99)
HBA1C MFR BLD: 6.6 % (ref 4–5.6)
HCT VFR BLD CALC: 39.6 % (ref 37–54)
HDLC SERPL-MCNC: 48 MG/DL
HEMOGLOBIN: 12.9 G/DL (ref 12.5–16.5)
IMMATURE GRANULOCYTES %: 0 % (ref 0–5)
IMMATURE GRANULOCYTES ABSOLUTE: 0.03 K/UL (ref 0–0.58)
LDL CHOLESTEROL: 30 MG/DL
LYMPHOCYTES ABSOLUTE: 2.18 K/UL (ref 1.5–4)
LYMPHOCYTES RELATIVE PERCENT: 30 % (ref 20–42)
MCH RBC QN AUTO: 30.8 PG (ref 26–35)
MCHC RBC AUTO-ENTMCNC: 32.6 G/DL (ref 32–34.5)
MCV RBC AUTO: 94.5 FL (ref 80–99.9)
MICROALBUMIN/CREAT 24H UR: <12 MG/L (ref 0–19)
MICROALBUMIN/CREAT UR-RTO: ABNORMAL MCG/MG CREAT (ref 0–30)
MONOCYTES ABSOLUTE: 0.7 K/UL (ref 0.1–0.95)
MONOCYTES RELATIVE PERCENT: 10 % (ref 2–12)
NEUTROPHILS ABSOLUTE: 4.16 K/UL (ref 1.8–7.3)
NEUTROPHILS RELATIVE PERCENT: 58 % (ref 43–80)
PDW BLD-RTO: 13.3 % (ref 11.5–15)
PLATELET # BLD: 157 K/UL (ref 130–450)
PMV BLD AUTO: 10.3 FL (ref 7–12)
POTASSIUM SERPL-SCNC: 4.8 MMOL/L (ref 3.5–5)
RBC # BLD: 4.19 M/UL (ref 3.8–5.8)
SODIUM BLD-SCNC: 141 MMOL/L (ref 132–146)
TOTAL PROTEIN: 7.6 G/DL (ref 6.4–8.3)
TRIGL SERPL-MCNC: 257 MG/DL
TSH SERPL DL<=0.05 MIU/L-ACNC: 1.42 UIU/ML (ref 0.27–4.2)
VLDLC SERPL CALC-MCNC: 51 MG/DL
WBC # BLD: 7.2 K/UL (ref 4.5–11.5)

## 2025-02-12 SDOH — ECONOMIC STABILITY: FOOD INSECURITY: WITHIN THE PAST 12 MONTHS, THE FOOD YOU BOUGHT JUST DIDN'T LAST AND YOU DIDN'T HAVE MONEY TO GET MORE.: NEVER TRUE

## 2025-02-12 SDOH — ECONOMIC STABILITY: FOOD INSECURITY: WITHIN THE PAST 12 MONTHS, YOU WORRIED THAT YOUR FOOD WOULD RUN OUT BEFORE YOU GOT MONEY TO BUY MORE.: NEVER TRUE

## 2025-02-12 ASSESSMENT — PATIENT HEALTH QUESTIONNAIRE - PHQ9
SUM OF ALL RESPONSES TO PHQ QUESTIONS 1-9: 0
2. FEELING DOWN, DEPRESSED OR HOPELESS: NOT AT ALL
1. LITTLE INTEREST OR PLEASURE IN DOING THINGS: NOT AT ALL
SUM OF ALL RESPONSES TO PHQ QUESTIONS 1-9: 0
SUM OF ALL RESPONSES TO PHQ9 QUESTIONS 1 & 2: 0

## 2025-02-12 NOTE — PROGRESS NOTES
mucosa and turbinates normal without polyps  Neck: supple and non-tender without mass, no thyromegaly or thyroid nodules, no cervical lymphadenopathy  Pulmonary/Chest: clear to auscultation bilaterally- no wheezes, rales or rhonchi, normal air movement, no respiratory distress  Cardiovascular: normal rate, regular rhythm, normal S1 and S2, no murmurs, rubs, clicks, or gallops, distal pulses intact, no carotid bruits  Abdomen: soft, non-tender, non-distended, normal bowel sounds, no masses or organomegaly  Extremities: no cyanosis, clubbing or edema  Musculoskeletal: normal range of motion, no joint swelling, deformity or tenderness  Neurologic: reflexes normal and symmetric, no cranial nerve deficit, gait, coordination and speech normal            Allergies   Allergen Reactions    Iodine Swelling and Other (See Comments)     Reddened face and swelling of tongue     Prior to Visit Medications    Medication Sig Taking? Authorizing Provider   empagliflozin (JARDIANCE) 10 MG tablet Take 1 tablet by mouth daily  Elio Green DO   amoxicillin-clavulanate (AUGMENTIN) 875-125 MG per tablet Take 1 tablet by mouth 2 times daily for 10 days  Elio Green DO   gabapentin (NEURONTIN) 100 MG capsule Take 1 capsule by mouth 2 times daily for 180 days. Intended supply: 90 days  Elio Green DO   REPATHA SOSY syringe INJECT SUBCUTANEOUSLY 2 TIMES A MONTH  Elio Green DO   apixaban (ELIQUIS) 5 MG TABS tablet TAKE ONE TABLET BY MOUTH TWO TIMES A DAY  Elio Green DO   metoprolol succinate (TOPROL XL) 25 MG extended release tablet TAKE ONE TABLET BY MOUTH TWO TIMES A DAY  Elio Green DO   amLODIPine (NORVASC) 5 MG tablet TAKE ONE TABLET BY MOUTH DAILY  Ganesh Peng DO   olmesartan (BENICAR) 20 MG tablet TAKE ONE TABLET BY MOUTH EVERY DAY  Elio Green DO   omeprazole (PRILOSEC) 20 MG delayed release capsule Take 1 capsule by mouth daily  Elio Green DO   allopurinol (ZYLOPRIM) 300 MG tablet Take 1 tablet

## 2025-02-12 NOTE — PATIENT INSTRUCTIONS

## 2025-02-13 DIAGNOSIS — I10 ESSENTIAL HYPERTENSION: Primary | ICD-10-CM

## 2025-02-13 RX ORDER — OLMESARTAN MEDOXOMIL 20 MG/1
20 TABLET ORAL DAILY
Qty: 90 TABLET | Refills: 0 | Status: SHIPPED | OUTPATIENT
Start: 2025-02-13

## 2025-03-03 DIAGNOSIS — I10 ESSENTIAL HYPERTENSION: Chronic | ICD-10-CM

## 2025-03-03 RX ORDER — AMLODIPINE BESYLATE 5 MG/1
5 TABLET ORAL DAILY
Qty: 90 TABLET | Refills: 1 | Status: SHIPPED | OUTPATIENT
Start: 2025-03-03

## 2025-03-26 DIAGNOSIS — E79.0 HYPERURICEMIA: ICD-10-CM

## 2025-03-26 RX ORDER — OMEPRAZOLE 20 MG/1
20 CAPSULE, DELAYED RELEASE ORAL DAILY
Qty: 90 CAPSULE | Refills: 1 | Status: SHIPPED | OUTPATIENT
Start: 2025-03-26

## 2025-03-26 RX ORDER — ALLOPURINOL 300 MG/1
300 TABLET ORAL DAILY
Qty: 90 TABLET | Refills: 1 | Status: SHIPPED | OUTPATIENT
Start: 2025-03-26

## 2025-03-26 NOTE — TELEPHONE ENCOUNTER
Name of Medication(s) Requested:  Requested Prescriptions     Pending Prescriptions Disp Refills    allopurinol (ZYLOPRIM) 300 MG tablet 90 tablet 1     Sig: Take 1 tablet by mouth daily    omeprazole (PRILOSEC) 20 MG delayed release capsule 90 capsule 1     Sig: Take 1 capsule by mouth daily       Medication is on current medication list Yes    Dosage and directions were verified? Yes    Quantity verified: 90 day supply     Pharmacy Verified?  Yes    Last Appointment:  2/12/2025    Future appts:  Future Appointments   Date Time Provider Department Center   4/14/2025  9:30 AM Elio Green DO [place] [facility] DEP   9/18/2025 10:00 AM SILVIANO BRISENO VAS US 1 SEYZ CARDIO SE Rad/Car   9/18/2025 10:30 AM Zeferino Russell MD VASC/MED [facility]        (If no appt send self scheduling link. .REFILLAPPT)  Scheduling request sent?     [] Yes  [x] No    Does patient need updated?  [] Yes  [x] No

## 2025-04-12 DIAGNOSIS — I73.9 PVD (PERIPHERAL VASCULAR DISEASE): ICD-10-CM

## 2025-04-12 DIAGNOSIS — I25.10 ATHEROSCLEROSIS OF NATIVE CORONARY ARTERY OF NATIVE HEART WITHOUT ANGINA PECTORIS: ICD-10-CM

## 2025-04-14 ENCOUNTER — RESULTS FOLLOW-UP (OUTPATIENT)
Dept: PRIMARY CARE CLINIC | Age: 80
End: 2025-04-14

## 2025-04-14 ENCOUNTER — OFFICE VISIT (OUTPATIENT)
Dept: PRIMARY CARE CLINIC | Age: 80
End: 2025-04-14
Payer: MEDICARE

## 2025-04-14 VITALS
DIASTOLIC BLOOD PRESSURE: 80 MMHG | RESPIRATION RATE: 18 BRPM | BODY MASS INDEX: 31.4 KG/M2 | SYSTOLIC BLOOD PRESSURE: 138 MMHG | OXYGEN SATURATION: 96 % | HEIGHT: 69 IN | HEART RATE: 77 BPM | WEIGHT: 212 LBS | TEMPERATURE: 97 F

## 2025-04-14 DIAGNOSIS — I10 ESSENTIAL HYPERTENSION: Primary | ICD-10-CM

## 2025-04-14 DIAGNOSIS — I25.10 ATHEROSCLEROSIS OF NATIVE CORONARY ARTERY OF NATIVE HEART WITHOUT ANGINA PECTORIS: ICD-10-CM

## 2025-04-14 DIAGNOSIS — I10 ESSENTIAL HYPERTENSION: ICD-10-CM

## 2025-04-14 DIAGNOSIS — E79.0 HYPERURICEMIA: ICD-10-CM

## 2025-04-14 DIAGNOSIS — E11.40 TYPE 2 DIABETES MELLITUS WITH DIABETIC NEUROPATHY, WITHOUT LONG-TERM CURRENT USE OF INSULIN (HCC): ICD-10-CM

## 2025-04-14 DIAGNOSIS — I48.0 PAROXYSMAL ATRIAL FIBRILLATION (HCC): ICD-10-CM

## 2025-04-14 LAB
ALBUMIN: 4.2 G/DL (ref 3.5–5.2)
ALP BLD-CCNC: 58 U/L (ref 40–129)
ALT SERPL-CCNC: 27 U/L (ref 0–40)
ANION GAP SERPL CALCULATED.3IONS-SCNC: 14 MMOL/L (ref 7–16)
AST SERPL-CCNC: 28 U/L (ref 0–39)
BASOPHILS ABSOLUTE: 0.07 K/UL (ref 0–0.2)
BASOPHILS RELATIVE PERCENT: 1 % (ref 0–2)
BILIRUB SERPL-MCNC: 0.4 MG/DL (ref 0–1.2)
BUN BLDV-MCNC: 24 MG/DL (ref 6–23)
CALCIUM SERPL-MCNC: 9.9 MG/DL (ref 8.6–10.2)
CHLORIDE BLD-SCNC: 101 MMOL/L (ref 98–107)
CHOLESTEROL, TOTAL: 145 MG/DL
CO2: 22 MMOL/L (ref 22–29)
CREAT SERPL-MCNC: 1 MG/DL (ref 0.7–1.2)
EOSINOPHILS ABSOLUTE: 0.19 K/UL (ref 0.05–0.5)
EOSINOPHILS RELATIVE PERCENT: 3 % (ref 0–6)
GFR, ESTIMATED: 80 ML/MIN/1.73M2
GLUCOSE BLD-MCNC: 134 MG/DL (ref 74–99)
HBA1C MFR BLD: 6.5 % (ref 4–5.6)
HCT VFR BLD CALC: 39.4 % (ref 37–54)
HDLC SERPL-MCNC: 65 MG/DL
HEMOGLOBIN: 13 G/DL (ref 12.5–16.5)
IMMATURE GRANULOCYTES %: 0 % (ref 0–5)
IMMATURE GRANULOCYTES ABSOLUTE: <0.03 K/UL (ref 0–0.58)
LDL CHOLESTEROL: 43 MG/DL
LYMPHOCYTES ABSOLUTE: 1.88 K/UL (ref 1.5–4)
LYMPHOCYTES RELATIVE PERCENT: 28 % (ref 20–42)
MCH RBC QN AUTO: 30.6 PG (ref 26–35)
MCHC RBC AUTO-ENTMCNC: 33 G/DL (ref 32–34.5)
MCV RBC AUTO: 92.7 FL (ref 80–99.9)
MONOCYTES ABSOLUTE: 0.7 K/UL (ref 0.1–0.95)
MONOCYTES RELATIVE PERCENT: 10 % (ref 2–12)
NEUTROPHILS ABSOLUTE: 3.88 K/UL (ref 1.8–7.3)
NEUTROPHILS RELATIVE PERCENT: 58 % (ref 43–80)
PDW BLD-RTO: 13.3 % (ref 11.5–15)
PLATELET # BLD: 137 K/UL (ref 130–450)
PMV BLD AUTO: 10.4 FL (ref 7–12)
POTASSIUM SERPL-SCNC: 4.3 MMOL/L (ref 3.5–5)
RBC # BLD: 4.25 M/UL (ref 3.8–5.8)
SODIUM BLD-SCNC: 137 MMOL/L (ref 132–146)
TOTAL PROTEIN: 7.2 G/DL (ref 6.4–8.3)
TRIGL SERPL-MCNC: 183 MG/DL
TSH SERPL DL<=0.05 MIU/L-ACNC: 1.27 UIU/ML (ref 0.27–4.2)
URIC ACID: 4.9 MG/DL (ref 3.4–7)
VLDLC SERPL CALC-MCNC: 37 MG/DL
WBC # BLD: 6.7 K/UL (ref 4.5–11.5)

## 2025-04-14 PROCEDURE — 3079F DIAST BP 80-89 MM HG: CPT | Performed by: FAMILY MEDICINE

## 2025-04-14 PROCEDURE — 99214 OFFICE O/P EST MOD 30 MIN: CPT | Performed by: FAMILY MEDICINE

## 2025-04-14 PROCEDURE — G8417 CALC BMI ABV UP PARAM F/U: HCPCS | Performed by: FAMILY MEDICINE

## 2025-04-14 PROCEDURE — 1123F ACP DISCUSS/DSCN MKR DOCD: CPT | Performed by: FAMILY MEDICINE

## 2025-04-14 PROCEDURE — 3075F SYST BP GE 130 - 139MM HG: CPT | Performed by: FAMILY MEDICINE

## 2025-04-14 PROCEDURE — 1036F TOBACCO NON-USER: CPT | Performed by: FAMILY MEDICINE

## 2025-04-14 PROCEDURE — G8427 DOCREV CUR MEDS BY ELIG CLIN: HCPCS | Performed by: FAMILY MEDICINE

## 2025-04-14 PROCEDURE — 1159F MED LIST DOCD IN RCRD: CPT | Performed by: FAMILY MEDICINE

## 2025-04-14 PROCEDURE — 3044F HG A1C LEVEL LT 7.0%: CPT | Performed by: FAMILY MEDICINE

## 2025-04-14 RX ORDER — EVOLOCUMAB 140 MG/ML
INJECTION, SOLUTION SUBCUTANEOUS
Qty: 6 ML | Refills: 0 | Status: SHIPPED | OUTPATIENT
Start: 2025-04-14

## 2025-04-14 ASSESSMENT — ENCOUNTER SYMPTOMS
SINUS PRESSURE: 0
PHOTOPHOBIA: 0
VOMITING: 0
ALLERGIC/IMMUNOLOGIC NEGATIVE: 1
COUGH: 0
NAUSEA: 0
DIARRHEA: 0
SORE THROAT: 0
BACK PAIN: 0
BLOOD IN STOOL: 0
SHORTNESS OF BREATH: 0
ABDOMINAL PAIN: 0
FACIAL SWELLING: 0
APNEA: 0
WHEEZING: 0
CHEST TIGHTNESS: 0
COLOR CHANGE: 0

## 2025-04-14 NOTE — PROGRESS NOTES
Chief Complaint:   Chief Complaint   Patient presents with    3 Month Follow-Up       Hypertension  This is a chronic problem. The current episode started more than 1 year ago. The problem has been resolved since onset. The problem is controlled. Pertinent negatives include no chest pain, headaches, palpitations or shortness of breath. Past treatments include angiotensin blockers, calcium channel blockers and beta blockers. The current treatment provides significant improvement.   Diabetes  He presents for his follow-up diabetic visit. He has type 2 diabetes mellitus. His disease course has been stable. Pertinent negatives for hypoglycemia include no confusion, headaches or nervousness/anxiousness. Pertinent negatives for diabetes include no chest pain and no weakness. Symptoms are stable. Current diabetic treatment includes oral agent (monotherapy). He is compliant with treatment all of the time.       Patient Active Problem List   Diagnosis    Coronary atherosclerosis of native coronary artery    Hyperlipidemia    Aortic stenosis    Essential hypertension    DEBORAH (obstructive sleep apnea)    DDD (degenerative disc disease), cervical    Nocturnal hypoxemia due to asthma    Neuroforaminal stenosis of lumbar spine    Valvular heart disease    Mild intermittent asthma without complication    New onset a-fib ([facility])    S/P AVR (aortic valve replacement)    S/P CABG (coronary artery bypass graft)    Atrial fibrillation with rapid ventricular response ([facility])    Atrial fibrillation ([facility])    Type 2 diabetes mellitus with diabetic neuropathy, without long-term current use of insulin ([facility])    PVD (peripheral vascular disease)    Bilateral carotid bruits    Carotid stenosis, right    Diplopia    Acute on chronic diastolic congestive heart failure ([facility])    Obesity    Chronic obstructive pulmonary disease, unspecified COPD type ([facility])    Hyperuricemia    Decreased dorsalis pedis pulse    Difficulty walking       Past Medical

## 2025-04-15 LAB
CREATININE URINE: 20.6 MG/DL (ref 40–278)
MICROALBUMIN/CREAT 24H UR: <12 MG/L (ref 0–19)
MICROALBUMIN/CREAT UR-RTO: ABNORMAL MCG/MG CREAT (ref 0–30)

## 2025-05-02 DIAGNOSIS — I10 ESSENTIAL HYPERTENSION: Chronic | ICD-10-CM

## 2025-05-02 RX ORDER — TORSEMIDE 10 MG/1
10 TABLET ORAL DAILY
Qty: 90 TABLET | Refills: 0 | Status: SHIPPED | OUTPATIENT
Start: 2025-05-02

## 2025-05-27 DIAGNOSIS — I10 ESSENTIAL HYPERTENSION: ICD-10-CM

## 2025-05-27 RX ORDER — OLMESARTAN MEDOXOMIL 20 MG/1
20 TABLET ORAL DAILY
Qty: 90 TABLET | Refills: 1 | Status: SHIPPED | OUTPATIENT
Start: 2025-05-27

## 2025-05-27 NOTE — TELEPHONE ENCOUNTER
Last Appointment:  4/14/2025  Future Appointments   Date Time Provider Department Center   7/14/2025  9:45 AM Elio Green DO [place]  [facility] DEP   9/18/2025 10:00 AM SILVIANO GARSIA  1 [address] [facility]ARDIO [facility] Rad/Car   9/18/2025 10:30 AM Zeferino Russell MD VASC/MED [facility]

## 2025-06-12 LAB — DIABETIC RETINOPATHY: NEGATIVE

## 2025-07-09 DIAGNOSIS — I73.9 PVD (PERIPHERAL VASCULAR DISEASE): ICD-10-CM

## 2025-07-09 DIAGNOSIS — I25.10 ATHEROSCLEROSIS OF NATIVE CORONARY ARTERY OF NATIVE HEART WITHOUT ANGINA PECTORIS: ICD-10-CM

## 2025-07-09 RX ORDER — EVOLOCUMAB 140 MG/ML
INJECTION, SOLUTION SUBCUTANEOUS
Qty: 6 ML | Refills: 0 | Status: SHIPPED | OUTPATIENT
Start: 2025-07-09

## 2025-07-10 DIAGNOSIS — I48.0 PAROXYSMAL ATRIAL FIBRILLATION (HCC): ICD-10-CM

## 2025-07-10 RX ORDER — APIXABAN 5 MG/1
5 TABLET, FILM COATED ORAL 2 TIMES DAILY
Qty: 180 TABLET | Refills: 0 | Status: SHIPPED | OUTPATIENT
Start: 2025-07-10

## 2025-07-14 ENCOUNTER — OFFICE VISIT (OUTPATIENT)
Dept: PRIMARY CARE CLINIC | Age: 80
End: 2025-07-14
Payer: MEDICARE

## 2025-07-14 VITALS
BODY MASS INDEX: 30.66 KG/M2 | OXYGEN SATURATION: 96 % | HEART RATE: 98 BPM | WEIGHT: 207 LBS | RESPIRATION RATE: 18 BRPM | DIASTOLIC BLOOD PRESSURE: 78 MMHG | HEIGHT: 69 IN | SYSTOLIC BLOOD PRESSURE: 138 MMHG | TEMPERATURE: 97.1 F

## 2025-07-14 DIAGNOSIS — E11.40 TYPE 2 DIABETES MELLITUS WITH DIABETIC NEUROPATHY, WITHOUT LONG-TERM CURRENT USE OF INSULIN (HCC): ICD-10-CM

## 2025-07-14 DIAGNOSIS — I48.0 PAROXYSMAL ATRIAL FIBRILLATION (HCC): ICD-10-CM

## 2025-07-14 DIAGNOSIS — I10 ESSENTIAL HYPERTENSION: ICD-10-CM

## 2025-07-14 DIAGNOSIS — I50.33 ACUTE ON CHRONIC DIASTOLIC CONGESTIVE HEART FAILURE (HCC): ICD-10-CM

## 2025-07-14 DIAGNOSIS — M25.50 ARTHRALGIA, UNSPECIFIED JOINT: ICD-10-CM

## 2025-07-14 DIAGNOSIS — E79.0 HYPERURICEMIA: ICD-10-CM

## 2025-07-14 DIAGNOSIS — I48.0 PAROXYSMAL ATRIAL FIBRILLATION (HCC): Primary | ICD-10-CM

## 2025-07-14 LAB
ALBUMIN: 4.2 G/DL (ref 3.5–5.2)
ALP BLD-CCNC: 64 U/L (ref 40–129)
ALT SERPL-CCNC: 22 U/L (ref 0–50)
ANION GAP SERPL CALCULATED.3IONS-SCNC: 12 MMOL/L (ref 7–16)
AST SERPL-CCNC: 30 U/L (ref 0–50)
BASOPHILS ABSOLUTE: 0.07 K/UL (ref 0–0.2)
BASOPHILS RELATIVE PERCENT: 1 % (ref 0–2)
BILIRUB SERPL-MCNC: 0.2 MG/DL (ref 0–1.2)
BUN BLDV-MCNC: 27 MG/DL (ref 8–23)
CALCIUM SERPL-MCNC: 9.6 MG/DL (ref 8.8–10.2)
CHLORIDE BLD-SCNC: 105 MMOL/L (ref 98–107)
CHOLESTEROL, TOTAL: 129 MG/DL
CO2: 23 MMOL/L (ref 22–29)
CREAT SERPL-MCNC: 1 MG/DL (ref 0.7–1.2)
EOSINOPHILS ABSOLUTE: 0.12 K/UL (ref 0.05–0.5)
EOSINOPHILS RELATIVE PERCENT: 2 % (ref 0–6)
GFR, ESTIMATED: 75 ML/MIN/1.73M2
GLUCOSE BLD-MCNC: 101 MG/DL (ref 74–99)
HBA1C MFR BLD: 6.8 % (ref 4–5.6)
HCT VFR BLD CALC: 39.9 % (ref 37–54)
HDLC SERPL-MCNC: 50 MG/DL
HEMOGLOBIN: 13.1 G/DL (ref 12.5–16.5)
IMMATURE GRANULOCYTES %: 0 % (ref 0–5)
IMMATURE GRANULOCYTES ABSOLUTE: <0.03 K/UL (ref 0–0.58)
LDL CHOLESTEROL: 45 MG/DL
LYMPHOCYTES ABSOLUTE: 1.95 K/UL (ref 1.5–4)
LYMPHOCYTES RELATIVE PERCENT: 30 % (ref 20–42)
MCH RBC QN AUTO: 30.1 PG (ref 26–35)
MCHC RBC AUTO-ENTMCNC: 32.8 G/DL (ref 32–34.5)
MCV RBC AUTO: 91.7 FL (ref 80–99.9)
MONOCYTES ABSOLUTE: 0.72 K/UL (ref 0.1–0.95)
MONOCYTES RELATIVE PERCENT: 11 % (ref 2–12)
NEUTROPHILS ABSOLUTE: 3.57 K/UL (ref 1.8–7.3)
NEUTROPHILS RELATIVE PERCENT: 55 % (ref 43–80)
PDW BLD-RTO: 13.7 % (ref 11.5–15)
PLATELET # BLD: 143 K/UL (ref 130–450)
PMV BLD AUTO: 10.6 FL (ref 7–12)
POTASSIUM SERPL-SCNC: 4.5 MMOL/L (ref 3.5–5.1)
RBC # BLD: 4.35 M/UL (ref 3.8–5.8)
RHEUMATOID FACTOR: <10 IU/ML (ref 0–14)
SED RATE, AUTOMATED: 20 MM/HR (ref 0–15)
SODIUM BLD-SCNC: 140 MMOL/L (ref 136–145)
TOTAL CK: 190 U/L (ref 0–190)
TOTAL PROTEIN: 7 G/DL (ref 6.4–8.3)
TRIGL SERPL-MCNC: 169 MG/DL
TSH SERPL DL<=0.05 MIU/L-ACNC: 0.96 UIU/ML (ref 0.27–4.2)
URIC ACID: 4.5 MG/DL (ref 3.4–7)
VLDLC SERPL CALC-MCNC: 34 MG/DL
WBC # BLD: 6.5 K/UL (ref 4.5–11.5)

## 2025-07-14 PROCEDURE — 99214 OFFICE O/P EST MOD 30 MIN: CPT | Performed by: FAMILY MEDICINE

## 2025-07-14 PROCEDURE — 3075F SYST BP GE 130 - 139MM HG: CPT | Performed by: FAMILY MEDICINE

## 2025-07-14 PROCEDURE — 3078F DIAST BP <80 MM HG: CPT | Performed by: FAMILY MEDICINE

## 2025-07-14 PROCEDURE — 1036F TOBACCO NON-USER: CPT | Performed by: FAMILY MEDICINE

## 2025-07-14 PROCEDURE — G8427 DOCREV CUR MEDS BY ELIG CLIN: HCPCS | Performed by: FAMILY MEDICINE

## 2025-07-14 PROCEDURE — G8417 CALC BMI ABV UP PARAM F/U: HCPCS | Performed by: FAMILY MEDICINE

## 2025-07-14 PROCEDURE — 3044F HG A1C LEVEL LT 7.0%: CPT | Performed by: FAMILY MEDICINE

## 2025-07-14 PROCEDURE — 1123F ACP DISCUSS/DSCN MKR DOCD: CPT | Performed by: FAMILY MEDICINE

## 2025-07-14 PROCEDURE — 1159F MED LIST DOCD IN RCRD: CPT | Performed by: FAMILY MEDICINE

## 2025-07-14 ASSESSMENT — ENCOUNTER SYMPTOMS
ABDOMINAL PAIN: 0
PHOTOPHOBIA: 0
BLOOD IN STOOL: 0
WHEEZING: 0
ALLERGIC/IMMUNOLOGIC NEGATIVE: 1
VOMITING: 0
COUGH: 0
SORE THROAT: 0
COLOR CHANGE: 0
BACK PAIN: 1
FACIAL SWELLING: 0
SINUS PRESSURE: 0
SHORTNESS OF BREATH: 0
CHEST TIGHTNESS: 0
DIARRHEA: 0
APNEA: 0
NAUSEA: 0

## 2025-07-14 NOTE — PROGRESS NOTES
Chief Complaint:   Chief Complaint   Patient presents with    3 Month Follow-Up       Hypertension  This is a chronic problem. The current episode started more than 1 year ago. The problem has been resolved since onset. The problem is controlled. Pertinent negatives include no chest pain, headaches, palpitations or shortness of breath. Past treatments include angiotensin blockers, calcium channel blockers, beta blockers and diuretics. The current treatment provides significant improvement. There are no compliance problems.    Diabetes  He presents for his follow-up diabetic visit. He has type 2 diabetes mellitus. His disease course has been stable. Pertinent negatives for hypoglycemia include no confusion, headaches or nervousness/anxiousness. Pertinent negatives for diabetes include no chest pain and no weakness. Symptoms are stable. Current diabetic treatment includes diet. He is compliant with treatment all of the time.   Muscle Pain  This is a new problem. The current episode started in the past 7 days. The problem occurs constantly. Pain location: diffuse. Pertinent negatives include no abdominal pain, chest pain, diarrhea, headaches, joint swelling, nausea, rash, shortness of breath, vomiting, weakness or wheezing.       Patient Active Problem List   Diagnosis    Coronary atherosclerosis of native coronary artery    Hyperlipidemia    Aortic stenosis    Essential hypertension    DEBORAH (obstructive sleep apnea)    DDD (degenerative disc disease), cervical    Nocturnal hypoxemia due to asthma    Neuroforaminal stenosis of lumbar spine    Valvular heart disease    Mild intermittent asthma without complication    New onset a-fib ([facility])    S/P AVR (aortic valve replacement)    S/P CABG (coronary artery bypass graft)    Atrial fibrillation with rapid ventricular response ([facility])    Atrial fibrillation ([facility])    Type 2 diabetes mellitus with diabetic neuropathy, without long-term current use of insulin ([facility])    PVD

## 2025-07-16 DIAGNOSIS — R76.8 ANA POSITIVE: ICD-10-CM

## 2025-07-16 DIAGNOSIS — M13.0 POLYARTHRITIS: Primary | ICD-10-CM

## 2025-07-16 LAB
ANA PATTERN: ABNORMAL
ANA TITER: ABNORMAL
ANTI-NUCLEAR ANTIBODY (ANA): POSITIVE

## 2025-07-31 DIAGNOSIS — E11.40 TYPE 2 DIABETES MELLITUS WITH DIABETIC NEUROPATHY, WITHOUT LONG-TERM CURRENT USE OF INSULIN (HCC): ICD-10-CM

## 2025-07-31 NOTE — TELEPHONE ENCOUNTER
Name of Medication(s) Requested:  Requested Prescriptions      No prescriptions requested or ordered in this encounter       Medication is on current medication list Yes    Dosage and directions were verified? Yes    Quantity verified: 90 day supply     Pharmacy Verified?  Yes    Last Appointment:  7/14/2025    Future appts:  Future Appointments   Date Time Provider Department Center   9/18/2025 10:00 AM SILVIANO GARSIA US 1 SEYZ CARDIO SE Rad/Car   9/18/2025 10:30 AM Zeferino Russell MD VAS/MED [facility]   10/14/2025 10:00 AM Elio Green DO [place] [facility] E[facility] DEP   10/20/2025  4:00 PM Theodore Chavez DO BDM [facility]        (If no appt send self scheduling link. .REFILLAPPT)  Scheduling request sent?     [] Yes  [] No    Does patient need updated?  [] Yes  [] No

## 2025-08-04 DIAGNOSIS — M51.369 DDD (DEGENERATIVE DISC DISEASE), LUMBAR: ICD-10-CM

## 2025-08-04 DIAGNOSIS — G62.9 NEUROPATHY: ICD-10-CM

## 2025-08-04 RX ORDER — GABAPENTIN 100 MG/1
100 CAPSULE ORAL 2 TIMES DAILY
Qty: 180 CAPSULE | Refills: 1 | Status: SHIPPED | OUTPATIENT
Start: 2025-08-04 | End: 2026-01-31

## 2025-08-19 DIAGNOSIS — I10 ESSENTIAL HYPERTENSION: Chronic | ICD-10-CM

## 2025-08-19 RX ORDER — TORSEMIDE 10 MG/1
10 TABLET ORAL DAILY
Qty: 90 TABLET | Refills: 0 | Status: SHIPPED | OUTPATIENT
Start: 2025-08-19

## 2025-09-02 DIAGNOSIS — I10 ESSENTIAL HYPERTENSION: Chronic | ICD-10-CM

## 2025-09-02 RX ORDER — AMLODIPINE BESYLATE 5 MG/1
5 TABLET ORAL DAILY
Qty: 90 TABLET | Refills: 1 | Status: SHIPPED | OUTPATIENT
Start: 2025-09-02

## 2025-09-05 RX ORDER — METOPROLOL SUCCINATE 25 MG/1
25 TABLET, EXTENDED RELEASE ORAL 2 TIMES DAILY
Qty: 180 TABLET | Refills: 0 | Status: SHIPPED | OUTPATIENT
Start: 2025-09-05